# Patient Record
Sex: MALE | Race: WHITE | NOT HISPANIC OR LATINO | Employment: OTHER | URBAN - METROPOLITAN AREA
[De-identification: names, ages, dates, MRNs, and addresses within clinical notes are randomized per-mention and may not be internally consistent; named-entity substitution may affect disease eponyms.]

---

## 2018-10-23 PROBLEM — R07.9 CHEST PAIN: Status: ACTIVE | Noted: 2018-10-23

## 2020-06-18 ENCOUNTER — OFFICE VISIT (OUTPATIENT)
Dept: FAMILY MEDICINE | Facility: CLINIC | Age: 65
End: 2020-06-18
Payer: MEDICARE

## 2020-06-18 VITALS
WEIGHT: 153.38 LBS | OXYGEN SATURATION: 95 % | SYSTOLIC BLOOD PRESSURE: 152 MMHG | DIASTOLIC BLOOD PRESSURE: 90 MMHG | HEART RATE: 68 BPM | BODY MASS INDEX: 24.65 KG/M2 | HEIGHT: 66 IN | TEMPERATURE: 99 F

## 2020-06-18 DIAGNOSIS — Z12.5 SCREENING FOR PROSTATE CANCER: ICD-10-CM

## 2020-06-18 DIAGNOSIS — I10 ESSENTIAL HYPERTENSION: ICD-10-CM

## 2020-06-18 DIAGNOSIS — Z13.89 SCREENING FOR BLOOD OR PROTEIN IN URINE: ICD-10-CM

## 2020-06-18 DIAGNOSIS — Z79.899 LONG-TERM USE OF HIGH-RISK MEDICATION: ICD-10-CM

## 2020-06-18 DIAGNOSIS — Z13.29 SCREENING FOR ENDOCRINE DISORDER: ICD-10-CM

## 2020-06-18 DIAGNOSIS — K21.9 GASTROESOPHAGEAL REFLUX DISEASE WITHOUT ESOPHAGITIS: Primary | ICD-10-CM

## 2020-06-18 DIAGNOSIS — Z13.0 SCREENING FOR IRON DEFICIENCY ANEMIA: ICD-10-CM

## 2020-06-18 DIAGNOSIS — F41.1 GENERALIZED ANXIETY DISORDER: ICD-10-CM

## 2020-06-18 DIAGNOSIS — Z13.6 SCREENING FOR ISCHEMIC HEART DISEASE (IHD): ICD-10-CM

## 2020-06-18 DIAGNOSIS — E78.2 MIXED HYPERLIPIDEMIA: ICD-10-CM

## 2020-06-18 DIAGNOSIS — B00.1 FEVER BLISTER: ICD-10-CM

## 2020-06-18 PROCEDURE — 99203 OFFICE O/P NEW LOW 30 MIN: CPT | Mod: S$GLB,,, | Performed by: FAMILY MEDICINE

## 2020-06-18 PROCEDURE — 99203 PR OFFICE/OUTPT VISIT, NEW, LEVL III, 30-44 MIN: ICD-10-PCS | Mod: S$GLB,,, | Performed by: FAMILY MEDICINE

## 2020-06-18 RX ORDER — HYDROGEN PEROXIDE 3 %
20 SOLUTION, NON-ORAL MISCELLANEOUS
COMMUNITY
End: 2020-07-08 | Stop reason: SDUPTHER

## 2020-06-18 RX ORDER — ESCITALOPRAM OXALATE 20 MG/1
10 TABLET ORAL DAILY
Qty: 90 TABLET | Refills: 1 | Status: SHIPPED | OUTPATIENT
Start: 2020-06-18 | End: 2022-03-14 | Stop reason: SDUPTHER

## 2020-06-18 RX ORDER — FAMOTIDINE 20 MG/1
20 TABLET, FILM COATED ORAL 2 TIMES DAILY
COMMUNITY
End: 2020-07-17

## 2020-06-18 RX ORDER — ROSUVASTATIN CALCIUM 20 MG/1
20 TABLET, COATED ORAL DAILY
Qty: 30 TABLET | Refills: 3 | Status: SHIPPED | OUTPATIENT
Start: 2020-06-18 | End: 2020-07-17

## 2020-06-18 NOTE — PROGRESS NOTES
SUBJECTIVE:    Patient ID: Martell Corcoran is a 65 y.o. male.    Chief Complaint: Establish Care and Bottles brought    Pt here to establish care.       Pt with a PMHx of Anxiety, Pepcid, HTN, CAD (65%), HLD, GERD, Anxiety    CAD, has chest pain, with exertion, not much just walking around (feeding chickens or tending to coup) doesn't currently have a cardiologist. Wants to see Dr. French Gaffney    Has COPD, has a steady cough where he get phlegm up, clear, takes mucinex DM.     HLD, LDL is 209, not on a statin. Has trouble with statin(pravastatin, atorvastatin).  Has was to try livalo in the past but her insurance did not want to cover it.     Has bad reflux, and found that pepcid did not work as well, but otc nexium works well. (failed prilosec,protonix)    BP is not good today, takes it at home, and tends to fluctuate there too.      Has had a lot of anxiety. Thinks back, and thinks it has been all of is life. Probably didn't realize until his 30s. Has been on Xanax since 2009. Takes as needed.  Has also been on lexapro for a while.     Has occasional fever blister breakout. Has one right now.    Has been on avodart before. Urinating fine.           No visits with results within 6 Month(s) from this visit.   Latest known visit with results is:   Orders Only on 11/25/2019   Component Date Value Ref Range Status    Cholesterol 11/25/2019 284* 100 - 199 mg/dL Final    Triglycerides 11/25/2019 106  0 - 149 mg/dL Final    HDL 11/25/2019 54  >39 mg/dL Final    VLDL Cholesterol Christophe 11/25/2019 21  5 - 40 mg/dL Final    LDL Calculated 11/25/2019 209* 0 - 99 mg/dL Final       Past Medical History:   Diagnosis Date    Benign enlargement of prostate     Had a biopsy in around 2015    GERD (gastroesophageal reflux disease)     Hypertension     Started with meds in 2012.     Past Surgical History:   Procedure Laterality Date    FISTULA REPAIR      LEFT HEART CATHETERIZATION N/A 10/23/2018    Procedure:  Left heart cath;  Surgeon: Niharika Squires MD;  Location: [facility];  Service: Cardiology;  Laterality: N/A;     Family History   Problem Relation Age of Onset    Heart failure Mother     Cancer Father     Heart disease Brother     Heart attack Brother     Cancer Brother        Marital Status:   Alcohol History:  reports current alcohol use.  Tobacco History:  reports that he has been smoking cigarettes. He has been smoking about 1.00 pack per day. He has never used smokeless tobacco.  Drug History:  reports no history of drug use.    Review of patient's allergies indicates:   Allergen Reactions    Oyster extract Swelling     PT swells in his throat after eating oysters on occasion       Current Outpatient Medications:     ALPRAZolam (XANAX) 0.5 MG tablet, Take 0.5 mg by mouth 3 (three) times daily as needed for Anxiety., Disp: , Rfl:     azelastine (ASTELIN) 137 mcg (0.1 %) nasal spray, 1 spray by Nasal route 2 (two) times daily., Disp: , Rfl:     escitalopram oxalate (LEXAPRO) 20 MG tablet, Take 0.5 tablets (10 mg total) by mouth once daily., Disp: 90 tablet, Rfl: 1    esomeprazole (NEXIUM) 20 MG capsule, Take 20 mg by mouth before breakfast., Disp: , Rfl:     losartan (COZAAR) 100 MG tablet, Take 100 mg by mouth once daily., Disp: , Rfl:     valACYclovir (VALTREX) 1000 MG tablet, Take 1 g by mouth 2 (two) times daily., Disp: , Rfl:     aspirin (ECOTRIN) 81 MG EC tablet, Take 1 tablet (81 mg total) by mouth once daily., Disp: , Rfl: 0    ciprofloxacin HCl (CIPRO) 500 MG tablet, Take 1 tablet (500 mg total) by mouth 2 (two) times daily., Disp: 14 tablet, Rfl: 0    dutasteride (AVODART) 0.5 mg capsule, Take 0.5 mg by mouth once daily., Disp: , Rfl:     famotidine (PEPCID) 20 MG tablet, Take 20 mg by mouth 2 (two) times daily., Disp: , Rfl:     pantoprazole (PROTONIX) 40 MG tablet, Take 40 mg by mouth once daily., Disp: , Rfl:     rosuvastatin (CRESTOR) 20 MG tablet, Take 1 tablet (20 mg  "total) by mouth once daily., Disp: 30 tablet, Rfl: 3    varenicline (CHANTIX) 1 mg Tab, Take 1 mg by mouth 2 (two) times daily., Disp: , Rfl:     Review of Systems   Constitutional: Negative for appetite change, fatigue, fever and unexpected weight change.   Respiratory: Positive for cough. Negative for chest tightness, shortness of breath and wheezing.    Cardiovascular: Positive for chest pain. Negative for leg swelling.   Gastrointestinal: Negative for abdominal pain, constipation, nausea and vomiting.        -heartburn   Genitourinary: Negative for decreased urine volume, difficulty urinating, dysuria and frequency.   Musculoskeletal: Negative for arthralgias, back pain, myalgias and neck pain.   Skin: Positive for rash (lip).   Neurological: Negative for dizziness, weakness, numbness and headaches.   Hematological: Does not bruise/bleed easily.   Psychiatric/Behavioral: Negative for dysphoric mood, sleep disturbance and suicidal ideas. The patient is nervous/anxious.    All other systems reviewed and are negative.         Objective:       Vitals:    06/18/20 1502   BP: (!) 152/90   Pulse: 68   Temp: 98.7 °F (37.1 °C)   SpO2: 95%   Weight: 69.6 kg (153 lb 6.4 oz)   Height: 5' 6" (1.676 m)     Body mass index is 24.76 kg/m².     Physical Exam  Vitals signs reviewed.   Constitutional:       General: He is not in acute distress.     Appearance: He is well-developed.   HENT:      Head: Normocephalic and atraumatic.   Neck:      Musculoskeletal: Neck supple.      Thyroid: No thyromegaly.   Cardiovascular:      Rate and Rhythm: Normal rate and regular rhythm.      Heart sounds: Normal heart sounds. No murmur. No friction rub.   Pulmonary:      Effort: Pulmonary effort is normal.      Breath sounds: Normal breath sounds. No wheezing or rales.   Abdominal:      General: Bowel sounds are normal. There is no distension.      Palpations: Abdomen is soft.      Tenderness: There is no abdominal tenderness. "   Lymphadenopathy:      Cervical: No cervical adenopathy.   Skin:     General: Skin is warm and dry.      Findings: No rash.   Neurological:      Mental Status: He is alert and oriented to person, place, and time.   Psychiatric:         Attention and Perception: He is attentive.         Speech: Speech normal.         Behavior: Behavior normal.         Thought Content: Thought content normal.         Judgment: Judgment normal.             Assessment:       1. Gastroesophageal reflux disease without esophagitis    2. Essential hypertension    3. Generalized anxiety disorder    4. Fever blister    5. Mixed hyperlipidemia    6. Screening for prostate cancer    7. Screening for ischemic heart disease (IHD)    8. Screening for endocrine disorder    9. Screening for iron deficiency anemia    10. Screening for blood or protein in urine    11. Long-term use of high-risk medication         Plan:       Gastroesophageal reflux disease without esophagitis  Comments:  Symptomatic. To continue otc nexium for now. Will continue to monitor symptoms.    Essential hypertension  Comments:  Uncontrolled. Will continue to monitor    Generalized anxiety disorder  Comments:  Symptomatic. To continue lexapro and xanax prn. Will continue to monitor symptoms.  Orders:  -     escitalopram oxalate (LEXAPRO) 20 MG tablet; Take 0.5 tablets (10 mg total) by mouth once daily.  Dispense: 90 tablet; Refill: 1    Fever blister  Comments:  Symptomatic. To take valtrex as needed. Will continue ot monitor symptoms.    Mixed hyperlipidemia  Comments:  Hx of statin intolerance. Willing to try crestor. Will monitor response.   Orders:  -     rosuvastatin (CRESTOR) 20 MG tablet; Take 1 tablet (20 mg total) by mouth once daily.  Dispense: 30 tablet; Refill: 3    Screening for prostate cancer  -     PSA, Screening; Future; Expected date: 06/18/2020  -     Urinalysis; Future; Expected date: 06/18/2020    Screening for ischemic heart disease (IHD)  -      Comprehensive metabolic panel; Future; Expected date: 06/18/2020  -     Lipid Panel; Future; Expected date: 06/18/2020    Screening for endocrine disorder  -     TSH w/reflex to FT4; Future; Expected date: 06/18/2020    Screening for iron deficiency anemia    Screening for blood or protein in urine  -     Microalbumin/creatinine urine ratio; Future; Expected date: 06/18/2020    Long-term use of high-risk medication  -     CBC auto differential; Future; Expected date: 06/18/2020      Follow up in about 3 months (around 9/18/2020) for HTN, GERD, Anxiety, HSV.

## 2020-06-24 ENCOUNTER — PATIENT MESSAGE (OUTPATIENT)
Dept: FAMILY MEDICINE | Facility: CLINIC | Age: 65
End: 2020-06-24

## 2020-06-24 ENCOUNTER — TELEPHONE (OUTPATIENT)
Dept: FAMILY MEDICINE | Facility: CLINIC | Age: 65
End: 2020-06-24

## 2020-06-24 ENCOUNTER — OFFICE VISIT (OUTPATIENT)
Dept: FAMILY MEDICINE | Facility: CLINIC | Age: 65
End: 2020-06-24
Payer: MEDICARE

## 2020-06-24 DIAGNOSIS — R31.0 GROSS HEMATURIA: ICD-10-CM

## 2020-06-24 DIAGNOSIS — R07.2 PRECORDIAL PAIN: Primary | ICD-10-CM

## 2020-06-24 DIAGNOSIS — Z87.442 HISTORY OF KIDNEY STONES: ICD-10-CM

## 2020-06-24 DIAGNOSIS — R10.9 ACUTE FLANK PAIN: ICD-10-CM

## 2020-06-24 DIAGNOSIS — R10.30 LOWER ABDOMINAL PAIN: ICD-10-CM

## 2020-06-24 DIAGNOSIS — R31.9 HEMATURIA: Primary | ICD-10-CM

## 2020-06-24 PROCEDURE — 99214 OFFICE O/P EST MOD 30 MIN: CPT | Mod: 25,S$GLB,, | Performed by: FAMILY MEDICINE

## 2020-06-24 PROCEDURE — 93000 ELECTROCARDIOGRAM COMPLETE: CPT | Mod: S$GLB,,, | Performed by: FAMILY MEDICINE

## 2020-06-24 PROCEDURE — 99214 PR OFFICE/OUTPT VISIT, EST, LEVL IV, 30-39 MIN: ICD-10-PCS | Mod: 25,S$GLB,, | Performed by: FAMILY MEDICINE

## 2020-06-24 PROCEDURE — 93000 POCT EKG 12-LEAD: ICD-10-PCS | Mod: S$GLB,,, | Performed by: FAMILY MEDICINE

## 2020-06-24 RX ORDER — CIPROFLOXACIN 500 MG/1
500 TABLET ORAL 2 TIMES DAILY
Qty: 14 TABLET | Refills: 0 | Status: SHIPPED | OUTPATIENT
Start: 2020-06-24 | End: 2020-07-17

## 2020-06-24 NOTE — PROGRESS NOTES
SUBJECTIVE:    Patient ID: Martell Corcoran is a 65 y.o. male.    Chief Complaint: Hematuria, Chest Pain, and Back Pain    Pt neglected to mention that he was having some blood in urine at previous visit.  He has had some bright red blood in it.  Has had a kidney stone in the past, but they never did give him a problem or pain, and is unsure if he passed them.  Was in 2016.    Has been having pain in the middle of his back and both sides of flank as well that comes and goes, not constant.    Started having a tightness in his neck yesterday morning as well, that is new, and feels tight.  Usually has neck pain in the center of the neck from herniated disks, etc.  Then has been having a dull pain in the center of his chest as well, which has been going on for a while as well.  The shortness of breath is the same      Office Visit on 06/18/2020   Component Date Value Ref Range Status    WBC 06/24/2020 6.9  3.8 - 10.8 Thousand/uL Final    RBC 06/24/2020 5.34  4.20 - 5.80 Million/uL Final    Hemoglobin 06/24/2020 16.9  13.2 - 17.1 g/dL Final    Hematocrit 06/24/2020 51.0* 38.5 - 50.0 % Final    Mean Corpuscular Volume 06/24/2020 95.5  80.0 - 100.0 fL Final    Mean Corpuscular Hemoglobin 06/24/2020 31.6  27.0 - 33.0 pg Final    Mean Corpuscular Hemoglobin Conc 06/24/2020 33.1  32.0 - 36.0 g/dL Final    RDW 06/24/2020 13.4  11.0 - 15.0 % Final    Platelets 06/24/2020 262  140 - 400 Thousand/uL Final    MPV 06/24/2020 9.0  7.5 - 12.5 fL Final    Neutrophils Absolute 06/24/2020 4,616  1,500 - 7,800 cells/uL Final    Lymph # 06/24/2020 1,484  850 - 3,900 cells/uL Final    Mono # 06/24/2020 676  200 - 950 cells/uL Final    Eos # 06/24/2020 97  15 - 500 cells/uL Final    Baso # 06/24/2020 28  0 - 200 cells/uL Final    Neutrophils Relative 06/24/2020 66.9  % Final    Lymph% 06/24/2020 21.5  % Final    Mono% 06/24/2020 9.8  % Final    Eosinophil% 06/24/2020 1.4  % Final    Basophil% 06/24/2020 0.4  %  Final    TSH w/reflex to FT4 06/24/2020 0.89  0.40 - 4.50 mIU/L Final    Creatinine, Random Ur 06/24/2020 143  20 - 320 mg/dL Final    Microalb, Ur 06/24/2020 4.6  See Note: mg/dL Final    Microalb Creat Ratio 06/24/2020 32* <30 mcg/mg creat Final    Glucose 06/24/2020 106  65 - 139 mg/dL Final    BUN, Bld 06/24/2020 12  7 - 25 mg/dL Final    Creatinine 06/24/2020 0.84  0.70 - 1.25 mg/dL Final    eGFR if non African American 06/24/2020 92  > OR = 60 mL/min/1.73m2 Final    eGFR if African American 06/24/2020 106  > OR = 60 mL/min/1.73m2 Final    BUN/Creatinine Ratio 06/24/2020 NOT APPLICABLE  6 - 22 (calc) Final    Sodium 06/24/2020 141  135 - 146 mmol/L Final    Potassium 06/24/2020 4.3  3.5 - 5.3 mmol/L Final    Chloride 06/24/2020 102  98 - 110 mmol/L Final    CO2 06/24/2020 31  20 - 32 mmol/L Final    Calcium 06/24/2020 9.8  8.6 - 10.3 mg/dL Final    Total Protein 06/24/2020 6.6  6.1 - 8.1 g/dL Final    Albumin 06/24/2020 4.2  3.6 - 5.1 g/dL Final    Globulin, Total 06/24/2020 2.4  1.9 - 3.7 g/dL (calc) Final    Albumin/Globulin Ratio 06/24/2020 1.8  1.0 - 2.5 (calc) Final    Total Bilirubin 06/24/2020 0.4  0.2 - 1.2 mg/dL Final    Alkaline Phosphatase 06/24/2020 72  35 - 144 U/L Final    AST 06/24/2020 19  10 - 35 U/L Final    ALT 06/24/2020 15  9 - 46 U/L Final    Cholesterol 06/24/2020 224* <200 mg/dL Final    HDL 06/24/2020 56  > OR = 40 mg/dL Final    Triglycerides 06/24/2020 143  <150 mg/dL Final    LDL Cholesterol 06/24/2020 141* mg/dL (calc) Final    Hdl/Cholesterol Ratio 06/24/2020 4.0  <5.0 (calc) Final    Non HDL Chol. (LDL+VLDL) 06/24/2020 168* <130 mg/dL (calc) Final    PROSTATE SPECIFIC ANTIGEN, SCR - Q* 06/24/2020 2.4  < OR = 4.0 ng/mL Final    Color, UA 06/24/2020 DARK YELLOW  YELLOW Final    Appearance, UA 06/24/2020 CLOUDY* CLEAR Final    Specific [place], UA 06/24/2020 1.017  1.001 - 1.035 Final    pH, UA 06/24/2020 6.5  5.0 - 8.0 Final    Glucose, UA  06/24/2020 NEGATIVE  NEGATIVE Final    Bilirubin, UA 06/24/2020 NEGATIVE  NEGATIVE Final    Ketones, UA 06/24/2020 NEGATIVE  NEGATIVE Final    Occult Blood UA 06/24/2020 3+* NEGATIVE Final    Protein, UA 06/24/2020 1+* NEGATIVE Final    Nitrite, UA 06/24/2020 NEGATIVE  NEGATIVE Final    Leukocytes, UA 06/24/2020 2+* NEGATIVE Final    Urine Culture, Routine 06/24/2020 *  Final       Past Medical History:   Diagnosis Date    Benign enlargement of prostate     Had a biopsy in around 2015    GERD (gastroesophageal reflux disease)     Hypertension     Started with meds in 2012.     Past Surgical History:   Procedure Laterality Date    FISTULA REPAIR      LEFT HEART CATHETERIZATION N/A 10/23/2018    Procedure: Left heart cath;  Surgeon: Niharika Squires MD;  Location: [facility] C[facility];  Service: Cardiology;  Laterality: N/A;     Family History   Problem Relation Age of Onset    Heart failure Mother     Cancer Father     Heart disease Brother     Heart attack Brother     Cancer Brother        Marital Status:   Alcohol History:  reports current alcohol use.  Tobacco History:  reports that he has been smoking cigarettes. He has been smoking about 1.00 pack per day. He has never used smokeless tobacco.  Drug History:  reports no history of drug use.    Review of patient's allergies indicates:   Allergen Reactions    Oyster extract Swelling     PT swells in his throat after eating oysters on occasion       Current Outpatient Medications:     ALPRAZolam (XANAX) 0.5 MG tablet, Take 0.5 mg by mouth 3 (three) times daily as needed for Anxiety., Disp: , Rfl:     aspirin (ECOTRIN) 81 MG EC tablet, Take 1 tablet (81 mg total) by mouth once daily., Disp: , Rfl: 0    azelastine (ASTELIN) 137 mcg (0.1 %) nasal spray, 1 spray by Nasal route 2 (two) times daily., Disp: , Rfl:     ciprofloxacin HCl (CIPRO) 500 MG tablet, Take 1 tablet (500 mg total) by mouth 2 (two) times daily., Disp: 14 tablet, Rfl: 0     dutasteride (AVODART) 0.5 mg capsule, Take 0.5 mg by mouth once daily., Disp: , Rfl:     escitalopram oxalate (LEXAPRO) 20 MG tablet, Take 0.5 tablets (10 mg total) by mouth once daily., Disp: 90 tablet, Rfl: 1    esomeprazole (NEXIUM) 20 MG capsule, Take 20 mg by mouth before breakfast., Disp: , Rfl:     famotidine (PEPCID) 20 MG tablet, Take 20 mg by mouth 2 (two) times daily., Disp: , Rfl:     losartan (COZAAR) 100 MG tablet, Take 100 mg by mouth once daily., Disp: , Rfl:     methylPREDNISolone (MEDROL DOSEPACK) 4 mg tablet, use as directed, Disp: 1 Package, Rfl: 0    pantoprazole (PROTONIX) 40 MG tablet, Take 40 mg by mouth once daily., Disp: , Rfl:     rosuvastatin (CRESTOR) 20 MG tablet, Take 1 tablet (20 mg total) by mouth once daily., Disp: 30 tablet, Rfl: 3    valACYclovir (VALTREX) 1000 MG tablet, Take 1 g by mouth 2 (two) times daily., Disp: , Rfl:     varenicline (CHANTIX) 1 mg Tab, Take 1 mg by mouth 2 (two) times daily., Disp: , Rfl:     Review of Systems   Constitutional: Negative for appetite change, fatigue, fever and unexpected weight change.   Respiratory: Negative for cough, chest tightness, shortness of breath and wheezing.    Cardiovascular: Negative for chest pain and leg swelling.   Gastrointestinal: Positive for abdominal pain. Negative for constipation, nausea and vomiting.        -heartburn   Genitourinary: Positive for difficulty urinating (little bit), flank pain and hematuria. Negative for decreased urine volume, dysuria and frequency.   Musculoskeletal: Positive for back pain and neck pain. Negative for arthralgias and myalgias.   Skin: Negative for rash.   Neurological: Negative for dizziness, weakness, numbness and headaches.   Hematological: Does not bruise/bleed easily.   Psychiatric/Behavioral: Negative for dysphoric mood, sleep disturbance and suicidal ideas. The patient is not nervous/anxious.    All other systems reviewed and are negative.         Objective:     "  Vitals:    06/24/20 1542   BP: (!) 145/92   Pulse: 72   Resp: 16   Temp: 99.1 °F (37.3 °C)   SpO2: 96%   Weight: 69.9 kg (154 lb)   Height: 5' 6" (1.676 m)     Body mass index is 24.86 kg/m².     EKG 6/24/20: normal sinus rhythm, nonspecific ST and T waves changes, nonacute. Abnormal EKG.      Physical Exam  Vitals signs reviewed.   Constitutional:       General: He is not in acute distress.     Appearance: He is well-developed and normal weight.   HENT:      Head: Normocephalic and atraumatic.   Neck:      Musculoskeletal: Neck supple.      Thyroid: No thyromegaly.   Cardiovascular:      Rate and Rhythm: Normal rate and regular rhythm.      Heart sounds: Normal heart sounds. No murmur. No friction rub.   Pulmonary:      Effort: Pulmonary effort is normal.      Breath sounds: Normal breath sounds. No wheezing or rales.   Abdominal:      General: Bowel sounds are normal. There is no distension.      Palpations: Abdomen is soft. There is no hepatomegaly or splenomegaly.      Tenderness: There is generalized abdominal tenderness. There is left CVA tenderness. There is no right CVA tenderness, guarding or rebound.      Hernia: A hernia is present.   Lymphadenopathy:      Cervical: No cervical adenopathy.   Skin:     General: Skin is warm and dry.      Findings: No rash.   Neurological:      Mental Status: He is alert and oriented to person, place, and time.   Psychiatric:         Attention and Perception: He is attentive.         Speech: Speech normal.         Behavior: Behavior normal.         Thought Content: Thought content normal.         Judgment: Judgment normal.           Assessment:       1. Precordial pain    2. Gross hematuria    3. Acute flank pain    4. History of kidney stones    5. Lower abdominal pain         Plan:       Precordial pain  Comments:  Chronic. Likely some aspect of angina. Needs f/u with Cardiology. Will continue to monito symptoms  Orders:  -     POCT EKG 12-LEAD (NOT FOR RACHAELSASHLEY " USE)    Gross hematuria  Comments:  Acute. U/A adn CT ordered to r/o stone due to history and symtpoms  Orders:  -     ciprofloxacin HCl (CIPRO) 500 MG tablet; Take 1 tablet (500 mg total) by mouth 2 (two) times daily.  Dispense: 14 tablet; Refill: 0    Acute flank pain    History of kidney stones    Lower abdominal pain  Comments:  Acute. Labs and tests ordered to r/o renal pathology  Orders:  -     CT Abdomen Pelvis  Without Contrast; Future; Expected date: 06/24/2020    Labs have been ordered for monitoring of chronic conditions, just before next visit.    Follow up for As scheduled.

## 2020-06-24 NOTE — TELEPHONE ENCOUNTER
Per Dr Burrell, add urine culture. order. Dx Hematuria. I only added urine culture, not u/a or u/a with reflex to culture. Patient has several orders from 6/18, just FYI. Please send to Dr Burrell to sign order

## 2020-06-25 ENCOUNTER — TELEPHONE (OUTPATIENT)
Dept: FAMILY MEDICINE | Facility: CLINIC | Age: 65
End: 2020-06-25

## 2020-06-25 ENCOUNTER — PATIENT MESSAGE (OUTPATIENT)
Dept: FAMILY MEDICINE | Facility: CLINIC | Age: 65
End: 2020-06-25

## 2020-06-25 LAB
ALBUMIN SERPL-MCNC: 4.2 G/DL (ref 3.6–5.1)
ALBUMIN/GLOB SERPL: 1.8 (CALC) (ref 1–2.5)
ALP SERPL-CCNC: 72 U/L (ref 35–144)
ALT SERPL-CCNC: 15 U/L (ref 9–46)
AST SERPL-CCNC: 19 U/L (ref 10–35)
BASOPHILS # BLD AUTO: 28 CELLS/UL (ref 0–200)
BASOPHILS NFR BLD AUTO: 0.4 %
BILIRUB SERPL-MCNC: 0.4 MG/DL (ref 0.2–1.2)
BUN SERPL-MCNC: 12 MG/DL (ref 7–25)
BUN/CREAT SERPL: NORMAL (CALC) (ref 6–22)
CALCIUM SERPL-MCNC: 9.8 MG/DL (ref 8.6–10.3)
CHLORIDE SERPL-SCNC: 102 MMOL/L (ref 98–110)
CHOLEST SERPL-MCNC: 224 MG/DL
CHOLEST/HDLC SERPL: 4 (CALC)
CO2 SERPL-SCNC: 31 MMOL/L (ref 20–32)
CREAT SERPL-MCNC: 0.84 MG/DL (ref 0.7–1.25)
EOSINOPHIL # BLD AUTO: 97 CELLS/UL (ref 15–500)
EOSINOPHIL NFR BLD AUTO: 1.4 %
ERYTHROCYTE [DISTWIDTH] IN BLOOD BY AUTOMATED COUNT: 13.4 % (ref 11–15)
GFRSERPLBLD MDRD-ARVRAT: 92 ML/MIN/1.73M2
GLOBULIN SER CALC-MCNC: 2.4 G/DL (CALC) (ref 1.9–3.7)
GLUCOSE SERPL-MCNC: 106 MG/DL (ref 65–139)
HCT VFR BLD AUTO: 51 % (ref 38.5–50)
HDLC SERPL-MCNC: 56 MG/DL
HGB BLD-MCNC: 16.9 G/DL (ref 13.2–17.1)
LDLC SERPL CALC-MCNC: 141 MG/DL (CALC)
LYMPHOCYTES # BLD AUTO: 1484 CELLS/UL (ref 850–3900)
LYMPHOCYTES NFR BLD AUTO: 21.5 %
MCH RBC QN AUTO: 31.6 PG (ref 27–33)
MCHC RBC AUTO-ENTMCNC: 33.1 G/DL (ref 32–36)
MCV RBC AUTO: 95.5 FL (ref 80–100)
MONOCYTES # BLD AUTO: 676 CELLS/UL (ref 200–950)
MONOCYTES NFR BLD AUTO: 9.8 %
NEUTROPHILS # BLD AUTO: 4616 CELLS/UL (ref 1500–7800)
NEUTROPHILS NFR BLD AUTO: 66.9 %
NONHDLC SERPL-MCNC: 168 MG/DL (CALC)
PLATELET # BLD AUTO: 262 THOUSAND/UL (ref 140–400)
PMV BLD REES-ECKER: 9 FL (ref 7.5–12.5)
POTASSIUM SERPL-SCNC: 4.3 MMOL/L (ref 3.5–5.3)
PROT SERPL-MCNC: 6.6 G/DL (ref 6.1–8.1)
PSA SERPL-MCNC: 2.4 NG/ML
RBC # BLD AUTO: 5.34 MILLION/UL (ref 4.2–5.8)
SODIUM SERPL-SCNC: 141 MMOL/L (ref 135–146)
TRIGL SERPL-MCNC: 143 MG/DL
TSH SERPL-ACNC: 0.89 MIU/L (ref 0.4–4.5)
WBC # BLD AUTO: 6.9 THOUSAND/UL (ref 3.8–10.8)

## 2020-06-25 NOTE — TELEPHONE ENCOUNTER
----- Message from Zo Burrell MD sent at 6/25/2020  5:04 PM CDT -----  Let pt know that     1. the cholesterol panel is elevated, but not as high as it was 7 months ago..   2. The liver and kidney function are normal.    3. The glucose is good for non fasting sample.  4. The patient is not anemic.   5. The urinalysis is abnormal.  He has blood, protein and white blood cells in the urine, which could be evidence of a stone or UTI.  He is spilling some micro protein in his urine, which may or may not be significant. We will need to repeat this about 6 months from now.  He is to be sure to take the cipro (anbx I called in) and have CT done.  6. The thyroid function is normal.  7. His PSA is normal at 2.4

## 2020-06-26 ENCOUNTER — HOSPITAL ENCOUNTER (OUTPATIENT)
Dept: RADIOLOGY | Facility: HOSPITAL | Age: 65
Discharge: HOME | End: 2020-06-26
Attending: FAMILY MEDICINE
Payer: MEDICARE

## 2020-06-26 ENCOUNTER — TELEPHONE (OUTPATIENT)
Dept: FAMILY MEDICINE | Facility: CLINIC | Age: 65
End: 2020-06-26

## 2020-06-26 DIAGNOSIS — R10.9 ABDOMINAL PAIN, UNSPECIFIED ABDOMINAL LOCATION: ICD-10-CM

## 2020-06-26 LAB
ALBUMIN/CREAT UR: 32 MCG/MG CREAT
APPEARANCE UR: ABNORMAL
BACTERIA UR CULT: ABNORMAL
BILIRUB UR QL STRIP: NEGATIVE
COLOR UR: ABNORMAL
CREAT UR-MCNC: 143 MG/DL (ref 20–320)
GLUCOSE UR QL STRIP: NEGATIVE
HGB UR QL STRIP: ABNORMAL
KETONES UR QL STRIP: NEGATIVE
LEUKOCYTE ESTERASE UR QL STRIP: ABNORMAL
MICROALBUMIN UR-MCNC: 4.6 MG/DL
NITRITE UR QL STRIP: NEGATIVE
PH UR STRIP: 6.5 [PH] (ref 5–8)
PROT UR QL STRIP: ABNORMAL
SP GR UR STRIP: 1.02 (ref 1–1.03)

## 2020-06-26 PROCEDURE — 74176 CT ABD & PELVIS W/O CONTRAST: CPT | Mod: TC,PO

## 2020-06-26 NOTE — TELEPHONE ENCOUNTER
Abnormal urine culture. Looks like he was called with lab results yesterday, but I don't believe the culture was back yet.

## 2020-06-29 ENCOUNTER — TELEPHONE (OUTPATIENT)
Dept: FAMILY MEDICINE | Facility: CLINIC | Age: 65
End: 2020-06-29

## 2020-06-29 DIAGNOSIS — N32.89 BLADDER MASS: ICD-10-CM

## 2020-06-29 DIAGNOSIS — T14.8XXA MUSCLE STRAIN: Primary | ICD-10-CM

## 2020-06-29 RX ORDER — METHYLPREDNISOLONE 4 MG/1
TABLET ORAL
Qty: 1 PACKAGE | Refills: 0 | Status: SHIPPED | OUTPATIENT
Start: 2020-06-29 | End: 2020-07-17

## 2020-06-29 NOTE — TELEPHONE ENCOUNTER
Spoke with pt - Pt informed of results and recommendations per Dr. Burrell verbatim. Pt verbalized an understanding of our conversation. CHARLES

## 2020-06-29 NOTE — TELEPHONE ENCOUNTER
Spoke with pt - Pt informed of his results and that he needs to make sure he finishes his abx. Pt verbalized an understanding. CHARLES

## 2020-06-29 NOTE — TELEPHONE ENCOUNTER
----- Message from Zo Burrell MD sent at 6/28/2020 11:47 PM CDT -----  Please call the patient regarding his abnormal urine culture result.    1. Let him know he can finish antibiotic.

## 2020-06-29 NOTE — TELEPHONE ENCOUNTER
----- Message from Zo Burrell MD sent at 6/28/2020 11:45 PM CDT -----  Please call the patient regarding his abnormal result.    1. Let him know he has bladder mass suspicious for bladder cancer, needs to see  Urology.   2. Also has liver lesions resembling cysts; Can wait as Urology may order more imaging.  3. Emphysematous changes of lung at the bases.  4. Atherosclerosis of the aorta  5. Small inguinal hernias.  ##Refer to Urology, Dr. Lewis;

## 2020-06-29 NOTE — TELEPHONE ENCOUNTER
Spoke with pt - Pt states that he pulled something in his back over the weekend and that he is taking ibuprofen for the pain. Pt requesting a steroid pack to take because he has used it before when he pulled something in his back and it seemed to help. CHARLES    [facility]

## 2020-07-01 ENCOUNTER — TELEPHONE (OUTPATIENT)
Dept: UROLOGY | Facility: CLINIC | Age: 65
End: 2020-07-01

## 2020-07-01 ENCOUNTER — TELEPHONE (OUTPATIENT)
Dept: FAMILY MEDICINE | Facility: CLINIC | Age: 65
End: 2020-07-01

## 2020-07-01 VITALS
DIASTOLIC BLOOD PRESSURE: 92 MMHG | HEIGHT: 66 IN | TEMPERATURE: 99 F | SYSTOLIC BLOOD PRESSURE: 145 MMHG | RESPIRATION RATE: 16 BRPM | WEIGHT: 154 LBS | BODY MASS INDEX: 24.75 KG/M2 | OXYGEN SATURATION: 96 % | HEART RATE: 72 BPM

## 2020-07-01 LAB — EKG 12-LEAD: NORMAL

## 2020-07-01 NOTE — TELEPHONE ENCOUNTER
----- Message from Kaylan Bahena MA sent at 7/1/2020 10:04 AM CDT -----  Type:  Patient Returning Call    Who Called:  Martell Chamorro Left Message for Patient:  unknown  Does the patient know what this is regarding?:  appointment  Best Call Back Number:    Additional Information:

## 2020-07-01 NOTE — TELEPHONE ENCOUNTER
----- Message from Cuca Humphrey MA sent at 7/1/2020  4:39 PM CDT -----  Regarding: FW: needing call back    ----- Message -----  From: Mary Gomez  Sent: 7/1/2020   3:41 PM CDT  To: Zo Burrell Staff  Subject: needing call back                                Pt just finished the 1st round of Cipro and he is urinating a lot of blood . And wants to know does he need another round of Cipro   Pt [phone]

## 2020-07-01 NOTE — TELEPHONE ENCOUNTER
Patient scheduled for NP appt for bladder mass 7/20.  Records requested from Dr. Deepak Antonio.    Please advise if sooner appt needed.

## 2020-07-06 ENCOUNTER — TELEPHONE (OUTPATIENT)
Dept: PULMONOLOGY | Facility: CLINIC | Age: 65
End: 2020-07-06

## 2020-07-08 ENCOUNTER — OFFICE VISIT (OUTPATIENT)
Dept: PULMONOLOGY | Facility: CLINIC | Age: 65
End: 2020-07-08
Payer: MEDICARE

## 2020-07-08 VITALS
SYSTOLIC BLOOD PRESSURE: 136 MMHG | WEIGHT: 148.25 LBS | OXYGEN SATURATION: 95 % | HEART RATE: 73 BPM | BODY MASS INDEX: 23.93 KG/M2 | DIASTOLIC BLOOD PRESSURE: 86 MMHG

## 2020-07-08 DIAGNOSIS — J44.9 CHRONIC OBSTRUCTIVE PULMONARY DISEASE, UNSPECIFIED COPD TYPE: ICD-10-CM

## 2020-07-08 DIAGNOSIS — K21.9 GASTROESOPHAGEAL REFLUX DISEASE, ESOPHAGITIS PRESENCE NOT SPECIFIED: ICD-10-CM

## 2020-07-08 DIAGNOSIS — R06.09 DOE (DYSPNEA ON EXERTION): Primary | ICD-10-CM

## 2020-07-08 DIAGNOSIS — I10 HYPERTENSION, UNSPECIFIED TYPE: ICD-10-CM

## 2020-07-08 PROCEDURE — 99214 OFFICE O/P EST MOD 30 MIN: CPT | Mod: PBBFAC,PO | Performed by: INTERNAL MEDICINE

## 2020-07-08 PROCEDURE — 99205 PR OFFICE/OUTPT VISIT, NEW, LEVL V, 60-74 MIN: ICD-10-PCS | Mod: S$PBB,,, | Performed by: INTERNAL MEDICINE

## 2020-07-08 PROCEDURE — 99999 PR PBB SHADOW E&M-EST. PATIENT-LVL IV: ICD-10-PCS | Mod: PBBFAC,,, | Performed by: INTERNAL MEDICINE

## 2020-07-08 PROCEDURE — 99205 OFFICE O/P NEW HI 60 MIN: CPT | Mod: S$PBB,,, | Performed by: INTERNAL MEDICINE

## 2020-07-08 PROCEDURE — 99999 PR PBB SHADOW E&M-EST. PATIENT-LVL IV: CPT | Mod: PBBFAC,,, | Performed by: INTERNAL MEDICINE

## 2020-07-08 RX ORDER — ALBUTEROL SULFATE 90 UG/1
1-2 AEROSOL, METERED RESPIRATORY (INHALATION) EVERY 4 HOURS PRN
Qty: 18 G | Refills: 11 | Status: SHIPPED | OUTPATIENT
Start: 2020-07-08 | End: 2022-01-14 | Stop reason: SDUPTHER

## 2020-07-08 RX ORDER — HYDROGEN PEROXIDE 3 %
20 SOLUTION, NON-ORAL MISCELLANEOUS
Qty: 90 CAPSULE | Refills: 3 | Status: SHIPPED | OUTPATIENT
Start: 2020-07-08 | End: 2021-07-26

## 2020-07-08 NOTE — PROGRESS NOTES
2020    Martell Corcoran  New Patient Consult    Chief Complaint   Patient presents with    Referral To Pulmonary     Dr. Janette MD    Cough     clear mucus    Shortness of Breath    Results     had pelvic/abdomen CT done shows something on CT       HPI: Pt is a 66 yo male with HTN, GERD and BPH presents for new evaluation of breathing issues. He complains of SOB especially if he carries anything like taking out trash to the dumpster. This has been progressive over about 3.5 yrs. He wheezes and coughs up clear mucous, throughout the day.  Pt smokes 1 PPD x 55 yrs.  At age 5 pt had pna and a collapsed lung requiring chest tube on the left side and hospitalization. Didn't have any other problems w/ breathing growing up. He took up playing [employer] to help lung function.  Pt had abd/p scan for UTI recently which showed some emphysematous changes.    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 One brother  at 65 from leukemia.    Grew up in [place]  The chief compliant  problem is new to me    PFSH:  Past Medical History:   Diagnosis Date    Benign enlargement of prostate     Had a biopsy in around     GERD (gastroesophageal reflux disease)     Hypertension     Started with meds in .         Past Surgical History:   Procedure Laterality Date    FISTULA REPAIR      LEFT HEART CATHETERIZATION N/A 10/23/2018    Procedure: Left heart cath;  Surgeon: Niharika Squires MD;  Location: [facility] CATH;  Service: Cardiology;  Laterality: N/A;     Social History     Tobacco Use    Smoking status: Current Every Day Smoker     Packs/day: 1.00     Types: Cigarettes    Smokeless tobacco: Never Used   Substance Use Topics    Alcohol use: Yes     Comment: Previous 12 beer a day for 20 years. Now 8-10/week    Drug use: No     Family History   Problem Relation Age of Onset    Heart failure Mother     Cancer  Father     Heart disease Brother     Heart attack Brother     Cancer Brother      Review of patient's allergies indicates:   Allergen Reactions    Oyster extract Swelling     PT swells in his throat after eating oysters on occasion       Performance Status:The patient's activity level is functions out of house. Dyspneic w/ exertion such as walking to take trash out.     Review of Systems:  a review of eleven systems covering constitutional, Eye, HEENT, Psych, Respiratory, Cardiac, GI, , Musculoskeletal, Endocrine, Dermatologic was negative except for pertinent findings as listed ABOVE and below:  Chest pain/pressure   GERD - takes Nexium  Headaches  dizziness  occ diarrhea    Exam:Comprehensive exam done. /86 (BP Location: Left arm, Patient Position: Sitting)   Pulse 73   Wt 67.3 kg (148 lb 4.2 oz)   SpO2 95% Comment: on room air at rest  BMI 23.93 kg/m²   Exam included Vitals as listed, and patient's appearance and affect and alertness and mood, oral exam for yeast and hygiene and pharynx lesions and Mallapatti (M) score, neck with inspection for jvd and masses and thyroid abnormalities and lymph nodes (supraclavicular and infraclavicular nodes and axillary also examined and noted if abn), chest exam included symmetry and effort and fremitus and percussion and auscultation, cardiac exam included rhythm and gallops and murmur and rubs and jvd and edema, abdominal exam for mass and hepatosplenomegaly and tenderness and hernias and bowel sounds, Musculoskeletal exam with muscle tone and posture and mobility/gait and  strength, and skin for rashes and cyanosis and pallor and turgor, extremity for clubbing.  Findings were normal except for pertinent findings listed below:  M1  Diminished breath sounds bilaterally  No clubbing or edema    Radiographs (ct chest and cxr) reviewed: view by direct vision   CT abd/p 6/26/20- bases of lungs w/ scant emphysematous changes, some strands of atelectasis    Labs  reviewed    Results for JENAESOMARTELL DOMINGUEZ (MRN [id number]) as of 7/8/2020 13:20   Ref. Range 6/24/2020 16:04   Hemoglobin Latest Ref Range: 13.2 - 17.1 g/dL 16.9   Hematocrit Latest Ref Range: 38.5 - 50.0 % 51.0 (H)   CMP wnl     PFT will be done and results to be reviewed      Plan:  Clinical impression is resonably certain and repeated evaluation prn +/- follow up will be needed as below.    Martell was seen today for referral to pulmonary, cough, shortness of breath and results.    Diagnoses and all orders for this visit:    QUACH (dyspnea on exertion)    Chronic obstructive pulmonary disease, unspecified COPD type  -     Complete PFT with bronchodilator; Future  -     fluticasone-umeclidin-vilanter (TRELEGY ELLIPTA) 100-62.5-25 mcg DsDv; Inhale 1 puff into the lungs once daily.  -     albuterol (PROVENTIL/VENTOLIN HFA) 90 mcg/actuation inhaler; Inhale 1-2 puffs into the lungs every 4 (four) hours as needed for Shortness of Breath (coughing). Rescue  -     CT Chest Without Contrast; Future  -     Ambulatory referral/consult to Smoking Cessation Program; Future    Hypertension, unspecified type    Gastroesophageal reflux disease, esophagitis presence not specified  -     esomeprazole (NEXIUM) 20 MG capsule; Take 1 capsule (20 mg total) by mouth before breakfast.        Follow up in about 3 months (around 10/8/2020).    Discussed with patient above for education the following:      Patient Instructions   Get pulmonary function tests  Get CT chest  Start using trelegy inhaler once a day- control inhaler- rinse mouth after use  Use albuterol inhaler as needed- rescue inhaler  Need to quit smoking- go to program, consider wellbutrin instead of chantix

## 2020-07-08 NOTE — PATIENT INSTRUCTIONS
Get pulmonary function tests  Get CT chest  Start using trelegy inhaler once a day- control inhaler- rinse mouth after use  Use albuterol inhaler as needed- rescue inhaler  Need to quit smoking- go to program, consider wellbutrin instead of chantix

## 2020-07-13 ENCOUNTER — TELEPHONE (OUTPATIENT)
Dept: PULMONOLOGY | Facility: CLINIC | Age: 65
End: 2020-07-13

## 2020-07-13 NOTE — TELEPHONE ENCOUNTER
Faxed last office note to fax number provided.----- Message from Manny Aden sent at 7/13/2020  2:55 PM CDT -----  Type: Needs Medical Advice    Who Called:  Dominic (Dr. Savage)  Best Call Back Number: [phone]  Additional Information: Caller would like to request clinical notes from previous appointment from referral. Please fax to [phone]. Thanks!

## 2020-07-14 ENCOUNTER — TELEPHONE (OUTPATIENT)
Dept: PULMONOLOGY | Facility: CLINIC | Age: 65
End: 2020-07-14

## 2020-07-14 ENCOUNTER — TELEPHONE (OUTPATIENT)
Dept: UROLOGY | Facility: CLINIC | Age: 65
End: 2020-07-14

## 2020-07-14 NOTE — TELEPHONE ENCOUNTER
----- Message from Devorah Colunga sent at 7/14/2020 11:43 AM CDT -----  Regarding: sooner appointment  Type:  Patient Returning Call    Who Called:  Patient/[phone] (home)     Who Left Message for Patient:  April  Does the patient know what this is regarding?:  Sooner appointment    Additional Information:  Called office but nurse said she did not know anything about this; to send a message to April. Please call patient with sooner availability.

## 2020-07-14 NOTE — TELEPHONE ENCOUNTER
Called patient no answer. LVM. Prior auth done for Trelegy. It was denied. Dr. Marcelo aware and will be changing medication.----- Message from Ita Michelle sent at 7/14/2020 12:01 PM CDT -----  Contact: self  Type: Needs Medical Advice  Who Called:  patient     Pharmacy name and phone #:    [id number] - [place], [place] - [address] [facility] [address] [facility].  [address] [place] [address]  Phone: [phone] Fax: [phone]  Best Call Back Number: [phone]  Additional Information: states insurance will not cover fluticasone-umeclidin-vilanter (TRELEGY ELLIPTA) 100-62.5-25 mcg DsDv, may require prior authorization, please contact to advise.

## 2020-07-15 ENCOUNTER — TELEPHONE (OUTPATIENT)
Dept: PULMONOLOGY | Facility: CLINIC | Age: 65
End: 2020-07-15

## 2020-07-15 DIAGNOSIS — J44.9 CHRONIC OBSTRUCTIVE PULMONARY DISEASE, UNSPECIFIED COPD TYPE: Primary | ICD-10-CM

## 2020-07-15 RX ORDER — UMECLIDINIUM BROMIDE AND VILANTEROL TRIFENATATE 62.5; 25 UG/1; UG/1
1 POWDER RESPIRATORY (INHALATION) DAILY
Qty: 1 EACH | Refills: 11 | Status: SHIPPED | OUTPATIENT
Start: 2020-07-15 | End: 2021-11-04

## 2020-07-15 NOTE — TELEPHONE ENCOUNTER
Spoke with patient and let him know the new inhaler was sent to his pharmacy, patient verbally understood.     ----- Message from Zayra Marcelo MD sent at 7/15/2020  7:57 AM CDT -----  Please let patient know I switched his inhaler to Anoro because his insurance refused to pay for trelegy.

## 2020-07-15 NOTE — PROGRESS NOTES
Pt's insurance denied Trelegy. He may not need the inhaled glucocorticoid.  Will switch to anoro inhaler.

## 2020-07-16 ENCOUNTER — HOSPITAL ENCOUNTER (OUTPATIENT)
Dept: PULMONOLOGY | Facility: HOSPITAL | Age: 65
Discharge: HOME | End: 2020-07-16
Attending: INTERNAL MEDICINE
Payer: MEDICARE

## 2020-07-16 ENCOUNTER — HOSPITAL ENCOUNTER (OUTPATIENT)
Dept: RADIOLOGY | Facility: HOSPITAL | Age: 65
Discharge: HOME | End: 2020-07-16
Attending: INTERNAL MEDICINE
Payer: MEDICARE

## 2020-07-16 ENCOUNTER — TELEPHONE (OUTPATIENT)
Dept: UROLOGY | Facility: CLINIC | Age: 65
End: 2020-07-16

## 2020-07-16 DIAGNOSIS — J44.9 CHRONIC OBSTRUCTIVE PULMONARY DISEASE, UNSPECIFIED COPD TYPE: ICD-10-CM

## 2020-07-16 LAB
BRPFT: ABNORMAL
DLCO ADJ PRE: 14.47 ML/(MIN*MMHG) (ref 17.99–31.85)
DLCO SINGLE BREATH LLN: 17.99
DLCO SINGLE BREATH PRE REF: 58.1 %
DLCO SINGLE BREATH REF: 24.92
DLCOC SBVA LLN: 2.62
DLCOC SBVA PRE REF: 68.7 %
DLCOC SBVA REF: 3.93
DLCOC SINGLE BREATH LLN: 17.99
DLCOC SINGLE BREATH PRE REF: 58.1 %
DLCOC SINGLE BREATH REF: 24.92
DLCOVA LLN: 2.62
DLCOVA PRE REF: 68.7 %
DLCOVA PRE: 2.7 ML/(MIN*MMHG*L) (ref 2.62–5.24)
DLCOVA REF: 3.93
DLVAADJ PRE: 2.7 ML/(MIN*MMHG*L) (ref 2.62–5.24)
ERVN2 LLN: -16448.97
ERVN2 PRE REF: 76.2 %
ERVN2 PRE: 0.78 L (ref -16448.97–16451.03)
ERVN2 REF: 1.03
FEF 25 75 CHG: -19.4 %
FEF 25 75 LLN: 1.14
FEF 25 75 POST REF: 20.3 %
FEF 25 75 PRE REF: 25.2 %
FEF 25 75 REF: 2.45
FET100 CHG: 26.6 %
FEV1 CHG: -5.5 %
FEV1 FVC CHG: -0.2 %
FEV1 FVC LLN: 65
FEV1 FVC POST REF: 59.9 %
FEV1 FVC PRE REF: 60 %
FEV1 FVC REF: 77
FEV1 LLN: 2.21
FEV1 POST REF: 51.6 %
FEV1 PRE REF: 54.6 %
FEV1 REF: 3
FRCN2 LLN: 2.44
FRCN2 PRE REF: 120.2 %
FRCN2 REF: 3.43
FVC CHG: -5.3 %
FVC LLN: 2.92
FVC POST REF: 86 %
FVC PRE REF: 90.8 %
FVC REF: 3.87
IVC PRE: 2.5 L (ref 2.92–4.83)
IVC SINGLE BREATH LLN: 2.92
IVC SINGLE BREATH PRE REF: 64.5 %
IVC SINGLE BREATH REF: 3.87
MVV LLN: 97
MVV PRE REF: 47.4 %
MVV REF: 114
PEF CHG: -9.8 %
PEF LLN: 5.91
PEF POST REF: 52.3 %
PEF PRE REF: 58 %
PEF REF: 7.98
POST FEF 25 75: 0.5 L/S (ref 1.14–3.77)
POST FET 100: 11.52 SEC
POST FEV1 FVC: 46.38 % (ref 64.6–90.21)
POST FEV1: 1.54 L (ref 2.21–3.78)
POST FVC: 3.33 L (ref 2.92–4.83)
POST PEF: 4.17 L/S (ref 5.91–10.05)
PRE DLCO: 14.47 ML/(MIN*MMHG) (ref 17.99–31.85)
PRE FEF 25 75: 0.62 L/S (ref 1.14–3.77)
PRE FET 100: 9.1 SEC
PRE FEV1 FVC: 46.48 % (ref 64.6–90.21)
PRE FEV1: 1.64 L (ref 2.21–3.78)
PRE FRC N2: 4.12 L
PRE FVC: 3.52 L (ref 2.92–4.83)
PRE MVV: 54 L/MIN (ref 96.87–131.05)
PRE PEF: 4.63 L/S (ref 5.91–10.05)
RVN2 LLN: 1.73
RVN2 PRE REF: 130.6 %
RVN2 PRE: 3.14 L (ref 1.73–3.08)
RVN2 REF: 2.4
RVN2TLCN2 LLN: 30.33
RVN2TLCN2 PRE REF: 119.9 %
RVN2TLCN2 PRE: 47.13 % (ref 30.33–48.29)
RVN2TLCN2 REF: 39.31
TLCN2 LLN: 5.19
TLCN2 PRE REF: 104.9 %
TLCN2 PRE: 6.65 L (ref 5.19–7.49)
TLCN2 REF: 6.34
VA PRE: 5.37 L (ref 6.19–6.19)
VA SINGLE BREATH LLN: 6.19
VA SINGLE BREATH PRE REF: 86.6 %
VA SINGLE BREATH REF: 6.19
VCMAXN2 LLN: 2.92
VCMAXN2 PRE REF: 90.8 %
VCMAXN2 PRE: 3.52 L (ref 2.92–4.83)
VCMAXN2 REF: 3.87

## 2020-07-16 PROCEDURE — 94727 PR PULM FUNCTION TEST BY GAS: ICD-10-PCS | Mod: 26,,, | Performed by: INTERNAL MEDICINE

## 2020-07-16 PROCEDURE — 94727 GAS DIL/WSHOT DETER LNG VOL: CPT

## 2020-07-16 PROCEDURE — 94729 DIFFUSING CAPACITY: CPT

## 2020-07-16 PROCEDURE — 71250 CT CHEST WITHOUT CONTRAST: ICD-10-PCS | Mod: 26,,, | Performed by: RADIOLOGY

## 2020-07-16 PROCEDURE — 94729 PR C02/MEMBANE DIFFUSE CAPACITY: ICD-10-PCS | Mod: 26,,, | Performed by: INTERNAL MEDICINE

## 2020-07-16 PROCEDURE — 94729 DIFFUSING CAPACITY: CPT | Mod: 26,,, | Performed by: INTERNAL MEDICINE

## 2020-07-16 PROCEDURE — 71250 CT THORAX DX C-: CPT | Mod: 26,,, | Performed by: RADIOLOGY

## 2020-07-16 PROCEDURE — 94060 EVALUATION OF WHEEZING: CPT | Mod: 26,,, | Performed by: INTERNAL MEDICINE

## 2020-07-16 PROCEDURE — 94727 GAS DIL/WSHOT DETER LNG VOL: CPT | Mod: 26,,, | Performed by: INTERNAL MEDICINE

## 2020-07-16 PROCEDURE — 94060 EVALUATION OF WHEEZING: CPT

## 2020-07-16 PROCEDURE — 94060 PR EVAL OF BRONCHOSPASM: ICD-10-PCS | Mod: 26,,, | Performed by: INTERNAL MEDICINE

## 2020-07-16 PROCEDURE — 71250 CT THORAX DX C-: CPT | Mod: TC

## 2020-07-16 NOTE — TELEPHONE ENCOUNTER
Informed Maria Luisa that we don't have any records.    ----- Message from Zayra Rain sent at 7/16/2020  2:12 PM CDT -----  Type: Patient Call Back    Who called: Maria Luisa with Dr. Lewis     What is the request in detail: Maria Luisa with Dr. Lewis is requesting a call back. She states that she would like all the medical records be sent over. She states that patient has an upcoming appointment on Monday. She states that she faxed over a request on 07/01/20.   Please advise.    Can the clinic reply by [facility]? No    Best call back number: [phone] fax# [phone]    Additional Information: N/A

## 2020-07-17 ENCOUNTER — OFFICE VISIT (OUTPATIENT)
Dept: UROLOGY | Facility: CLINIC | Age: 65
End: 2020-07-17
Payer: MEDICARE

## 2020-07-17 VITALS
HEART RATE: 73 BPM | RESPIRATION RATE: 18 BRPM | TEMPERATURE: 98 F | WEIGHT: 154.31 LBS | SYSTOLIC BLOOD PRESSURE: 148 MMHG | DIASTOLIC BLOOD PRESSURE: 92 MMHG | HEIGHT: 66 IN | BODY MASS INDEX: 24.8 KG/M2

## 2020-07-17 DIAGNOSIS — R91.1 SOLITARY PULMONARY NODULE: ICD-10-CM

## 2020-07-17 DIAGNOSIS — R31.0 GROSS HEMATURIA: ICD-10-CM

## 2020-07-17 DIAGNOSIS — N32.89 BLADDER MASS: Primary | ICD-10-CM

## 2020-07-17 PROCEDURE — 99205 OFFICE O/P NEW HI 60 MIN: CPT | Mod: S$PBB,,, | Performed by: UROLOGY

## 2020-07-17 PROCEDURE — 99999 PR PBB SHADOW E&M-EST. PATIENT-LVL V: ICD-10-PCS | Mod: PBBFAC,,, | Performed by: UROLOGY

## 2020-07-17 PROCEDURE — 99205 PR OFFICE/OUTPT VISIT, NEW, LEVL V, 60-74 MIN: ICD-10-PCS | Mod: S$PBB,,, | Performed by: UROLOGY

## 2020-07-17 PROCEDURE — 99215 OFFICE O/P EST HI 40 MIN: CPT | Mod: PBBFAC,PN | Performed by: UROLOGY

## 2020-07-17 PROCEDURE — 99999 PR PBB SHADOW E&M-EST. PATIENT-LVL V: CPT | Mod: PBBFAC,,, | Performed by: UROLOGY

## 2020-07-17 PROCEDURE — 87086 URINE CULTURE/COLONY COUNT: CPT

## 2020-07-17 RX ORDER — NITROGLYCERIN 0.4 MG/1
TABLET SUBLINGUAL
COMMUNITY
Start: 2020-07-08 | End: 2020-09-22

## 2020-07-17 RX ORDER — FLUTICASONE FUROATE, UMECLIDINIUM BROMIDE AND VILANTEROL TRIFENATATE 100; 62.5; 25 UG/1; UG/1; UG/1
POWDER RESPIRATORY (INHALATION)
COMMUNITY
Start: 2020-07-16 | End: 2021-03-22

## 2020-07-17 RX ORDER — EZETIMIBE 10 MG/1
10 TABLET ORAL DAILY
COMMUNITY
Start: 2020-07-06 | End: 2023-05-22 | Stop reason: SDUPTHER

## 2020-07-17 RX ORDER — PITAVASTATIN CALCIUM 4.18 MG/1
1 TABLET, FILM COATED ORAL DAILY
COMMUNITY
Start: 2020-07-13 | End: 2023-07-06 | Stop reason: SDUPTHER

## 2020-07-17 NOTE — PROGRESS NOTES
[facility] New Patient/H&P:    Martell Corcoran is a 65 y.o. male who presents for bladder mass and gross hematuria.    Patient has had gross hematuria that started 6/2020. He subsequently underwent CT abd/pelvis on 6/26/20 which revealed 2.1 cm left UVJ bladder mass.     He was previously on Avodart and Flomax for lower urinary tract symptoms. He has since discontinued the medication, but states his LUTS have returned starting 6/2020.     Reports severe incomplete emptying, nocturia, frequency and urgency.    Of note, s/p negative prostate biopsy with Dr. Antonio for elevated PSA.     Patient has smoked 0.5 ppd for approximately 55 years.  On ASA daily. Has a history of left heart cath. No stents.       PSA  2.4  6/24/20    IPSS QoL  31 2 7/17/20     PVR  80 mL  7/17/20      Past Medical History:   Diagnosis Date    Benign enlargement of prostate     Had a biopsy in around 2015    Elevated PSA     GERD (gastroesophageal reflux disease)     Hypertension     Started with meds in 2012.    Kidney stone     Urinary tract infection        Past Surgical History:   Procedure Laterality Date    FISTULA REPAIR      LEFT HEART CATHETERIZATION N/A 10/23/2018    Procedure: Left heart cath;  Surgeon: Niharika Squires MD;  Location: [facility] CATH;  Service: Cardiology;  Laterality: N/A;       Family History   Problem Relation Age of Onset    Heart failure Mother     Cancer Father     Heart disease Brother     Heart attack Brother     Cancer Brother        Social History     Socioeconomic History    Marital status:      Spouse name: Not on file    Number of children: Not on file    Years of education: Not on file    Highest education level: Not on file   Occupational History    Not on file   Social Needs    Financial resource strain: Not on file    Food insecurity     Worry: Not on file     Inability: Not on file    Transportation needs     Medical: Not on file     Non-medical: Not  on file   Tobacco Use    Smoking status: Current Every Day Smoker     Packs/day: 1.00     Types: Cigarettes    Smokeless tobacco: Never Used   Substance and Sexual Activity    Alcohol use: Yes     Comment: Previous 12 beer a day for 20 years. Now 8-10/week    Drug use: No    Sexual activity: Not on file   Lifestyle    Physical activity     Days per week: Not on file     Minutes per session: Not on file    Stress: Not on file   Relationships    Social connections     Talks on phone: Not on file     Gets together: Not on file     Attends Scientologist service: Not on file     Active member of club or organization: Not on file     Attends meetings of clubs or organizations: Not on file     Relationship status: Not on file   Other Topics Concern    Not on file   Social History Narrative    Not on file       Review of patient's allergies indicates:   Allergen Reactions    Oyster extract Swelling     PT swells in his throat after eating oysters on occasion       Medications Reviewed: see MAR    ROS:    Constitutional: denies fevers, chills, night sweats, fatigue, malaise  Respiratory: negative for cough, shortness of breath, wheezing, dyspnea.  Cardiovascular: + for high blood pressure, negative for chest pain, varicose veins, ankle swelling, palpitations, syncope.  GI: negative for abdominal pain, heartburn, indigestion, nausea, vomiting, constipation, diarrhea, blood in stool.   Urology: as noted above in HPI  Endocrinology: negative for cold intolerance, excessive thirst, not feeling tired/sluggish, no heat intolerance.   Hematology/Lymph: negative for easy bleeding, easy bruising, swollen glands.  Musculoskeletal: negative for back pain, joint pain, joint swelling, neck pain.  Allergy-Immunology: negative for seasonal allergies, negative for unusual infections.   Skin: negative for boils, breast lumps, hives, itching, rash.   Neurology: negative for, dizziness, headache, tingling/numbness, tremors.   Psych:  "satisfied with life; negative for, anxiety, depression, suicidal thoughts.     PHYSICAL EXAM:    Vitals:    07/17/20 1120   BP: (!) 148/92   Pulse: 73   Resp: 18   Temp: 98.2 °F (36.8 °C)     Body mass index is 24.91 kg/m². Weight: 70 kg (154 lb 5.2 oz) Height: 5' 6" (167.6 cm)       General: Alert, cooperative, no distress, appears stated age  Head: Normocephalic, without obvious abnormality, atraumatic  Neck: no masses, no thyromegaly, no lymphadenopathy  Eyes: PERRL, conjunctiva/corneas clear  Lungs: Respirations unlabored, normal effort, no accessory muscle use  CV: Warm and well perfused extremities  Abdomen: Soft, non-tender, no CVA tenderness, no hepatosplenomegaly, no hernia  Extremities: Extremities normal, atraumatic, no cyanosis or edema  Skin: Normal color, texture, and turgor, no rashes or lesions  Psych: Appropriate, well oriented, normal affect, normal mood  Neuro: Non-focal    No results found for: PSA    LABS:    Recent Results (from the past 336 hour(s))   Complete PFT with bronchodilator    Collection Time: 07/16/20  4:53 PM   Result Value Ref Range    Interpretation         Moderately severe obstruction. FEV1  54.74 %  predicted.  Airflow is not clearly improved after bronchodilator. Clinical improvement following bronchodilator therapy may occur in the absence of spirometric improvement.   Mild Hyperinflation is noted on lung volume testing.  Moderate reduction in diffusion capacity - unadjusted for hemoglobin.       Post FVC 3.33 2.92 - 4.83 L    Post FEV1 1.54 (L) 2.21 - 3.78 L    Post FEV1 FVC 46.38 (L) 64.60 - 90.21 %    Post FEF 25 75 0.50 (L) 1.14 - 3.77 L/s    Post PEF 4.17 (L) 5.91 - 10.05 L/s    Post  11.52 sec    Pre DLCO 14.47 (L) 17.99 - 31.85 ml/(min*mmHg)    DLCO ADJ PRE 14.47 (L) 17.99 - 31.85 ml/(min*mmHg)    DLCOVA PRE 2.70 2.62 - 5.24 ml/(min*mmHg*L)    DLVAAdj PRE 2.70 2.62 - 5.24 ml/(min*mmHg*L)    VA PRE 5.37 (L) 6.19 - 6.19 L    IVC PRE 2.50 (L) 2.92 - 4.83 L    " TLCN2 PRE 6.65 5.19 - 7.49 L    VCMAXN2 PRE 3.52 2.92 - 4.83 L    Pre FRC N2 4.12 L    ERVN2 PRE 0.78 -44449.97 - 86731.03 L    RVN2 PRE 3.14 (H) 1.73 - 3.08 L    WCL4GPGO3 PRE 47.13 30.33 - 48.29 %    Pre FVC 3.52 2.92 - 4.83 L    Pre FEV1 1.64 (L) 2.21 - 3.78 L    Pre FEV1 FVC 46.48 (L) 64.60 - 90.21 %    Pre FEF 25 75 0.62 (L) 1.14 - 3.77 L/s    Pre PEF 4.63 (L) 5.91 - 10.05 L/s    Pre  9.10 sec    Pre MVV 54.00 (L) 96.87 - 131.05 L/min    FVC Ref 3.87     FVC LLN 2.92     FVC Pre Ref 90.8 %    FVC Post Ref 86.0 %    FVC Chg -5.3 %    FEV1 Ref 3.00     FEV1 LLN 2.21     FEV1 Pre Ref 54.6 %    FEV1 Post Ref 51.6 %    FEV1 Chg -5.5 %    FEV1 FVC Ref 77     FEV1 FVC LLN 65     FEV1 FVC Pre Ref 60.0 %    FEV1 FVC Post Ref 59.9 %    FEV1 FVC Chg -0.2 %    FEF 25 75 Ref 2.45     FEF 25 75 LLN 1.14     FEF 25 75 Pre Ref 25.2 %    FEF 25 75 Post Ref 20.3 %    FEF 25 75 Chg -19.4 %    PEF Ref 7.98     PEF LLN 5.91     PEF Pre Ref 58.0 %    PEF Post Ref 52.3 %    PEF Chg -9.8 %    YOU139 Chg 26.6 %    MVV Ref 114     MVV LLN 97     MVV Pre Ref 47.4 %    TLCN2 Ref 6.34     TLCN2 LLN 5.19     TLCN2 Pre Ref 104.9 %    VCMAXN2 Ref 3.87     VCMAXN2 LLN 2.92     VCMAXN2 Pre Ref 90.8 %    FRCN2 Ref 3.43     FRCN2 LLN 2.44     FRCN2 Pre Ref 120.2 %    ERVN2 Ref 1.03     ERVN2 LLN -24166.97     ERVN2 Pre Ref 76.2 %    RVN2 Ref 2.40     RVN2 LLN 1.73     RVN2 Pre Ref 130.6 %    CER4KDFR5 Ref 39.31     EDH9ECIY8 LLN 30.33     UEC4JLFL7 Pre Ref 119.9 %    DLCO Single Breath Ref 24.92     DLCO Single Breath LLN 17.99     DLCO Single Breath Pre Ref 58.1 %    DLCOc Single Breath Ref 24.92     DLCOc Single Breath LLN 17.99     DLCOc Single Breath Pre Ref 58.1 %    DLCOVA Ref 3.93     DLCOVA LLN 2.62     DLCOVA Pre Ref 68.7 %    DLCOc SBVA Ref 3.93     DLCOc SBVA LLN 2.62     DLCOc SBVA Pre Ref 68.7 %    VA Single Breath Ref 6.19     VA Single Breath LLN 6.19     VA Single Breath Pre Ref 86.6 %    IVC Single Breath Ref 3.87     IVC  Single Breath LLN 2.92     IVC Single Breath Pre Ref 64.5 %       IMAGING:    CT abd/pelvis wo contrast 6/26/20  Images independently reviewed by me    1. 2.1 cm intraluminal bladder mass at the left ureterovesicular  junction. Findings are highly suspicious for bladder neoplasm.  Urologic consultation and cystoscopy are recommended for further  assessment.  2. Mild circumferential bladder wall thickening, likely on the basis  of chronic outlet obstruction. Prostate gland is mildly enlarged.  3. Small low-density lesions within the liver are probably cysts, but  indeterminate on this noncontrast examination.  4. Mild emphysematous changes at the lung bases.  5. Aortic atherosclerosis.  6. Small bilateral fat-containing inguinal hernias.      Assessment/Diagnosis:    1. Bladder mass  Case Request Operating Room: TURBT (TRANSURETHRAL RESECTION OF BLADDER TUMOR)    Place in Outpatient    Vital Signs     Diet NPO    Place sequential compression device    EKG 12-lead    Diet NPO    COVID-19 Routine Screening    Urine culture   2. Gross hematuria         Plans:    - We discussed the etiology and management of the patient's gross hematuria. Explained that hematuria is multi-factorial and can be secondary to  cancer, obstructive uropathy, nephritis,  trauma,  infections, thrombosis, nephrolithiasis, bleeding disorders and medications.  - We discussed that patient has a 2.1 cm mass near his left UVJ which is concerning for malignancy. Explained that we should defer further evaluation with cystoscopy and proceed with transurethral resection of bladder tumor.   - Scheduled for transurethral resection of bladder tumor on 7/28/20 with instillation of mitomycin C and possible retrograde pyelogram. All risks, benefits and alternatives discussed at length and all questions answered.    - Urine culture today.   - Needs COVID and cardiac clearance. Dr. Porter - cardiologist.

## 2020-07-17 NOTE — LETTER
July 17, 2020      Zo Burrell MD  [address] [facility]  [address]  [facility]  [place] [address]           [place] [facility]  [address] [place] [address] [address]  [place] [address]  Phone: [phone]  Fax: [phone]          Patient: Martell Corcoran   MR Number: [id number]   YOB: 1955   Date of Visit: 7/17/2020       Dear Dr. Zo Burrell:    Thank you for referring Martell Corcoran to me for evaluation. Attached you will find relevant portions of my assessment and plan of care.    If you have questions, please do not hesitate to call me. I look forward to following Martell Corcoran along with you.    Sincerely,    Vanna Garcia Jr., MD    Enclosure  CC:  No Recipients    If you would like to receive this communication electronically, please contact [email or web address] or [phone] to request more information on [employer] access.    For providers and/or their staff who would like to refer a patient to [facility], please contact us through our one-stop-shop provider referral line, [facility], at [phone].    If you feel you have received this communication in error or would no longer like to receive these types of communications, please e-mail [email or web address]

## 2020-07-19 LAB — BACTERIA UR CULT: NORMAL

## 2020-07-20 ENCOUNTER — TELEPHONE (OUTPATIENT)
Dept: PULMONOLOGY | Facility: CLINIC | Age: 65
End: 2020-07-20

## 2020-07-20 NOTE — TELEPHONE ENCOUNTER
Spoke to pt, informed of results and CT scheduled for 1/20/2021   ----- Message from Zayra Marcelo MD sent at 7/17/2020 12:05 PM CDT -----  Mr Gonsoulin,  CT shows emphysema from smoking and tiny nodule in the left lung- recommendation is to repeat CT chest in 6 months.  Dr. Marcelo

## 2020-07-20 NOTE — TELEPHONE ENCOUNTER
Pt informed of results ----- Message from Zayra Marcelo MD sent at 7/16/2020  4:54 PM CDT -----  PFT shows moderately severe obstruction (COPD)

## 2020-07-21 ENCOUNTER — TELEPHONE (OUTPATIENT)
Dept: UROLOGY | Facility: CLINIC | Age: 65
End: 2020-07-21

## 2020-07-21 NOTE — TELEPHONE ENCOUNTER
Called and spoke to patient who stated that he left a message with his cardiologist nurse regarding clearance and is awaiting a response back from the nurse. Informed patient that clearance request has been faxed over 3x and left VM with nurse and awaiting a response back as well. Patient stated that he will give the office a call back tomorrow if he hear from her and vice versa. Patient verbalized understanding.

## 2020-07-21 NOTE — TELEPHONE ENCOUNTER
----- Message from Pa Ankit sent at 7/21/2020  3:03 PM CDT -----  Regarding: Patient Advice  Contact: Martell Corcoran  Pt called and stated he wanted to let your office know he has been in contact with his cardiologist and would like to speak to a nurse when possible     Pt can be reached at [phone]

## 2020-07-23 ENCOUNTER — TELEPHONE (OUTPATIENT)
Dept: SMOKING CESSATION | Facility: CLINIC | Age: 65
End: 2020-07-23

## 2020-07-23 NOTE — TELEPHONE ENCOUNTER
I spoke to patient today regarding the tobacco cessation program. He has accepted the invitation to participate. He has been registered and he is scheduled for his intake appointment on 8/24/2020.

## 2020-07-24 ENCOUNTER — HOSPITAL ENCOUNTER (OUTPATIENT)
Dept: PREADMISSION TESTING | Facility: HOSPITAL | Age: 65
Discharge: HOME | End: 2020-07-24

## 2020-07-24 NOTE — PRE-PROCEDURE INSTRUCTIONS
Patient was called to give information regarding medication and  pre-procedure instructions. Information left on voicemail. Pt instructed to call back to verify receiving information.  Pt reminded to have Covid test done 7/25/20      Pt called back to verify receipt of information.

## 2020-07-25 ENCOUNTER — LAB VISIT (OUTPATIENT)
Dept: PRIMARY CARE CLINIC | Facility: CLINIC | Age: 65
End: 2020-07-25
Payer: MEDICARE

## 2020-07-25 DIAGNOSIS — N32.89 BLADDER MASS: ICD-10-CM

## 2020-07-25 PROCEDURE — U0003 INFECTIOUS AGENT DETECTION BY NUCLEIC ACID (DNA OR RNA); SEVERE ACUTE RESPIRATORY SYNDROME CORONAVIRUS 2 (SARS-COV-2) (CORONAVIRUS DISEASE [COVID-19]), AMPLIFIED PROBE TECHNIQUE, MAKING USE OF HIGH THROUGHPUT TECHNOLOGIES AS DESCRIBED BY CMS-2020-01-R: HCPCS

## 2020-07-26 LAB — SARS-COV-2 RNA RESP QL NAA+PROBE: NOT DETECTED

## 2020-07-27 ENCOUNTER — TELEPHONE (OUTPATIENT)
Dept: UROLOGY | Facility: CLINIC | Age: 65
End: 2020-07-27

## 2020-07-27 NOTE — TELEPHONE ENCOUNTER
----- Message from Tierney Brock sent at 7/27/2020 12:20 PM CDT -----  Type: Needs Medical Advice  Who Called:  pt  Symptoms (please be specific):   How long has patient had these symptoms:   Pharmacy name and phone #:   Best Call Back Number: [phone] (home)     Additional Information: pt states he is having coughing, sob and feeling he has something in his throat, just wanted to make someone a ware in the physical office before his procedure and he also stated he does not know what time the procedure is schedule for . thanks

## 2020-07-27 NOTE — TELEPHONE ENCOUNTER
----- Message from Manny Aden sent at 7/27/2020  3:39 PM CDT -----  Type: Needs Medical Advice    Who Called:  Patient  Best Call Back Number: [phone]  Additional Information: Patient would like to discuss upcoming procedure. Please call to advise. Thanks!

## 2020-07-27 NOTE — TELEPHONE ENCOUNTER
Called and spoke to patient who had concerns about arrival time. Patient stated that he was told on phone pre-op to be NPO/ no smoking for 24 hours. Informed patient that pre-op is making calls to inform of arrival time. Patient also stated that he has been experiencing SOB, coughing and wheezing for 4 days. Patient stated that he has been taking mucinex and allergy medicine. Informed patient that provider has been informed and to also inform pre-op when they call for arrival time. Patient verbalized understanding.

## 2020-07-27 NOTE — TELEPHONE ENCOUNTER
Called patient regarding symptoms and upcoming procedure. No answer. LMOM to give the office a call back.

## 2020-07-28 ENCOUNTER — DOCUMENTATION ONLY (OUTPATIENT)
Dept: UROLOGY | Facility: CLINIC | Age: 65
End: 2020-07-28

## 2020-07-28 NOTE — PROGRESS NOTES
Patient scheduled for resection of bladder tumor in the OR today.     He was upset yesterday due to several reasons (e.g., COVID testing, unsure about how long to fast or stop smoking, cough, wheezing, cardiac clearance, etc.).     Called several times by pre-op and myself regarding his case today after he no-showed.     No answer from him or wife.

## 2020-07-29 ENCOUNTER — TELEPHONE (OUTPATIENT)
Dept: FAMILY MEDICINE | Facility: CLINIC | Age: 65
End: 2020-07-29

## 2020-07-29 DIAGNOSIS — R06.02 SHORTNESS OF BREATH: ICD-10-CM

## 2020-07-29 DIAGNOSIS — R51.9 HEAD ACHE: ICD-10-CM

## 2020-07-29 DIAGNOSIS — Z20.822 CLOSE EXPOSURE TO COVID-19 VIRUS: Primary | ICD-10-CM

## 2020-07-29 DIAGNOSIS — R05.9 COUGH: ICD-10-CM

## 2020-07-29 DIAGNOSIS — M79.10 MUSCLE PAIN: ICD-10-CM

## 2020-07-29 NOTE — TELEPHONE ENCOUNTER
Pt requesting appointment via portal -   Reason for visit: Respiratory distress, coughing, sneezing, wheezing, shortness of breath, tickling in throat and chest, itchy eyes and watering,stomach muscle pain and headache from coughing, fatigued      Comments:   Coughing and respiratory distress and shortness of breath

## 2020-07-29 NOTE — TELEPHONE ENCOUNTER
Let pt know I would like him to be seen at COVID clinic today if possible, or ER if they have no availability

## 2020-07-29 NOTE — TELEPHONE ENCOUNTER
Spoke with patient states he's already had a covid test on 7.25.20, was supposed to have a procedure this past Tuesday.  Is wanting to be seen in the office -DN

## 2020-07-29 NOTE — TELEPHONE ENCOUNTER
Spoke with pt - Pt informed that we have a 4pm appointment available if he would like to be scheduled.Pt inquiring as to what does Dr. Burrell believe would be best. Pt informed that because he short of breath and having difficultly breathing then the best place per Dr. Burrell would be the ER. Pt informed that we no longer do xray's in office and that if one is needed the ER  would be able the place where he medical needs could be met. Pt verbalized an understanding and states that he will be going to the ER. Dr. Burrell notified. CHARLES

## 2020-08-13 ENCOUNTER — HOSPITAL ENCOUNTER (OUTPATIENT)
Facility: HOSPITAL | Age: 65
Discharge: HOME | End: 2020-08-14
Attending: EMERGENCY MEDICINE | Admitting: HOSPITALIST
Payer: MEDICARE

## 2020-08-13 DIAGNOSIS — J44.1 COPD WITH ACUTE EXACERBATION: Primary | ICD-10-CM

## 2020-08-13 DIAGNOSIS — R07.9 CHEST PAIN: ICD-10-CM

## 2020-08-13 PROBLEM — Z72.0 TOBACCO ABUSE: Status: ACTIVE | Noted: 2020-08-13

## 2020-08-13 PROBLEM — N32.89 BLADDER MASS: Status: ACTIVE | Noted: 2020-08-13

## 2020-08-13 PROBLEM — I24.89 ISCHEMIA DUE TO INCREASED OXYGEN DEMAND: Status: ACTIVE | Noted: 2020-08-13

## 2020-08-13 LAB
ALBUMIN SERPL BCP-MCNC: 3.9 G/DL (ref 3.5–5.2)
ALP SERPL-CCNC: 76 U/L (ref 55–135)
ALT SERPL W/O P-5'-P-CCNC: 29 U/L (ref 10–44)
ANION GAP SERPL CALC-SCNC: 10 MMOL/L (ref 8–16)
ANION GAP SERPL CALC-SCNC: 9 MMOL/L (ref 8–16)
AST SERPL-CCNC: 27 U/L (ref 10–40)
BASOPHILS # BLD AUTO: 0.03 K/UL (ref 0–0.2)
BASOPHILS # BLD AUTO: 0.07 K/UL (ref 0–0.2)
BASOPHILS NFR BLD: 0.2 % (ref 0–1.9)
BASOPHILS NFR BLD: 0.5 % (ref 0–1.9)
BILIRUB SERPL-MCNC: 0.2 MG/DL (ref 0.1–1)
BNP SERPL-MCNC: 52 PG/ML (ref 0–99)
BUN SERPL-MCNC: 12 MG/DL (ref 8–23)
BUN SERPL-MCNC: 13 MG/DL (ref 8–23)
CALCIUM SERPL-MCNC: 8.7 MG/DL (ref 8.7–10.5)
CALCIUM SERPL-MCNC: 9 MG/DL (ref 8.7–10.5)
CHLORIDE SERPL-SCNC: 106 MMOL/L (ref 95–110)
CHLORIDE SERPL-SCNC: 107 MMOL/L (ref 95–110)
CO2 SERPL-SCNC: 26 MMOL/L (ref 23–29)
CO2 SERPL-SCNC: 27 MMOL/L (ref 23–29)
CREAT SERPL-MCNC: 0.9 MG/DL (ref 0.5–1.4)
CREAT SERPL-MCNC: 1.1 MG/DL (ref 0.5–1.4)
DIFFERENTIAL METHOD: ABNORMAL
DIFFERENTIAL METHOD: ABNORMAL
EOSINOPHIL # BLD AUTO: 0.3 K/UL (ref 0–0.5)
EOSINOPHIL # BLD AUTO: 1.4 K/UL (ref 0–0.5)
EOSINOPHIL NFR BLD: 10.6 % (ref 0–8)
EOSINOPHIL NFR BLD: 2 % (ref 0–8)
ERYTHROCYTE [DISTWIDTH] IN BLOOD BY AUTOMATED COUNT: 13 % (ref 11.5–14.5)
ERYTHROCYTE [DISTWIDTH] IN BLOOD BY AUTOMATED COUNT: 13.1 % (ref 11.5–14.5)
EST. GFR  (AFRICAN AMERICAN): >60 ML/MIN/1.73 M^2
EST. GFR  (AFRICAN AMERICAN): >60 ML/MIN/1.73 M^2
EST. GFR  (NON AFRICAN AMERICAN): >60 ML/MIN/1.73 M^2
EST. GFR  (NON AFRICAN AMERICAN): >60 ML/MIN/1.73 M^2
GLUCOSE SERPL-MCNC: 113 MG/DL (ref 70–110)
GLUCOSE SERPL-MCNC: 125 MG/DL (ref 70–110)
HCT VFR BLD AUTO: 45.6 % (ref 40–54)
HCT VFR BLD AUTO: 47.5 % (ref 40–54)
HGB BLD-MCNC: 14.8 G/DL (ref 14–18)
HGB BLD-MCNC: 15.4 G/DL (ref 14–18)
IMM GRANULOCYTES # BLD AUTO: 0.03 K/UL (ref 0–0.04)
IMM GRANULOCYTES # BLD AUTO: 0.06 K/UL (ref 0–0.04)
IMM GRANULOCYTES NFR BLD AUTO: 0.2 % (ref 0–0.5)
IMM GRANULOCYTES NFR BLD AUTO: 0.5 % (ref 0–0.5)
LACTATE SERPL-SCNC: 1 MMOL/L (ref 0.5–2.2)
LYMPHOCYTES # BLD AUTO: 0.7 K/UL (ref 1–4.8)
LYMPHOCYTES # BLD AUTO: 2.8 K/UL (ref 1–4.8)
LYMPHOCYTES NFR BLD: 21.6 % (ref 18–48)
LYMPHOCYTES NFR BLD: 5.9 % (ref 18–48)
MAGNESIUM SERPL-MCNC: 1.8 MG/DL (ref 1.6–2.6)
MCH RBC QN AUTO: 32.8 PG (ref 27–31)
MCH RBC QN AUTO: 32.9 PG (ref 27–31)
MCHC RBC AUTO-ENTMCNC: 32.4 G/DL (ref 32–36)
MCHC RBC AUTO-ENTMCNC: 32.5 G/DL (ref 32–36)
MCV RBC AUTO: 101 FL (ref 82–98)
MCV RBC AUTO: 101 FL (ref 82–98)
MONOCYTES # BLD AUTO: 0.2 K/UL (ref 0.3–1)
MONOCYTES # BLD AUTO: 1.3 K/UL (ref 0.3–1)
MONOCYTES NFR BLD: 1.9 % (ref 4–15)
MONOCYTES NFR BLD: 10.3 % (ref 4–15)
NEUTROPHILS # BLD AUTO: 11.1 K/UL (ref 1.8–7.7)
NEUTROPHILS # BLD AUTO: 7.3 K/UL (ref 1.8–7.7)
NEUTROPHILS NFR BLD: 56.8 % (ref 38–73)
NEUTROPHILS NFR BLD: 89.5 % (ref 38–73)
NRBC BLD-RTO: 0 /100 WBC
NRBC BLD-RTO: 0 /100 WBC
PHOSPHATE SERPL-MCNC: 1.5 MG/DL (ref 2.7–4.5)
PLATELET # BLD AUTO: 244 K/UL (ref 150–350)
PLATELET # BLD AUTO: 271 K/UL (ref 150–350)
PMV BLD AUTO: 8.4 FL (ref 9.2–12.9)
PMV BLD AUTO: 8.5 FL (ref 9.2–12.9)
POTASSIUM SERPL-SCNC: 4.1 MMOL/L (ref 3.5–5.1)
POTASSIUM SERPL-SCNC: 4.5 MMOL/L (ref 3.5–5.1)
PROCALCITONIN SERPL IA-MCNC: 0.02 NG/ML
PROT SERPL-MCNC: 7 G/DL (ref 6–8.4)
RBC # BLD AUTO: 4.5 M/UL (ref 4.6–6.2)
RBC # BLD AUTO: 4.7 M/UL (ref 4.6–6.2)
SARS-COV-2 RDRP RESP QL NAA+PROBE: NEGATIVE
SODIUM SERPL-SCNC: 142 MMOL/L (ref 136–145)
SODIUM SERPL-SCNC: 143 MMOL/L (ref 136–145)
TROPONIN I SERPL DL<=0.01 NG/ML-MCNC: 0.01 NG/ML (ref 0–0.03)
TROPONIN I SERPL DL<=0.01 NG/ML-MCNC: 0.04 NG/ML (ref 0–0.03)
TROPONIN I SERPL DL<=0.01 NG/ML-MCNC: 0.09 NG/ML (ref 0–0.03)
WBC # BLD AUTO: 12.36 K/UL (ref 3.9–12.7)
WBC # BLD AUTO: 12.94 K/UL (ref 3.9–12.7)

## 2020-08-13 PROCEDURE — 25000242 PHARM REV CODE 250 ALT 637 W/ HCPCS: Performed by: EMERGENCY MEDICINE

## 2020-08-13 PROCEDURE — 94640 AIRWAY INHALATION TREATMENT: CPT

## 2020-08-13 PROCEDURE — 36415 COLL VENOUS BLD VENIPUNCTURE: CPT

## 2020-08-13 PROCEDURE — U0002 COVID-19 LAB TEST NON-CDC: HCPCS

## 2020-08-13 PROCEDURE — G0378 HOSPITAL OBSERVATION PER HR: HCPCS | Mod: CS

## 2020-08-13 PROCEDURE — 83880 ASSAY OF NATRIURETIC PEPTIDE: CPT

## 2020-08-13 PROCEDURE — S4991 NICOTINE PATCH NONLEGEND: HCPCS | Performed by: NURSE PRACTITIONER

## 2020-08-13 PROCEDURE — 96374 THER/PROPH/DIAG INJ IV PUSH: CPT

## 2020-08-13 PROCEDURE — 27000221 HC OXYGEN, UP TO 24 HOURS

## 2020-08-13 PROCEDURE — 63600175 PHARM REV CODE 636 W HCPCS: Performed by: NURSE PRACTITIONER

## 2020-08-13 PROCEDURE — 94761 N-INVAS EAR/PLS OXIMETRY MLT: CPT

## 2020-08-13 PROCEDURE — 99215 OFFICE O/P EST HI 40 MIN: CPT | Mod: ,,, | Performed by: INTERNAL MEDICINE

## 2020-08-13 PROCEDURE — 25000242 PHARM REV CODE 250 ALT 637 W/ HCPCS: Performed by: NURSE PRACTITIONER

## 2020-08-13 PROCEDURE — G0378 HOSPITAL OBSERVATION PER HR: HCPCS

## 2020-08-13 PROCEDURE — 25000003 PHARM REV CODE 250: Performed by: NURSE PRACTITIONER

## 2020-08-13 PROCEDURE — 84100 ASSAY OF PHOSPHORUS: CPT

## 2020-08-13 PROCEDURE — 84484 ASSAY OF TROPONIN QUANT: CPT | Mod: 91

## 2020-08-13 PROCEDURE — 80053 COMPREHEN METABOLIC PANEL: CPT

## 2020-08-13 PROCEDURE — 63600175 PHARM REV CODE 636 W HCPCS: Performed by: EMERGENCY MEDICINE

## 2020-08-13 PROCEDURE — 83735 ASSAY OF MAGNESIUM: CPT

## 2020-08-13 PROCEDURE — 25000003 PHARM REV CODE 250: Performed by: EMERGENCY MEDICINE

## 2020-08-13 PROCEDURE — 80048 BASIC METABOLIC PNL TOTAL CA: CPT

## 2020-08-13 PROCEDURE — 85025 COMPLETE CBC W/AUTO DIFF WBC: CPT | Mod: 91

## 2020-08-13 PROCEDURE — 84145 PROCALCITONIN (PCT): CPT

## 2020-08-13 PROCEDURE — 99285 EMERGENCY DEPT VISIT HI MDM: CPT | Mod: 25

## 2020-08-13 PROCEDURE — 93005 ELECTROCARDIOGRAM TRACING: CPT

## 2020-08-13 PROCEDURE — 96375 TX/PRO/DX INJ NEW DRUG ADDON: CPT

## 2020-08-13 PROCEDURE — 99215 PR OFFICE/OUTPT VISIT, EST, LEVL V, 40-54 MIN: ICD-10-PCS | Mod: ,,, | Performed by: INTERNAL MEDICINE

## 2020-08-13 PROCEDURE — 83605 ASSAY OF LACTIC ACID: CPT

## 2020-08-13 PROCEDURE — 84484 ASSAY OF TROPONIN QUANT: CPT

## 2020-08-13 RX ORDER — ACETAMINOPHEN 325 MG/1
650 TABLET ORAL EVERY 6 HOURS PRN
Status: DISCONTINUED | OUTPATIENT
Start: 2020-08-13 | End: 2020-08-14 | Stop reason: HOSPADM

## 2020-08-13 RX ORDER — FLUTICASONE FUROATE AND VILANTEROL 200; 25 UG/1; UG/1
1 POWDER RESPIRATORY (INHALATION) DAILY
Status: DISCONTINUED | OUTPATIENT
Start: 2020-08-13 | End: 2020-08-14 | Stop reason: HOSPADM

## 2020-08-13 RX ORDER — ASPIRIN 325 MG
325 TABLET ORAL
Status: COMPLETED | OUTPATIENT
Start: 2020-08-13 | End: 2020-08-13

## 2020-08-13 RX ORDER — AMOXICILLIN 250 MG
1 CAPSULE ORAL DAILY
Status: DISCONTINUED | OUTPATIENT
Start: 2020-08-13 | End: 2020-08-14 | Stop reason: HOSPADM

## 2020-08-13 RX ORDER — EZETIMIBE 10 MG/1
10 TABLET ORAL DAILY
Status: DISCONTINUED | OUTPATIENT
Start: 2020-08-13 | End: 2020-08-14 | Stop reason: HOSPADM

## 2020-08-13 RX ORDER — LEVOFLOXACIN 5 MG/ML
750 INJECTION, SOLUTION INTRAVENOUS
Status: DISCONTINUED | OUTPATIENT
Start: 2020-08-13 | End: 2020-08-13

## 2020-08-13 RX ORDER — ESCITALOPRAM OXALATE 10 MG/1
10 TABLET ORAL DAILY
Status: DISCONTINUED | OUTPATIENT
Start: 2020-08-13 | End: 2020-08-14 | Stop reason: HOSPADM

## 2020-08-13 RX ORDER — ONDANSETRON 2 MG/ML
4 INJECTION INTRAMUSCULAR; INTRAVENOUS EVERY 12 HOURS PRN
Status: DISCONTINUED | OUTPATIENT
Start: 2020-08-13 | End: 2020-08-14 | Stop reason: HOSPADM

## 2020-08-13 RX ORDER — LOSARTAN POTASSIUM 25 MG/1
100 TABLET ORAL DAILY
Status: DISCONTINUED | OUTPATIENT
Start: 2020-08-13 | End: 2020-08-14 | Stop reason: HOSPADM

## 2020-08-13 RX ORDER — HYDRALAZINE HYDROCHLORIDE 20 MG/ML
10 INJECTION INTRAMUSCULAR; INTRAVENOUS EVERY 8 HOURS PRN
Status: DISCONTINUED | OUTPATIENT
Start: 2020-08-13 | End: 2020-08-14 | Stop reason: HOSPADM

## 2020-08-13 RX ORDER — LEVOFLOXACIN 5 MG/ML
750 INJECTION, SOLUTION INTRAVENOUS ONCE
Status: COMPLETED | OUTPATIENT
Start: 2020-08-13 | End: 2020-08-13

## 2020-08-13 RX ORDER — ASPIRIN 81 MG/1
81 TABLET ORAL DAILY
Status: DISCONTINUED | OUTPATIENT
Start: 2020-08-13 | End: 2020-08-14 | Stop reason: HOSPADM

## 2020-08-13 RX ORDER — NITROGLYCERIN 0.4 MG/1
0.4 TABLET SUBLINGUAL EVERY 5 MIN PRN
Status: DISCONTINUED | OUTPATIENT
Start: 2020-08-13 | End: 2020-08-14 | Stop reason: HOSPADM

## 2020-08-13 RX ORDER — METHYLPREDNISOLONE 4 MG/1
4 TABLET ORAL DAILY
COMMUNITY
End: 2020-12-22

## 2020-08-13 RX ORDER — IPRATROPIUM BROMIDE AND ALBUTEROL SULFATE 2.5; .5 MG/3ML; MG/3ML
3 SOLUTION RESPIRATORY (INHALATION)
Status: COMPLETED | OUTPATIENT
Start: 2020-08-13 | End: 2020-08-13

## 2020-08-13 RX ORDER — METHYLPREDNISOLONE SOD SUCC 125 MG
125 VIAL (EA) INJECTION
Status: COMPLETED | OUTPATIENT
Start: 2020-08-13 | End: 2020-08-13

## 2020-08-13 RX ORDER — PREDNISONE 20 MG/1
40 TABLET ORAL DAILY
Status: DISCONTINUED | OUTPATIENT
Start: 2020-08-13 | End: 2020-08-14 | Stop reason: HOSPADM

## 2020-08-13 RX ORDER — IPRATROPIUM BROMIDE AND ALBUTEROL SULFATE 2.5; .5 MG/3ML; MG/3ML
3 SOLUTION RESPIRATORY (INHALATION)
Status: DISCONTINUED | OUTPATIENT
Start: 2020-08-13 | End: 2020-08-14 | Stop reason: HOSPADM

## 2020-08-13 RX ORDER — TALC
6 POWDER (GRAM) TOPICAL NIGHTLY PRN
Status: DISCONTINUED | OUTPATIENT
Start: 2020-08-13 | End: 2020-08-14 | Stop reason: HOSPADM

## 2020-08-13 RX ORDER — SODIUM CHLORIDE 0.9 % (FLUSH) 0.9 %
3 SYRINGE (ML) INJECTION
Status: DISCONTINUED | OUTPATIENT
Start: 2020-08-13 | End: 2020-08-14 | Stop reason: HOSPADM

## 2020-08-13 RX ORDER — IBUPROFEN 200 MG
1 TABLET ORAL DAILY
Status: DISCONTINUED | OUTPATIENT
Start: 2020-08-13 | End: 2020-08-14 | Stop reason: HOSPADM

## 2020-08-13 RX ORDER — PANTOPRAZOLE SODIUM 40 MG/1
40 TABLET, DELAYED RELEASE ORAL DAILY
Status: DISCONTINUED | OUTPATIENT
Start: 2020-08-13 | End: 2020-08-14 | Stop reason: HOSPADM

## 2020-08-13 RX ADMIN — METHYLPREDNISOLONE SODIUM SUCCINATE 125 MG: 125 INJECTION, POWDER, FOR SOLUTION INTRAMUSCULAR; INTRAVENOUS at 03:08

## 2020-08-13 RX ADMIN — ASPIRIN 81 MG: 81 TABLET, COATED ORAL at 09:08

## 2020-08-13 RX ADMIN — ASPIRIN 325 MG ORAL TABLET 325 MG: 325 PILL ORAL at 03:08

## 2020-08-13 RX ADMIN — IPRATROPIUM BROMIDE AND ALBUTEROL SULFATE 3 ML: .5; 2.5 SOLUTION RESPIRATORY (INHALATION) at 07:08

## 2020-08-13 RX ADMIN — PREDNISONE 40 MG: 20 TABLET ORAL at 09:08

## 2020-08-13 RX ADMIN — IPRATROPIUM BROMIDE AND ALBUTEROL SULFATE 3 ML: .5; 2.5 SOLUTION RESPIRATORY (INHALATION) at 03:08

## 2020-08-13 RX ADMIN — LOSARTAN POTASSIUM 100 MG: 25 TABLET, FILM COATED ORAL at 09:08

## 2020-08-13 RX ADMIN — IPRATROPIUM BROMIDE AND ALBUTEROL SULFATE 3 ML: .5; 2.5 SOLUTION RESPIRATORY (INHALATION) at 11:08

## 2020-08-13 RX ADMIN — IPRATROPIUM BROMIDE AND ALBUTEROL SULFATE 3 ML: .5; 2.5 SOLUTION RESPIRATORY (INHALATION) at 04:08

## 2020-08-13 RX ADMIN — SODIUM CHLORIDE 1000 ML: 0.9 INJECTION, SOLUTION INTRAVENOUS at 03:08

## 2020-08-13 RX ADMIN — LEVOFLOXACIN 750 MG: 5 INJECTION, SOLUTION INTRAVENOUS at 06:08

## 2020-08-13 RX ADMIN — EZETIMIBE 10 MG: 10 TABLET ORAL at 09:08

## 2020-08-13 RX ADMIN — ESCITALOPRAM OXALATE 10 MG: 10 TABLET, FILM COATED ORAL at 09:08

## 2020-08-13 RX ADMIN — FLUTICASONE FUROATE AND VILANTEROL TRIFENATATE 1 PUFF: 200; 25 POWDER RESPIRATORY (INHALATION) at 08:08

## 2020-08-13 RX ADMIN — Medication 1 PATCH: at 04:08

## 2020-08-13 RX ADMIN — PANTOPRAZOLE SODIUM 40 MG: 40 TABLET, DELAYED RELEASE ORAL at 09:08

## 2020-08-13 NOTE — PLAN OF CARE
Assessment completed with Pt at bedside.  Pt currently lives alone and reports his fiance stays with him when assistance is needed.  He has not addressed advance directives, and will be provided with the [facility].  He denies any history of HH services or DME at home.  His PCP is Dr. Zo Burrell, and he has Medicare / Medicaid ([facility]  His preferred pharmacy is [facility].  Plan for discharge is home, and his fiance will provide transportation.  SW notes that he is currently on 3L O2 and will need Home O2 Assessment prior to discharge if O2 is still required.  Case Management to continue to follow.     08/13/20 1021   Discharge Assessment   Assessment Type Discharge Planning Assessment   Confirmed/corrected address and phone number on facesheet? Yes   Assessment information obtained from? Patient;Medical Record   Prior to hospitilization cognitive status: Alert/Oriented   Prior to hospitalization functional status: Independent   Current cognitive status: Alert/Oriented   Current Functional Status: Independent   Facility Arrived From: Home   Lives With alone   Able to Return to Prior Arrangements yes   Is patient able to care for self after discharge? Yes   Who are your caregiver(s) and their phone number(s)? Pt is  and has one adult daughter.  He lists Ashlyn López fiance ([phone]) and Pao Ly, daughter ([phone]) as emergency contacts.   Patient's perception of discharge disposition home or selfcare   Readmission Within the Last 30 Days no previous admission in last 30 days   Patient currently being followed by outpatient case management? No   Patient currently receives any other outside agency services? No   Equipment Currently Used at Home none   Do you have any problems affording any of your prescribed medications? No   Is the patient taking medications as prescribed? yes   Does the patient have transportation home? Yes    Transportation Anticipated family or friend will provide   Dialysis Name and Scheduled days N/A   Does the patient receive services at the Coumadin Clinic? No   Discharge Plan A Home   Discharge Plan B Home   DME Needed Upon Discharge  none   Patient/Family in Agreement with Plan yes

## 2020-08-13 NOTE — CONSULTS
08/13/2020      Admit Date: 8/13/2020  Martell Jensen Nilo  New Patient Consult    Chief Complaint   Patient presents with    Chest Pain     short of breath       History of Present Illness:  Pt is a 66 yo male with bladder mass, tobacco abuse, UTI, HTN, GERD, and COPD who presented to the ED on 8/13 with complaints of SOB, cough, and congestion x2 weeks. He is being treated for COPD exacerbation.  He is known to me from clinic and seen as a new pt on 7/8/20 at which time he was started on a control inhaler for COPD and recommended that he quit smoking.   Pt reports he started Trelegy inhaler (was able to get it paid thru medicare) and it was helping his breathing. After clinic visit he developed UTI and was getting treated with an antibiotic. Then he started having increased wheezing and coughing for the past 2 wks with congestion in his chest and clear phlegm. He tried to manage at home taking mucinex DM and allergy meds and symptoms were on and off better and then worse again. Yesterday he started having gross hematuria w/ blood clots at about 11am then developed increased cough and increased SOB with pleuritic chest pain which became worse and inhalers were not working. He felt panicky like he couldn't breathe so came to the ED.    From consult 7/8/20-  HPI: Pt is a 66 yo male with HTN, GERD and BPH presents for new evaluation of breathing issues. He complains of SOB especially if he carries anything like taking out trash to the dumpster. This has been progressive over about 3.5 yrs. He wheezes and coughs up clear mucous, throughout the day.  Pt smokes 1 PPD x 55 yrs.  At age 5 pt had pna and a collapsed lung requiring chest tube on the left side and hospitalization. Didn't have any other problems w/ breathing growing up. He took up playing [employer] to help lung function.  Pt had abd/p scan for UTI recently which showed some emphysematous changes.     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 One brother  at 65 from leukemia.         PFSH:  Past Medical History:   Diagnosis Date    Benign enlargement of prostate     Had a biopsy in around     Elevated PSA     GERD (gastroesophageal reflux disease)     Hypertension     Started with meds in .    Kidney stone     Urinary tract infection      Past Surgical History:   Procedure Laterality Date    FISTULA REPAIR      LEFT HEART CATHETERIZATION N/A 10/23/2018    Procedure: Left heart cath;  Surgeon: Niharika Squires MD;  Location: [facility];  Service: Cardiology;  Laterality: N/A;     Social History     Tobacco Use    Smoking status: Current Every Day Smoker     Packs/day: 1.00     Years: 55.00     Pack years: 55.00     Types: Cigarettes    Smokeless tobacco: Never Used   Substance Use Topics    Alcohol use: Yes     Comment: Previous 12 beer a day for 20 years. Now occ.    Drug use: No     Family History   Problem Relation Age of Onset    Heart failure Mother     Cancer Father     Heart disease Brother     Heart attack Brother     Cancer Brother      Review of patient's allergies indicates:   Allergen Reactions    Oyster extract Swelling     PT swells in his throat after eating oysters on occasion       Performance Status:Performance Status:The patient's activity level is functions out of house.    Review of Systems:  a review of eleven systems covering constitutional, Psych, Eye, HEENT, Respiratory, Cardiac, GI, , Musculoskeletal, Endocrine, Dermatologicwas negative except the above mentioned abnormalities and for any pertinent findings as listed below:  Chest discomfort- sharp, pleuritic       Exam:Comprehensive exam done. /85   Pulse 85   Temp 97.2 °F (36.2 °C) (Oral)   Resp 18   Ht 5' 6" (1.676 m)   Wt 71.7 kg (158 lb 1.1 oz)   SpO2 95%   BMI 25.51 kg/m²   Exam included Vitals as listed, and patient's appearance and affect " and alertness and mood, oral exam for yeast and hygiene and pharynx lesions and Mallapatti (M) score, neck with inspection for jvd and masses and thyroid abnormalities and lymph nodes (supraclavicular and infraclavicular nodes also examined and noted if abn), chest exam included symmetry and effort and fremitus and percussion and auscultation, cardiac exam included rhythm and gallops and murmur and rubs and jvd and edema, abdominal exam for mass and hepatosplenomegaly and tenderness and hernias and bowel sounds, Musculoskeletal exam with muscle tone and posture and mobility/gait and  strenght, and skin for rashes and cyanosis and pallor and turgor, extremity for clubbing.  Findings were normal except as listed below:  Awake, alert no acute distress  Tight breath sounds bilaterally, diminished at bases- slight rales at left base  Abdomen nontender nondistended  Urine in urinal dark brown-black  No edema    Radiographs reviewed: view by direct vision   CXR 8/13/20- hyperinflation. has some chronic interstitial markings at bilateral bases and possible new infiltrates L base  CT chest 7/16/20- upper lobe emphysematous changes, aortic calcification, hyperinflation. 6 mm nodule DANA    PFT 7/16/20- mod/severe obstruction, air trapping, reduced DLCO      Labs     Recent Labs   Lab 08/13/20  0601   WBC 12.36   HGB 14.8   HCT 45.6        Recent Labs   Lab 08/13/20  0230 08/13/20  0300 08/13/20  0601     --  143   K 4.1  --  4.5     --  107   CO2 26  --  27   BUN 12  --  13   CREATININE 1.1  --  0.9   *  --  125*   CALCIUM 9.0  --  8.7   MG  --   --  1.8   PHOS  --   --  1.5*   AST 27  --   --    ALT 29  --   --    ALKPHOS 76  --   --    BILITOT 0.2  --   --    PROT 7.0  --   --    ALBUMIN 3.9  --   --    PROCAL  --   --  0.02   LACTATE  --  1.0  --    TROPONINI 0.012  --  0.094*   BNP 52  --   --    No results for input(s): PH, PCO2, PO2, HCO3 in the last 24 hours.  Microbiology Results (last 7  days)     ** No results found for the last 168 hours. **          Impression:  Active Hospital Problems    Diagnosis  POA    *Chronic obstructive pulmonary disease with acute exacerbation [J44.1]  Yes    Bladder mass [N32.89]  Yes    Tobacco abuse [Z72.0]  Yes    HTN (hypertension) [I10]  Yes    GERD (gastroesophageal reflux disease) [K21.9]  Yes      Resolved Hospital Problems   No resolved problems to display.               Plan:   Acute hypoxemic resp failure  Acute exacerbation COPD  Tobacco dependence  Hematuria  Bladder mass  HTN    - continue inhaled bronchodilators   - supplemental O2 to keep sats >89%  - continue LABA/ICS inhaler- resume trelegy on dc  - continue prednisone 40mg daily  - repeat CXR in am- possible pneumonia on left?  - continue levaquin for now  - follow up with urology outpatient   - needs to quit smoking  - BP meds per hospitalist    Zayra Marcelo MD  Pulmonary & Critical Care Medicine

## 2020-08-13 NOTE — ASSESSMENT & PLAN NOTE
Patient having hematuria for >6 weeks. He is followed by Dr. Garcia outpatient. He did not show for resection of bladder tumor scheduled 2 weeks ago. Patient plans to reschedule and follow up with Dr. Garcia.

## 2020-08-13 NOTE — RESPIRATORY THERAPY
08/13/20 0738   Patient Assessment/Suction   Level of Consciousness (AVPU) alert   All Lung Fields Breath Sounds diminished   PRE-TX-O2   O2 Device (Oxygen Therapy) nasal cannula   $ Is the patient on Low Flow Oxygen? Yes   Flow (L/min) 3   Oxygen Concentration (%) 32   SpO2 95 %   Pulse Oximetry Type Intermittent   $ Pulse Oximetry - Multiple Charge Pulse Oximetry - Multiple   Pulse 68   Resp 18   Aerosol Therapy   $ Aerosol Therapy Charges Aerosol Treatment   Respiratory Treatment Status (SVN) given   Treatment Route (SVN) mask   Patient Position (SVN) HOB elevated   Post Treatment Assessment (SVN) breath sounds unchanged   Signs of Intolerance (SVN) none   Breath Sounds Post-Respiratory Treatment   Throughout All Fields Post-Treatment All Fields   Throughout All Fields Post-Treatment no change   Post-treatment Heart Rate (beats/min) 72   Post-treatment Resp Rate (breaths/min) 18   Ready to Wean/Extubation Screen   FIO2<=50 (chart decimal) 0.32

## 2020-08-13 NOTE — ASSESSMENT & PLAN NOTE
Patient's COPD is uncontrolled due to continued dyspnea and worsening of baseline hypoxia currently.  Patient is currently on COPD Pathway. Continue scheduled inhalers Steroids, Antibiotics and Supplemental oxygen and monitor respiratory status closely.     Lactic negative.   Procalcitonin ordered.  Patient started on levaquin in ED, will continue for now.  Pulmonology consulted.

## 2020-08-13 NOTE — SUBJECTIVE & OBJECTIVE
Interval History: still with dyspnea, increases with exertion and conversation. On 3 Liters NC.  Denies oxygen at home.   + productive cough, fatigue    Review of Systems   Constitutional: Positive for fatigue. Negative for appetite change, chills and diaphoresis.   HENT: Negative for congestion and hearing loss.    Respiratory: Positive for cough, shortness of breath and wheezing.    Cardiovascular: Negative for chest pain, palpitations and leg swelling.   Gastrointestinal: Negative for abdominal pain, blood in stool and nausea.   Musculoskeletal: Negative for arthralgias and myalgias.   Neurological: Negative for dizziness, syncope and light-headedness.   Hematological: Negative for adenopathy.   Psychiatric/Behavioral: Negative for agitation and confusion. The patient is nervous/anxious.      Objective:     Vital Signs (Most Recent):  Temp: 96.5 °F (35.8 °C) (08/13/20 1153)  Pulse: 74 (08/13/20 1153)  Resp: 18 (08/13/20 1153)  BP: 127/61 (08/13/20 1153)  SpO2: 97 % (08/13/20 1153) Vital Signs (24h Range):  Temp:  [96.4 °F (35.8 °C)-99.8 °F (37.7 °C)] 96.5 °F (35.8 °C)  Pulse:  [] 74  Resp:  [17-26] 18  SpO2:  [94 %-97 %] 97 %  BP: (127-181)/() 127/61     Weight: 71.7 kg (158 lb 1.1 oz)  Body mass index is 25.51 kg/m².    Intake/Output Summary (Last 24 hours) at 8/13/2020 1250  Last data filed at 8/13/2020 0622  Gross per 24 hour   Intake 150 ml   Output 200 ml   Net -50 ml      Physical Exam  Vitals signs and nursing note reviewed.   Constitutional:       Appearance: Normal appearance. He is well-developed. He is not ill-appearing.   HENT:      Head: Normocephalic and atraumatic.      Right Ear: External ear normal.      Left Ear: External ear normal.      Nose: Nose normal.   Eyes:      Conjunctiva/sclera: Conjunctivae normal.      Pupils: Pupils are equal, round, and reactive to light.   Neck:      Musculoskeletal: Normal range of motion and neck supple.   Cardiovascular:      Rate and Rhythm:  Normal rate and regular rhythm.      Heart sounds: Normal heart sounds.   Pulmonary:      Effort: Respiratory distress present.      Breath sounds: Wheezing present.      Comments: Tachypnea with conversation, on 3 Liters NC.  Abdominal:      General: Bowel sounds are normal.      Palpations: Abdomen is soft.   Musculoskeletal: Normal range of motion.   Skin:     General: Skin is warm and dry.   Neurological:      Mental Status: He is alert and oriented to person, place, and time.   Psychiatric:         Behavior: Behavior normal.         Significant Labs:   CBC:   Recent Labs   Lab 08/13/20  0230 08/13/20  0601   WBC 12.94* 12.36   HGB 15.4 14.8   HCT 47.5 45.6    244     CMP:   Recent Labs   Lab 08/13/20 0230 08/13/20  0601    143   K 4.1 4.5    107   CO2 26 27   * 125*   BUN 12 13   CREATININE 1.1 0.9   CALCIUM 9.0 8.7   PROT 7.0  --    ALBUMIN 3.9  --    BILITOT 0.2  --    ALKPHOS 76  --    AST 27  --    ALT 29  --    ANIONGAP 10 9   EGFRNONAA >60 >60       Significant Imaging: I have reviewed and interpreted all pertinent imaging results/findings within the past 24 hours.

## 2020-08-13 NOTE — ED PROVIDER NOTES
Encounter Date: 8/13/2020       History     Chief Complaint   Patient presents with    Chest Pain     short of breath     65-year-old male past medical history of hypertension, dyslipidemia, acid reflux, tobacco use, CAD and bladder cancer presents today with multiple complaints but most significant is his sob. He reports worsening SOB,cough, and congestion x 2 weeks. Reports SOB worse with exertion. States got acutely worse in last two days. He reports chest pain a/w coughing. Denies fevers or chills. Also reports worsening hematuria. He has known bladder mass and was supposed to get bx 12 days ago but missed his appointment. Denies Fevers, chills, N/V/D, abdominal pain, dysuria or any other complaints.      The history is provided by the patient. No  was used.     Review of patient's allergies indicates:   Allergen Reactions    Oyster extract Swelling     PT swells in his throat after eating oysters on occasion     Past Medical History:   Diagnosis Date    Benign enlargement of prostate     Had a biopsy in around 2015    Elevated PSA     GERD (gastroesophageal reflux disease)     Hypertension     Started with meds in 2012.    Kidney stone     Urinary tract infection      Past Surgical History:   Procedure Laterality Date    FISTULA REPAIR      LEFT HEART CATHETERIZATION N/A 10/23/2018    Procedure: Left heart cath;  Surgeon: Niharika Squires MD;  Location: [facility] CATH;  Service: Cardiology;  Laterality: N/A;     Family History   Problem Relation Age of Onset    Heart failure Mother     Cancer Father     Heart disease Brother     Heart attack Brother     Cancer Brother      Social History     Tobacco Use    Smoking status: Current Every Day Smoker     Packs/day: 1.00     Years: 55.00     Pack years: 55.00     Types: Cigarettes    Smokeless tobacco: Never Used   Substance Use Topics    Alcohol use: Yes     Comment: Previous 12 beer a day for 20 years. Now occ.    Drug use: No      Review of Systems   Constitutional: Positive for fatigue. Negative for activity change, diaphoresis and fever.   HENT: Negative for drooling, rhinorrhea, sore throat and trouble swallowing.    Eyes: Negative for pain and visual disturbance.   Respiratory: Positive for cough and shortness of breath. Negative for stridor.    Cardiovascular: Positive for chest pain. Negative for leg swelling.   Gastrointestinal: Negative for abdominal distention, abdominal pain, constipation and vomiting.   Genitourinary: Positive for hematuria. Negative for discharge and dysuria.   Musculoskeletal: Negative for gait problem.   Skin: Negative for rash.   Neurological: Negative for seizures, facial asymmetry and headaches.   Psychiatric/Behavioral: Negative for hallucinations and suicidal ideas.       Physical Exam     Initial Vitals [08/13/20 0222]   BP Pulse Resp Temp SpO2   (!) 181/106 110 (!) 26 99.8 °F (37.7 °C) (!) 94 %      MAP       --         Physical Exam    Nursing note and vitals reviewed.  Constitutional: He appears well-developed.   HENT:   Head: Normocephalic and atraumatic.   Nose: Nose normal.   Eyes: EOM are normal.   Neck: Neck supple. No tracheal deviation present. No JVD present.   Cardiovascular: Regular rhythm, normal heart sounds and intact distal pulses. Tachycardia present.  Exam reveals no gallop and no friction rub.    No murmur heard.  Pulmonary/Chest: Accessory muscle usage present. Tachypnea noted. He is in respiratory distress. He has wheezes. He has no rhonchi. He has no rales.   Abdominal: Soft. Bowel sounds are normal. There is no abdominal tenderness.   Musculoskeletal: Normal range of motion.   Neurological: He is alert and oriented to person, place, and time. No cranial nerve deficit.   Skin: Skin is warm and dry. Capillary refill takes less than 2 seconds. No rash noted.   Psychiatric: He has a normal mood and affect.         ED Course   Procedures  Labs Reviewed   CBC W/ AUTO DIFFERENTIAL -  Abnormal; Notable for the following components:       Result Value    WBC 12.94 (*)     Mean Corpuscular Volume 101 (*)     Mean Corpuscular Hemoglobin 32.8 (*)     MPV 8.5 (*)     Mono # 1.3 (*)     Eos # 1.4 (*)     Eosinophil% 10.6 (*)     All other components within normal limits   COMPREHENSIVE METABOLIC PANEL - Abnormal; Notable for the following components:    Glucose 113 (*)     All other components within normal limits   SARS-COV-2 RNA AMPLIFICATION, QUAL   TROPONIN I   B-TYPE NATRIURETIC PEPTIDE   LACTIC ACID, PLASMA        ECG Results          EKG 12-lead (In process)  Result time 08/13/20 09:57:26    In process by Interface, Lab In [facility] (08/13/20 09:57:26)                 Narrative:    Test Reason : R07.9,    Vent. Rate : 084 BPM     Atrial Rate : 084 BPM     P-R Int : 160 ms          QRS Dur : 092 ms      QT Int : 364 ms       P-R-T Axes : 085 -24 071 degrees     QTc Int : 430 ms    Normal sinus rhythm  Incomplete right bundle branch block  Possible Anterior infarct (cited on or before 13-AUG-2020)  Abnormal ECG  When compared with ECG of 13-AUG-2020 02:31,  No significant change was found    Referred By: [email or web address]   SELF           Confirmed By:                              EKG 12-lead (In process)  Result time 08/13/20 09:57:14    In process by Interface, Lab In [facility] (08/13/20 09:57:14)                 Narrative:    Test Reason : R07.9,    Vent. Rate : 089 BPM     Atrial Rate : 089 BPM     P-R Int : 150 ms          QRS Dur : 094 ms      QT Int : 366 ms       P-R-T Axes : 083 -35 071 degrees     QTc Int : 445 ms    Normal sinus rhythm  Left axis deviation  Incomplete right bundle branch block  Possible Anterior infarct ,age undetermined  Abnormal ECG  When compared with ECG of 06-JAN-1989 15:29,  Incomplete right bundle branch block has replaced Nonspecific  intraventricular conduction delay  Borderline criteria for Anterior infarct are now Present    Referred By: [email or web address]   SELF            Confirmed By:                             Imaging Results          X-Ray Chest AP Portable (Final result)  Result time 08/13/20 07:59:40    Final result by Vinay Spence MD (08/13/20 07:59:40)                 Impression:      1.  As above      Electronically signed by: Vinay Spence MD  Date:    08/13/2020  Time:    07:59             Narrative:    EXAMINATION:  XR CHEST AP PORTABLE    CLINICAL HISTORY:  Chest Pain;    TECHNIQUE:  A single portable AP erect view of the chest was obtained    COMPARISON:  Chest CT dated 07/16/2020    FINDINGS:  The lungs are mildly hyperexpanded but clear without focal infiltrate or mass.  There is a very gentle dextrocurvature of the thoracic spine.  Heart size and mediastinal contour are normal.  There is no pleural effusion or pneumothorax.  Pulmonary vascularity is normal.  There is no evidence of congestive failure.                                 Medical Decision Making:   ED Management:  Given steroids and nebs as well as levaquin for emperic coverage. Admit to medicine for further management of COPD exacerbation.                    ED Course as of Aug 13 1604   Thu Aug 13, 2020   0243 65-year-old male past medical history of hypertension, dyslipidemia, acid reflux, tobacco use, CAD and bladder cancer presents today with sob, worsening cough, cp and hematuria.  Afebrile.  Tachycardic, tachypneic, hypertensive but oxygenating well. Diffuse wheezing with accessory muscle usage on exam.    [BD]      ED Course User Index  [BD] Micah Gonzales MD                Clinical Impression:       ICD-10-CM ICD-9-CM   1. Chest pain  R07.9 786.50   2. COPD with acute exacerbation  J44.1 491.21             ED Disposition Condition    Observation                           Micah Gonzales MD  08/13/20 1612

## 2020-08-13 NOTE — SUBJECTIVE & OBJECTIVE
Past Medical History:   Diagnosis Date    Benign enlargement of prostate     Had a biopsy in around 2015    Elevated PSA     GERD (gastroesophageal reflux disease)     Hypertension     Started with meds in 2012.    Kidney stone     Urinary tract infection        Past Surgical History:   Procedure Laterality Date    FISTULA REPAIR      LEFT HEART CATHETERIZATION N/A 10/23/2018    Procedure: Left heart cath;  Surgeon: Niharika Squires MD;  Location: [facility];  Service: Cardiology;  Laterality: N/A;       Review of patient's allergies indicates:   Allergen Reactions    Oyster extract Swelling     PT swells in his throat after eating oysters on occasion       No current facility-administered medications on file prior to encounter.      Current Outpatient Medications on File Prior to Encounter   Medication Sig    albuterol (PROVENTIL/VENTOLIN HFA) 90 mcg/actuation inhaler Inhale 1-2 puffs into the lungs every 4 (four) hours as needed for Shortness of Breath (coughing). Rescue    ALPRAZolam (XANAX) 0.5 MG tablet Take 0.5 mg by mouth 3 (three) times daily as needed for Anxiety.    aspirin (ECOTRIN) 81 MG EC tablet Take 1 tablet (81 mg total) by mouth once daily.    azelastine (ASTELIN) 137 mcg (0.1 %) nasal spray 1 spray by Nasal route 2 (two) times daily.    escitalopram oxalate (LEXAPRO) 20 MG tablet Take 0.5 tablets (10 mg total) by mouth once daily.    esomeprazole (NEXIUM) 20 MG capsule Take 1 capsule (20 mg total) by mouth before breakfast.    ezetimibe (ZETIA) 10 mg tablet Take 10 mg by mouth once daily.    LIVALO 4 mg Tab Take 1 tablet by mouth once daily.    losartan (COZAAR) 100 MG tablet Take 100 mg by mouth once daily.    nitroGLYCERIN (NITROSTAT) 0.4 MG SL tablet DISSOLVE ONE TABLET UNDER THE TONGUE EVERY 5 MINUTES AS NEEDED FOR CHEST PAIN. DO NOT EXCEED A TOTAL OF 3 DOSES IN 15 MINUTES    TRELEGY ELLIPTA 100-62.5-25 mcg DsDv     umeclidinium-vilanteroL (ANORO ELLIPTA) 62.5-25  mcg/actuation DsDv Inhale 1 puff into the lungs once daily. Controller    valACYclovir (VALTREX) 1000 MG tablet Take 1 g by mouth 2 (two) times daily.     Family History     Problem Relation (Age of Onset)    Cancer Father, Brother    Heart attack Brother    Heart disease Brother    Heart failure Mother        Tobacco Use    Smoking status: Current Every Day Smoker     Packs/day: 1.00     Years: 55.00     Pack years: 55.00     Types: Cigarettes    Smokeless tobacco: Never Used   Substance and Sexual Activity    Alcohol use: Yes     Comment: Previous 12 beer a day for 20 years. Now occ.    Drug use: No    Sexual activity: Not on file     Review of Systems   Constitutional: Negative for activity change, appetite change, chills, fatigue and fever.   HENT: Positive for congestion. Negative for ear pain, rhinorrhea, sneezing and sore throat.    Eyes: Negative for photophobia and visual disturbance.   Respiratory: Positive for cough, chest tightness, shortness of breath and wheezing.    Cardiovascular: Negative for chest pain, palpitations and leg swelling.   Gastrointestinal: Negative for abdominal distention, abdominal pain, constipation, diarrhea, nausea and vomiting.   Genitourinary: Positive for hematuria (x6 weeks). Negative for dysuria, flank pain and frequency.   Musculoskeletal: Negative for arthralgias, gait problem, myalgias and neck pain.   Skin: Negative for color change, rash and wound.   Neurological: Negative for dizziness, speech difficulty, weakness, numbness and headaches.   Psychiatric/Behavioral: Negative for agitation, confusion and hallucinations. The patient is not nervous/anxious.      Objective:     Vital Signs (Most Recent):  Temp: 99.8 °F (37.7 °C) (08/13/20 0222)  Pulse: 100 (08/13/20 0418)  Resp: 20 (08/13/20 0418)  BP: (!) 168/96 (08/13/20 0301)  SpO2: 96 % (08/13/20 0418) Vital Signs (24h Range):  Temp:  [99.8 °F (37.7 °C)] 99.8 °F (37.7 °C)  Pulse:  [] 100  Resp:  [20-26]  20  SpO2:  [94 %-97 %] 96 %  BP: (168-181)/() 168/96     Weight: 71.7 kg (158 lb)  Body mass index is 25.5 kg/m².    Physical Exam  Vitals signs and nursing note reviewed.   Constitutional:       General: He is not in acute distress.     Appearance: He is well-developed. He is not diaphoretic.      Interventions: Nasal cannula in place.   HENT:      Head: Normocephalic and atraumatic.      Right Ear: External ear normal.      Left Ear: External ear normal.      Mouth/Throat:      Mouth: Mucous membranes are moist.   Eyes:      Pupils: Pupils are equal, round, and reactive to light.   Neck:      Musculoskeletal: Normal range of motion.      Vascular: No JVD.   Cardiovascular:      Rate and Rhythm: Normal rate and regular rhythm.      Pulses: Normal pulses.      Heart sounds: No murmur.   Pulmonary:      Effort: Pulmonary effort is normal. Tachypnea present. No respiratory distress.      Breath sounds: Decreased air movement present. Wheezing present. No rhonchi or rales.      Comments: Scattered expiratory wheezes; currently on 4L nasal cannula  Abdominal:      General: Bowel sounds are normal. There is no distension.      Palpations: Abdomen is soft.      Tenderness: There is no abdominal tenderness.   Genitourinary:     Comments: Not examined  Musculoskeletal: Normal range of motion.         General: No deformity.      Right lower leg: No edema.      Left lower leg: No edema.   Skin:     General: Skin is warm and dry.      Capillary Refill: Capillary refill takes less than 2 seconds.   Neurological:      General: No focal deficit present.      Mental Status: He is alert and oriented to person, place, and time.      Motor: No weakness or tremor.   Psychiatric:         Attention and Perception: Attention normal.         Mood and Affect: Mood normal.         Speech: Speech normal.         Behavior: Behavior normal. Behavior is cooperative.           CRANIAL NERVES     CN III, IV, VI   Pupils are equal, round,  and reactive to light.       Significant Labs:   CBC:   Recent Labs   Lab 08/13/20  0230   WBC 12.94*   HGB 15.4   HCT 47.5        CMP:   Recent Labs   Lab 08/13/20  0230      K 4.1      CO2 26   *   BUN 12   CREATININE 1.1   CALCIUM 9.0   PROT 7.0   ALBUMIN 3.9   BILITOT 0.2   ALKPHOS 76   AST 27   ALT 29   ANIONGAP 10   EGFRNONAA >60     Lactic Acid:   Recent Labs   Lab 08/13/20  0300   LACTATE 1.0     All pertinent labs within the past 24 hours have been reviewed.    Significant Imaging: I have reviewed and interpreted all pertinent imaging results/findings within the past 24 hours.

## 2020-08-13 NOTE — ASSESSMENT & PLAN NOTE
Elevation in troponin. Repeat troponin.   Underwent cardiac stress test recently >2 months- negative. Cardiac angiogram in 2017 no significant CAD, no PCI

## 2020-08-13 NOTE — H&P
[facility] Hospital Medicine  History & Physical    Patient Name: Martell Corcoran  MRN: [id number]  Admission Date: 8/13/2020  Attending Physician: Mingo Antonio MD   Primary Care Provider: Zo Burrell MD         Patient information was obtained from patient, past medical records and ER records.     Subjective:     Principal Problem:Chronic obstructive pulmonary disease with acute exacerbation    Chief Complaint:   Chief Complaint   Patient presents with    Chest Pain     short of breath        HPI: Martell Corcoran is a 65 y.o. male with a PMHx of bladder mass, tobacco abuse, UTI, HTN, GERD, and COPD who presents to the ED with complaints of SOB, cough, and congestion x2 weeks. He reports intermittent symptoms over the last 2 weeks that have progressively gotten worse over the last 2 days. He reports exertion makes the SOB worse and nothing makes it better. The patient endorses clear sputum production and chest tightness with coughing or deep breathing. He denies any fever/chills. The patient denies any CP, abdominal pain, nausea, vomiting, diarrhea, weakness, dizziness, headache, or dysuria. His ED work up is significant for tachypnea, tachycardia, and mild leukocytosis on labs. The patient received duo neb treatment, solumedrol, and levaquin while in the ED. The patient will be placed in observation for further work up and evaluation.     Past Medical History:   Diagnosis Date    Benign enlargement of prostate     Had a biopsy in around 2015    Elevated PSA     GERD (gastroesophageal reflux disease)     Hypertension     Started with meds in 2012.    Kidney stone     Urinary tract infection        Past Surgical History:   Procedure Laterality Date    FISTULA REPAIR      LEFT HEART CATHETERIZATION N/A 10/23/2018    Procedure: Left heart cath;  Surgeon: Niharika Squires MD;  Location: [facility] CATH;  Service: Cardiology;  Laterality: N/A;       Review of patient's allergies  indicates:   Allergen Reactions    Oyster extract Swelling     PT swells in his throat after eating oysters on occasion       No current facility-administered medications on file prior to encounter.      Current Outpatient Medications on File Prior to Encounter   Medication Sig    albuterol (PROVENTIL/VENTOLIN HFA) 90 mcg/actuation inhaler Inhale 1-2 puffs into the lungs every 4 (four) hours as needed for Shortness of Breath (coughing). Rescue    ALPRAZolam (XANAX) 0.5 MG tablet Take 0.5 mg by mouth 3 (three) times daily as needed for Anxiety.    aspirin (ECOTRIN) 81 MG EC tablet Take 1 tablet (81 mg total) by mouth once daily.    azelastine (ASTELIN) 137 mcg (0.1 %) nasal spray 1 spray by Nasal route 2 (two) times daily.    escitalopram oxalate (LEXAPRO) 20 MG tablet Take 0.5 tablets (10 mg total) by mouth once daily.    esomeprazole (NEXIUM) 20 MG capsule Take 1 capsule (20 mg total) by mouth before breakfast.    ezetimibe (ZETIA) 10 mg tablet Take 10 mg by mouth once daily.    LIVALO 4 mg Tab Take 1 tablet by mouth once daily.    losartan (COZAAR) 100 MG tablet Take 100 mg by mouth once daily.    nitroGLYCERIN (NITROSTAT) 0.4 MG SL tablet DISSOLVE ONE TABLET UNDER THE TONGUE EVERY 5 MINUTES AS NEEDED FOR CHEST PAIN. DO NOT EXCEED A TOTAL OF 3 DOSES IN 15 MINUTES    TRELEGY ELLIPTA 100-62.5-25 mcg DsDv     umeclidinium-vilanteroL (ANORO ELLIPTA) 62.5-25 mcg/actuation DsDv Inhale 1 puff into the lungs once daily. Controller    valACYclovir (VALTREX) 1000 MG tablet Take 1 g by mouth 2 (two) times daily.     Family History     Problem Relation (Age of Onset)    Cancer Father, Brother    Heart attack Brother    Heart disease Brother    Heart failure Mother        Tobacco Use    Smoking status: Current Every Day Smoker     Packs/day: 1.00     Years: 55.00     Pack years: 55.00     Types: Cigarettes    Smokeless tobacco: Never Used   Substance and Sexual Activity    Alcohol use: Yes     Comment:  Previous 12 beer a day for 20 years. Now occ.    Drug use: No    Sexual activity: Not on file     Review of Systems   Constitutional: Negative for activity change, appetite change, chills, fatigue and fever.   HENT: Positive for congestion. Negative for ear pain, rhinorrhea, sneezing and sore throat.    Eyes: Negative for photophobia and visual disturbance.   Respiratory: Positive for cough, chest tightness, shortness of breath and wheezing.    Cardiovascular: Negative for chest pain, palpitations and leg swelling.   Gastrointestinal: Negative for abdominal distention, abdominal pain, constipation, diarrhea, nausea and vomiting.   Genitourinary: Positive for hematuria (x6 weeks). Negative for dysuria, flank pain and frequency.   Musculoskeletal: Negative for arthralgias, gait problem, myalgias and neck pain.   Skin: Negative for color change, rash and wound.   Neurological: Negative for dizziness, speech difficulty, weakness, numbness and headaches.   Psychiatric/Behavioral: Negative for agitation, confusion and hallucinations. The patient is not nervous/anxious.      Objective:     Vital Signs (Most Recent):  Temp: 99.8 °F (37.7 °C) (08/13/20 0222)  Pulse: 100 (08/13/20 0418)  Resp: 20 (08/13/20 0418)  BP: (!) 168/96 (08/13/20 0301)  SpO2: 96 % (08/13/20 0418) Vital Signs (24h Range):  Temp:  [99.8 °F (37.7 °C)] 99.8 °F (37.7 °C)  Pulse:  [] 100  Resp:  [20-26] 20  SpO2:  [94 %-97 %] 96 %  BP: (168-181)/() 168/96     Weight: 71.7 kg (158 lb)  Body mass index is 25.5 kg/m².    Physical Exam  Vitals signs and nursing note reviewed.   Constitutional:       General: He is not in acute distress.     Appearance: He is well-developed. He is not diaphoretic.      Interventions: Nasal cannula in place.   HENT:      Head: Normocephalic and atraumatic.      Right Ear: External ear normal.      Left Ear: External ear normal.      Mouth/Throat:      Mouth: Mucous membranes are moist.   Eyes:      Pupils: Pupils  are equal, round, and reactive to light.   Neck:      Musculoskeletal: Normal range of motion.      Vascular: No JVD.   Cardiovascular:      Rate and Rhythm: Normal rate and regular rhythm.      Pulses: Normal pulses.      Heart sounds: No murmur.   Pulmonary:      Effort: Pulmonary effort is normal. Tachypnea present. No respiratory distress.      Breath sounds: Decreased air movement present. Wheezing present. No rhonchi or rales.      Comments: Scattered expiratory wheezes; currently on 4L nasal cannula  Abdominal:      General: Bowel sounds are normal. There is no distension.      Palpations: Abdomen is soft.      Tenderness: There is no abdominal tenderness.   Genitourinary:     Comments: Not examined  Musculoskeletal: Normal range of motion.         General: No deformity.      Right lower leg: No edema.      Left lower leg: No edema.   Skin:     General: Skin is warm and dry.      Capillary Refill: Capillary refill takes less than 2 seconds.   Neurological:      General: No focal deficit present.      Mental Status: He is alert and oriented to person, place, and time.      Motor: No weakness or tremor.   Psychiatric:         Attention and Perception: Attention normal.         Mood and Affect: Mood normal.         Speech: Speech normal.         Behavior: Behavior normal. Behavior is cooperative.           CRANIAL NERVES     CN III, IV, VI   Pupils are equal, round, and reactive to light.       Significant Labs:   CBC:   Recent Labs   Lab 08/13/20  0230   WBC 12.94*   HGB 15.4   HCT 47.5        CMP:   Recent Labs   Lab 08/13/20  0230      K 4.1      CO2 26   *   BUN 12   CREATININE 1.1   CALCIUM 9.0   PROT 7.0   ALBUMIN 3.9   BILITOT 0.2   ALKPHOS 76   AST 27   ALT 29   ANIONGAP 10   EGFRNONAA >60     Lactic Acid:   Recent Labs   Lab 08/13/20  0300   LACTATE 1.0     All pertinent labs within the past 24 hours have been reviewed.    Significant Imaging: I have reviewed and interpreted  all pertinent imaging results/findings within the past 24 hours.    Assessment/Plan:     * Chronic obstructive pulmonary disease with acute exacerbation  Patient's COPD is uncontrolled due to continued dyspnea and worsening of baseline hypoxia currently.  Patient is currently on COPD Pathway. Continue scheduled inhalers Steroids, Antibiotics and Supplemental oxygen and monitor respiratory status closely.     Lactic negative.   Procalcitonin ordered.  Patient started on levaquin in ED, will continue for now.  Pulmonology consulted.    Tobacco abuse  Assistance with smoking cessation was offered, including:  [x]  Medications  [x]  Counseling  []  Printed Information on Smoking Cessation  []  Referral to a Smoking Cessation Program    Patient was counseled regarding smoking for 3-10 minutes.    Bladder mass  Patient having hematuria for >6 weeks. He is followed by Dr. Garcia outpatient. He did not show for resection of bladder tumor scheduled 2 weeks ago. Patient plans to reschedule and follow up with Dr. Garcia.    GERD (gastroesophageal reflux disease)  Chronic, stable. Continue PPI.    HTN (hypertension)  Chronic, controlled.  Latest blood pressure and vitals reviewed-   Temp:  [96.4 °F (35.8 °C)-99.8 °F (37.7 °C)]   Pulse:  []   Resp:  [17-26]   BP: (166-181)/()   SpO2:  [94 %-97 %] .   Home meds for hypertension were reviewed and noted below. Hospital anti-hypertensive changes were made as shown below.  Hypertension Medications             losartan (COZAAR) 100 MG tablet Take 100 mg by mouth once daily.    nitroGLYCERIN (NITROSTAT) 0.4 MG SL tablet DISSOLVE ONE TABLET UNDER THE TONGUE EVERY 5 MINUTES AS NEEDED FOR CHEST PAIN. DO NOT EXCEED A TOTAL OF 3 DOSES IN 15 MINUTES      Hospital Medications             hydrALAZINE injection 10 mg 10 mg, Intravenous, Every 8 hours PRN, 1. Consider placing patient on continuous cardiac monitor (obtain order if needed).<BR>2. Monitor BP and HR pre-administration,  post-administration, then every 30 minutes x2, then every hour x2.<BR>3. Administer at a rate no faster than 5mg over 1 minute.<BR>    losartan tablet 100 mg 100 mg, Oral, Daily    nitroGLYCERIN SL tablet 0.4 mg 0.4 mg, Sublingual, Every 5 min PRN, DO NOT CRUSH OR CHEW; DISSOLVE UNDER TONGUE; MAXIMUM OF 3 DOSES IN 15 MINUTES        Will utilize p.r.n. blood pressure medication only if patient's blood pressure greater than  180/110 and he develops symptoms such as worsening chest pain or shortness of breath.      VTE Risk Mitigation (From admission, onward)         Ordered     IP VTE LOW RISK PATIENT  Once      08/13/20 0520     Place sequential compression device  Until discontinued      08/13/20 0520     Place KIANA hose  Until discontinued      08/13/20 0520                   Loli Hernandez NP  Department of Hospital Medicine   [facility]

## 2020-08-13 NOTE — PLAN OF CARE
Plan of care reviewed with pt at beginning during admission.  Pt verbalized understanding.  Pt voids with use of urinal- red, bloody urine with clots noted.  MD aware.  Repositions independently, safety maintained.  Patient has remained free from fall/injury, no skin breakdown noted.  Side rails up x2, bed in locked and lowest position, call light kept within reach.  Needs attended to, will continue to monitor/ update as indicated

## 2020-08-13 NOTE — PROGRESS NOTES
[facility] Hospital Medicine  Progress Note    Patient Name: Martell Corcoran  MRN: [id number]  Patient Class: OP- Observation   Admission Date: 8/13/2020  Length of Stay: 0 days  Attending Physician: Mingo Antonio MD  Primary Care Provider: Zo Burrell MD        Subjective:     Principal Problem:Chronic obstructive pulmonary disease with acute exacerbation        HPI:  Martell Corcoran is a 65 y.o. male with a PMHx of bladder mass, tobacco abuse, UTI, HTN, GERD, and COPD who presents to the ED with complaints of SOB, cough, and congestion x2 weeks. He reports intermittent symptoms over the last 2 weeks that have progressively gotten worse over the last 2 days. He reports exertion makes the SOB worse and nothing makes it better. The patient endorses clear sputum production and chest tightness with coughing or deep breathing. He denies any fever/chills. The patient denies any CP, abdominal pain, nausea, vomiting, diarrhea, weakness, dizziness, headache, or dysuria. His ED work up is significant for tachypnea, tachycardia, and mild leukocytosis on labs. The patient received duo neb treatment, solumedrol, and levaquin while in the ED. The patient will be placed in observation for further work up and evaluation.     Overview/Hospital Course:  No notes on file    Interval History: still with dyspnea, increases with exertion and conversation. On 3 Liters NC.  Denies oxygen at home.   + productive cough, fatigue    Review of Systems   Constitutional: Positive for fatigue. Negative for appetite change, chills and diaphoresis.   HENT: Negative for congestion and hearing loss.    Respiratory: Positive for cough, shortness of breath and wheezing.    Cardiovascular: Negative for chest pain, palpitations and leg swelling.   Gastrointestinal: Negative for abdominal pain, blood in stool and nausea.   Musculoskeletal: Negative for arthralgias and myalgias.   Neurological: Negative for dizziness,  syncope and light-headedness.   Hematological: Negative for adenopathy.   Psychiatric/Behavioral: Negative for agitation and confusion. The patient is nervous/anxious.      Objective:     Vital Signs (Most Recent):  Temp: 96.5 °F (35.8 °C) (08/13/20 1153)  Pulse: 74 (08/13/20 1153)  Resp: 18 (08/13/20 1153)  BP: 127/61 (08/13/20 1153)  SpO2: 97 % (08/13/20 1153) Vital Signs (24h Range):  Temp:  [96.4 °F (35.8 °C)-99.8 °F (37.7 °C)] 96.5 °F (35.8 °C)  Pulse:  [] 74  Resp:  [17-26] 18  SpO2:  [94 %-97 %] 97 %  BP: (127-181)/() 127/61     Weight: 71.7 kg (158 lb 1.1 oz)  Body mass index is 25.51 kg/m².    Intake/Output Summary (Last 24 hours) at 8/13/2020 1250  Last data filed at 8/13/2020 0622  Gross per 24 hour   Intake 150 ml   Output 200 ml   Net -50 ml      Physical Exam  Vitals signs and nursing note reviewed.   Constitutional:       Appearance: Normal appearance. He is well-developed. He is not ill-appearing.   HENT:      Head: Normocephalic and atraumatic.      Right Ear: External ear normal.      Left Ear: External ear normal.      Nose: Nose normal.   Eyes:      Conjunctiva/sclera: Conjunctivae normal.      Pupils: Pupils are equal, round, and reactive to light.   Neck:      Musculoskeletal: Normal range of motion and neck supple.   Cardiovascular:      Rate and Rhythm: Normal rate and regular rhythm.      Heart sounds: Normal heart sounds.   Pulmonary:      Effort: Respiratory distress present.      Breath sounds: Wheezing present.      Comments: Tachypnea with conversation, on 3 Liters NC.  Abdominal:      General: Bowel sounds are normal.      Palpations: Abdomen is soft.   Musculoskeletal: Normal range of motion.   Skin:     General: Skin is warm and dry.   Neurological:      Mental Status: He is alert and oriented to person, place, and time.   Psychiatric:         Behavior: Behavior normal.         Significant Labs:   CBC:   Recent Labs   Lab 08/13/20  0230 08/13/20  0601   WBC 12.94* 12.36    HGB 15.4 14.8   HCT 47.5 45.6    244     CMP:   Recent Labs   Lab 08/13/20  0230 08/13/20  0601    143   K 4.1 4.5    107   CO2 26 27   * 125*   BUN 12 13   CREATININE 1.1 0.9   CALCIUM 9.0 8.7   PROT 7.0  --    ALBUMIN 3.9  --    BILITOT 0.2  --    ALKPHOS 76  --    AST 27  --    ALT 29  --    ANIONGAP 10 9   EGFRNONAA >60 >60       Significant Imaging: I have reviewed and interpreted all pertinent imaging results/findings within the past 24 hours.      Assessment/Plan:      * Chronic obstructive pulmonary disease with acute exacerbation  Patient's COPD is uncontrolled due to continued dyspnea and worsening of baseline hypoxia currently.  Patient is currently on COPD Pathway. Continue scheduled inhalers Steroids, Antibiotics and Supplemental oxygen and monitor respiratory status closely.     Lactic negative.   Procalcitonin ordered.  Patient started on levaquin in ED, will continue for now.  Pulmonology consulted.    Ischemia due to increased oxygen demand  Elevation in troponin. Repeat troponin.   Underwent cardiac stress test recently >2 months- negative. Cardiac angiogram in 2017 no significant CAD, no PCI      Tobacco abuse  Assistance with smoking cessation was offered, including:  [x]  Medications  [x]  Counseling  []  Printed Information on Smoking Cessation  []  Referral to a Smoking Cessation Program    Patient was counseled regarding smoking for 3-10 minutes.    Bladder mass  Patient having hematuria for >6 weeks. He is followed by Dr. Garcia outpatient. He did not show for resection of bladder tumor scheduled 2 weeks ago. Patient plans to reschedule and follow up with Dr. Garcia.    GERD (gastroesophageal reflux disease)  Chronic, stable. Continue PPI.    HTN (hypertension)  Chronic, controlled.  Latest blood pressure and vitals reviewed-   Temp:  [96.4 °F (35.8 °C)-99.8 °F (37.7 °C)]   Pulse:  []   Resp:  [17-26]   BP: (166-181)/()   SpO2:  [94 %-97 %] .   Home meds  for hypertension were reviewed and noted below. Hospital anti-hypertensive changes were made as shown below.  Hypertension Medications             losartan (COZAAR) 100 MG tablet Take 100 mg by mouth once daily.    nitroGLYCERIN (NITROSTAT) 0.4 MG SL tablet DISSOLVE ONE TABLET UNDER THE TONGUE EVERY 5 MINUTES AS NEEDED FOR CHEST PAIN. DO NOT EXCEED A TOTAL OF 3 DOSES IN 15 MINUTES      Hospital Medications             hydrALAZINE injection 10 mg 10 mg, Intravenous, Every 8 hours PRN, 1. Consider placing patient on continuous cardiac monitor (obtain order if needed).2. Monitor BP and HR pre-administration, post-administration, then every 30 minutes x2, then every hour x2.3. Administer at a rate no faster than 5mg over 1 minute.    losartan tablet 100 mg 100 mg, Oral, Daily    nitroGLYCERIN SL tablet 0.4 mg 0.4 mg, Sublingual, Every 5 min PRN, DO NOT CRUSH OR CHEW; DISSOLVE UNDER TONGUE; MAXIMUM OF 3 DOSES IN 15 MINUTES        Will utilize p.r.n. blood pressure medication only if patient's blood pressure greater than  180/110 and he develops symptoms such as worsening chest pain or shortness of breath.    VTE Risk Mitigation (From admission, onward)         Ordered     IP VTE LOW RISK PATIENT  Once      08/13/20 0520     Place sequential compression device  Until discontinued      08/13/20 0520     Place KIANA hose  Until discontinued      08/13/20 0520                Discharge Planning   DIAN: 8/14/2020     Code Status: Full Code   Is the patient medically ready for discharge?: No    Reason for patient still in hospital (select all that apply): Patient trending condition, Laboratory test, Treatment and Consult recommendations  Discharge Plan A: Home                  Zayra Shaw NP  Department of Hospital Medicine   [facility]

## 2020-08-13 NOTE — HPI
Martell Corcoran is a 65 y.o. male with a PMHx of bladder mass, tobacco abuse, UTI, HTN, GERD, and COPD who presents to the ED with complaints of SOB, cough, and congestion x2 weeks. He reports intermittent symptoms over the last 2 weeks that have progressively gotten worse over the last 2 days. He reports exertion makes the SOB worse and nothing makes it better. The patient endorses clear sputum production and chest tightness with coughing or deep breathing. He denies any fever/chills. The patient denies any CP, abdominal pain, nausea, vomiting, diarrhea, weakness, dizziness, headache, or dysuria. His ED work up is significant for tachypnea, tachycardia, and mild leukocytosis on labs. The patient received duo neb treatment, solumedrol, and levaquin while in the ED. The patient will be placed in observation for further work up and evaluation.

## 2020-08-13 NOTE — NURSING
Results for GONSOULIN, ANDREW RAMEY (MRN [id number]) as of 8/13/2020 07:09   Ref. Range 8/13/2020 06:01   Troponin I Latest Ref Range: 0.000 - 0.026 ng/mL 0.094 (H)     Dr. Antonio notified via secure chat

## 2020-08-13 NOTE — ED NOTES
Martell Corcoranpresents to ED with c/o chest pain sob, and blood in urine x 1 day.   Mucous membranes are pink and moist. Skin is warm, dry and intact. Lungs wheezing noted, respirations labored. BS active x4, no tenderness with palpitation, abd is soft and not distended. Denies any appetite or activity change. S1S2, capillary refill is < 2 secs.Denies  difficulty urinating, frequency, numbness, tingling or weakness.

## 2020-08-13 NOTE — ASSESSMENT & PLAN NOTE
Chronic, controlled.  Latest blood pressure and vitals reviewed-   Temp:  [96.4 °F (35.8 °C)-99.8 °F (37.7 °C)]   Pulse:  []   Resp:  [17-26]   BP: (166-181)/()   SpO2:  [94 %-97 %] .   Home meds for hypertension were reviewed and noted below. Hospital anti-hypertensive changes were made as shown below.  Hypertension Medications             losartan (COZAAR) 100 MG tablet Take 100 mg by mouth once daily.    nitroGLYCERIN (NITROSTAT) 0.4 MG SL tablet DISSOLVE ONE TABLET UNDER THE TONGUE EVERY 5 MINUTES AS NEEDED FOR CHEST PAIN. DO NOT EXCEED A TOTAL OF 3 DOSES IN 15 MINUTES      Hospital Medications             hydrALAZINE injection 10 mg 10 mg, Intravenous, Every 8 hours PRN, 1. Consider placing patient on continuous cardiac monitor (obtain order if needed).<BR>2. Monitor BP and HR pre-administration, post-administration, then every 30 minutes x2, then every hour x2.<BR>3. Administer at a rate no faster than 5mg over 1 minute.<BR>    losartan tablet 100 mg 100 mg, Oral, Daily    nitroGLYCERIN SL tablet 0.4 mg 0.4 mg, Sublingual, Every 5 min PRN, DO NOT CRUSH OR CHEW; DISSOLVE UNDER TONGUE; MAXIMUM OF 3 DOSES IN 15 MINUTES        Will utilize p.r.n. blood pressure medication only if patient's blood pressure greater than  180/110 and he develops symptoms such as worsening chest pain or shortness of breath.

## 2020-08-14 ENCOUNTER — TELEPHONE (OUTPATIENT)
Dept: FAMILY MEDICINE | Facility: CLINIC | Age: 65
End: 2020-08-14

## 2020-08-14 VITALS
TEMPERATURE: 97 F | WEIGHT: 158.06 LBS | RESPIRATION RATE: 18 BRPM | DIASTOLIC BLOOD PRESSURE: 67 MMHG | OXYGEN SATURATION: 96 % | HEART RATE: 70 BPM | HEIGHT: 66 IN | SYSTOLIC BLOOD PRESSURE: 141 MMHG | BODY MASS INDEX: 25.4 KG/M2

## 2020-08-14 LAB
ANION GAP SERPL CALC-SCNC: 10 MMOL/L (ref 8–16)
BACTERIA #/AREA URNS HPF: ABNORMAL /HPF
BASOPHILS # BLD AUTO: 0.02 K/UL (ref 0–0.2)
BASOPHILS NFR BLD: 0.1 % (ref 0–1.9)
BILIRUB UR QL STRIP: NEGATIVE
BUN SERPL-MCNC: 12 MG/DL (ref 8–23)
CALCIUM SERPL-MCNC: 9.7 MG/DL (ref 8.7–10.5)
CHLORIDE SERPL-SCNC: 104 MMOL/L (ref 95–110)
CLARITY UR: CLEAR
CO2 SERPL-SCNC: 27 MMOL/L (ref 23–29)
COLOR UR: YELLOW
CREAT SERPL-MCNC: 0.7 MG/DL (ref 0.5–1.4)
DIFFERENTIAL METHOD: ABNORMAL
EOSINOPHIL # BLD AUTO: 0 K/UL (ref 0–0.5)
EOSINOPHIL NFR BLD: 0 % (ref 0–8)
ERYTHROCYTE [DISTWIDTH] IN BLOOD BY AUTOMATED COUNT: 13.2 % (ref 11.5–14.5)
EST. GFR  (AFRICAN AMERICAN): >60 ML/MIN/1.73 M^2
EST. GFR  (NON AFRICAN AMERICAN): >60 ML/MIN/1.73 M^2
GLUCOSE SERPL-MCNC: 121 MG/DL (ref 70–110)
GLUCOSE UR QL STRIP: NEGATIVE
HCT VFR BLD AUTO: 42.8 % (ref 40–54)
HGB BLD-MCNC: 13.9 G/DL (ref 14–18)
HGB UR QL STRIP: ABNORMAL
IMM GRANULOCYTES # BLD AUTO: 0.11 K/UL (ref 0–0.04)
IMM GRANULOCYTES NFR BLD AUTO: 0.7 % (ref 0–0.5)
KETONES UR QL STRIP: NEGATIVE
LEUKOCYTE ESTERASE UR QL STRIP: ABNORMAL
LYMPHOCYTES # BLD AUTO: 1.6 K/UL (ref 1–4.8)
LYMPHOCYTES NFR BLD: 9.9 % (ref 18–48)
MAGNESIUM SERPL-MCNC: 2.1 MG/DL (ref 1.6–2.6)
MCH RBC QN AUTO: 31.9 PG (ref 27–31)
MCHC RBC AUTO-ENTMCNC: 32.5 G/DL (ref 32–36)
MCV RBC AUTO: 98 FL (ref 82–98)
MICROSCOPIC COMMENT: ABNORMAL
MONOCYTES # BLD AUTO: 1.5 K/UL (ref 0.3–1)
MONOCYTES NFR BLD: 9.3 % (ref 4–15)
NEUTROPHILS # BLD AUTO: 12.6 K/UL (ref 1.8–7.7)
NEUTROPHILS NFR BLD: 80 % (ref 38–73)
NITRITE UR QL STRIP: NEGATIVE
NRBC BLD-RTO: 0 /100 WBC
PH UR STRIP: 7 [PH] (ref 5–8)
PHOSPHATE SERPL-MCNC: 2.8 MG/DL (ref 2.7–4.5)
PLATELET # BLD AUTO: 262 K/UL (ref 150–350)
PMV BLD AUTO: 8.6 FL (ref 9.2–12.9)
POTASSIUM SERPL-SCNC: 3.8 MMOL/L (ref 3.5–5.1)
PROT UR QL STRIP: ABNORMAL
RBC # BLD AUTO: 4.36 M/UL (ref 4.6–6.2)
RBC #/AREA URNS HPF: 60 /HPF (ref 0–4)
SODIUM SERPL-SCNC: 141 MMOL/L (ref 136–145)
SP GR UR STRIP: 1.01 (ref 1–1.03)
SQUAMOUS #/AREA URNS HPF: 1 /HPF
URN SPEC COLLECT METH UR: ABNORMAL
UROBILINOGEN UR STRIP-ACNC: NEGATIVE EU/DL
WBC # BLD AUTO: 15.77 K/UL (ref 3.9–12.7)
WBC #/AREA URNS HPF: 2 /HPF (ref 0–5)

## 2020-08-14 PROCEDURE — 63600175 PHARM REV CODE 636 W HCPCS: Performed by: NURSE PRACTITIONER

## 2020-08-14 PROCEDURE — 94640 AIRWAY INHALATION TREATMENT: CPT

## 2020-08-14 PROCEDURE — 81000 URINALYSIS NONAUTO W/SCOPE: CPT

## 2020-08-14 PROCEDURE — S4991 NICOTINE PATCH NONLEGEND: HCPCS | Performed by: NURSE PRACTITIONER

## 2020-08-14 PROCEDURE — 25000003 PHARM REV CODE 250: Performed by: NURSE PRACTITIONER

## 2020-08-14 PROCEDURE — G0378 HOSPITAL OBSERVATION PER HR: HCPCS | Mod: CS

## 2020-08-14 PROCEDURE — 99214 PR OFFICE/OUTPT VISIT, EST, LEVL IV, 30-39 MIN: ICD-10-PCS | Mod: ,,, | Performed by: INTERNAL MEDICINE

## 2020-08-14 PROCEDURE — 85025 COMPLETE CBC W/AUTO DIFF WBC: CPT

## 2020-08-14 PROCEDURE — 25000242 PHARM REV CODE 250 ALT 637 W/ HCPCS: Performed by: NURSE PRACTITIONER

## 2020-08-14 PROCEDURE — G0378 HOSPITAL OBSERVATION PER HR: HCPCS

## 2020-08-14 PROCEDURE — 84100 ASSAY OF PHOSPHORUS: CPT

## 2020-08-14 PROCEDURE — 80048 BASIC METABOLIC PNL TOTAL CA: CPT

## 2020-08-14 PROCEDURE — 96375 TX/PRO/DX INJ NEW DRUG ADDON: CPT

## 2020-08-14 PROCEDURE — 99214 OFFICE O/P EST MOD 30 MIN: CPT | Mod: ,,, | Performed by: INTERNAL MEDICINE

## 2020-08-14 PROCEDURE — 83735 ASSAY OF MAGNESIUM: CPT

## 2020-08-14 PROCEDURE — 36415 COLL VENOUS BLD VENIPUNCTURE: CPT

## 2020-08-14 RX ORDER — IPRATROPIUM BROMIDE AND ALBUTEROL SULFATE 2.5; .5 MG/3ML; MG/3ML
3 SOLUTION RESPIRATORY (INHALATION) EVERY 4 HOURS PRN
Status: DISCONTINUED | OUTPATIENT
Start: 2020-08-14 | End: 2020-08-14 | Stop reason: HOSPADM

## 2020-08-14 RX ORDER — LEVOFLOXACIN 750 MG/1
750 TABLET ORAL DAILY
Qty: 7 TABLET | Refills: 0 | Status: SHIPPED | OUTPATIENT
Start: 2020-08-15 | End: 2020-08-22

## 2020-08-14 RX ORDER — IPRATROPIUM BROMIDE AND ALBUTEROL SULFATE 2.5; .5 MG/3ML; MG/3ML
3 SOLUTION RESPIRATORY (INHALATION) EVERY 4 HOURS PRN
Qty: 1 BOX | Refills: 0 | Status: SHIPPED | OUTPATIENT
Start: 2020-08-14 | End: 2020-08-20 | Stop reason: SDUPTHER

## 2020-08-14 RX ORDER — PREDNISONE 20 MG/1
40 TABLET ORAL DAILY
Qty: 8 TABLET | Refills: 0 | Status: SHIPPED | OUTPATIENT
Start: 2020-08-15 | End: 2020-08-20 | Stop reason: SDUPTHER

## 2020-08-14 RX ORDER — KETOROLAC TROMETHAMINE 30 MG/ML
15 INJECTION, SOLUTION INTRAMUSCULAR; INTRAVENOUS ONCE
Status: COMPLETED | OUTPATIENT
Start: 2020-08-14 | End: 2020-08-14

## 2020-08-14 RX ADMIN — ASPIRIN 81 MG: 81 TABLET, COATED ORAL at 09:08

## 2020-08-14 RX ADMIN — PANTOPRAZOLE SODIUM 40 MG: 40 TABLET, DELAYED RELEASE ORAL at 09:08

## 2020-08-14 RX ADMIN — IPRATROPIUM BROMIDE AND ALBUTEROL SULFATE 3 ML: .5; 2.5 SOLUTION RESPIRATORY (INHALATION) at 07:08

## 2020-08-14 RX ADMIN — LEVOFLOXACIN 750 MG: 500 TABLET, FILM COATED ORAL at 05:08

## 2020-08-14 RX ADMIN — DOCUSATE SODIUM 50 MG AND SENNOSIDES 8.6 MG 1 TABLET: 8.6; 5 TABLET, FILM COATED ORAL at 09:08

## 2020-08-14 RX ADMIN — ESCITALOPRAM OXALATE 10 MG: 10 TABLET, FILM COATED ORAL at 09:08

## 2020-08-14 RX ADMIN — FLUTICASONE FUROATE AND VILANTEROL TRIFENATATE 1 PUFF: 200; 25 POWDER RESPIRATORY (INHALATION) at 07:08

## 2020-08-14 RX ADMIN — IPRATROPIUM BROMIDE AND ALBUTEROL SULFATE 3 ML: .5; 2.5 SOLUTION RESPIRATORY (INHALATION) at 11:08

## 2020-08-14 RX ADMIN — KETOROLAC TROMETHAMINE 15 MG: 30 INJECTION, SOLUTION INTRAMUSCULAR; INTRAVENOUS at 05:08

## 2020-08-14 RX ADMIN — Medication 1 PATCH: at 09:08

## 2020-08-14 RX ADMIN — LOSARTAN POTASSIUM 100 MG: 25 TABLET, FILM COATED ORAL at 09:08

## 2020-08-14 RX ADMIN — PREDNISONE 40 MG: 20 TABLET ORAL at 09:08

## 2020-08-14 RX ADMIN — EZETIMIBE 10 MG: 10 TABLET ORAL at 09:08

## 2020-08-14 RX ADMIN — IPRATROPIUM BROMIDE AND ALBUTEROL SULFATE 3 ML: .5; 2.5 SOLUTION RESPIRATORY (INHALATION) at 04:08

## 2020-08-14 NOTE — PLAN OF CARE
08/13/20 1942   Patient Assessment/Suction   Level of Consciousness (AVPU) alert   Respiratory Effort Normal;Unlabored   Expansion/Accessory Muscles/Retractions expansion symmetric   All Lung Fields Breath Sounds diminished   Cough Frequency infrequent   Cough Type nonproductive   PRE-TX-O2   O2 Device (Oxygen Therapy) nasal cannula   Flow (L/min) 3   SpO2 96 %   Pulse Oximetry Type Intermittent   Pulse 74   Resp 18   Aerosol Therapy   $ Aerosol Therapy Charges Aerosol Treatment   Respiratory Treatment Status (SVN) given   Treatment Route (SVN) mask   Patient Position (SVN) HOB elevated   Signs of Intolerance (SVN) none   Breath Sounds Post-Respiratory Treatment   Post-treatment Heart Rate (beats/min) 76   Post-treatment Resp Rate (breaths/min) 18

## 2020-08-14 NOTE — PLAN OF CARE
The pt is discharging home and does not qualify for home O2. The pt is clear for discharge from case management. Alondra Leo, KAILEE     08/14/20 1134   Final Note   Assessment Type Final Discharge Note   Anticipated Discharge Disposition Home

## 2020-08-14 NOTE — PROGRESS NOTES
Progress Note  PULMONARY    Admit Date: 8/13/2020 08/14/2020    From Dr Marcelo's note 8/13-History of Present Illness:  Pt is a 64 yo male with bladder mass, tobacco abuse, UTI, HTN, GERD, and COPD who presented to the ED on 8/13 with complaints of SOB, cough, and congestion x2 weeks. He is being treated for COPD exacerbation.  He is known to me from clinic and seen as a new pt on 7/8/20 at which time he was started on a control inhaler for COPD and recommended that he quit smoking.   Pt reports he started Trelegy inhaler (was able to get it paid thru medicare) and it was helping his breathing. After clinic visit he developed UTI and was getting treated with an antibiotic. Then he started having increased wheezing and coughing for the past 2 wks with congestion in his chest and clear phlegm. He tried to manage at home taking mucinex DM and allergy meds and symptoms were on and off better and then worse again. Yesterday he started having gross hematuria w/ blood clots at about 11am then developed increased cough and increased SOB with pleuritic chest pain which became worse and inhalers were not working. He felt panicky like he couldn't breathe so came to the ED.     Plan:   Acute hypoxemic resp failure  Acute exacerbation COPD  Tobacco dependence  Hematuria  Bladder mass  HTN     - continue inhaled bronchodilators   - supplemental O2 to keep sats >89%  - continue LABA/ICS inhaler- resume trelegy on dc  - continue prednisone 40mg daily  - repeat CXR in am- possible pneumonia on left?  - continue levaquin for now  - follow up with urology outpatient   - needs to quit smoking  - BP meds per hospitalist     Zayra Marcelo MD  Pulmonary & Critical Care Medicine  SUBJECTIVE:     8/14 no new c/o, needing 3lpm  Not typcially with cough/wheezes but noted with hematuria yesterday.  Hematuria better.    PFSH and Allergies reviewed.    OBJECTIVE:     Vitals (Most recent):  Vitals:    08/14/20 0745   BP:    Pulse: 72   Resp:  18   Temp:        Vitals (24 hour range):  Temp:  [96.4 °F (35.8 °C)-97.8 °F (36.6 °C)]   Pulse:  [64-98]   Resp:  [18]   BP: (106-147)/(61-79)   SpO2:  [95 %-100 %]       Intake/Output Summary (Last 24 hours) at 8/14/2020 0925  Last data filed at 8/14/2020 0000  Gross per 24 hour   Intake 600 ml   Output 2215 ml   Net -1615 ml          Physical Exam:  The patient's neuro status (alertness,orientation,cognitive function,motor skills,), pharyngeal exam (oral lesions, hygiene, abn dentition,), Neck (jvd,mass,thyroid,nodes in neck and above/below clavicle),RESPIRATORY(symmetry,effort,fremitus,percussion,auscultation),  Cor(rhythm,heart tones including gallops,perfusion,edema)ABD(distention,hepatic&splenomegaly,tenderness,masses), Skin(rash,cyanosis),Psyc(affect,judgement,).  Exam negative except for these pertinent findings:    chest is symmetric, no distress, normal percussion, normal fremitus and good decreased breath sounds      Radiographs reviewed: view by direct vision  nad  Results for orders placed during the hospital encounter of 08/13/20   X-Ray Chest 1 View    Narrative EXAMINATION:  XR CHEST 1 VIEW    CLINICAL HISTORY:  possible pneumonia;    TECHNIQUE:  Single frontal view of the chest was performed.    COMPARISON:  Radiograph 1 day prior    FINDINGS:  Pulmonary overinflation with increased lucency in the upper lung zones.  No airspace disease.  Normal size heart.  Aortic arch atherosclerosis.  No pleural effusion or pneumothorax.  No acute osseous abnormality.      Impression No acute cardiopulmonary abnormality.  COPD.      Electronically signed by: Jax Garza  Date:    08/14/2020  Time:    08:20   ]    Labs     Recent Labs   Lab 08/14/20  0503   WBC 15.77*   HGB 13.9*   HCT 42.8        Recent Labs   Lab 08/13/20  1331 08/14/20  0503   NA  --  141   K  --  3.8   CL  --  104   CO2  --  27   BUN  --  12   CREATININE  --  0.7   GLU  --  121*   CALCIUM  --  9.7   MG  --  2.1   PHOS  --  2.8    TROPONINI 0.039*  --    No results for input(s): PH, PCO2, PO2, HCO3 in the last 24 hours.  Microbiology Results (last 7 days)     ** No results found for the last 168 hours. **          Impression:  Active Hospital Problems    Diagnosis  POA    *Chronic obstructive pulmonary disease with acute exacerbation [J44.1]  Yes    Bladder mass [N32.89]  Yes    Tobacco abuse [Z72.0]  Yes    Ischemia due to increased oxygen demand [I24.8]  Yes    HTN (hypertension) [I10]  Yes    GERD (gastroesophageal reflux disease) [K21.9]  Yes      Resolved Hospital Problems   No resolved problems to display.               Plan:     8/14, cxr nad, ct July emphysema.  Had rm air sat 95% 7/8/2020 office visit.      Pt needs course prednisone/abx- not typcially with wheezes/cough but had yesterday.  Dose pack ordered as is levaquin. outpt f/u dr Marcelo needed.                                .

## 2020-08-14 NOTE — PLAN OF CARE
I called Dr. Burrell's office at [phone] and left a message with the nurse to call me regarding a one week follow up. The pt currently has an apt for 9/22/20. She is going to look at the schedule and call me back.     I messaged David Mickey with Dr. Marcelo's office asking if the pts appt on 10/8/20 can be moved up closer for a one week follow up.  David made a f/u apt for 8/20/20 at 1:40, pts AVS updated.     Pts is still pending a home O2 evaluation for discharge. Updated pts nurse, Tejal . [facility] Leo, [facility]   08/14/20 0816   Discharge Assessment   Assessment Type Discharge Planning Reassessment

## 2020-08-14 NOTE — NURSING
Pt IV removed tele removed and returned to monitor room.  No distress noted.  Pt left floor via wheelchair.

## 2020-08-14 NOTE — PLAN OF CARE
POC reviewed with patient, patient verbalized understanding, AAOX4, VSS, 2L O2 NC, no c /o pain or sob, tele and safety maintained, bed in lowest position, side rails up X2, call light and table within reach, no distress noted, will continue to monitor.

## 2020-08-14 NOTE — TELEPHONE ENCOUNTER
----- Message from Marnie Smith sent at 8/14/2020  9:29 AM CDT -----  Alondra calling from [facility] they need to schedule a one week HFU for the attached pt.   Cb # [phone]

## 2020-08-14 NOTE — CARE UPDATE
08/14/20 1025   Home Oxygen Qualification   Room Air SpO2 At Rest 95 %   Room Air SpO2 on Exertion 94 %   Heart Rate on O2 89 bpm   SpO2 on Recovery 96 %   Recovery Heart Rate 87 bpm   Home O2 Eval Comments SpO2 on exertion increased to 98 only dropped to 94

## 2020-08-14 NOTE — UM SECONDARY REVIEW
Physician Advisor External    Level of Care Issue    To EHR for md review. OUTPT DETERMINATION FOR 8/13/2020.

## 2020-08-14 NOTE — PLAN OF CARE
Approval received from Mihaela Xie with [facility] to pull the pts nebulizer. Per Tejal, the pt has already left. She is going to call the family to return. I pulled the home nebulizer and the pts wife signed the delivery ticket. Completed paperwork returned to the DME closet and AVS updated. Alondra Leo, SUSANNAW     08/14/20 1440   Post-Acute Status   Post-Acute Authorization HME   E Status Set-up Complete

## 2020-08-15 NOTE — DISCHARGE SUMMARY
[facility] Hospital Medicine  Discharge Summary      Patient Name: Martell Corcoran  MRN: [id number]  Admission Date: 8/13/2020  Hospital Length of Stay: 0 days  Discharge Date and Time: 8/14/2020  2:25 PM  Attending Physician: No att. providers found   Discharging Provider: Zayra Shaw NP  Primary Care Provider: Zo Burrell MD      HPI:   Martell Corcoran is a 65 y.o. male with a PMHx of bladder mass, tobacco abuse, UTI, HTN, GERD, and COPD who presents to the ED with complaints of SOB, cough, and congestion x2 weeks. He reports intermittent symptoms over the last 2 weeks that have progressively gotten worse over the last 2 days. He reports exertion makes the SOB worse and nothing makes it better. The patient endorses clear sputum production and chest tightness with coughing or deep breathing. He denies any fever/chills. The patient denies any CP, abdominal pain, nausea, vomiting, diarrhea, weakness, dizziness, headache, or dysuria. His ED work up is significant for tachypnea, tachycardia, and mild leukocytosis on labs. The patient received duo neb treatment, solumedrol, and levaquin while in the ED. The patient will be placed in observation for further work up and evaluation.     * No surgery found *      Hospital Course:   Patient monitor closely during hospitalization.  He was initiated on oxygen and oxygen saturation monitor closely.  He received IV Levaquin and initiated on prednisone.  Pulmonology consulted.  Sputum culture obtained.  Patient is doing well from a respiratory standpoint is stable for discharge from pulmonary.  He did report hematuria due to bladder mass.  He has follow-up with Urology.  Discussed outpatient plan of care with Dr. Adams who recommends close follow-up for cystoscope.  Patient was scheduled for cystoscope 2 weeks ago however was a no-show.  Discussed with patient and wife importance of follow up with  outpatient.  CBC and BMP trended and  remained stable.  He did not qualify for home oxygen.    PE.  Chest course with few rhonchi at the bases no wheezes noted.  Oxygen saturation 98% at rest.     Consults:   Consults (From admission, onward)        Status Ordering Provider     Inpatient consult to Pulmonology  Once     Provider:  Zayra Marcelo MD    Completed LÁZARO LO     Inpatient consult to Social Work/Case Management  Once     Provider:  (Not yet assigned)    PAM Franco          No new Assessment & Plan notes have been filed under this hospital service since the last note was generated.  Service: Hospital Medicine    Final Active Diagnoses:    Diagnosis Date Noted POA    PRINCIPAL PROBLEM:  Chronic obstructive pulmonary disease with acute exacerbation [J44.1] 07/08/2020 Yes    Bladder mass [N32.89] 08/13/2020 Yes    Tobacco abuse [Z72.0] 08/13/2020 Yes    Ischemia due to increased oxygen demand [I24.8] 08/13/2020 Yes    HTN (hypertension) [I10] 07/08/2020 Yes    GERD (gastroesophageal reflux disease) [K21.9] 07/08/2020 Yes      Problems Resolved During this Admission:       Discharged Condition: stable    Disposition: Home or Self Care    Follow Up:  Follow-up Information     Zo Burrell MD In 1 week.    Specialty: Family Medicine  Contact information:  [address]  [address]  [facility]  [place] [address]  [phone]             Zayra Marcelo MD On 8/20/2020.    Specialties: Pulmonary Disease, Critical Care Medicine  Why: 1:40 pm   Contact information:  [address]  [address]  [place] [address]  [phone]             Vanna Garcia Jr, MD In 1 week.    Specialty: Urology  Contact information:  [address] [place] [address] [address]  [address]  [place] [address]  [phone]             [facility]    Specialty: DME Provider  Why: DME- nebulizer  Contact information:  [address]  [address]  [facility] [address]  [phone]                 Patient Instructions:      NEBULIZER FOR HOME  "USE     Order Specific Question Answer Comments   Height: 5' 6" (1.676 m)    Weight: 71.7 kg (158 lb 1.1 oz)    Does patient have medical equipment at home? none    Length of need (1-99 months): 99    Vendor: [facility] Mailed out   Expected Date of Delivery: 8/14/2020      Ambulatory referral/consult to Pulmonology   Standing Status: Future   Referral Priority: Routine Referral Type: Consultation   Referral Reason: Specialty Services Required   Requested Specialty: Pulmonary Disease   Number of Visits Requested: 1     Ambulatory referral/consult to Pulmonary Rehab   Standing Status: Future   Referral Priority: Routine Referral Type: Consultation   Referral Reason: Specialty Services Required   Requested Specialty: Pulmonary Disease   Number of Visits Requested: 1     Ambulatory referral/consult to Smoking Cessation Program   Standing Status: Future   Referral Priority: Routine Referral Type: Consultation   Referral Reason: Specialty Services Required   Requested Specialty: CTTS   Number of Visits Requested: 1       Significant Diagnostic Studies: Labs:   BMP:   Recent Labs   Lab 08/14/20  0503   *      K 3.8      CO2 27   BUN 12   CREATININE 0.7   CALCIUM 9.7   MG 2.1    and CMP   Recent Labs   Lab 08/14/20  0503      K 3.8      CO2 27   *   BUN 12   CREATININE 0.7   CALCIUM 9.7   ANIONGAP 10   ESTGFRAFRICA >60   EGFRNONAA >60     Radiology: X-Ray: CXR: X-Ray Chest 1 View (CXR):   Results for orders placed or performed during the hospital encounter of 08/13/20   X-Ray Chest 1 View    Narrative    EXAMINATION:  XR CHEST 1 VIEW    CLINICAL HISTORY:  possible pneumonia;    TECHNIQUE:  Single frontal view of the chest was performed.    COMPARISON:  Radiograph 1 day prior    FINDINGS:  Pulmonary overinflation with increased lucency in the upper lung zones.  No airspace disease.  Normal size heart.  Aortic arch atherosclerosis.  No pleural effusion or pneumothorax.  No acute osseous " abnormality.      Impression    No acute cardiopulmonary abnormality.  COPD.      Electronically signed by: Jax Garza  Date:    08/14/2020  Time:    08:20       Pending Diagnostic Studies:     None         Medications:  Reconciled Home Medications:      Medication List      START taking these medications    albuterol-ipratropium 2.5 mg-0.5 mg/3 mL nebulizer solution  Commonly known as: DUO-NEB  Take 3 mLs by nebulization every 4 (four) hours as needed for Wheezing. Rescue     levoFLOXacin 750 MG tablet  Commonly known as: LEVAQUIN  Take 1 tablet (750 mg total) by mouth once daily. for 7 days     predniSONE 20 MG tablet  Commonly known as: DELTASONE  Take 2 tablets (40 mg total) by mouth once daily. for 4 days        CONTINUE taking these medications    albuterol 90 mcg/actuation inhaler  Commonly known as: PROVENTIL/VENTOLIN HFA  Inhale 1-2 puffs into the lungs every 4 (four) hours as needed for Shortness of Breath (coughing). Rescue     ALPRAZolam 0.5 MG tablet  Commonly known as: XANAX  Take 0.5 mg by mouth 3 (three) times daily as needed for Anxiety.     ANORO ELLIPTA 62.5-25 mcg/actuation Dsdv  Generic drug: umeclidinium-vilanteroL  Inhale 1 puff into the lungs once daily. Controller     aspirin 81 MG EC tablet  Commonly known as: ECOTRIN  Take 1 tablet (81 mg total) by mouth once daily.     azelastine 137 mcg (0.1 %) nasal spray  Commonly known as: ASTELIN  1 spray by Nasal route 2 (two) times daily.     escitalopram oxalate 20 MG tablet  Commonly known as: LEXAPRO  Take 0.5 tablets (10 mg total) by mouth once daily.     esomeprazole 20 MG capsule  Commonly known as: NEXIUM  Take 1 capsule (20 mg total) by mouth before breakfast.     ezetimibe 10 mg tablet  Commonly known as: ZETIA  Take 10 mg by mouth once daily.     LIVALO 4 mg Tab  Generic drug: pitavastatin calcium  Take 1 tablet by mouth once daily.     losartan 100 MG tablet  Commonly known as: COZAAR  Take 100 mg by mouth once daily.     MEDROL  (JOLYNN) 4 mg tablet  Generic drug: methylPREDNISolone  Take 4 mg by mouth once daily. use as directed     nitroGLYCERIN 0.4 MG SL tablet  Commonly known as: NITROSTAT  DISSOLVE ONE TABLET UNDER THE TONGUE EVERY 5 MINUTES AS NEEDED FOR CHEST PAIN. DO NOT EXCEED A TOTAL OF 3 DOSES IN 15 MINUTES     TRELEGY ELLIPTA 100-62.5-25 mcg Dsdv  Generic drug: fluticasone-umeclidin-vilanter     valACYclovir 1000 MG tablet  Commonly known as: VALTREX  Take 1 g by mouth 2 (two) times daily.            Indwelling Lines/Drains at time of discharge:   Lines/Drains/Airways     None                 Time spent on the discharge of patient: 60 minutes  Patient was seen and examined on the date of discharge and determined to be suitable for discharge.         Zayra Shaw NP  Department of Hospital Medicine  [facility]

## 2020-08-15 NOTE — HOSPITAL COURSE
Patient monitor closely during hospitalization.  He was initiated on oxygen and oxygen saturation monitor closely.  He received IV Levaquin and initiated on prednisone.  Pulmonology consulted.  Sputum culture obtained.  Patient is doing well from a respiratory standpoint is stable for discharge from pulmonary.  He did report hematuria due to bladder mass.  He has follow-up with Urology.  Discussed outpatient plan of care with Dr. Adams who recommends close follow-up for cystoscope.  Patient was scheduled for cystoscope 2 weeks ago however was a no-show.  Discussed with patient and wife importance of follow up with  outpatient.  CBC and BMP trended and remained stable.  He did not qualify for home oxygen.    PE.  Chest course with few rhonchi at the bases no wheezes noted.  Oxygen saturation 98% at rest.

## 2020-08-17 ENCOUNTER — TELEPHONE (OUTPATIENT)
Dept: MEDSURG UNIT | Facility: HOSPITAL | Age: 65
End: 2020-08-17

## 2020-08-20 ENCOUNTER — OFFICE VISIT (OUTPATIENT)
Dept: PULMONOLOGY | Facility: CLINIC | Age: 65
End: 2020-08-20
Payer: MEDICARE

## 2020-08-20 VITALS
BODY MASS INDEX: 26.65 KG/M2 | OXYGEN SATURATION: 96 % | WEIGHT: 165.13 LBS | DIASTOLIC BLOOD PRESSURE: 98 MMHG | HEART RATE: 79 BPM | SYSTOLIC BLOOD PRESSURE: 156 MMHG

## 2020-08-20 DIAGNOSIS — J44.1 COPD WITH ACUTE EXACERBATION: Primary | ICD-10-CM

## 2020-08-20 DIAGNOSIS — F17.200 TOBACCO DEPENDENCE: ICD-10-CM

## 2020-08-20 DIAGNOSIS — N32.89 BLADDER MASS: ICD-10-CM

## 2020-08-20 PROCEDURE — 99214 PR OFFICE/OUTPT VISIT, EST, LEVL IV, 30-39 MIN: ICD-10-PCS | Mod: S$PBB,,, | Performed by: INTERNAL MEDICINE

## 2020-08-20 PROCEDURE — 99214 OFFICE O/P EST MOD 30 MIN: CPT | Mod: S$PBB,,, | Performed by: INTERNAL MEDICINE

## 2020-08-20 PROCEDURE — 99999 PR PBB SHADOW E&M-EST. PATIENT-LVL IV: CPT | Mod: PBBFAC,,, | Performed by: INTERNAL MEDICINE

## 2020-08-20 PROCEDURE — 99214 OFFICE O/P EST MOD 30 MIN: CPT | Mod: PBBFAC,PO | Performed by: INTERNAL MEDICINE

## 2020-08-20 PROCEDURE — 99999 PR PBB SHADOW E&M-EST. PATIENT-LVL IV: ICD-10-PCS | Mod: PBBFAC,,, | Performed by: INTERNAL MEDICINE

## 2020-08-20 RX ORDER — PREDNISONE 5 MG/1
5 TABLET ORAL DAILY
Qty: 30 TABLET | Refills: 0 | Status: SHIPPED | OUTPATIENT
Start: 2020-08-20 | End: 2020-09-22

## 2020-08-20 RX ORDER — PREDNISONE 20 MG/1
TABLET ORAL
Qty: 30 TABLET | Refills: 0 | Status: SHIPPED | OUTPATIENT
Start: 2020-08-20 | End: 2020-09-22

## 2020-08-20 RX ORDER — IPRATROPIUM BROMIDE AND ALBUTEROL SULFATE 2.5; .5 MG/3ML; MG/3ML
3 SOLUTION RESPIRATORY (INHALATION) EVERY 4 HOURS PRN
Qty: 180 ML | Refills: 11 | Status: SHIPPED | OUTPATIENT
Start: 2020-08-20 | End: 2021-11-04

## 2020-08-20 NOTE — PROGRESS NOTES
2020    Martell Jensen [place]  Follow up    Chief Complaint   Patient presents with    Follow-up     still coughing       HPI:   2020- pt was recently hospitalized for COPD exacerbation, seen by me while admitted 20 and also having worsening hematuria at that time. He was sent home with a course of levaquin and steroid taper. He did not qualify for home O2. He is being worked up for a bladder mass per urology and pending transurethral resection.  Pt c/o pain around L kidney after doing yard work and urine turned darker. He was coughing last night and woke up with face feeling puffy. Switched urologists to a Dr. Pham at [place] and has appt at end of September.  He finished prednisone taper. Still taking levaquin. Still feels some congestion in chest but it is getting better. Also has sinus problems w/ nose blocked. He denies frequent exacerbations but this one has been very bad. Feels like worsening since stopped prednisone. Still smokes but trying to cut back. Has smoking cessation appt later this month.  Inhalers: nebs 4-5x/day, trelegy daily, albuterol rescue 1-2x/day    7/15/20- Pt is a 64 yo male with HTN, GERD and BPH presents for new evaluation of breathing issues. He complains of SOB especially if he carries anything like taking out trash to the dumpster. This has been progressive over about 3.5 yrs. He wheezes and coughs up clear mucous, throughout the day.  Pt smokes 1 PPD x 55 yrs.  At age 5 pt had pna and a collapsed lung requiring chest tube on the left side and hospitalization. Didn't have any other problems w/ breathing growing up. He took up playing [employer] to help lung function.  Pt had abd/p scan for UTI recently which showed some emphysematous changes.    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 One brother  at 65 from leukemia.    Grew up in [place]  The chief  compliant  problem is new to me    PFSH:  Past Medical History:   Diagnosis Date    Benign enlargement of prostate     Had a biopsy in around 2015    Elevated PSA     GERD (gastroesophageal reflux disease)     Hypertension     Started with meds in 2012.    Kidney stone     Urinary tract infection          Past Surgical History:   Procedure Laterality Date    FISTULA REPAIR      LEFT HEART CATHETERIZATION N/A 10/23/2018    Procedure: Left heart cath;  Surgeon: Niharika Squires MD;  Location: [facility] CATH;  Service: Cardiology;  Laterality: N/A;     Social History     Tobacco Use    Smoking status: Current Every Day Smoker     Packs/day: 1.00     Years: 55.00     Pack years: 55.00     Types: Cigarettes    Smokeless tobacco: Never Used   Substance Use Topics    Alcohol use: Yes     Comment: Previous 12 beer a day for 20 years. Now occ.    Drug use: No     Family History   Problem Relation Age of Onset    Heart failure Mother     Cancer Father     Heart disease Brother     Heart attack Brother     Cancer Brother      Review of patient's allergies indicates:   Allergen Reactions    Oyster extract Swelling     PT swells in his throat after eating oysters on occasion       Performance Status:The patient's activity level is functions out of house. Dyspneic w/ exertion such as walking to take trash out.     Review of Systems:  a review of eleven systems covering constitutional, Eye, HEENT, Psych, Respiratory, Cardiac, GI, , Musculoskeletal, Endocrine, Dermatologic was negative except for pertinent findings as listed ABOVE and below:  Chest pain/pressure   GERD - takes Nexium  Headaches  dizziness  occ diarrhea    Exam:Comprehensive exam done. BP (!) 156/98 (BP Location: Left arm, Patient Position: Sitting)   Pulse 79   Wt 74.9 kg (165 lb 2 oz)   SpO2 96% Comment: on room air at rest  BMI 26.65 kg/m²   Exam included Vitals as listed, and patient's appearance and affect and alertness and mood, oral exam for  yeast and hygiene and pharynx lesions and Mallapatti (M) score, neck with inspection for jvd and masses and thyroid abnormalities and lymph nodes (supraclavicular and infraclavicular nodes and axillary also examined and noted if abn), chest exam included symmetry and effort and fremitus and percussion and auscultation, cardiac exam included rhythm and gallops and murmur and rubs and jvd and edema, abdominal exam for mass and hepatosplenomegaly and tenderness and hernias and bowel sounds, Musculoskeletal exam with muscle tone and posture and mobility/gait and  strength, and skin for rashes and cyanosis and pallor and turgor, extremity for clubbing.  Findings were normal except for pertinent findings listed below:  M1  Diminished breath sounds bilaterally, rales that clear w/ cough RLL  L flank tenderness   No clubbing or edema    Radiographs (ct chest and cxr) reviewed: view by direct vision   CXR 8/14/20- possible vague infiltrate/opacifications bilateral bases  CXR 8/14/20-   CT abd/p 6/26/20- bases of lungs w/ scant emphysematous changes, some strands of atelectasis    Labs reviewed    Results for GONSOALBERTO, MARTELL RAMEY (MRN [id number]) as of 8/20/2020 12:48   Ref. Range 8/14/2020 05:03   WBC Latest Ref Range: 3.90 - 12.70 K/uL 15.77 (H)   RBC Latest Ref Range: 4.60 - 6.20 M/uL 4.36 (L)   Hemoglobin Latest Ref Range: 14.0 - 18.0 g/dL 13.9 (L)   Hematocrit Latest Ref Range: 40.0 - 54.0 % 42.8   MCV Latest Ref Range: 82 - 98 fL 98   MCH Latest Ref Range: 27.0 - 31.0 pg 31.9 (H)   MCHC Latest Ref Range: 32.0 - 36.0 g/dL 32.5   RDW Latest Ref Range: 11.5 - 14.5 % 13.2   Platelets Latest Ref Range: 150 - 350 K/uL 262     PFT reviewed  7/16/20- moderately severe obstruction, reduced DLCO, air trapping        Plan:  Clinical impression is resonably certain and repeated evaluation prn +/- follow up will be needed as below.    Martell was seen today for follow-up.    Diagnoses and all orders for this visit:    COPD with  acute exacerbation  -     predniSONE (DELTASONE) 20 MG tablet; Take 1 tablet daily for 7 days then 1/2 tablet daily 7 days, then start taking 5mg tablet for 7 days  -     predniSONE (DELTASONE) 5 MG tablet; Take 1 tablet (5 mg total) by mouth once daily. Start taking after you finish the 20mg then 10mg taper  -     albuterol-ipratropium (DUO-NEB) 2.5 mg-0.5 mg/3 mL nebulizer solution; Take 3 mLs by nebulization every 4 (four) hours as needed for Wheezing. Rescue    Tobacco dependence    Bladder mass        Follow up in about 6 weeks (around 10/1/2020).    Discussed with patient above for education the following:      Patient Instructions   Start taking prednisone again- long taper over 3 weeks then try to stop. If lungs flaring while decreasing prednisone go up to the last dose that helped you and call our office.  Continue trelegy  Refilled duo nebs to use as needed  Use albuterol as needed  Quit smoking- go to program  Follow up with urologist  Follow up with PCP for kidney testing and possible urinary infection

## 2020-08-20 NOTE — PATIENT INSTRUCTIONS
Start taking prednisone again- long taper over 3 weeks then try to stop. If lungs flaring while decreasing prednisone go up to the last dose that helped you and call our office.  Continue trelegy  Refilled duo nebs to use as needed  Use albuterol as needed  Quit smoking- go to program  Follow up with urologist  Follow up with PCP for kidney testing and possible urinary infection

## 2020-08-24 ENCOUNTER — TELEPHONE (OUTPATIENT)
Dept: SMOKING CESSATION | Facility: CLINIC | Age: 65
End: 2020-08-24

## 2020-08-24 NOTE — TELEPHONE ENCOUNTER
Patient cancelled his tobacco cessation intake appointment for today. I called to offer him an opportunity to reschedule or to complete by phone today. I was unable to reach him at this time. I did leave my name and number for a return call.

## 2020-09-22 ENCOUNTER — OFFICE VISIT (OUTPATIENT)
Dept: FAMILY MEDICINE | Facility: CLINIC | Age: 65
End: 2020-09-22
Payer: MEDICARE

## 2020-09-22 VITALS
OXYGEN SATURATION: 96 % | BODY MASS INDEX: 26.01 KG/M2 | SYSTOLIC BLOOD PRESSURE: 140 MMHG | TEMPERATURE: 98 F | DIASTOLIC BLOOD PRESSURE: 82 MMHG | WEIGHT: 161.81 LBS | HEART RATE: 67 BPM | HEIGHT: 66 IN

## 2020-09-22 DIAGNOSIS — J44.1 CHRONIC OBSTRUCTIVE PULMONARY DISEASE WITH ACUTE EXACERBATION: ICD-10-CM

## 2020-09-22 DIAGNOSIS — I10 ESSENTIAL HYPERTENSION: Primary | ICD-10-CM

## 2020-09-22 DIAGNOSIS — K21.9 GASTROESOPHAGEAL REFLUX DISEASE WITHOUT ESOPHAGITIS: ICD-10-CM

## 2020-09-22 DIAGNOSIS — F41.1 GENERALIZED ANXIETY DISORDER: ICD-10-CM

## 2020-09-22 DIAGNOSIS — J30.9 ALLERGIC RHINITIS, UNSPECIFIED SEASONALITY, UNSPECIFIED TRIGGER: ICD-10-CM

## 2020-09-22 DIAGNOSIS — Z28.21 INFLUENZA VACCINE REFUSED: ICD-10-CM

## 2020-09-22 DIAGNOSIS — N32.89 BLADDER MASS: ICD-10-CM

## 2020-09-22 PROCEDURE — 99214 PR OFFICE/OUTPT VISIT, EST, LEVL IV, 30-39 MIN: ICD-10-PCS | Mod: S$GLB,,, | Performed by: FAMILY MEDICINE

## 2020-09-22 PROCEDURE — 99214 OFFICE O/P EST MOD 30 MIN: CPT | Mod: S$GLB,,, | Performed by: FAMILY MEDICINE

## 2020-09-22 RX ORDER — AZELASTINE 1 MG/ML
1 SPRAY, METERED NASAL 2 TIMES DAILY
Qty: 30 ML | Refills: 2 | Status: SHIPPED | OUTPATIENT
Start: 2020-09-22 | End: 2022-09-14 | Stop reason: SDUPTHER

## 2020-09-22 NOTE — PROGRESS NOTES
SUBJECTIVE:    Patient ID: Martell Corcoran is a 65 y.o. male.    Chief Complaint: Hypertension (follow up) and Bottles not brought    Pt here to follow up on chronic issues.      Pt with a PMHx of Anxiety, Pepcid, HTN, CAD (65%), HLD, GERD, Anxiety    CAD, has not had any more chest pain since he has been taking neixum. Did get a chance to see  Cardiologist, Dr. French Gaffney.  Had a nuclear stress test, that came out fine. Is now on livalo and zetia.    Heartburn is doing ok on nexium. (failed prilosec,protonix)    BP is not good today, takes it at home, and tends to fluctuate there too.      Anxiety is doing ok. Continues to take some of his Xanax when he gets up and then another piece if he has to later on. Was taking 10mg for a while of lexapro but back to taking 20mg due to every thing going on.    Has had a fall from a chair after waking from a vivid dream. Fell forward into another chair and hit his head.     Since last visit, pt went to the hospital 8/13/20 for SOB. Was urinating blood clots for 1.5 days again. Was dx with COPD exacerbation.  Has since follow with Pulm, Dr. Marcelo, and remains on prednisone. Also on Trelegy, albuterol and neb about 4 times a day.    Had been following Dr. Pham ([place])  for gross hematuria and dx with bladder mass.  Has a follow up appt on 9/28/20.  States he was supposed to have the mass removed last month but didn't have it done due to miscommunication.  Has not had blood in the urine since last hospitalization in August.    Allergies are acting up, using astelin which helps.            Admission on 08/13/2020, Discharged on 08/14/2020   Component Date Value Ref Range Status    SARS-CoV-2 RNA, Amplification, Qual 08/13/2020 Negative  Negative Final    WBC 08/13/2020 12.94* 3.90 - 12.70 K/uL Final    RBC 08/13/2020 4.70  4.60 - 6.20 M/uL Final    Hemoglobin 08/13/2020 15.4  14.0 - 18.0 g/dL Final    Hematocrit 08/13/2020 47.5  40.0 - 54.0 % Final     Mean Corpuscular Volume 08/13/2020 101* 82 - 98 fL Final    Mean Corpuscular Hemoglobin 08/13/2020 32.8* 27.0 - 31.0 pg Final    Mean Corpuscular Hemoglobin Conc 08/13/2020 32.4  32.0 - 36.0 g/dL Final    RDW 08/13/2020 13.1  11.5 - 14.5 % Final    Platelets 08/13/2020 271  150 - 350 K/uL Final    MPV 08/13/2020 8.5* 9.2 - 12.9 fL Final    Immature Granulocytes 08/13/2020 0.2  0.0 - 0.5 % Final    Gran # (ANC) 08/13/2020 7.3  1.8 - 7.7 K/uL Final    Immature Grans (Abs) 08/13/2020 0.03  0.00 - 0.04 K/uL Final    Lymph # 08/13/2020 2.8  1.0 - 4.8 K/uL Final    Mono # 08/13/2020 1.3* 0.3 - 1.0 K/uL Final    Eos # 08/13/2020 1.4* 0.0 - 0.5 K/uL Final    Baso # 08/13/2020 0.07  0.00 - 0.20 K/uL Final    nRBC 08/13/2020 0  0 /100 WBC Final    Gran% 08/13/2020 56.8  38.0 - 73.0 % Final    Lymph% 08/13/2020 21.6  18.0 - 48.0 % Final    Mono% 08/13/2020 10.3  4.0 - 15.0 % Final    Eosinophil% 08/13/2020 10.6* 0.0 - 8.0 % Final    Basophil% 08/13/2020 0.5  0.0 - 1.9 % Final    Differential Method 08/13/2020 Automated   Final    Sodium 08/13/2020 142  136 - 145 mmol/L Final    Potassium 08/13/2020 4.1  3.5 - 5.1 mmol/L Final    Chloride 08/13/2020 106  95 - 110 mmol/L Final    CO2 08/13/2020 26  23 - 29 mmol/L Final    Glucose 08/13/2020 113* 70 - 110 mg/dL Final    BUN, Bld 08/13/2020 12  8 - 23 mg/dL Final    Creatinine 08/13/2020 1.1  0.5 - 1.4 mg/dL Final    Calcium 08/13/2020 9.0  8.7 - 10.5 mg/dL Final    Total Protein 08/13/2020 7.0  6.0 - 8.4 g/dL Final    Albumin 08/13/2020 3.9  3.5 - 5.2 g/dL Final    Total Bilirubin 08/13/2020 0.2  0.1 - 1.0 mg/dL Final    Alkaline Phosphatase 08/13/2020 76  55 - 135 U/L Final    AST 08/13/2020 27  10 - 40 U/L Final    ALT 08/13/2020 29  10 - 44 U/L Final    Anion Gap 08/13/2020 10  8 - 16 mmol/L Final    eGFR if African American 08/13/2020 >60  >60 mL/min/1.73 m^2 Final    eGFR if non African American 08/13/2020 >60  >60 mL/min/1.73 m^2 Final     Troponin I 08/13/2020 0.012  0.000 - 0.026 ng/mL Final    Troponin I 08/13/2020 0.094* 0.000 - 0.026 ng/mL Final    BNP 08/13/2020 52  0 - 99 pg/mL Final    Lactate (Lactic Acid) 08/13/2020 1.0  0.5 - 2.2 mmol/L Final    Procalcitonin 08/13/2020 0.02  <0.25 ng/mL Final    Sodium 08/13/2020 143  136 - 145 mmol/L Final    Potassium 08/13/2020 4.5  3.5 - 5.1 mmol/L Final    Chloride 08/13/2020 107  95 - 110 mmol/L Final    CO2 08/13/2020 27  23 - 29 mmol/L Final    Glucose 08/13/2020 125* 70 - 110 mg/dL Final    BUN, Bld 08/13/2020 13  8 - 23 mg/dL Final    Creatinine 08/13/2020 0.9  0.5 - 1.4 mg/dL Final    Calcium 08/13/2020 8.7  8.7 - 10.5 mg/dL Final    Anion Gap 08/13/2020 9  8 - 16 mmol/L Final    eGFR if African American 08/13/2020 >60  >60 mL/min/1.73 m^2 Final    eGFR if non African American 08/13/2020 >60  >60 mL/min/1.73 m^2 Final    Magnesium 08/13/2020 1.8  1.6 - 2.6 mg/dL Final    Phosphorus 08/13/2020 1.5* 2.7 - 4.5 mg/dL Final    WBC 08/13/2020 12.36  3.90 - 12.70 K/uL Final    RBC 08/13/2020 4.50* 4.60 - 6.20 M/uL Final    Hemoglobin 08/13/2020 14.8  14.0 - 18.0 g/dL Final    Hematocrit 08/13/2020 45.6  40.0 - 54.0 % Final    Mean Corpuscular Volume 08/13/2020 101* 82 - 98 fL Final    Mean Corpuscular Hemoglobin 08/13/2020 32.9* 27.0 - 31.0 pg Final    Mean Corpuscular Hemoglobin Conc 08/13/2020 32.5  32.0 - 36.0 g/dL Final    RDW 08/13/2020 13.0  11.5 - 14.5 % Final    Platelets 08/13/2020 244  150 - 350 K/uL Final    MPV 08/13/2020 8.4* 9.2 - 12.9 fL Final    Immature Granulocytes 08/13/2020 0.5  0.0 - 0.5 % Final    Gran # (ANC) 08/13/2020 11.1* 1.8 - 7.7 K/uL Final    Immature Grans (Abs) 08/13/2020 0.06* 0.00 - 0.04 K/uL Final    Lymph # 08/13/2020 0.7* 1.0 - 4.8 K/uL Final    Mono # 08/13/2020 0.2* 0.3 - 1.0 K/uL Final    Eos # 08/13/2020 0.3  0.0 - 0.5 K/uL Final    Baso # 08/13/2020 0.03  0.00 - 0.20 K/uL Final    nRBC 08/13/2020 0  0 /100 WBC Final     Gran% 08/13/2020 89.5* 38.0 - 73.0 % Final    Lymph% 08/13/2020 5.9* 18.0 - 48.0 % Final    Mono% 08/13/2020 1.9* 4.0 - 15.0 % Final    Eosinophil% 08/13/2020 2.0  0.0 - 8.0 % Final    Basophil% 08/13/2020 0.2  0.0 - 1.9 % Final    Differential Method 08/13/2020 Automated   Final    Troponin I 08/13/2020 0.039* 0.000 - 0.026 ng/mL Final    Sodium 08/14/2020 141  136 - 145 mmol/L Final    Potassium 08/14/2020 3.8  3.5 - 5.1 mmol/L Final    Chloride 08/14/2020 104  95 - 110 mmol/L Final    CO2 08/14/2020 27  23 - 29 mmol/L Final    Glucose 08/14/2020 121* 70 - 110 mg/dL Final    BUN, Bld 08/14/2020 12  8 - 23 mg/dL Final    Creatinine 08/14/2020 0.7  0.5 - 1.4 mg/dL Final    Calcium 08/14/2020 9.7  8.7 - 10.5 mg/dL Final    Anion Gap 08/14/2020 10  8 - 16 mmol/L Final    eGFR if African American 08/14/2020 >60  >60 mL/min/1.73 m^2 Final    eGFR if non African American 08/14/2020 >60  >60 mL/min/1.73 m^2 Final    Magnesium 08/14/2020 2.1  1.6 - 2.6 mg/dL Final    Phosphorus 08/14/2020 2.8  2.7 - 4.5 mg/dL Final    WBC 08/14/2020 15.77* 3.90 - 12.70 K/uL Final    RBC 08/14/2020 4.36* 4.60 - 6.20 M/uL Final    Hemoglobin 08/14/2020 13.9* 14.0 - 18.0 g/dL Final    Hematocrit 08/14/2020 42.8  40.0 - 54.0 % Final    Mean Corpuscular Volume 08/14/2020 98  82 - 98 fL Final    Mean Corpuscular Hemoglobin 08/14/2020 31.9* 27.0 - 31.0 pg Final    Mean Corpuscular Hemoglobin Conc 08/14/2020 32.5  32.0 - 36.0 g/dL Final    RDW 08/14/2020 13.2  11.5 - 14.5 % Final    Platelets 08/14/2020 262  150 - 350 K/uL Final    MPV 08/14/2020 8.6* 9.2 - 12.9 fL Final    Immature Granulocytes 08/14/2020 0.7* 0.0 - 0.5 % Final    Gran # (ANC) 08/14/2020 12.6* 1.8 - 7.7 K/uL Final    Immature Grans (Abs) 08/14/2020 0.11* 0.00 - 0.04 K/uL Final    Lymph # 08/14/2020 1.6  1.0 - 4.8 K/uL Final    Mono # 08/14/2020 1.5* 0.3 - 1.0 K/uL Final    Eos # 08/14/2020 0.0  0.0 - 0.5 K/uL Final    Baso # 08/14/2020 0.02   0.00 - 0.20 K/uL Final    nRBC 08/14/2020 0  0 /100 WBC Final    Gran% 08/14/2020 80.0* 38.0 - 73.0 % Final    Lymph% 08/14/2020 9.9* 18.0 - 48.0 % Final    Mono% 08/14/2020 9.3  4.0 - 15.0 % Final    Eosinophil% 08/14/2020 0.0  0.0 - 8.0 % Final    Basophil% 08/14/2020 0.1  0.0 - 1.9 % Final    Differential Method 08/14/2020 Automated   Final    Specimen UA 08/14/2020 Urine, Clean Catch   Final    Color, UA 08/14/2020 Yellow  Yellow, Straw, Noami Final    Appearance, UA 08/14/2020 Clear  Clear Final    pH, UA 08/14/2020 7.0  5.0 - 8.0 Final    Specific Gravity, UA 08/14/2020 1.010  1.005 - 1.030 Final    Protein, UA 08/14/2020 Trace* Negative Final    Glucose, UA 08/14/2020 Negative  Negative Final    Ketones, UA 08/14/2020 Negative  Negative Final    Bilirubin (UA) 08/14/2020 Negative  Negative Final    Occult Blood UA 08/14/2020 3+* Negative Final    Nitrite, UA 08/14/2020 Negative  Negative Final    Urobilinogen, UA 08/14/2020 Negative  <2.0 EU/dL Final    Leukocytes, UA 08/14/2020 Trace* Negative Final    RBC, UA 08/14/2020 60* 0 - 4 /hpf Final    WBC, UA 08/14/2020 2  0 - 5 /hpf Final    Bacteria 08/14/2020 Occasional  None-Occ /hpf Final    Squam Epithel, UA 08/14/2020 1  /hpf Final    Microscopic Comment 08/14/2020 SEE COMMENT   Final   Lab Visit on 07/25/2020   Component Date Value Ref Range Status    SARS-CoV2 (COVID-19) Qualitative P* 07/25/2020 Not Detected  Not Detected Final   Office Visit on 07/17/2020   Component Date Value Ref Range Status    Urine Culture, Routine 07/17/2020 No significant growth   Final   Hospital Outpatient Visit on 07/16/2020   Component Date Value Ref Range Status    Interpretation 07/16/2020    Final                    Value:  Moderately severe obstruction. FEV1  54.74 %  predicted.  Airflow is not clearly improved after bronchodilator. Clinical improvement following bronchodilator therapy may occur in the absence of spirometric improvement.   Mild  Hyperinflation is noted on lung volume testing.  Moderate reduction in diffusion capacity - unadjusted for hemoglobin.       Post FVC 07/16/2020 3.33  2.92 - 4.83 L Final    Post FEV1 07/16/2020 1.54* 2.21 - 3.78 L Final    Post FEV1 FVC 07/16/2020 46.38* 64.60 - 90.21 % Final    Post FEF 25 75 07/16/2020 0.50* 1.14 - 3.77 L/s Final    Post PEF 07/16/2020 4.17* 5.91 - 10.05 L/s Final    Post  07/16/2020 11.52  sec Final    Pre DLCO 07/16/2020 14.47* 17.99 - 31.85 ml/(min*mmHg) Final    DLCO ADJ PRE 07/16/2020 14.47* 17.99 - 31.85 ml/(min*mmHg) Final    DLCOVA PRE 07/16/2020 2.70  2.62 - 5.24 ml/(min*mmHg*L) Final    DLVAAdj PRE 07/16/2020 2.70  2.62 - 5.24 ml/(min*mmHg*L) Final    VA PRE 07/16/2020 5.37* 6.19 - 6.19 L Final    IVC PRE 07/16/2020 2.50* 2.92 - 4.83 L Final    TLCN2 PRE 07/16/2020 6.65  5.19 - 7.49 L Final    VCMAXN2 PRE 07/16/2020 3.52  2.92 - 4.83 L Final    Pre FRC N2 07/16/2020 4.12  L Final    ERVN2 PRE 07/16/2020 0.78  -24750.97 - 81530.03 L Final    RVN2 PRE 07/16/2020 3.14* 1.73 - 3.08 L Final    [id number] PRE 07/16/2020 47.13  30.33 - 48.29 % Final    Pre FVC 07/16/2020 3.52  2.92 - 4.83 L Final    Pre FEV1 07/16/2020 1.64* 2.21 - 3.78 L Final    Pre FEV1 FVC 07/16/2020 46.48* 64.60 - 90.21 % Final    Pre FEF 25 75 07/16/2020 0.62* 1.14 - 3.77 L/s Final    Pre PEF 07/16/2020 4.63* 5.91 - 10.05 L/s Final    Pre  07/16/2020 9.10  sec Final    Pre MVV 07/16/2020 54.00* 96.87 - 131.05 L/min Final    FVC Ref 07/16/2020 3.87   Final    FVC LLN 07/16/2020 2.92   Final    FVC Pre Ref 07/16/2020 90.8  % Final    FVC Post Ref 07/16/2020 86.0  % Final    FVC Chg 07/16/2020 -5.3  % Final    FEV1 Ref 07/16/2020 3.00   Final    FEV1 LLN 07/16/2020 2.21   Final    FEV1 Pre Ref 07/16/2020 54.6  % Final    FEV1 Post Ref 07/16/2020 51.6  % Final    FEV1 Chg 07/16/2020 -5.5  % Final    FEV1 FVC Ref 07/16/2020 77   Final    FEV1 FVC LLN 07/16/2020 65   Final     FEV1 FVC Pre Ref 07/16/2020 60.0  % Final    FEV1 FVC Post Ref 07/16/2020 59.9  % Final    FEV1 FVC Chg 07/16/2020 -0.2  % Final    FEF 25 75 Ref 07/16/2020 2.45   Final    FEF 25 75 LLN 07/16/2020 1.14   Final    FEF 25 75 Pre Ref 07/16/2020 25.2  % Final    FEF 25 75 Post Ref 07/16/2020 20.3  % Final    FEF 25 75 Chg 07/16/2020 -19.4  % Final    PEF Ref 07/16/2020 7.98   Final    PEF LLN 07/16/2020 5.91   Final    PEF Pre Ref 07/16/2020 58.0  % Final    PEF Post Ref 07/16/2020 52.3  % Final    PEF Chg 07/16/2020 -9.8  % Final    FPQ108 Chg 07/16/2020 26.6  % Final    MVV Ref 07/16/2020 114   Final    MVV LLN 07/16/2020 97   Final    MVV Pre Ref 07/16/2020 47.4  % Final    TLCN2 Ref 07/16/2020 6.34   Final    TLCN2 LLN 07/16/2020 5.19   Final    TLCN2 Pre Ref 07/16/2020 104.9  % Final    VCMAXN2 Ref 07/16/2020 3.87   Final    VCMAXN2 LLN 07/16/2020 2.92   Final    VCMAXN2 Pre Ref 07/16/2020 90.8  % Final    FRCN2 Ref 07/16/2020 3.43   Final    FRCN2 LLN 07/16/2020 2.44   Final    FRCN2 Pre Ref 07/16/2020 120.2  % Final    ERVN2 Ref 07/16/2020 1.03   Final    ERVN2 LLN 07/16/2020 -66394.97   Final    ERVN2 Pre Ref 07/16/2020 76.2  % Final    RVN2 Ref 07/16/2020 2.40   Final    RVN2 LLN 07/16/2020 1.73   Final    RVN2 Pre Ref 07/16/2020 130.6  % Final    [id number] Ref 07/16/2020 39.31   Final    ZLE9OEOT2 LLN 07/16/2020 30.33   Final    [id number] Pre Ref 07/16/2020 119.9  % Final    DLCO Single Breath Ref 07/16/2020 24.92   Final    DLCO Single Breath LLN 07/16/2020 17.99   Final    DLCO Single Breath Pre Ref 07/16/2020 58.1  % Final    DLCOc Single Breath Ref 07/16/2020 24.92   Final    DLCOc Single Breath LLN 07/16/2020 17.99   Final    DLCOc Single Breath Pre Ref 07/16/2020 58.1  % Final    DLCOVA Ref 07/16/2020 3.93   Final    DLCOVA LLN 07/16/2020 2.62   Final    DLCOVA Pre Ref 07/16/2020 68.7  % Final    DLCOc SBVA Ref 07/16/2020 3.93   Final    DLCOc SBVA LLN  07/16/2020 2.62   Final    DLCOc SBVA Pre Ref 07/16/2020 68.7  % Final    VA Single Breath Ref 07/16/2020 6.19   Final    VA Single Breath LLN 07/16/2020 6.19   Final    VA Single Breath Pre Ref 07/16/2020 86.6  % Final    IVC Single Breath Ref 07/16/2020 3.87   Final    IVC Single Breath LLN 07/16/2020 2.92   Final    IVC Single Breath Pre Ref 07/16/2020 64.5  % Final   Office Visit on 06/18/2020   Component Date Value Ref Range Status    WBC 06/24/2020 6.9  3.8 - 10.8 Thousand/uL Final    RBC 06/24/2020 5.34  4.20 - 5.80 Million/uL Final    Hemoglobin 06/24/2020 16.9  13.2 - 17.1 g/dL Final    Hematocrit 06/24/2020 51.0* 38.5 - 50.0 % Final    Mean Corpuscular Volume 06/24/2020 95.5  80.0 - 100.0 fL Final    Mean Corpuscular Hemoglobin 06/24/2020 31.6  27.0 - 33.0 pg Final    Mean Corpuscular Hemoglobin Conc 06/24/2020 33.1  32.0 - 36.0 g/dL Final    RDW 06/24/2020 13.4  11.0 - 15.0 % Final    Platelets 06/24/2020 262  140 - 400 Thousand/uL Final    MPV 06/24/2020 9.0  7.5 - 12.5 fL Final    Neutrophils Absolute 06/24/2020 4,616  1,500 - 7,800 cells/uL Final    Lymph # 06/24/2020 1,484  850 - 3,900 cells/uL Final    Mono # 06/24/2020 676  200 - 950 cells/uL Final    Eos # 06/24/2020 97  15 - 500 cells/uL Final    Baso # 06/24/2020 28  0 - 200 cells/uL Final    Neutrophils Relative 06/24/2020 66.9  % Final    Lymph% 06/24/2020 21.5  % Final    Mono% 06/24/2020 9.8  % Final    Eosinophil% 06/24/2020 1.4  % Final    Basophil% 06/24/2020 0.4  % Final    TSH w/reflex to FT4 06/24/2020 0.89  0.40 - 4.50 mIU/L Final    Creatinine, Random Ur 06/24/2020 143  20 - 320 mg/dL Final    Microalb, Ur 06/24/2020 4.6  See Note: mg/dL Final    Microalb Creat Ratio 06/24/2020 32* <30 mcg/mg creat Final    Glucose 06/24/2020 106  65 - 139 mg/dL Final    BUN, Bld 06/24/2020 12  7 - 25 mg/dL Final    Creatinine 06/24/2020 0.84  0.70 - 1.25 mg/dL Final    eGFR if non African American 06/24/2020 92  >  OR = 60 mL/min/1.73m2 Final    eGFR if African American 06/24/2020 106  > OR = 60 mL/min/1.73m2 Final    BUN/Creatinine Ratio 06/24/2020 NOT APPLICABLE  6 - 22 (calc) Final    Sodium 06/24/2020 141  135 - 146 mmol/L Final    Potassium 06/24/2020 4.3  3.5 - 5.3 mmol/L Final    Chloride 06/24/2020 102  98 - 110 mmol/L Final    CO2 06/24/2020 31  20 - 32 mmol/L Final    Calcium 06/24/2020 9.8  8.6 - 10.3 mg/dL Final    Total Protein 06/24/2020 6.6  6.1 - 8.1 g/dL Final    Albumin 06/24/2020 4.2  3.6 - 5.1 g/dL Final    Globulin, Total 06/24/2020 2.4  1.9 - 3.7 g/dL (calc) Final    Albumin/Globulin Ratio 06/24/2020 1.8  1.0 - 2.5 (calc) Final    Total Bilirubin 06/24/2020 0.4  0.2 - 1.2 mg/dL Final    Alkaline Phosphatase 06/24/2020 72  35 - 144 U/L Final    AST 06/24/2020 19  10 - 35 U/L Final    ALT 06/24/2020 15  9 - 46 U/L Final    Cholesterol 06/24/2020 224* <200 mg/dL Final    HDL 06/24/2020 56  > OR = 40 mg/dL Final    Triglycerides 06/24/2020 143  <150 mg/dL Final    LDL Cholesterol 06/24/2020 141* mg/dL (calc) Final    Hdl/Cholesterol Ratio 06/24/2020 4.0  <5.0 (calc) Final    Non HDL Chol. (LDL+VLDL) 06/24/2020 168* <130 mg/dL (calc) Final    PROSTATE SPECIFIC ANTIGEN, SCR - Q* 06/24/2020 2.4  < OR = 4.0 ng/mL Final    Color, UA 06/24/2020 DARK YELLOW  YELLOW Final    Appearance, UA 06/24/2020 CLOUDY* CLEAR Final    Specific Elmer, UA 06/24/2020 1.017  1.001 - 1.035 Final    pH, UA 06/24/2020 6.5  5.0 - 8.0 Final    Glucose, UA 06/24/2020 NEGATIVE  NEGATIVE Final    Bilirubin, UA 06/24/2020 NEGATIVE  NEGATIVE Final    Ketones, UA 06/24/2020 NEGATIVE  NEGATIVE Final    Occult Blood UA 06/24/2020 3+* NEGATIVE Final    Protein, UA 06/24/2020 1+* NEGATIVE Final    Nitrite, UA 06/24/2020 NEGATIVE  NEGATIVE Final    Leukocytes, UA 06/24/2020 2+* NEGATIVE Final    Urine Culture, Routine 06/24/2020 *  Final       Past Medical History:   Diagnosis Date    Benign enlargement of  prostate     Had a biopsy in around 2015    Elevated PSA     GERD (gastroesophageal reflux disease)     Hypertension     Started with meds in 2012.    Kidney stone     Urinary tract infection      Social History     Socioeconomic History    Marital status: Significant Other     Spouse name: Not on file    Number of children: Not on file    Years of education: Not on file    Highest education level: Not on file   Occupational History    Not on file   Social Needs    Financial resource strain: Very hard    Food insecurity     Worry: Sometimes true     Inability: Sometimes true    Transportation needs     Medical: No     Non-medical: No   Tobacco Use    Smoking status: Current Every Day Smoker     Packs/day: 1.00     Years: 55.00     Pack years: 55.00     Types: Cigarettes    Smokeless tobacco: Never Used   Substance and Sexual Activity    Alcohol use: Yes     Comment: Previous 12 beer a day for 20 years. Now occ.    Drug use: No    Sexual activity: Not on file   Lifestyle    Physical activity     Days per week: 2 days     Minutes per session: 150+ min    Stress: To some extent   Relationships    Social connections     Talks on phone: More than three times a week     Gets together: Once a week     Attends Gnosticism service: Not on file     Active member of club or organization: No     Attends meetings of clubs or organizations: Never     Relationship status:    Other Topics Concern    Not on file   Social History Narrative    Not on file     Past Surgical History:   Procedure Laterality Date    FISTULA REPAIR      LEFT HEART CATHETERIZATION N/A 10/23/2018    Procedure: Left heart cath;  Surgeon: Niharika Squires MD;  Location[facility];  Service: Cardiology;  Laterality: N/A;     Family History   Problem Relation Age of Onset    Heart failure Mother     Cancer Father     Heart disease Brother     Heart attack Brother     Cancer Brother        Review of patient's allergies  indicates:   Allergen Reactions    Oyster extract Swelling     PT swells in his throat after eating oysters on occasion       Current Outpatient Medications:     albuterol (PROVENTIL/VENTOLIN HFA) 90 mcg/actuation inhaler, Inhale 1-2 puffs into the lungs every 4 (four) hours as needed for Shortness of Breath (coughing). Rescue, Disp: 18 g, Rfl: 11    albuterol-ipratropium (DUO-NEB) 2.5 mg-0.5 mg/3 mL nebulizer solution, Take 3 mLs by nebulization every 4 (four) hours as needed for Wheezing. Rescue, Disp: 180 mL, Rfl: 11    ALPRAZolam (XANAX) 0.5 MG tablet, Take 0.5 mg by mouth 3 (three) times daily as needed for Anxiety., Disp: , Rfl:     aspirin (ECOTRIN) 81 MG EC tablet, Take 1 tablet (81 mg total) by mouth once daily., Disp: , Rfl: 0    azelastine (ASTELIN) 137 mcg (0.1 %) nasal spray, 1 spray (137 mcg total) by Nasal route 2 (two) times daily., Disp: 30 mL, Rfl: 2    escitalopram oxalate (LEXAPRO) 20 MG tablet, Take 0.5 tablets (10 mg total) by mouth once daily., Disp: 90 tablet, Rfl: 1    esomeprazole (NEXIUM) 20 MG capsule, Take 1 capsule (20 mg total) by mouth before breakfast., Disp: 90 capsule, Rfl: 3    ezetimibe (ZETIA) 10 mg tablet, Take 10 mg by mouth once daily., Disp: , Rfl:     LIVALO 4 mg Tab, Take 1 tablet by mouth once daily., Disp: , Rfl:     losartan (COZAAR) 100 MG tablet, Take 100 mg by mouth once daily., Disp: , Rfl:     methylPREDNISolone (MEDROL, JOLYNN,) 4 mg tablet, Take 4 mg by mouth once daily. use as directed, Disp: , Rfl:     TRELEGY ELLIPTA 100-62.5-25 mcg DsDv, , Disp: , Rfl:     umeclidinium-vilanteroL (ANORO ELLIPTA) 62.5-25 mcg/actuation DsDv, Inhale 1 puff into the lungs once daily. Controller, Disp: 1 each, Rfl: 11    valACYclovir (VALTREX) 1000 MG tablet, Take 1 g by mouth 2 (two) times daily., Disp: , Rfl:     Review of Systems   Constitutional: Negative for appetite change, fatigue, fever and unexpected weight change.   HENT: Positive for congestion and  "rhinorrhea.    Respiratory: Negative for cough, chest tightness, shortness of breath and wheezing.    Cardiovascular: Negative for chest pain and leg swelling.   Gastrointestinal: Negative for abdominal pain, constipation, nausea and vomiting.        -heartburn   Genitourinary: Negative for decreased urine volume, difficulty urinating, dysuria and frequency.   Musculoskeletal: Negative for arthralgias, back pain, myalgias and neck pain.   Skin: Negative for rash.   Neurological: Negative for dizziness, weakness, numbness and headaches.   Hematological: Does not bruise/bleed easily.   Psychiatric/Behavioral: Negative for dysphoric mood, sleep disturbance and suicidal ideas. The patient is not nervous/anxious.    All other systems reviewed and are negative.         Objective:      Vitals:    09/22/20 1444 09/22/20 1532   BP: (!) 154/78 (!) 140/82   Pulse: 67    Temp: 97.8 °F (36.6 °C)    SpO2: 96%    Weight: 73.4 kg (161 lb 12.8 oz)    Height: 5' 6" (1.676 m)      Physical Exam  Vitals signs reviewed.   Constitutional:       General: He is not in acute distress.     Appearance: Normal appearance. He is well-developed and overweight.   HENT:      Head: Normocephalic and atraumatic.   Neck:      Musculoskeletal: Neck supple.      Thyroid: No thyromegaly.   Cardiovascular:      Rate and Rhythm: Normal rate and regular rhythm.      Heart sounds: Normal heart sounds. No murmur. No friction rub.   Pulmonary:      Effort: Pulmonary effort is normal.      Breath sounds: Normal breath sounds. No wheezing or rales.   Abdominal:      General: Bowel sounds are normal. There is no distension.      Palpations: Abdomen is soft.      Tenderness: There is no abdominal tenderness.   Lymphadenopathy:      Cervical: No cervical adenopathy.   Skin:     General: Skin is warm and dry.      Findings: No rash.   Neurological:      Mental Status: He is alert and oriented to person, place, and time.   Psychiatric:         Attention and " Perception: He is attentive.         Speech: Speech normal.         Behavior: Behavior normal.         Thought Content: Thought content normal.         Judgment: Judgment normal.           Assessment:       1. Essential hypertension    2. Generalized anxiety disorder    3. Gastroesophageal reflux disease without esophagitis    4. Bladder mass    5. Chronic obstructive pulmonary disease with acute exacerbation    6. Allergic rhinitis, unspecified seasonality, unspecified trigger    7. Influenza vaccine refused         Plan:       Essential hypertension  Comments:  Controlled. Will continue to monitor on current BP regimen    Generalized anxiety disorder  Comments:  Controlled. Will continue to monitor symptoms on xanax/lexapro    Gastroesophageal reflux disease without esophagitis  Comments:  Controlled. Will continue to monitor symptoms on nexium    Bladder mass  Comments:  Active. Encouraged pt to keep f/u with urology to complete evaluation to r/o cancer.    Chronic obstructive pulmonary disease with acute exacerbation  Comments:  Stable. To continue prednisone, Trelegy, albuterol nebs until f/u with Pulmonlogy.    Allergic rhinitis, unspecified seasonality, unspecified trigger  Comments:  Symptomatic. Astelin refilled.  Orders:  -     azelastine (ASTELIN) 137 mcg (0.1 %) nasal spray; 1 spray (137 mcg total) by Nasal route 2 (two) times daily.  Dispense: 30 mL; Refill: 2    Influenza vaccine refused    Other  Lab results discussed and reviewed with patient.    Follow up in about 3 months (around 12/22/2020) for HTN, Anxiety, GERD, Allergies, COPD, Cancer.        9/23/2020 Zo Burrell

## 2020-10-09 ENCOUNTER — LAB VISIT (OUTPATIENT)
Dept: PRIMARY CARE CLINIC | Facility: CLINIC | Age: 65
End: 2020-10-09
Payer: MEDICARE

## 2020-10-09 DIAGNOSIS — Z01.818 PREOP TESTING: ICD-10-CM

## 2020-10-09 LAB — SARS-COV-2 RNA RESP QL NAA+PROBE: NOT DETECTED

## 2020-10-09 PROCEDURE — U0003 INFECTIOUS AGENT DETECTION BY NUCLEIC ACID (DNA OR RNA); SEVERE ACUTE RESPIRATORY SYNDROME CORONAVIRUS 2 (SARS-COV-2) (CORONAVIRUS DISEASE [COVID-19]), AMPLIFIED PROBE TECHNIQUE, MAKING USE OF HIGH THROUGHPUT TECHNOLOGIES AS DESCRIBED BY CMS-2020-01-R: HCPCS

## 2020-10-17 ENCOUNTER — PATIENT MESSAGE (OUTPATIENT)
Dept: PULMONOLOGY | Facility: CLINIC | Age: 65
End: 2020-10-17

## 2020-10-17 ENCOUNTER — PATIENT MESSAGE (OUTPATIENT)
Dept: FAMILY MEDICINE | Facility: CLINIC | Age: 65
End: 2020-10-17

## 2020-10-19 ENCOUNTER — PATIENT MESSAGE (OUTPATIENT)
Dept: FAMILY MEDICINE | Facility: CLINIC | Age: 65
End: 2020-10-19

## 2020-10-19 RX ORDER — LOSARTAN POTASSIUM 100 MG/1
100 TABLET ORAL DAILY
Qty: 90 TABLET | Refills: 1 | Status: SHIPPED | OUTPATIENT
Start: 2020-10-19 | End: 2022-03-14

## 2020-10-19 NOTE — TELEPHONE ENCOUNTER
The patient's prescription has been approved and sent to   [id number] - [place], [place] - [address]  [address]  [place] [address]  Phone: [phone] Fax: [phone]

## 2020-11-16 DIAGNOSIS — C67.2 MALIGNANT NEOPLASM OF LATERAL WALL OF URINARY BLADDER: Primary | ICD-10-CM

## 2020-11-20 ENCOUNTER — HOSPITAL ENCOUNTER (OUTPATIENT)
Dept: RADIOLOGY | Facility: HOSPITAL | Age: 65
Discharge: HOME | End: 2020-11-20
Attending: UROLOGY
Payer: MEDICARE

## 2020-11-20 DIAGNOSIS — C67.2 MALIGNANT NEOPLASM OF LATERAL WALL OF URINARY BLADDER: ICD-10-CM

## 2020-11-20 LAB
CREAT SERPL-MCNC: 1 MG/DL (ref 0.5–1.4)
SAMPLE: NORMAL

## 2020-11-20 PROCEDURE — 82565 ASSAY OF CREATININE: CPT | Mod: PO

## 2020-11-20 PROCEDURE — 25500020 PHARM REV CODE 255: Mod: PO

## 2020-11-20 RX ADMIN — IOHEXOL 100 ML: 350 INJECTION, SOLUTION INTRAVENOUS at 11:11

## 2020-11-24 ENCOUNTER — OFFICE VISIT (OUTPATIENT)
Dept: PULMONOLOGY | Facility: CLINIC | Age: 65
End: 2020-11-24
Payer: MEDICARE

## 2020-11-24 VITALS
SYSTOLIC BLOOD PRESSURE: 133 MMHG | HEART RATE: 90 BPM | WEIGHT: 171.5 LBS | BODY MASS INDEX: 27.68 KG/M2 | DIASTOLIC BLOOD PRESSURE: 76 MMHG | OXYGEN SATURATION: 95 %

## 2020-11-24 DIAGNOSIS — R91.1 LUNG NODULE: ICD-10-CM

## 2020-11-24 DIAGNOSIS — F17.200 TOBACCO DEPENDENCE: ICD-10-CM

## 2020-11-24 DIAGNOSIS — J30.2 SEASONAL ALLERGIES: ICD-10-CM

## 2020-11-24 DIAGNOSIS — C67.9 MALIGNANT NEOPLASM OF URINARY BLADDER, UNSPECIFIED SITE: ICD-10-CM

## 2020-11-24 DIAGNOSIS — J44.1 CHRONIC OBSTRUCTIVE PULMONARY DISEASE WITH ACUTE EXACERBATION: Primary | ICD-10-CM

## 2020-11-24 DIAGNOSIS — J43.9 PULMONARY EMPHYSEMA, UNSPECIFIED EMPHYSEMA TYPE: ICD-10-CM

## 2020-11-24 PROCEDURE — 99214 OFFICE O/P EST MOD 30 MIN: CPT | Mod: S$PBB,,, | Performed by: INTERNAL MEDICINE

## 2020-11-24 PROCEDURE — 99999 PR PBB SHADOW E&M-EST. PATIENT-LVL IV: CPT | Mod: PBBFAC,,, | Performed by: INTERNAL MEDICINE

## 2020-11-24 PROCEDURE — 99214 OFFICE O/P EST MOD 30 MIN: CPT | Mod: PBBFAC,PO | Performed by: INTERNAL MEDICINE

## 2020-11-24 PROCEDURE — 99214 PR OFFICE/OUTPT VISIT, EST, LEVL IV, 30-39 MIN: ICD-10-PCS | Mod: S$PBB,,, | Performed by: INTERNAL MEDICINE

## 2020-11-24 PROCEDURE — 99999 PR PBB SHADOW E&M-EST. PATIENT-LVL IV: ICD-10-PCS | Mod: PBBFAC,,, | Performed by: INTERNAL MEDICINE

## 2020-11-24 RX ORDER — BUDESONIDE, GLYCOPYRROLATE, AND FORMOTEROL FUMARATE 160; 9; 4.8 UG/1; UG/1; UG/1
2 AEROSOL, METERED RESPIRATORY (INHALATION) 2 TIMES DAILY
Qty: 10.7 G | Refills: 11 | Status: SHIPPED | OUTPATIENT
Start: 2020-11-24 | End: 2021-03-22

## 2020-11-24 RX ORDER — ALFUZOSIN HYDROCHLORIDE 10 MG/1
TABLET, EXTENDED RELEASE ORAL
COMMUNITY
Start: 2020-11-04

## 2020-11-24 RX ORDER — FINASTERIDE 5 MG/1
5 TABLET, FILM COATED ORAL DAILY
COMMUNITY
Start: 2020-11-04

## 2020-11-24 RX ORDER — HYDROCODONE BITARTRATE AND ACETAMINOPHEN 7.5; 325 MG/1; MG/1
TABLET ORAL
COMMUNITY
Start: 2020-11-04 | End: 2021-03-22

## 2020-11-24 NOTE — PATIENT INSTRUCTIONS
Need to quit smoking  Continue anoro inhaler for lungs- in future if not doing well can document and try to get trelegy approved by insurance  If covered can try to switch to breztri inhaler- 2 puffs twice a day.  To follow up tiny lung nodule would get another CT in 6 months- need to cancel next scan and push it back to may  Follow up with urologist

## 2020-11-24 NOTE — PROGRESS NOTES
2020    Martell Jensen Gonsoulin  Follow up    Chief Complaint   Patient presents with    Follow-up     feeling better    Recurrent Sinusitis       HPI:   2020- he had bladder biopsy at [facility] with Dr. Pham on 11/3- per outside records path with High grade papillary urothelial cell carcinoma. He has cough w/ postnasal drip and allergies but breathing is much better. He has been dealing with a lot for the family- 2 brothers just . Hematuria is much better. Not getting bladder infections any more. He continues to smoke 1/2 ppd. Wants to quit but too much stress recently- not ready.    2020-   Start taking prednisone again- long taper over 3 weeks then try to stop. If lungs flaring while decreasing prednisone go up to the last dose that helped you and call our office.  Continue trelegy  Refilled duo nebs to use as needed  Use albuterol as needed  Quit smoking- go to program  Follow up with urologist  Follow up with PCP for kidney testing and possible urinary infection  pt was recently hospitalized for COPD exacerbation, seen by me while admitted 20 and also having worsening hematuria at that time. He was sent home with a course of levaquin and steroid taper. He did not qualify for home O2. He is being worked up for a bladder mass per urology and pending transurethral resection.  Pt c/o pain around L kidney after doing yard work and urine turned darker. He was coughing last night and woke up with face feeling puffy. Switched urologists to a Dr. Pham at [facility] and has appt at end of September.  He finished prednisone taper. Still taking levaquin. Still feels some congestion in chest but it is getting better. Also has sinus problems w/ nose blocked. He denies frequent exacerbations but this one has been very bad. Feels like worsening since stopped prednisone. Still smokes but trying to cut back. Has smoking cessation appt later this month.  Inhalers: nebs 4-5x/day, trelegy daily,  albuterol rescue 1-2x/day    7/15/20- Pt is a 66 yo male with HTN, GERD and BPH presents for new evaluation of breathing issues. He complains of SOB especially if he carries anything like taking out trash to the dumpster. This has been progressive over about 3.5 yrs. He wheezes and coughs up clear mucous, throughout the day.  Pt smokes 1 PPD x 55 yrs.  At age 5 pt had pna and a collapsed lung requiring chest tube on the left side and hospitalization. Didn't have any other problems w/ breathing growing up. He took up playing Simple-Fill to help lung function.  Pt had abd/p scan for UTI recently which showed some emphysematous changes.    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 One brother  at 65 from leukemia.    Grew up in [place]  The chief compliant  problem is stable    PFSH:  Past Medical History:   Diagnosis Date    Benign enlargement of prostate     Had a biopsy in around     Elevated PSA     GERD (gastroesophageal reflux disease)     Hypertension     Started with meds in .    Kidney stone     Urinary tract infection          Past Surgical History:   Procedure Laterality Date    FISTULA REPAIR      LEFT HEART CATHETERIZATION N/A 10/23/2018    Procedure: Left heart cath;  Surgeon: Niharika Squires MD;  Location: [facility] CATH;  Service: Cardiology;  Laterality: N/A;     Social History     Tobacco Use    Smoking status: Current Every Day Smoker     Packs/day: 1.00     Years: 55.00     Pack years: 55.00     Types: Cigarettes    Smokeless tobacco: Never Used   Substance Use Topics    Alcohol use: Yes     Comment: Previous 12 beer a day for 20 years. Now occ.    Drug use: No     Family History   Problem Relation Age of Onset    Heart failure Mother     Cancer Father     Heart disease Brother     Heart attack Brother     Cancer Brother      Review of patient's allergies indicates:   Allergen  Reactions    Oyster extract Swelling     PT swells in his throat after eating oysters on occasion       Performance Status:The patient's activity level is functions out of house. QUACH improved and does not limit him. Back pain limits activity.    Review of Systems:  a review of eleven systems covering constitutional, Eye, HEENT, Psych, Respiratory, Cardiac, GI, , Musculoskeletal, Endocrine, Dermatologic was negative except for pertinent findings as listed ABOVE and below:  Back pain  Wt gain  Sinus/allergy problems    Exam:Comprehensive exam done. /76 (BP Location: Right arm, Patient Position: Sitting)   Pulse 90   Wt 77.8 kg (171 lb 8.3 oz)   SpO2 95% Comment: on room air at rest  BMI 27.68 kg/m²   Exam included Vitals as listed, and patient's appearance and affect and alertness and mood, oral exam for yeast and hygiene and pharynx lesions and Mallapatti (M) score, neck with inspection for jvd and masses and thyroid abnormalities and lymph nodes (supraclavicular and infraclavicular nodes and axillary also examined and noted if abn), chest exam included symmetry and effort and fremitus and percussion and auscultation, cardiac exam included rhythm and gallops and murmur and rubs and jvd and edema, abdominal exam for mass and hepatosplenomegaly and tenderness and hernias and bowel sounds, Musculoskeletal exam with muscle tone and posture and mobility/gait and  strength, and skin for rashes and cyanosis and pallor and turgor, extremity for clubbing.  Findings were normal except for pertinent findings listed below:  M1  Bilateral clear lungs w/ faint rhonchi bilateral lower lung zones  No clubbing or edema    Radiographs (ct chest and cxr) reviewed: view by direct vision   CT chest/abd/p 11/20/20- mild emphysema, lungs clear aside from small 6mm nodule DANA which is unchanged from prior exam  1. No metastatic disease.  2. Nodular thickening of the left posterior aspect of the bladder.  3. Enlarged  prostate.  3. Mild pulmonary emphysema.   CXR 8/14/20- possible vague infiltrate/opacifications bilateral bases  CT chest 7/16/20- DANA 6mm nodule  CT abd/p 6/26/20- bases of lungs w/ scant emphysematous changes, some strands of atelectasis    Labs reviewed    Results for GONSOULIN, MARTELL RAMEY (MRN [id number]) as of 8/20/2020 12:48   Ref. Range 8/14/2020 05:03   WBC Latest Ref Range: 3.90 - 12.70 K/uL 15.77 (H)   RBC Latest Ref Range: 4.60 - 6.20 M/uL 4.36 (L)   Hemoglobin Latest Ref Range: 14.0 - 18.0 g/dL 13.9 (L)   Hematocrit Latest Ref Range: 40.0 - 54.0 % 42.8   MCV Latest Ref Range: 82 - 98 fL 98   MCH Latest Ref Range: 27.0 - 31.0 pg 31.9 (H)   MCHC Latest Ref Range: 32.0 - 36.0 g/dL 32.5   RDW Latest Ref Range: 11.5 - 14.5 % 13.2   Platelets Latest Ref Range: 150 - 350 K/uL 262     PFT reviewed  7/16/20- moderately severe obstruction, reduced DLCO, air trapping        Plan:  Clinical impression is resonably certain and repeated evaluation prn +/- follow up will be needed as below. Mod-severe COPD, continues to smoke. Recent dx bladder ca without evidence of mets. Has small lung nodule- stable. Continue to follow given high risk.    Martell was seen today for follow-up and recurrent sinusitis.    Diagnoses and all orders for this visit:    Chronic obstructive pulmonary disease with acute exacerbation  -     budesonide-glycopyr-formoterol (BREZTRI AEROSPHERE) 160-9-4.8 mcg/actuation HFAA; Inhale 2 puffs into the lungs 2 (two) times a day.    Lung nodule  -     CT Chest Without Contrast; Future    Tobacco dependence    Malignant neoplasm of urinary bladder, unspecified site    Pulmonary emphysema, unspecified emphysema type    Seasonal allergies        Follow up in about 6 months (around 5/24/2021).    Discussed with patient above for education the following:      Patient Instructions   Need to quit smoking  Continue anoro inhaler for lungs- in future if not doing well can document and try to get trelegy approved by  insurance  If covered can try to switch to breztri inhaler- 2 puffs twice a day.  To follow up tiny lung nodule would get another CT in 6 months- need to cancel next scan and push it back to may  Follow up with urologist

## 2020-12-11 ENCOUNTER — LAB VISIT (OUTPATIENT)
Dept: PRIMARY CARE CLINIC | Facility: CLINIC | Age: 65
End: 2020-12-11
Payer: MEDICARE

## 2020-12-11 DIAGNOSIS — Z01.812 ENCOUNTER FOR PREOPERATIVE SCREENING LABORATORY TESTING FOR COVID-19 VIRUS: ICD-10-CM

## 2020-12-11 DIAGNOSIS — Z11.52 ENCOUNTER FOR PREOPERATIVE SCREENING LABORATORY TESTING FOR COVID-19 VIRUS: ICD-10-CM

## 2020-12-11 PROCEDURE — U0003 INFECTIOUS AGENT DETECTION BY NUCLEIC ACID (DNA OR RNA); SEVERE ACUTE RESPIRATORY SYNDROME CORONAVIRUS 2 (SARS-COV-2) (CORONAVIRUS DISEASE [COVID-19]), AMPLIFIED PROBE TECHNIQUE, MAKING USE OF HIGH THROUGHPUT TECHNOLOGIES AS DESCRIBED BY CMS-2020-01-R: HCPCS

## 2020-12-12 LAB — SARS-COV-2 RNA RESP QL NAA+PROBE: NOT DETECTED

## 2020-12-16 ENCOUNTER — HOSPITAL ENCOUNTER (EMERGENCY)
Facility: HOSPITAL | Age: 65
Discharge: HOME | End: 2020-12-16
Attending: EMERGENCY MEDICINE
Payer: MEDICARE

## 2020-12-16 VITALS
WEIGHT: 172 LBS | HEART RATE: 75 BPM | RESPIRATION RATE: 16 BRPM | DIASTOLIC BLOOD PRESSURE: 57 MMHG | OXYGEN SATURATION: 93 % | SYSTOLIC BLOOD PRESSURE: 104 MMHG | TEMPERATURE: 100 F | BODY MASS INDEX: 27.76 KG/M2

## 2020-12-16 DIAGNOSIS — J44.1 COPD WITH ACUTE EXACERBATION: Primary | ICD-10-CM

## 2020-12-16 DIAGNOSIS — R06.02 SHORTNESS OF BREATH: ICD-10-CM

## 2020-12-16 LAB
ANION GAP SERPL CALC-SCNC: 16 MMOL/L (ref 8–16)
BASOPHILS # BLD AUTO: 0.06 K/UL (ref 0–0.2)
BASOPHILS NFR BLD: 0.4 % (ref 0–1.9)
BUN SERPL-MCNC: 12 MG/DL (ref 8–23)
CALCIUM SERPL-MCNC: 8.5 MG/DL (ref 8.7–10.5)
CHLORIDE SERPL-SCNC: 104 MMOL/L (ref 95–110)
CO2 SERPL-SCNC: 25 MMOL/L (ref 23–29)
CREAT SERPL-MCNC: 0.8 MG/DL (ref 0.5–1.4)
DIFFERENTIAL METHOD: ABNORMAL
EOSINOPHIL # BLD AUTO: 0.5 K/UL (ref 0–0.5)
EOSINOPHIL NFR BLD: 3.3 % (ref 0–8)
ERYTHROCYTE [DISTWIDTH] IN BLOOD BY AUTOMATED COUNT: 13.1 % (ref 11.5–14.5)
EST. GFR  (AFRICAN AMERICAN): >60 ML/MIN/1.73 M^2
EST. GFR  (NON AFRICAN AMERICAN): >60 ML/MIN/1.73 M^2
GLUCOSE SERPL-MCNC: 125 MG/DL (ref 70–110)
HCT VFR BLD AUTO: 43.7 % (ref 40–54)
HGB BLD-MCNC: 14 G/DL (ref 14–18)
IMM GRANULOCYTES # BLD AUTO: 0.04 K/UL (ref 0–0.04)
IMM GRANULOCYTES NFR BLD AUTO: 0.3 % (ref 0–0.5)
LYMPHOCYTES # BLD AUTO: 1.7 K/UL (ref 1–4.8)
LYMPHOCYTES NFR BLD: 12.2 % (ref 18–48)
MCH RBC QN AUTO: 32.3 PG (ref 27–31)
MCHC RBC AUTO-ENTMCNC: 32 G/DL (ref 32–36)
MCV RBC AUTO: 101 FL (ref 82–98)
MONOCYTES # BLD AUTO: 1.3 K/UL (ref 0.3–1)
MONOCYTES NFR BLD: 9.4 % (ref 4–15)
NEUTROPHILS # BLD AUTO: 10.5 K/UL (ref 1.8–7.7)
NEUTROPHILS NFR BLD: 74.4 % (ref 38–73)
NRBC BLD-RTO: 0 /100 WBC
PLATELET # BLD AUTO: 252 K/UL (ref 150–350)
PMV BLD AUTO: 8.5 FL (ref 9.2–12.9)
POTASSIUM SERPL-SCNC: 4 MMOL/L (ref 3.5–5.1)
RBC # BLD AUTO: 4.34 M/UL (ref 4.6–6.2)
SARS-COV-2 RDRP RESP QL NAA+PROBE: NEGATIVE
SODIUM SERPL-SCNC: 145 MMOL/L (ref 136–145)
TROPONIN I SERPL DL<=0.01 NG/ML-MCNC: <0.006 NG/ML (ref 0–0.03)
WBC # BLD AUTO: 14.15 K/UL (ref 3.9–12.7)

## 2020-12-16 PROCEDURE — 94640 AIRWAY INHALATION TREATMENT: CPT

## 2020-12-16 PROCEDURE — 93005 ELECTROCARDIOGRAM TRACING: CPT

## 2020-12-16 PROCEDURE — 84484 ASSAY OF TROPONIN QUANT: CPT

## 2020-12-16 PROCEDURE — 93010 ELECTROCARDIOGRAM REPORT: CPT | Mod: ,,, | Performed by: SPECIALIST

## 2020-12-16 PROCEDURE — 99285 EMERGENCY DEPT VISIT HI MDM: CPT | Mod: 25

## 2020-12-16 PROCEDURE — U0002 COVID-19 LAB TEST NON-CDC: HCPCS

## 2020-12-16 PROCEDURE — 93010 EKG 12-LEAD: ICD-10-PCS | Mod: ,,, | Performed by: SPECIALIST

## 2020-12-16 PROCEDURE — 36415 COLL VENOUS BLD VENIPUNCTURE: CPT

## 2020-12-16 PROCEDURE — 25000242 PHARM REV CODE 250 ALT 637 W/ HCPCS: Performed by: EMERGENCY MEDICINE

## 2020-12-16 PROCEDURE — 85025 COMPLETE CBC W/AUTO DIFF WBC: CPT

## 2020-12-16 PROCEDURE — 80048 BASIC METABOLIC PNL TOTAL CA: CPT

## 2020-12-16 RX ORDER — ALBUTEROL SULFATE 2.5 MG/.5ML
5 SOLUTION RESPIRATORY (INHALATION)
Status: COMPLETED | OUTPATIENT
Start: 2020-12-16 | End: 2020-12-16

## 2020-12-16 RX ORDER — PREDNISONE 50 MG/1
50 TABLET ORAL DAILY
Qty: 5 TABLET | Refills: 0 | Status: SHIPPED | OUTPATIENT
Start: 2020-12-16 | End: 2020-12-22

## 2020-12-16 RX ADMIN — ALBUTEROL SULFATE 5 MG: 2.5 SOLUTION RESPIRATORY (INHALATION) at 09:12

## 2020-12-16 NOTE — ED PROVIDER NOTES
Encounter Date: 12/16/2020    SCRIBE #1 NOTE: I, Natalie Almonte, am scribing for, and in the presence of, Jax Trujillo MD.       History     Chief Complaint   Patient presents with    Shortness of Breath       Time seen by provider: 9:05 AM on 12/16/2020    Martell Corcoran is a 65 y.o. male with PMHx of COPD and HTN who presents to the ED via EMS with an onset of shortness of breath beginning last night. He and the EMS personnel report he was previous unable to stand or sit comfortably, was wheezing, was fighting for breath, and was coughing without any blood. He states he has mild chest pain that has improved since arriving to the ED. He denies fever, bloody stool, leg pain, change in urination or any other symptoms at this time. It is reported that his last COPD episode was 4 months ago and he had a biopsy yesterday. He has a PSHx of left heart catheterization.       The history is provided by the patient and the EMS personnel.     Review of patient's allergies indicates:   Allergen Reactions    Oyster extract Swelling     PT swells in his throat after eating oysters on occasion     Past Medical History:   Diagnosis Date    Benign enlargement of prostate     Had a biopsy in around 2015    Elevated PSA     GERD (gastroesophageal reflux disease)     Hypertension     Started with meds in 2012.    Kidney stone     Urinary tract infection      Past Surgical History:   Procedure Laterality Date    FISTULA REPAIR      LEFT HEART CATHETERIZATION N/A 10/23/2018    Procedure: Left heart cath;  Surgeon: Niharika Squires MD;  [facility];  Service: Cardiology;  Laterality: N/A;     Family History   Problem Relation Age of Onset    Heart failure Mother     Cancer Father     Heart disease Brother     Heart attack Brother     Cancer Brother      Social History     Tobacco Use    Smoking status: Current Every Day Smoker     Packs/day: 1.00     Years: 55.00     Pack years: 55.00     Types:  Cigarettes    Smokeless tobacco: Never Used   Substance Use Topics    Alcohol use: Yes     Comment: Previous 12 beer a day for 20 years. Now occ.    Drug use: No     Review of Systems   Constitutional: Negative for fever.   HENT: Negative for sore throat.    Respiratory: Positive for cough, shortness of breath and wheezing.    Cardiovascular: Positive for chest pain.   Gastrointestinal: Negative for blood in stool and nausea.   Genitourinary: Negative for difficulty urinating, dysuria and enuresis.   Musculoskeletal: Negative for back pain and myalgias.   Skin: Negative for rash.   Neurological: Negative for weakness.   Hematological: Does not bruise/bleed easily.       Physical Exam     Initial Vitals [12/16/20 0920]   BP Pulse Resp Temp SpO2   (!) 145/87 88 (!) 28 100 °F (37.8 °C) 98 %      MAP       --         Physical Exam    Nursing note and vitals reviewed.  Constitutional: He appears well-developed and well-nourished. He is not diaphoretic. No distress.   HENT:   Head: Normocephalic and atraumatic.   Mouth/Throat: Oropharynx is clear and moist.   No lip or tongue swelling.    Eyes: Conjunctivae are normal.   Neck: Neck supple.   Cardiovascular: Regular rhythm, normal heart sounds and intact distal pulses. Tachycardia present.  Exam reveals no gallop and no friction rub.    No murmur heard.  Tachycardic with normal rhythm.    Pulmonary/Chest: Tachypnea noted. He has wheezes. He has no rhonchi. He has no rales.   Tachypneic, diffuse bilateral expiratory wheezes, no stridor, no rhonchi.    Abdominal: Soft. He exhibits no distension. There is no abdominal tenderness.   Musculoskeletal: Normal range of motion. No tenderness or edema.      Comments: No edema, extremities non-tender and symmetric.    Neurological: He is alert and oriented to person, place, and time.   Skin: No rash noted. No erythema.         ED Course   Procedures  Labs Reviewed   CBC W/ AUTO DIFFERENTIAL - Abnormal; Notable for the following  components:       Result Value    WBC 14.15 (*)     RBC 4.34 (*)      (*)     MCH 32.3 (*)     MPV 8.5 (*)     Gran # (ANC) 10.5 (*)     Mono # 1.3 (*)     Gran % 74.4 (*)     Lymph % 12.2 (*)     All other components within normal limits   BASIC METABOLIC PANEL - Abnormal; Notable for the following components:    Glucose 125 (*)     Calcium 8.5 (*)     All other components within normal limits   TROPONIN I   SARS-COV-2 RNA AMPLIFICATION, QUAL        ECG Results          EKG 12-lead (In process)  Result time 12/16/20 09:50:11    In process by Interface, Lab In [facility] (12/16/20 09:50:11)                 Narrative:    Test Reason : R06.02,    Vent. Rate : 085 BPM     Atrial Rate : 085 BPM     P-R Int : 158 ms          QRS Dur : 098 ms      QT Int : 384 ms       P-R-T Axes : 080 -24 068 degrees     QTc Int : 456 ms    Normal sinus rhythm  Septal infarct (cited on or before 13-AUG-2020)  Abnormal ECG  When compared with ECG of 13-AUG-2020 03:15,  Incomplete right bundle branch block is no longer Present  Questionable change in initial forces of Anterior-septal leads    Referred By: [email or web address]   SELF           Confirmed By:                             Imaging Results          X-Ray Chest 1 View (Final result)  Result time 12/16/20 09:34:11    Final result by Gavino Lees Jr., MD (12/16/20 09:34:11)                 Impression:      No acute abnormality.      Electronically signed by: Gavino Lees MD  Date:    12/16/2020  Time:    09:34             Narrative:    EXAMINATION:  XR CHEST 1 VIEW    CLINICAL HISTORY:  SOB;    TECHNIQUE:  Single frontal view of the chest was performed.    COMPARISON:  Chest x-ray of August 14, 2020    FINDINGS:  The lungs are clear, with normal appearance of pulmonary vasculature and no pleural effusion or pneumothorax.    The cardiac silhouette is normal in size. The hilar and mediastinal contours are unremarkable.    Bones are intact.                            (radiology  reading, visualized by me)       Medical Decision Making:   History:   Old Medical Records: I decided to obtain old medical records.  Clinical Tests:   Lab Tests: Ordered and Reviewed  Radiological Study: Ordered and Reviewed  Medical Tests: Ordered and Reviewed            Scribe Attestation:   Scribe #1: I performed the above scribed service and the documentation accurately describes the services I performed. I attest to the accuracy of the note.    I, Dr. Jax Trujillo, personally performed the services described in this documentation. All medical record entries made by the scribe were at my direction and in my presence.  I have reviewed the chart and agree that the record reflects my personal performance and is accurate and complete. Jax Trujillo MD.  2:05 PM 12/16/2020    Martell Corcoran is a 65 y.o. male presenting with shortness of breath consistent with COPD exacerbation.  Patient had marked response to pre-hospital and continued ED therapy with beta agonist as well as anticholinergic therapy and systemic steroids.  I have low suspicion for other alternative processes such as ACS, pneumonia, PE.  No sign of ischemia on EKG or cardiac biomarker evaluation.  I do not he requires admission for serial monitoring or troponin evaluation.  He is asymptomatic on ultimate reassessment with markedly decreased.  He is on with his follow-up, which I feel is reasonable.  He has no persistent hypoxia or oxygen requirement.  I have review detailed return precautions if condition worsens and encouraged albuterol as necessary for any recurrent shortness of breath as well as steroid course.          ED Course as of Dec 16 1408   Wed Dec 16, 2020   0931 EKG:  NSR, rate of 85, normal intervals.  There are no acute ST or T wave changes suggestive of acute ischemia or infarction.      [MR]   1005 Patient appears much improved and is resting comfortably without distress or increased work of breathing.    [MR]    1056 Patient reassessed once again.  No subjective dyspnea.  Lying supine in bed.  No hypoxia 94% on room air.  Mild persistent wheezing on exam without accessory muscle use or prolonged expiratory phase.    [MR]      ED Course User Index  [MR] Jax Trujillo MD            Clinical Impression:       ICD-10-CM ICD-9-CM   1. COPD with acute exacerbation  J44.1 491.21   2. Shortness of breath  R06.02 786.05                      Disposition:   Disposition: Discharged  Condition: Stable     ED Disposition Condition    Discharge Stable        ED Prescriptions     Medication Sig Dispense Start Date End Date Auth. Provider    predniSONE (DELTASONE) 50 MG Tab Take 1 tablet (50 mg total) by mouth once daily. for 5 days 5 tablet 12/16/2020 12/21/2020 Jax Trujillo MD        Follow-up Information     Follow up With Specialties Details Why Contact Info    Zo Burrell MD Family Medicine  or your pulmonologist, 3-5 days [address]  [address]  [facility] [place] [address]  [phone]                                         Jax Trujillo MD  12/16/20 [id number]

## 2020-12-16 NOTE — RESPIRATORY THERAPY
12/16/20 0946   Patient Assessment/Suction   Level of Consciousness (AVPU) alert   Respiratory Effort Mild;Labored   All Lung Fields Breath Sounds diminished;wheezes, expiratory   PRE-TX-O2   O2 Device (Oxygen Therapy) room air   SpO2 98 %   Pulse 80   Resp 16   Aerosol Therapy   $ Aerosol Therapy Charges Aerosol Treatment   Respiratory Treatment Status (SVN) given   Treatment Route (SVN) mask   Patient Position (SVN) HOB elevated   Signs of Intolerance (SVN) none   Breath Sounds Post-Respiratory Treatment   Throughout All Fields Post-Treatment All Fields   Throughout All Fields Post-Treatment aeration increased;wheezes, expiratory   Post-treatment Heart Rate (beats/min) 80   Post-treatment Resp Rate (breaths/min) 16

## 2020-12-22 ENCOUNTER — OFFICE VISIT (OUTPATIENT)
Dept: FAMILY MEDICINE | Facility: CLINIC | Age: 65
End: 2020-12-22
Payer: MEDICARE

## 2020-12-22 VITALS
DIASTOLIC BLOOD PRESSURE: 78 MMHG | WEIGHT: 171 LBS | HEART RATE: 76 BPM | HEIGHT: 66 IN | SYSTOLIC BLOOD PRESSURE: 120 MMHG | BODY MASS INDEX: 27.48 KG/M2

## 2020-12-22 DIAGNOSIS — Z13.6 SCREENING FOR ISCHEMIC HEART DISEASE (IHD): ICD-10-CM

## 2020-12-22 DIAGNOSIS — F41.1 GENERALIZED ANXIETY DISORDER: ICD-10-CM

## 2020-12-22 DIAGNOSIS — Z13.89 SCREENING FOR BLOOD OR PROTEIN IN URINE: ICD-10-CM

## 2020-12-22 DIAGNOSIS — Z79.899 LONG-TERM USE OF HIGH-RISK MEDICATION: ICD-10-CM

## 2020-12-22 DIAGNOSIS — Z12.5 SCREENING FOR PROSTATE CANCER: ICD-10-CM

## 2020-12-22 DIAGNOSIS — Z13.29 SCREENING FOR ENDOCRINE DISORDER: ICD-10-CM

## 2020-12-22 DIAGNOSIS — C67.2 MALIGNANT NEOPLASM OF LATERAL WALL OF URINARY BLADDER: ICD-10-CM

## 2020-12-22 DIAGNOSIS — I10 ESSENTIAL HYPERTENSION: Primary | ICD-10-CM

## 2020-12-22 DIAGNOSIS — J44.1 COPD EXACERBATION: ICD-10-CM

## 2020-12-22 DIAGNOSIS — F17.200 TOBACCO DEPENDENCE: ICD-10-CM

## 2020-12-22 DIAGNOSIS — K21.9 GASTROESOPHAGEAL REFLUX DISEASE WITHOUT ESOPHAGITIS: ICD-10-CM

## 2020-12-22 PROCEDURE — 99214 OFFICE O/P EST MOD 30 MIN: CPT | Mod: S$GLB,,, | Performed by: FAMILY MEDICINE

## 2020-12-22 PROCEDURE — 99214 PR OFFICE/OUTPT VISIT, EST, LEVL IV, 30-39 MIN: ICD-10-PCS | Mod: S$GLB,,, | Performed by: FAMILY MEDICINE

## 2020-12-22 RX ORDER — PREDNISONE 20 MG/1
TABLET ORAL
Qty: 15 TABLET | Refills: 0 | Status: SHIPPED | OUTPATIENT
Start: 2020-12-22 | End: 2021-01-01

## 2020-12-22 RX ORDER — AZITHROMYCIN 250 MG/1
TABLET, FILM COATED ORAL
Qty: 6 TABLET | Refills: 0 | Status: SHIPPED | OUTPATIENT
Start: 2020-12-22 | End: 2020-12-27

## 2020-12-22 NOTE — PROGRESS NOTES
SUBJECTIVE:    Patient ID: Martell Corcoran is a 65 y.o. male.    Chief Complaint: Follow-up (did not bring bottles )    Pt here to follow up on chronic issues (Anxiety, Pepcid, HTN, CAD (65%), HLD, GERD)    Heartburn is doing ok on nexium.  (failed prilosec,protonix)    BP is doing ok today.     Anxiety is doing ok.     Since last visit, pt has had a operation on bladder mass. Has no more bladder tumor, high grade urothelial cell cancer.  Wants him to have some more infusions weekly for 6 weeks of tuberculin bladder wash. (Dr. Pham ([place]) )     Allergies are acting up, using astelin which helps.    Pt aware he needs to stop smoking, plans to stop smoking with his fiance at the first of the year. Planning to call with smoking cessation.    Has been having a cough since he had urologic procedure. Coughing up a lot of thick and yellow mucus. Having a hard time coming up.             Admission on 12/16/2020, Discharged on 12/16/2020   Component Date Value Ref Range Status    WBC 12/16/2020 14.15* 3.90 - 12.70 K/uL Final    RBC 12/16/2020 4.34* 4.60 - 6.20 M/uL Final    Hemoglobin 12/16/2020 14.0  14.0 - 18.0 g/dL Final    Hematocrit 12/16/2020 43.7  40.0 - 54.0 % Final    MCV 12/16/2020 101* 82 - 98 fL Final    MCH 12/16/2020 32.3* 27.0 - 31.0 pg Final    MCHC 12/16/2020 32.0  32.0 - 36.0 g/dL Final    RDW 12/16/2020 13.1  11.5 - 14.5 % Final    Platelets 12/16/2020 252  150 - 350 K/uL Final    MPV 12/16/2020 8.5* 9.2 - 12.9 fL Final    Immature Granulocytes 12/16/2020 0.3  0.0 - 0.5 % Final    Gran # (ANC) 12/16/2020 10.5* 1.8 - 7.7 K/uL Final    Immature Grans (Abs) 12/16/2020 0.04  0.00 - 0.04 K/uL Final    Lymph # 12/16/2020 1.7  1.0 - 4.8 K/uL Final    Mono # 12/16/2020 1.3* 0.3 - 1.0 K/uL Final    Eos # 12/16/2020 0.5  0.0 - 0.5 K/uL Final    Baso # 12/16/2020 0.06  0.00 - 0.20 K/uL Final    nRBC 12/16/2020 0  0 /100 WBC Final    Gran % 12/16/2020 74.4* 38.0 - 73.0 % Final     Lymph % 12/16/2020 12.2* 18.0 - 48.0 % Final    Mono % 12/16/2020 9.4  4.0 - 15.0 % Final    Eosinophil % 12/16/2020 3.3  0.0 - 8.0 % Final    Basophil % 12/16/2020 0.4  0.0 - 1.9 % Final    Differential Method 12/16/2020 Automated   Final    Sodium 12/16/2020 145  136 - 145 mmol/L Final    Potassium 12/16/2020 4.0  3.5 - 5.1 mmol/L Final    Chloride 12/16/2020 104  95 - 110 mmol/L Final    CO2 12/16/2020 25  23 - 29 mmol/L Final    Glucose 12/16/2020 125* 70 - 110 mg/dL Final    BUN 12/16/2020 12  8 - 23 mg/dL Final    Creatinine 12/16/2020 0.8  0.5 - 1.4 mg/dL Final    Calcium 12/16/2020 8.5* 8.7 - 10.5 mg/dL Final    Anion Gap 12/16/2020 16  8 - 16 mmol/L Final    eGFR if African American 12/16/2020 >60  >60 mL/min/1.73 m^2 Final    eGFR if non African American 12/16/2020 >60  >60 mL/min/1.73 m^2 Final    Troponin I 12/16/2020 <0.006  0.000 - 0.026 ng/mL Final    SARS-CoV-2 RNA, Amplification, Qual 12/16/2020 Negative  Negative Final   Lab Visit on 12/11/2020   Component Date Value Ref Range Status    SARS-CoV2 (COVID-19) Qualitative P* 12/11/2020 Not Detected  Not Detected Final   Hospital Outpatient Visit on 11/20/2020   Component Date Value Ref Range Status    POC Creatinine 11/20/2020 1.0  0.5 - 1.4 mg/dL Final    Sample 11/20/2020 VENOUS   Final   Lab Visit on 10/09/2020   Component Date Value Ref Range Status    SARS-CoV2 (COVID-19) Qualitative P* 10/09/2020 Not Detected  Not Detected Final   Admission on 08/13/2020, Discharged on 08/14/2020   Component Date Value Ref Range Status    SARS-CoV-2 RNA, Amplification, Qual 08/13/2020 Negative  Negative Final    WBC 08/13/2020 12.94* 3.90 - 12.70 K/uL Final    RBC 08/13/2020 4.70  4.60 - 6.20 M/uL Final    Hemoglobin 08/13/2020 15.4  14.0 - 18.0 g/dL Final    Hematocrit 08/13/2020 47.5  40.0 - 54.0 % Final    MCV 08/13/2020 101* 82 - 98 fL Final    MCH 08/13/2020 32.8* 27.0 - 31.0 pg Final    MCHC 08/13/2020 32.4  32.0 - 36.0 g/dL  Final    RDW 08/13/2020 13.1  11.5 - 14.5 % Final    Platelets 08/13/2020 271  150 - 350 K/uL Final    MPV 08/13/2020 8.5* 9.2 - 12.9 fL Final    Immature Granulocytes 08/13/2020 0.2  0.0 - 0.5 % Final    Gran # (ANC) 08/13/2020 7.3  1.8 - 7.7 K/uL Final    Immature Grans (Abs) 08/13/2020 0.03  0.00 - 0.04 K/uL Final    Lymph # 08/13/2020 2.8  1.0 - 4.8 K/uL Final    Mono # 08/13/2020 1.3* 0.3 - 1.0 K/uL Final    Eos # 08/13/2020 1.4* 0.0 - 0.5 K/uL Final    Baso # 08/13/2020 0.07  0.00 - 0.20 K/uL Final    nRBC 08/13/2020 0  0 /100 WBC Final    Gran % 08/13/2020 56.8  38.0 - 73.0 % Final    Lymph % 08/13/2020 21.6  18.0 - 48.0 % Final    Mono % 08/13/2020 10.3  4.0 - 15.0 % Final    Eosinophil % 08/13/2020 10.6* 0.0 - 8.0 % Final    Basophil % 08/13/2020 0.5  0.0 - 1.9 % Final    Differential Method 08/13/2020 Automated   Final    Sodium 08/13/2020 142  136 - 145 mmol/L Final    Potassium 08/13/2020 4.1  3.5 - 5.1 mmol/L Final    Chloride 08/13/2020 106  95 - 110 mmol/L Final    CO2 08/13/2020 26  23 - 29 mmol/L Final    Glucose 08/13/2020 113* 70 - 110 mg/dL Final    BUN 08/13/2020 12  8 - 23 mg/dL Final    Creatinine 08/13/2020 1.1  0.5 - 1.4 mg/dL Final    Calcium 08/13/2020 9.0  8.7 - 10.5 mg/dL Final    Total Protein 08/13/2020 7.0  6.0 - 8.4 g/dL Final    Albumin 08/13/2020 3.9  3.5 - 5.2 g/dL Final    Total Bilirubin 08/13/2020 0.2  0.1 - 1.0 mg/dL Final    Alkaline Phosphatase 08/13/2020 76  55 - 135 U/L Final    AST 08/13/2020 27  10 - 40 U/L Final    ALT 08/13/2020 29  10 - 44 U/L Final    Anion Gap 08/13/2020 10  8 - 16 mmol/L Final    eGFR if African American 08/13/2020 >60  >60 mL/min/1.73 m^2 Final    eGFR if non African American 08/13/2020 >60  >60 mL/min/1.73 m^2 Final    Troponin I 08/13/2020 0.012  0.000 - 0.026 ng/mL Final    Troponin I 08/13/2020 0.094* 0.000 - 0.026 ng/mL Final    BNP 08/13/2020 52  0 - 99 pg/mL Final    Lactate (Lactic Acid) 08/13/2020 1.0   0.5 - 2.2 mmol/L Final    Procalcitonin 08/13/2020 0.02  <0.25 ng/mL Final    Sodium 08/13/2020 143  136 - 145 mmol/L Final    Potassium 08/13/2020 4.5  3.5 - 5.1 mmol/L Final    Chloride 08/13/2020 107  95 - 110 mmol/L Final    CO2 08/13/2020 27  23 - 29 mmol/L Final    Glucose 08/13/2020 125* 70 - 110 mg/dL Final    BUN 08/13/2020 13  8 - 23 mg/dL Final    Creatinine 08/13/2020 0.9  0.5 - 1.4 mg/dL Final    Calcium 08/13/2020 8.7  8.7 - 10.5 mg/dL Final    Anion Gap 08/13/2020 9  8 - 16 mmol/L Final    eGFR if African American 08/13/2020 >60  >60 mL/min/1.73 m^2 Final    eGFR if non African American 08/13/2020 >60  >60 mL/min/1.73 m^2 Final    Magnesium 08/13/2020 1.8  1.6 - 2.6 mg/dL Final    Phosphorus 08/13/2020 1.5* 2.7 - 4.5 mg/dL Final    WBC 08/13/2020 12.36  3.90 - 12.70 K/uL Final    RBC 08/13/2020 4.50* 4.60 - 6.20 M/uL Final    Hemoglobin 08/13/2020 14.8  14.0 - 18.0 g/dL Final    Hematocrit 08/13/2020 45.6  40.0 - 54.0 % Final    MCV 08/13/2020 101* 82 - 98 fL Final    MCH 08/13/2020 32.9* 27.0 - 31.0 pg Final    MCHC 08/13/2020 32.5  32.0 - 36.0 g/dL Final    RDW 08/13/2020 13.0  11.5 - 14.5 % Final    Platelets 08/13/2020 244  150 - 350 K/uL Final    MPV 08/13/2020 8.4* 9.2 - 12.9 fL Final    Immature Granulocytes 08/13/2020 0.5  0.0 - 0.5 % Final    Gran # (ANC) 08/13/2020 11.1* 1.8 - 7.7 K/uL Final    Immature Grans (Abs) 08/13/2020 0.06* 0.00 - 0.04 K/uL Final    Lymph # 08/13/2020 0.7* 1.0 - 4.8 K/uL Final    Mono # 08/13/2020 0.2* 0.3 - 1.0 K/uL Final    Eos # 08/13/2020 0.3  0.0 - 0.5 K/uL Final    Baso # 08/13/2020 0.03  0.00 - 0.20 K/uL Final    nRBC 08/13/2020 0  0 /100 WBC Final    Gran % 08/13/2020 89.5* 38.0 - 73.0 % Final    Lymph % 08/13/2020 5.9* 18.0 - 48.0 % Final    Mono % 08/13/2020 1.9* 4.0 - 15.0 % Final    Eosinophil % 08/13/2020 2.0  0.0 - 8.0 % Final    Basophil % 08/13/2020 0.2  0.0 - 1.9 % Final    Differential Method 08/13/2020  Automated   Final    Troponin I 08/13/2020 0.039* 0.000 - 0.026 ng/mL Final    Sodium 08/14/2020 141  136 - 145 mmol/L Final    Potassium 08/14/2020 3.8  3.5 - 5.1 mmol/L Final    Chloride 08/14/2020 104  95 - 110 mmol/L Final    CO2 08/14/2020 27  23 - 29 mmol/L Final    Glucose 08/14/2020 121* 70 - 110 mg/dL Final    BUN 08/14/2020 12  8 - 23 mg/dL Final    Creatinine 08/14/2020 0.7  0.5 - 1.4 mg/dL Final    Calcium 08/14/2020 9.7  8.7 - 10.5 mg/dL Final    Anion Gap 08/14/2020 10  8 - 16 mmol/L Final    eGFR if African American 08/14/2020 >60  >60 mL/min/1.73 m^2 Final    eGFR if non African American 08/14/2020 >60  >60 mL/min/1.73 m^2 Final    Magnesium 08/14/2020 2.1  1.6 - 2.6 mg/dL Final    Phosphorus 08/14/2020 2.8  2.7 - 4.5 mg/dL Final    WBC 08/14/2020 15.77* 3.90 - 12.70 K/uL Final    RBC 08/14/2020 4.36* 4.60 - 6.20 M/uL Final    Hemoglobin 08/14/2020 13.9* 14.0 - 18.0 g/dL Final    Hematocrit 08/14/2020 42.8  40.0 - 54.0 % Final    MCV 08/14/2020 98  82 - 98 fL Final    MCH 08/14/2020 31.9* 27.0 - 31.0 pg Final    MCHC 08/14/2020 32.5  32.0 - 36.0 g/dL Final    RDW 08/14/2020 13.2  11.5 - 14.5 % Final    Platelets 08/14/2020 262  150 - 350 K/uL Final    MPV 08/14/2020 8.6* 9.2 - 12.9 fL Final    Immature Granulocytes 08/14/2020 0.7* 0.0 - 0.5 % Final    Gran # (ANC) 08/14/2020 12.6* 1.8 - 7.7 K/uL Final    Immature Grans (Abs) 08/14/2020 0.11* 0.00 - 0.04 K/uL Final    Lymph # 08/14/2020 1.6  1.0 - 4.8 K/uL Final    Mono # 08/14/2020 1.5* 0.3 - 1.0 K/uL Final    Eos # 08/14/2020 0.0  0.0 - 0.5 K/uL Final    Baso # 08/14/2020 0.02  0.00 - 0.20 K/uL Final    nRBC 08/14/2020 0  0 /100 WBC Final    Gran % 08/14/2020 80.0* 38.0 - 73.0 % Final    Lymph % 08/14/2020 9.9* 18.0 - 48.0 % Final    Mono % 08/14/2020 9.3  4.0 - 15.0 % Final    Eosinophil % 08/14/2020 0.0  0.0 - 8.0 % Final    Basophil % 08/14/2020 0.1  0.0 - 1.9 % Final    Differential Method 08/14/2020  Automated   Final    Specimen UA 08/14/2020 Urine, Clean Catch   Final    Color, UA 08/14/2020 Yellow  Yellow, Straw, Naomi Final    Appearance, UA 08/14/2020 Clear  Clear Final    pH, UA 08/14/2020 7.0  5.0 - 8.0 Final    Specific Gravity, UA 08/14/2020 1.010  1.005 - 1.030 Final    Protein, UA 08/14/2020 Trace* Negative Final    Glucose, UA 08/14/2020 Negative  Negative Final    Ketones, UA 08/14/2020 Negative  Negative Final    Bilirubin (UA) 08/14/2020 Negative  Negative Final    Occult Blood UA 08/14/2020 3+* Negative Final    Nitrite, UA 08/14/2020 Negative  Negative Final    Urobilinogen, UA 08/14/2020 Negative  <2.0 EU/dL Final    Leukocytes, UA 08/14/2020 Trace* Negative Final    RBC, UA 08/14/2020 60* 0 - 4 /hpf Final    WBC, UA 08/14/2020 2  0 - 5 /hpf Final    Bacteria 08/14/2020 Occasional  None-Occ /hpf Final    Squam Epithel, UA 08/14/2020 1  /hpf Final    Microscopic Comment 08/14/2020 SEE COMMENT   Final   Lab Visit on 07/25/2020   Component Date Value Ref Range Status    SARS-CoV2 (COVID-19) Qualitative P* 07/25/2020 Not Detected  Not Detected Final   Office Visit on 07/17/2020   Component Date Value Ref Range Status    Urine Culture, Routine 07/17/2020 No significant growth   Final   Hospital Outpatient Visit on 07/16/2020   Component Date Value Ref Range Status    Interpretation 07/16/2020    Final                    Value:  Moderately severe obstruction. FEV1  54.74 %  predicted.  Airflow is not clearly improved after bronchodilator. Clinical improvement following bronchodilator therapy may occur in the absence of spirometric improvement.   Mild Hyperinflation is noted on lung volume testing.  Moderate reduction in diffusion capacity - unadjusted for hemoglobin.       Post FVC 07/16/2020 3.33  2.92 - 4.83 L Final    Post FEV1 07/16/2020 1.54* 2.21 - 3.78 L Final    Post FEV1 FVC 07/16/2020 46.38* 64.60 - 90.21 % Final    Post FEF 25 75 07/16/2020 0.50* 1.14 - 3.77 L/s  Final    Post PEF 07/16/2020 4.17* 5.91 - 10.05 L/s Final    Post  07/16/2020 11.52  sec Final    Pre DLCO 07/16/2020 14.47* 17.99 - 31.85 ml/(min*mmHg) Final    DLCO ADJ PRE 07/16/2020 14.47* 17.99 - 31.85 ml/(min*mmHg) Final    DLCOVA PRE 07/16/2020 2.70  2.62 - 5.24 ml/(min*mmHg*L) Final    DLVAAdj PRE 07/16/2020 2.70  2.62 - 5.24 ml/(min*mmHg*L) Final    VA PRE 07/16/2020 5.37* 6.19 - 6.19 L Final    IVC PRE 07/16/2020 2.50* 2.92 - 4.83 L Final    TLCN2 PRE 07/16/2020 6.65  5.19 - 7.49 L Final    VCMAXN2 PRE 07/16/2020 3.52  2.92 - 4.83 L Final    Pre FRC N2 07/16/2020 4.12  L Final    ERVN2 PRE 07/16/2020 0.78  -47965.97 - 18824.03 L Final    RVN2 PRE 07/16/2020 3.14* 1.73 - 3.08 L Final    [id number] PRE 07/16/2020 47.13  30.33 - 48.29 % Final    Pre FVC 07/16/2020 3.52  2.92 - 4.83 L Final    Pre FEV1 07/16/2020 1.64* 2.21 - 3.78 L Final    Pre FEV1 FVC 07/16/2020 46.48* 64.60 - 90.21 % Final    Pre FEF 25 75 07/16/2020 0.62* 1.14 - 3.77 L/s Final    Pre PEF 07/16/2020 4.63* 5.91 - 10.05 L/s Final    Pre  07/16/2020 9.10  sec Final    Pre MVV 07/16/2020 54.00* 96.87 - 131.05 L/min Final    FVC Ref 07/16/2020 3.87   Final    FVC LLN 07/16/2020 2.92   Final    FVC Pre Ref 07/16/2020 90.8  % Final    FVC Post Ref 07/16/2020 86.0  % Final    FVC Chg 07/16/2020 -5.3  % Final    FEV1 Ref 07/16/2020 3.00   Final    FEV1 LLN 07/16/2020 2.21   Final    FEV1 Pre Ref 07/16/2020 54.6  % Final    FEV1 Post Ref 07/16/2020 51.6  % Final    FEV1 Chg 07/16/2020 -5.5  % Final    FEV1 FVC Ref 07/16/2020 77   Final    FEV1 FVC N 07/16/2020 65   Final    FEV1 FVC Pre Ref 07/16/2020 60.0  % Final    FEV1 FVC Post Ref 07/16/2020 59.9  % Final    FEV1 FVC Chg 07/16/2020 -0.2  % Final    FEF 25 75 Ref 07/16/2020 2.45   Final    FEF 25 75 N 07/16/2020 1.14   Final    FEF 25 75 Pre Ref 07/16/2020 25.2  % Final    FEF 25 75 Post Ref 07/16/2020 20.3  % Final    FEF 25 75 Chg  07/16/2020 -19.4  % Final    PEF Ref 07/16/2020 7.98   Final    PEF LLN 07/16/2020 5.91   Final    PEF Pre Ref 07/16/2020 58.0  % Final    PEF Post Ref 07/16/2020 52.3  % Final    PEF Chg 07/16/2020 -9.8  % Final    [id number] Chg 07/16/2020 26.6  % Final    MVV Ref 07/16/2020 114   Final    MVV LLN 07/16/2020 97   Final    MVV Pre Ref 07/16/2020 47.4  % Final    TLCN2 Ref 07/16/2020 6.34   Final    TLCN2 LLN 07/16/2020 5.19   Final    TLCN2 Pre Ref 07/16/2020 104.9  % Final    VCMAXN2 Ref 07/16/2020 3.87   Final    VCMAXN2 LLN 07/16/2020 2.92   Final    VCMAXN2 Pre Ref 07/16/2020 90.8  % Final    FRCN2 Ref 07/16/2020 3.43   Final    FRCN2 LLN 07/16/2020 2.44   Final    FRCN2 Pre Ref 07/16/2020 120.2  % Final    ERVN2 Ref 07/16/2020 1.03   Final    ERVN2 LLN 07/16/2020 -38454.97   Final    ERVN2 Pre Ref 07/16/2020 76.2  % Final    RVN2 Ref 07/16/2020 2.40   Final    RVN2 LLN 07/16/2020 1.73   Final    RVN2 Pre Ref 07/16/2020 130.6  % Final    [id number] Ref 07/16/2020 39.31   Final    [id number] LLN 07/16/2020 30.33   Final    [id number] Pre Ref 07/16/2020 119.9  % Final    DLCO Single Breath Ref 07/16/2020 24.92   Final    DLCO Single Breath LLN 07/16/2020 17.99   Final    DLCO Single Breath Pre Ref 07/16/2020 58.1  % Final    DLCOc Single Breath Ref 07/16/2020 24.92   Final    DLCOc Single Breath LLN 07/16/2020 17.99   Final    DLCOc Single Breath Pre Ref 07/16/2020 58.1  % Final    DLCOVA Ref 07/16/2020 3.93   Final    DLCOVA LLN 07/16/2020 2.62   Final    DLCOVA Pre Ref 07/16/2020 68.7  % Final    DLCOc SBVA Ref 07/16/2020 3.93   Final    DLCOc SBVA LLN 07/16/2020 2.62   Final    DLCOc SBVA Pre Ref 07/16/2020 68.7  % Final    VA Single Breath Ref 07/16/2020 6.19   Final    VA Single Breath LLN 07/16/2020 6.19   Final    VA Single Breath Pre Ref 07/16/2020 86.6  % Final    IVC Single Breath Ref 07/16/2020 3.87   Final    IVC Single Breath LLN 07/16/2020 2.92   Final    IVC  Single Breath Pre Ref 07/16/2020 64.5  % Final       Past Medical History:   Diagnosis Date    Benign enlargement of prostate     Had a biopsy in around 2015    Elevated PSA     GERD (gastroesophageal reflux disease)     Hypertension     Started with meds in 2012.    Kidney stone     Urinary tract infection      Social History     Socioeconomic History    Marital status: Significant Other     Spouse name: Not on file    Number of children: Not on file    Years of education: Not on file    Highest education level: Not on file   Occupational History    Not on file   Social Needs    Financial resource strain: Very hard    Food insecurity     Worry: Sometimes true     Inability: Sometimes true    Transportation needs     Medical: No     Non-medical: No   Tobacco Use    Smoking status: Current Every Day Smoker     Packs/day: 1.00     Years: 55.00     Pack years: 55.00     Types: Cigarettes    Smokeless tobacco: Never Used   Substance and Sexual Activity    Alcohol use: Yes     Comment: Previous 12 beer a day for 20 years. Now occ.    Drug use: No    Sexual activity: Not on file   Lifestyle    Physical activity     Days per week: 2 days     Minutes per session: 150+ min    Stress: To some extent   Relationships    Social connections     Talks on phone: More than three times a week     Gets together: Once a week     Attends Restorationism service: Not on file     Active member of club or organization: No     Attends meetings of clubs or organizations: Never     Relationship status:    Other Topics Concern    Not on file   Social History Narrative    Not on file     Past Surgical History:   Procedure Laterality Date    FISTULA REPAIR      LEFT HEART CATHETERIZATION N/A 10/23/2018    Procedure: Left heart cath;  Surgeon: Niharika Squires MD;  Location: [facility] CATH;  Service: Cardiology;  Laterality: N/A;     Family History   Problem Relation Age of Onset    Heart failure Mother     Cancer Father      Heart disease Brother     Heart attack Brother     Cancer Brother        Review of patient's allergies indicates:   Allergen Reactions    Oyster extract Swelling     PT swells in his throat after eating oysters on occasion       Current Outpatient Medications:     albuterol (PROVENTIL/VENTOLIN HFA) 90 mcg/actuation inhaler, Inhale 1-2 puffs into the lungs every 4 (four) hours as needed for Shortness of Breath (coughing). Rescue, Disp: 18 g, Rfl: 11    albuterol-ipratropium (DUO-NEB) 2.5 mg-0.5 mg/3 mL nebulizer solution, Take 3 mLs by nebulization every 4 (four) hours as needed for Wheezing. Rescue, Disp: 180 mL, Rfl: 11    alfuzosin (UROXATRAL) 10 mg Tb24, TAKE 1 TABLET BY MOUTH ONCE DAILY AFTER BREAKFAST, Disp: , Rfl:     ALPRAZolam (XANAX) 0.5 MG tablet, Take 0.5 mg by mouth 3 (three) times daily as needed for Anxiety., Disp: , Rfl:     aspirin (ECOTRIN) 81 MG EC tablet, Take 1 tablet (81 mg total) by mouth once daily., Disp: , Rfl: 0    azelastine (ASTELIN) 137 mcg (0.1 %) nasal spray, 1 spray (137 mcg total) by Nasal route 2 (two) times daily., Disp: 30 mL, Rfl: 2    azithromycin (Z-JOLYNN) 250 MG tablet, Take 2 tablets by mouth on day 1; Take 1 tablet by mouth on days 2-5, Disp: 6 tablet, Rfl: 0    budesonide-glycopyr-formoterol (BREZTRI AEROSPHERE) 160-9-4.8 mcg/actuation HFAA, Inhale 2 puffs into the lungs 2 (two) times a day., Disp: 10.7 g, Rfl: 11    escitalopram oxalate (LEXAPRO) 20 MG tablet, Take 0.5 tablets (10 mg total) by mouth once daily., Disp: 90 tablet, Rfl: 1    esomeprazole (NEXIUM) 20 MG capsule, Take 1 capsule (20 mg total) by mouth before breakfast., Disp: 90 capsule, Rfl: 3    ezetimibe (ZETIA) 10 mg tablet, Take 10 mg by mouth once daily., Disp: , Rfl:     finasteride (PROSCAR) 5 mg tablet, Take 5 mg by mouth once daily., Disp: , Rfl:     HYDROcodone-acetaminophen (NORCO) 7.5-325 mg per tablet, TAKE 1 TABLET BY MOUTH EVERY 6 HOURS AS NEEDED FOR SEVERE PAIN, Disp: , Rfl:  "    LIVALO 4 mg Tab, Take 1 tablet by mouth once daily., Disp: , Rfl:     losartan (COZAAR) 100 MG tablet, Take 1 tablet (100 mg total) by mouth once daily., Disp: 90 tablet, Rfl: 1    predniSONE (DELTASONE) 20 MG tablet, Take 2 tablets (40 mg total) by mouth once daily for 5 days, THEN 1 tablet (20 mg total) once daily for 5 days., Disp: 15 tablet, Rfl: 0    TRELEGY ELLIPTA 100-62.5-25 mcg DsDv, , Disp: , Rfl:     umeclidinium-vilanteroL (ANORO ELLIPTA) 62.5-25 mcg/actuation DsDv, Inhale 1 puff into the lungs once daily. Controller, Disp: 1 each, Rfl: 11    valACYclovir (VALTREX) 1000 MG tablet, Take 1 g by mouth 2 (two) times daily., Disp: , Rfl:     Review of Systems   Constitutional: Negative for appetite change, fatigue, fever and unexpected weight change.   Respiratory: Positive for cough. Negative for chest tightness, shortness of breath and wheezing.    Cardiovascular: Negative for chest pain and leg swelling.   Gastrointestinal: Negative for abdominal pain, constipation, nausea and vomiting.        -heartburn   Genitourinary: Negative for decreased urine volume, difficulty urinating, dysuria and frequency.   Musculoskeletal: Negative for arthralgias, back pain, myalgias and neck pain.   Skin: Negative for rash.   Neurological: Negative for dizziness, weakness, numbness and headaches.   Hematological: Does not bruise/bleed easily.   Psychiatric/Behavioral: Negative for dysphoric mood, sleep disturbance and suicidal ideas. The patient is not nervous/anxious.    All other systems reviewed and are negative.         Objective:      Vitals:    12/22/20 1449   BP: 120/78   Pulse: 76   Weight: 77.6 kg (171 lb)   Height: 5' 6" (1.676 m)     Physical Exam  Vitals signs reviewed.   Constitutional:       General: He is not in acute distress.     Appearance: Normal appearance. He is well-developed and overweight.   HENT:      Head: Normocephalic and atraumatic.   Neck:      Musculoskeletal: Neck supple.      " Thyroid: No thyromegaly.   Cardiovascular:      Rate and Rhythm: Normal rate and regular rhythm.      Heart sounds: Normal heart sounds. No murmur. No friction rub.   Pulmonary:      Effort: Pulmonary effort is normal.      Breath sounds: Normal breath sounds. No wheezing or rales.   Abdominal:      General: Bowel sounds are normal. There is no distension.      Palpations: Abdomen is soft.      Tenderness: There is no abdominal tenderness.   Lymphadenopathy:      Cervical: No cervical adenopathy.   Skin:     General: Skin is warm and dry.      Findings: No rash.   Neurological:      Mental Status: He is alert and oriented to person, place, and time.   Psychiatric:         Attention and Perception: He is attentive.         Speech: Speech normal.         Behavior: Behavior normal.         Thought Content: Thought content normal.         Judgment: Judgment normal.           Assessment:       1. Essential hypertension    2. Generalized anxiety disorder    3. Gastroesophageal reflux disease without esophagitis    4. COPD exacerbation    5. Malignant neoplasm of lateral wall of urinary bladder    6. Tobacco dependence    7. Long-term use of high-risk medication    8. Screening for blood or protein in urine    9. Screening for ischemic heart disease (IHD)    10. Screening for prostate cancer    11. Screening for endocrine disorder         Plan:       Essential hypertension  Comments:  Controlled. Will continue to monitor BP on current medication regimen  Orders:  -     Comprehensive Metabolic Panel; Future; Expected date: 12/22/2020  -     Lipid Panel; Future; Expected date: 12/22/2020  -     Microalbumin/Creatinine Ratio, Urine; Future; Expected date: 12/22/2020  -     Urinalysis; Future; Expected date: 12/22/2020  -     CBC Auto Differential; Future; Expected date: 12/22/2020  -     TSH w/reflex to FT4; Future; Expected date: 12/22/2020  -     PSA, Screening; Future; Expected date: 12/22/2020    Generalized anxiety  disorder  Comments:  Controlled. Will continue to monitor symptoms    Gastroesophageal reflux disease without esophagitis  Comments:  Controlled. Will continue to monitor symptoms on nexium    COPD exacerbation  Comments:  Acute. Will give longer steroid taper with zpack. To continue QID nebs at home  Orders:  -     predniSONE (DELTASONE) 20 MG tablet; Take 2 tablets (40 mg total) by mouth once daily for 5 days, THEN 1 tablet (20 mg total) once daily for 5 days.  Dispense: 15 tablet; Refill: 0  -     azithromycin (Z-JOLYNN) 250 MG tablet; Take 2 tablets by mouth on day 1; Take 1 tablet by mouth on days 2-5  Dispense: 6 tablet; Refill: 0    Malignant neoplasm of lateral wall of urinary bladder  Comments:  Active. To continue to follow treatment plan with Oncology    Tobacco dependence  Comments:  Pt strongly encouraged to stop smoking ASAP. Has plans to stop with Smoking cessation program    Long-term use of high-risk medication  -     Comprehensive Metabolic Panel; Future; Expected date: 12/22/2020  -     Lipid Panel; Future; Expected date: 12/22/2020  -     Microalbumin/Creatinine Ratio, Urine; Future; Expected date: 12/22/2020  -     Urinalysis; Future; Expected date: 12/22/2020  -     CBC Auto Differential; Future; Expected date: 12/22/2020  -     TSH w/reflex to FT4; Future; Expected date: 12/22/2020  -     PSA, Screening; Future; Expected date: 12/22/2020    Screening for blood or protein in urine  -     Microalbumin/Creatinine Ratio, Urine; Future; Expected date: 12/22/2020  -     Urinalysis; Future; Expected date: 12/22/2020    Screening for ischemic heart disease (IHD)  -     Comprehensive Metabolic Panel; Future; Expected date: 12/22/2020  -     Lipid Panel; Future; Expected date: 12/22/2020    Screening for prostate cancer  -     PSA, Screening; Future; Expected date: 12/22/2020    Screening for endocrine disorder  -     TSH w/reflex to FT4; Future; Expected date: 12/22/2020    Labs have been ordered for  monitoring of chronic conditions, just before next visit.    Follow up in about 3 months (around 3/22/2021) for HTN, Anxiety, GERD, COPD, LABS.        12/22/2020 Zo Burrell

## 2021-01-27 ENCOUNTER — CLINICAL SUPPORT (OUTPATIENT)
Dept: SMOKING CESSATION | Facility: CLINIC | Age: 66
End: 2021-01-27
Payer: COMMERCIAL

## 2021-01-27 DIAGNOSIS — F17.200 NICOTINE DEPENDENCE: Primary | ICD-10-CM

## 2021-01-27 PROCEDURE — 99404 PREV MED CNSL INDIV APPRX 60: CPT | Mod: S$GLB,,,

## 2021-01-27 PROCEDURE — 99404 PR PREVENT COUNSEL,INDIV,60 MIN: ICD-10-PCS | Mod: S$GLB,,,

## 2021-01-27 RX ORDER — DM/P-EPHED/ACETAMINOPH/DOXYLAM 30-7.5/3
2 LIQUID (ML) ORAL
Qty: 144 LOZENGE | Refills: 0 | Status: SHIPPED | OUTPATIENT
Start: 2021-01-27 | End: 2021-09-22

## 2021-01-27 RX ORDER — IBUPROFEN 200 MG
1 TABLET ORAL DAILY
Qty: 14 PATCH | Refills: 0 | Status: SHIPPED | OUTPATIENT
Start: 2021-01-27 | End: 2021-02-10 | Stop reason: SDUPTHER

## 2021-02-10 ENCOUNTER — CLINICAL SUPPORT (OUTPATIENT)
Dept: SMOKING CESSATION | Facility: CLINIC | Age: 66
End: 2021-02-10
Payer: COMMERCIAL

## 2021-02-10 DIAGNOSIS — F17.200 NICOTINE DEPENDENCE: Primary | ICD-10-CM

## 2021-02-10 PROCEDURE — 99403 PREV MED CNSL INDIV APPRX 45: CPT | Mod: S$GLB,,,

## 2021-02-10 PROCEDURE — 99403 PR PREVENT COUNSEL,INDIV,45 MIN: ICD-10-PCS | Mod: S$GLB,,,

## 2021-02-10 RX ORDER — IBUPROFEN 200 MG
1 TABLET ORAL DAILY
Qty: 28 PATCH | Refills: 0 | Status: SHIPPED | OUTPATIENT
Start: 2021-02-10 | End: 2021-02-24 | Stop reason: SDUPTHER

## 2021-02-24 ENCOUNTER — CLINICAL SUPPORT (OUTPATIENT)
Dept: SMOKING CESSATION | Facility: CLINIC | Age: 66
End: 2021-02-24
Payer: COMMERCIAL

## 2021-02-24 DIAGNOSIS — F17.200 NICOTINE DEPENDENCE: Primary | ICD-10-CM

## 2021-02-24 PROCEDURE — 99402 PR PREVENT COUNSEL,INDIV,30 MIN: ICD-10-PCS | Mod: S$GLB,,,

## 2021-02-24 PROCEDURE — 99402 PREV MED CNSL INDIV APPRX 30: CPT | Mod: S$GLB,,,

## 2021-02-24 RX ORDER — IBUPROFEN 200 MG
1 TABLET ORAL DAILY
Qty: 28 PATCH | Refills: 0 | Status: SHIPPED | OUTPATIENT
Start: 2021-02-24 | End: 2021-03-24 | Stop reason: SDUPTHER

## 2021-03-09 ENCOUNTER — TELEPHONE (OUTPATIENT)
Dept: FAMILY MEDICINE | Facility: CLINIC | Age: 66
End: 2021-03-09

## 2021-03-09 ENCOUNTER — PATIENT MESSAGE (OUTPATIENT)
Dept: FAMILY MEDICINE | Facility: CLINIC | Age: 66
End: 2021-03-09

## 2021-03-10 ENCOUNTER — CLINICAL SUPPORT (OUTPATIENT)
Dept: SMOKING CESSATION | Facility: CLINIC | Age: 66
End: 2021-03-10
Payer: COMMERCIAL

## 2021-03-10 DIAGNOSIS — F17.200 NICOTINE DEPENDENCE: Primary | ICD-10-CM

## 2021-03-10 PROCEDURE — 99402 PREV MED CNSL INDIV APPRX 30: CPT | Mod: S$GLB,,,

## 2021-03-10 PROCEDURE — 99402 PR PREVENT COUNSEL,INDIV,30 MIN: ICD-10-PCS | Mod: S$GLB,,,

## 2021-03-22 ENCOUNTER — OFFICE VISIT (OUTPATIENT)
Dept: FAMILY MEDICINE | Facility: CLINIC | Age: 66
End: 2021-03-22
Payer: MEDICARE

## 2021-03-22 VITALS
SYSTOLIC BLOOD PRESSURE: 132 MMHG | WEIGHT: 176 LBS | HEIGHT: 66 IN | HEART RATE: 72 BPM | DIASTOLIC BLOOD PRESSURE: 76 MMHG | BODY MASS INDEX: 28.28 KG/M2

## 2021-03-22 DIAGNOSIS — Z71.6 ENCOUNTER FOR SMOKING CESSATION COUNSELING: ICD-10-CM

## 2021-03-22 DIAGNOSIS — K21.9 GASTROESOPHAGEAL REFLUX DISEASE WITHOUT ESOPHAGITIS: ICD-10-CM

## 2021-03-22 DIAGNOSIS — C67.2 MALIGNANT NEOPLASM OF LATERAL WALL OF URINARY BLADDER: ICD-10-CM

## 2021-03-22 DIAGNOSIS — M54.50 CHRONIC LOW BACK PAIN, UNSPECIFIED BACK PAIN LATERALITY, UNSPECIFIED WHETHER SCIATICA PRESENT: ICD-10-CM

## 2021-03-22 DIAGNOSIS — F41.1 GENERALIZED ANXIETY DISORDER: ICD-10-CM

## 2021-03-22 DIAGNOSIS — I10 ESSENTIAL HYPERTENSION: Primary | ICD-10-CM

## 2021-03-22 DIAGNOSIS — Z11.59 NEED FOR HEPATITIS C SCREENING TEST: ICD-10-CM

## 2021-03-22 DIAGNOSIS — R10.11 RIGHT UPPER QUADRANT ABDOMINAL PAIN: ICD-10-CM

## 2021-03-22 DIAGNOSIS — G89.29 CHRONIC LOW BACK PAIN, UNSPECIFIED BACK PAIN LATERALITY, UNSPECIFIED WHETHER SCIATICA PRESENT: ICD-10-CM

## 2021-03-22 PROCEDURE — 99214 PR OFFICE/OUTPT VISIT, EST, LEVL IV, 30-39 MIN: ICD-10-PCS | Mod: S$GLB,,, | Performed by: FAMILY MEDICINE

## 2021-03-22 PROCEDURE — 99214 OFFICE O/P EST MOD 30 MIN: CPT | Mod: S$GLB,,, | Performed by: FAMILY MEDICINE

## 2021-03-22 RX ORDER — METHYLPREDNISOLONE 4 MG/1
TABLET ORAL
Qty: 1 PACKAGE | Refills: 0 | Status: SHIPPED | OUTPATIENT
Start: 2021-03-22 | End: 2021-04-12

## 2021-03-23 LAB
HCV AB S/CO SERPL IA: 0.01
HCV AB SERPL QL IA: NORMAL

## 2021-03-24 ENCOUNTER — CLINICAL SUPPORT (OUTPATIENT)
Dept: SMOKING CESSATION | Facility: CLINIC | Age: 66
End: 2021-03-24
Payer: COMMERCIAL

## 2021-03-24 DIAGNOSIS — F17.200 NICOTINE DEPENDENCE: Primary | ICD-10-CM

## 2021-03-24 LAB
ALBUMIN SERPL-MCNC: 4.3 G/DL (ref 3.6–5.1)
ALBUMIN/CREAT UR: 4 MCG/MG CREAT
ALBUMIN/GLOB SERPL: 1.8 (CALC) (ref 1–2.5)
ALP SERPL-CCNC: 96 U/L (ref 35–144)
ALT SERPL-CCNC: 18 U/L (ref 9–46)
APPEARANCE UR: CLEAR
AST SERPL-CCNC: 21 U/L (ref 10–35)
BASOPHILS # BLD AUTO: 31 CELLS/UL (ref 0–200)
BASOPHILS NFR BLD AUTO: 0.5 %
BILIRUB SERPL-MCNC: 0.7 MG/DL (ref 0.2–1.2)
BILIRUB UR QL STRIP: NEGATIVE
BUN SERPL-MCNC: 18 MG/DL (ref 7–25)
BUN/CREAT SERPL: NORMAL (CALC) (ref 6–22)
CALCIUM SERPL-MCNC: 9.5 MG/DL (ref 8.6–10.3)
CHLORIDE SERPL-SCNC: 101 MMOL/L (ref 98–110)
CHOLEST SERPL-MCNC: 170 MG/DL
CHOLEST/HDLC SERPL: 3.1 (CALC)
CO2 SERPL-SCNC: 30 MMOL/L (ref 20–32)
COLOR UR: YELLOW
CREAT SERPL-MCNC: 0.86 MG/DL (ref 0.7–1.25)
CREAT UR-MCNC: 108 MG/DL (ref 20–320)
EOSINOPHIL # BLD AUTO: 122 CELLS/UL (ref 15–500)
EOSINOPHIL NFR BLD AUTO: 2 %
ERYTHROCYTE [DISTWIDTH] IN BLOOD BY AUTOMATED COUNT: 13.2 % (ref 11–15)
GFRSERPLBLD MDRD-ARVRAT: 91 ML/MIN/1.73M2
GLOBULIN SER CALC-MCNC: 2.4 G/DL (CALC) (ref 1.9–3.7)
GLUCOSE SERPL-MCNC: 98 MG/DL (ref 65–99)
GLUCOSE UR QL STRIP: NEGATIVE
HCT VFR BLD AUTO: 46.6 % (ref 38.5–50)
HDLC SERPL-MCNC: 54 MG/DL
HGB BLD-MCNC: 15.6 G/DL (ref 13.2–17.1)
HGB UR QL STRIP: NEGATIVE
KETONES UR QL STRIP: ABNORMAL
LDLC SERPL CALC-MCNC: 101 MG/DL (CALC)
LEUKOCYTE ESTERASE UR QL STRIP: ABNORMAL
LYMPHOCYTES # BLD AUTO: 1604 CELLS/UL (ref 850–3900)
LYMPHOCYTES NFR BLD AUTO: 26.3 %
MCH RBC QN AUTO: 30.9 PG (ref 27–33)
MCHC RBC AUTO-ENTMCNC: 33.5 G/DL (ref 32–36)
MCV RBC AUTO: 92.3 FL (ref 80–100)
MICROALBUMIN UR-MCNC: 0.4 MG/DL
MONOCYTES # BLD AUTO: 665 CELLS/UL (ref 200–950)
MONOCYTES NFR BLD AUTO: 10.9 %
NEUTROPHILS # BLD AUTO: 3678 CELLS/UL (ref 1500–7800)
NEUTROPHILS NFR BLD AUTO: 60.3 %
NITRITE UR QL STRIP: NEGATIVE
NONHDLC SERPL-MCNC: 116 MG/DL (CALC)
PH UR STRIP: 6 [PH] (ref 5–8)
PLATELET # BLD AUTO: 281 THOUSAND/UL (ref 140–400)
PMV BLD REES-ECKER: 8.7 FL (ref 7.5–12.5)
POTASSIUM SERPL-SCNC: 4.4 MMOL/L (ref 3.5–5.3)
PROT SERPL-MCNC: 6.7 G/DL (ref 6.1–8.1)
PROT UR QL STRIP: NEGATIVE
PSA SERPL-MCNC: 3.4 NG/ML
RBC # BLD AUTO: 5.05 MILLION/UL (ref 4.2–5.8)
SODIUM SERPL-SCNC: 139 MMOL/L (ref 135–146)
SP GR UR STRIP: 1.01 (ref 1–1.03)
T4 FREE SERPL-MCNC: 1.1 NG/DL (ref 0.8–1.8)
TRIGL SERPL-MCNC: 61 MG/DL
TSH SERPL-ACNC: 0.37 MIU/L (ref 0.4–4.5)
WBC # BLD AUTO: 6.1 THOUSAND/UL (ref 3.8–10.8)

## 2021-03-24 PROCEDURE — 99403 PR PREVENT COUNSEL,INDIV,45 MIN: ICD-10-PCS | Mod: S$GLB,,,

## 2021-03-24 PROCEDURE — 99403 PREV MED CNSL INDIV APPRX 45: CPT | Mod: S$GLB,,,

## 2021-03-24 RX ORDER — IBUPROFEN 200 MG
1 TABLET ORAL DAILY
Qty: 28 PATCH | Refills: 0 | Status: SHIPPED | OUTPATIENT
Start: 2021-03-24 | End: 2021-05-12 | Stop reason: SDUPTHER

## 2021-03-31 DIAGNOSIS — R10.30 PAIN IN THE GROIN: Primary | ICD-10-CM

## 2021-03-31 DIAGNOSIS — K62.89 CHRONIC IDIOPATHIC ANAL PAIN: ICD-10-CM

## 2021-03-31 DIAGNOSIS — G89.29 CHRONIC IDIOPATHIC ANAL PAIN: ICD-10-CM

## 2021-03-31 DIAGNOSIS — Z85.51 PERSONAL HISTORY OF MALIGNANT NEOPLASM OF BLADDER: ICD-10-CM

## 2021-04-07 ENCOUNTER — CLINICAL SUPPORT (OUTPATIENT)
Dept: SMOKING CESSATION | Facility: CLINIC | Age: 66
End: 2021-04-07
Payer: COMMERCIAL

## 2021-04-07 DIAGNOSIS — F17.200 NICOTINE DEPENDENCE: Primary | ICD-10-CM

## 2021-04-07 PROCEDURE — 99402 PREV MED CNSL INDIV APPRX 30: CPT | Mod: S$GLB,,,

## 2021-04-07 PROCEDURE — 99402 PR PREVENT COUNSEL,INDIV,30 MIN: ICD-10-PCS | Mod: S$GLB,,,

## 2021-04-15 ENCOUNTER — HOSPITAL ENCOUNTER (OUTPATIENT)
Dept: RADIOLOGY | Facility: HOSPITAL | Age: 66
Discharge: HOME | End: 2021-04-15
Attending: FAMILY MEDICINE
Payer: MEDICARE

## 2021-04-15 ENCOUNTER — HOSPITAL ENCOUNTER (OUTPATIENT)
Dept: RADIOLOGY | Facility: HOSPITAL | Age: 66
Discharge: HOME | End: 2021-04-15
Attending: UROLOGY
Payer: MEDICARE

## 2021-04-15 DIAGNOSIS — G89.29 CHRONIC IDIOPATHIC ANAL PAIN: ICD-10-CM

## 2021-04-15 DIAGNOSIS — Z85.51 PERSONAL HISTORY OF MALIGNANT NEOPLASM OF BLADDER: ICD-10-CM

## 2021-04-15 DIAGNOSIS — R10.30 PAIN IN THE GROIN: ICD-10-CM

## 2021-04-15 DIAGNOSIS — K62.89 CHRONIC IDIOPATHIC ANAL PAIN: ICD-10-CM

## 2021-04-15 DIAGNOSIS — R10.11 RIGHT UPPER QUADRANT ABDOMINAL PAIN: ICD-10-CM

## 2021-04-15 PROCEDURE — 76700 US EXAM ABDOM COMPLETE: CPT | Mod: TC,PO

## 2021-04-20 ENCOUNTER — TELEPHONE (OUTPATIENT)
Dept: SMOKING CESSATION | Facility: CLINIC | Age: 66
End: 2021-04-20

## 2021-05-06 ENCOUNTER — PATIENT MESSAGE (OUTPATIENT)
Dept: RESEARCH | Facility: HOSPITAL | Age: 66
End: 2021-05-06

## 2021-05-12 ENCOUNTER — CLINICAL SUPPORT (OUTPATIENT)
Dept: SMOKING CESSATION | Facility: CLINIC | Age: 66
End: 2021-05-12
Payer: COMMERCIAL

## 2021-05-12 ENCOUNTER — TELEPHONE (OUTPATIENT)
Dept: SMOKING CESSATION | Facility: CLINIC | Age: 66
End: 2021-05-12

## 2021-05-12 DIAGNOSIS — F17.200 NICOTINE DEPENDENCE: Primary | ICD-10-CM

## 2021-05-12 PROCEDURE — 99403 PREV MED CNSL INDIV APPRX 45: CPT | Mod: S$GLB,,,

## 2021-05-12 PROCEDURE — 99403 PR PREVENT COUNSEL,INDIV,45 MIN: ICD-10-PCS | Mod: S$GLB,,,

## 2021-05-12 RX ORDER — IBUPROFEN 200 MG
1 TABLET ORAL DAILY
Qty: 28 PATCH | Refills: 0 | Status: SHIPPED | OUTPATIENT
Start: 2021-05-12 | End: 2021-06-09 | Stop reason: SDUPTHER

## 2021-05-20 ENCOUNTER — HOSPITAL ENCOUNTER (OUTPATIENT)
Dept: RADIOLOGY | Facility: HOSPITAL | Age: 66
Discharge: HOME | End: 2021-05-20
Attending: INTERNAL MEDICINE
Payer: MEDICARE

## 2021-05-20 DIAGNOSIS — R91.1 SOLITARY PULMONARY NODULE: ICD-10-CM

## 2021-05-20 PROCEDURE — 71250 CT THORAX DX C-: CPT | Mod: 26,,, | Performed by: RADIOLOGY

## 2021-05-20 PROCEDURE — 71250 CT THORAX DX C-: CPT | Mod: TC

## 2021-05-20 PROCEDURE — 71250 CT CHEST WITHOUT CONTRAST: ICD-10-PCS | Mod: 26,,, | Performed by: RADIOLOGY

## 2021-05-24 ENCOUNTER — OFFICE VISIT (OUTPATIENT)
Dept: PULMONOLOGY | Facility: CLINIC | Age: 66
End: 2021-05-24
Payer: MEDICARE

## 2021-05-24 VITALS
HEART RATE: 73 BPM | BODY MASS INDEX: 28.77 KG/M2 | OXYGEN SATURATION: 95 % | SYSTOLIC BLOOD PRESSURE: 140 MMHG | DIASTOLIC BLOOD PRESSURE: 78 MMHG | WEIGHT: 179 LBS | HEIGHT: 66 IN

## 2021-05-24 DIAGNOSIS — J44.9 CHRONIC OBSTRUCTIVE PULMONARY DISEASE, UNSPECIFIED COPD TYPE: Primary | ICD-10-CM

## 2021-05-24 DIAGNOSIS — J43.9 PULMONARY EMPHYSEMA, UNSPECIFIED EMPHYSEMA TYPE: ICD-10-CM

## 2021-05-24 DIAGNOSIS — R91.1 LUNG NODULE: ICD-10-CM

## 2021-05-24 PROCEDURE — 99214 PR OFFICE/OUTPT VISIT, EST, LEVL IV, 30-39 MIN: ICD-10-PCS | Mod: S$PBB,,, | Performed by: NURSE PRACTITIONER

## 2021-05-24 PROCEDURE — 99999 PR PBB SHADOW E&M-EST. PATIENT-LVL IV: CPT | Mod: PBBFAC,,, | Performed by: NURSE PRACTITIONER

## 2021-05-24 PROCEDURE — 99999 PR PBB SHADOW E&M-EST. PATIENT-LVL IV: ICD-10-PCS | Mod: PBBFAC,,, | Performed by: NURSE PRACTITIONER

## 2021-05-24 PROCEDURE — 99214 OFFICE O/P EST MOD 30 MIN: CPT | Mod: S$PBB,,, | Performed by: NURSE PRACTITIONER

## 2021-05-24 PROCEDURE — 99214 OFFICE O/P EST MOD 30 MIN: CPT | Mod: PBBFAC,PO | Performed by: NURSE PRACTITIONER

## 2021-05-25 ENCOUNTER — TELEPHONE (OUTPATIENT)
Dept: PULMONOLOGY | Facility: CLINIC | Age: 66
End: 2021-05-25

## 2021-05-26 ENCOUNTER — CLINICAL SUPPORT (OUTPATIENT)
Dept: SMOKING CESSATION | Facility: CLINIC | Age: 66
End: 2021-05-26
Payer: COMMERCIAL

## 2021-05-26 DIAGNOSIS — F17.200 NICOTINE DEPENDENCE: Primary | ICD-10-CM

## 2021-05-26 PROCEDURE — 99403 PR PREVENT COUNSEL,INDIV,45 MIN: ICD-10-PCS | Mod: S$GLB,,,

## 2021-05-26 PROCEDURE — 99403 PREV MED CNSL INDIV APPRX 45: CPT | Mod: S$GLB,,,

## 2021-06-07 ENCOUNTER — TELEPHONE (OUTPATIENT)
Dept: PULMONOLOGY | Facility: CLINIC | Age: 66
End: 2021-06-07

## 2021-06-07 DIAGNOSIS — C67.2 MALIGNANT NEOPLASM OF LATERAL WALL OF URINARY BLADDER: Primary | ICD-10-CM

## 2021-06-09 ENCOUNTER — CLINICAL SUPPORT (OUTPATIENT)
Dept: SMOKING CESSATION | Facility: CLINIC | Age: 66
End: 2021-06-09
Payer: COMMERCIAL

## 2021-06-09 DIAGNOSIS — F17.200 NICOTINE DEPENDENCE: Primary | ICD-10-CM

## 2021-06-09 PROCEDURE — 99403 PR PREVENT COUNSEL,INDIV,45 MIN: ICD-10-PCS | Mod: S$GLB,,,

## 2021-06-09 PROCEDURE — 99403 PREV MED CNSL INDIV APPRX 45: CPT | Mod: S$GLB,,,

## 2021-06-09 RX ORDER — IBUPROFEN 200 MG
1 TABLET ORAL DAILY
Qty: 28 PATCH | Refills: 0 | Status: SHIPPED | OUTPATIENT
Start: 2021-06-09 | End: 2022-09-14

## 2021-07-13 DIAGNOSIS — J44.9 CHRONIC OBSTRUCTIVE PULMONARY DISEASE, UNSPECIFIED COPD TYPE: ICD-10-CM

## 2021-07-13 DIAGNOSIS — J43.9 PULMONARY EMPHYSEMA, UNSPECIFIED EMPHYSEMA TYPE: ICD-10-CM

## 2021-08-19 ENCOUNTER — TELEPHONE (OUTPATIENT)
Dept: SMOKING CESSATION | Facility: CLINIC | Age: 66
End: 2021-08-19

## 2021-09-01 ENCOUNTER — TELEPHONE (OUTPATIENT)
Dept: SMOKING CESSATION | Facility: CLINIC | Age: 66
End: 2021-09-01

## 2021-09-13 RX ORDER — METHYLPREDNISOLONE 4 MG/1
TABLET ORAL
Qty: 1 PACKAGE | Refills: 0 | Status: SHIPPED | OUTPATIENT
Start: 2021-09-13 | End: 2021-09-22

## 2021-09-15 ENCOUNTER — TELEPHONE (OUTPATIENT)
Dept: SMOKING CESSATION | Facility: CLINIC | Age: 66
End: 2021-09-15

## 2021-09-22 ENCOUNTER — OFFICE VISIT (OUTPATIENT)
Dept: FAMILY MEDICINE | Facility: CLINIC | Age: 66
End: 2021-09-22
Payer: MEDICARE

## 2021-09-22 VITALS
WEIGHT: 168 LBS | SYSTOLIC BLOOD PRESSURE: 138 MMHG | OXYGEN SATURATION: 95 % | HEART RATE: 80 BPM | HEIGHT: 66 IN | BODY MASS INDEX: 27 KG/M2 | DIASTOLIC BLOOD PRESSURE: 80 MMHG

## 2021-09-22 DIAGNOSIS — Z79.899 LONG-TERM USE OF HIGH-RISK MEDICATION: ICD-10-CM

## 2021-09-22 DIAGNOSIS — J44.1 CHRONIC OBSTRUCTIVE PULMONARY DISEASE WITH ACUTE EXACERBATION: ICD-10-CM

## 2021-09-22 DIAGNOSIS — Z13.29 SCREENING FOR ENDOCRINE DISORDER: ICD-10-CM

## 2021-09-22 DIAGNOSIS — K21.9 GASTROESOPHAGEAL REFLUX DISEASE WITHOUT ESOPHAGITIS: ICD-10-CM

## 2021-09-22 DIAGNOSIS — Z13.6 SCREENING FOR ISCHEMIC HEART DISEASE (IHD): ICD-10-CM

## 2021-09-22 DIAGNOSIS — Z12.11 SCREENING FOR COLON CANCER: ICD-10-CM

## 2021-09-22 DIAGNOSIS — F41.1 GENERALIZED ANXIETY DISORDER: ICD-10-CM

## 2021-09-22 DIAGNOSIS — F17.200 TOBACCO DEPENDENCE: ICD-10-CM

## 2021-09-22 DIAGNOSIS — I10 ESSENTIAL HYPERTENSION: Primary | ICD-10-CM

## 2021-09-22 DIAGNOSIS — Z13.89 SCREENING FOR BLOOD OR PROTEIN IN URINE: ICD-10-CM

## 2021-09-22 PROCEDURE — 99214 OFFICE O/P EST MOD 30 MIN: CPT | Mod: S$GLB,,, | Performed by: FAMILY MEDICINE

## 2021-09-22 PROCEDURE — 99214 PR OFFICE/OUTPT VISIT, EST, LEVL IV, 30-39 MIN: ICD-10-PCS | Mod: S$GLB,,, | Performed by: FAMILY MEDICINE

## 2021-09-22 RX ORDER — HYDROGEN PEROXIDE 3 %
20 SOLUTION, NON-ORAL MISCELLANEOUS
Qty: 90 CAPSULE | Refills: 1 | Status: SHIPPED | OUTPATIENT
Start: 2021-09-22 | End: 2022-03-14 | Stop reason: DRUGHIGH

## 2021-10-04 ENCOUNTER — OFFICE VISIT (OUTPATIENT)
Dept: PULMONOLOGY | Facility: CLINIC | Age: 66
End: 2021-10-04
Payer: MEDICARE

## 2021-10-04 ENCOUNTER — LAB VISIT (OUTPATIENT)
Dept: LAB | Facility: HOSPITAL | Age: 66
End: 2021-10-04
Attending: NURSE PRACTITIONER
Payer: MEDICARE

## 2021-10-04 VITALS
DIASTOLIC BLOOD PRESSURE: 101 MMHG | SYSTOLIC BLOOD PRESSURE: 164 MMHG | HEIGHT: 66 IN | WEIGHT: 170.19 LBS | OXYGEN SATURATION: 95 % | TEMPERATURE: 98 F | BODY MASS INDEX: 27.35 KG/M2 | HEART RATE: 79 BPM

## 2021-10-04 DIAGNOSIS — R48.1 LOSS OF PERCEPTION FOR TASTE: ICD-10-CM

## 2021-10-04 DIAGNOSIS — R48.1 LOSS OF PERCEPTION FOR TASTE: Primary | ICD-10-CM

## 2021-10-04 DIAGNOSIS — J43.9 PULMONARY EMPHYSEMA, UNSPECIFIED EMPHYSEMA TYPE: ICD-10-CM

## 2021-10-04 DIAGNOSIS — G47.30 SLEEP APNEA, UNSPECIFIED TYPE: ICD-10-CM

## 2021-10-04 PROCEDURE — 86769 SARS-COV-2 COVID-19 ANTIBODY: CPT | Performed by: NURSE PRACTITIONER

## 2021-10-04 PROCEDURE — 99999 PR PBB SHADOW E&M-EST. PATIENT-LVL IV: CPT | Mod: PBBFAC,,, | Performed by: NURSE PRACTITIONER

## 2021-10-04 PROCEDURE — 99214 OFFICE O/P EST MOD 30 MIN: CPT | Mod: PBBFAC,PO | Performed by: NURSE PRACTITIONER

## 2021-10-04 PROCEDURE — 99213 PR OFFICE/OUTPT VISIT, EST, LEVL III, 20-29 MIN: ICD-10-PCS | Mod: S$PBB,,, | Performed by: NURSE PRACTITIONER

## 2021-10-04 PROCEDURE — 99999 PR PBB SHADOW E&M-EST. PATIENT-LVL IV: ICD-10-PCS | Mod: PBBFAC,,, | Performed by: NURSE PRACTITIONER

## 2021-10-04 PROCEDURE — 36415 COLL VENOUS BLD VENIPUNCTURE: CPT | Performed by: NURSE PRACTITIONER

## 2021-10-04 PROCEDURE — 99213 OFFICE O/P EST LOW 20 MIN: CPT | Mod: S$PBB,,, | Performed by: NURSE PRACTITIONER

## 2021-10-04 RX ORDER — ESOMEPRAZOLE MAGNESIUM 40 MG/1
40 CAPSULE, DELAYED RELEASE ORAL DAILY
COMMUNITY
Start: 2021-10-01

## 2021-10-05 ENCOUNTER — PATIENT MESSAGE (OUTPATIENT)
Dept: PULMONOLOGY | Facility: CLINIC | Age: 66
End: 2021-10-05

## 2021-10-05 LAB
SARS-COV-2 IGG SERPL IA-ACNC: ABNORMAL AU/ML
SARS-COV-2 IGG SERPL QL IA: POSITIVE

## 2021-10-07 ENCOUNTER — PROCEDURE VISIT (OUTPATIENT)
Dept: SLEEP MEDICINE | Facility: HOSPITAL | Age: 66
End: 2021-10-07
Attending: NURSE PRACTITIONER
Payer: MEDICARE

## 2021-10-07 DIAGNOSIS — G47.30 SLEEP APNEA, UNSPECIFIED TYPE: ICD-10-CM

## 2021-10-07 PROCEDURE — 95806 SLEEP STUDY UNATT&RESP EFFT: CPT

## 2021-10-14 ENCOUNTER — PATIENT MESSAGE (OUTPATIENT)
Dept: PULMONOLOGY | Facility: CLINIC | Age: 66
End: 2021-10-14
Payer: MEDICARE

## 2021-10-14 DIAGNOSIS — G47.33 OBSTRUCTIVE SLEEP APNEA SYNDROME: Primary | ICD-10-CM

## 2021-10-25 ENCOUNTER — TELEPHONE (OUTPATIENT)
Dept: PULMONOLOGY | Facility: CLINIC | Age: 66
End: 2021-10-25
Payer: MEDICARE

## 2021-11-09 ENCOUNTER — TELEPHONE (OUTPATIENT)
Dept: PULMONOLOGY | Facility: CLINIC | Age: 66
End: 2021-11-09
Payer: MEDICARE

## 2021-11-09 ENCOUNTER — PATIENT MESSAGE (OUTPATIENT)
Dept: FAMILY MEDICINE | Facility: CLINIC | Age: 66
End: 2021-11-09
Payer: MEDICARE

## 2021-11-09 DIAGNOSIS — M79.672 LEFT FOOT PAIN: Primary | ICD-10-CM

## 2021-11-15 ENCOUNTER — HOSPITAL ENCOUNTER (OUTPATIENT)
Dept: RADIOLOGY | Facility: CLINIC | Age: 66
Discharge: HOME | End: 2021-11-15
Attending: PODIATRIST
Payer: MEDICARE

## 2021-11-15 ENCOUNTER — OFFICE VISIT (OUTPATIENT)
Dept: PODIATRY | Facility: CLINIC | Age: 66
End: 2021-11-15
Payer: MEDICARE

## 2021-11-15 VITALS
OXYGEN SATURATION: 97 % | RESPIRATION RATE: 18 BRPM | HEIGHT: 66 IN | WEIGHT: 173.5 LBS | HEART RATE: 80 BPM | BODY MASS INDEX: 27.88 KG/M2

## 2021-11-15 DIAGNOSIS — M79.672 LEFT FOOT PAIN: ICD-10-CM

## 2021-11-15 DIAGNOSIS — M20.42 HAMMER TOE OF LEFT FOOT: ICD-10-CM

## 2021-11-15 DIAGNOSIS — D36.10 NEUROMA: Primary | ICD-10-CM

## 2021-11-15 PROCEDURE — 99203 OFFICE O/P NEW LOW 30 MIN: CPT | Mod: S$GLB,,, | Performed by: PODIATRIST

## 2021-11-15 PROCEDURE — 99203 PR OFFICE/OUTPT VISIT, NEW, LEVL III, 30-44 MIN: ICD-10-PCS | Mod: S$GLB,,, | Performed by: PODIATRIST

## 2021-11-15 PROCEDURE — 73630 XR FOOT COMPLETE 3 VIEW LEFT: ICD-10-PCS | Mod: LT,S$GLB,, | Performed by: RADIOLOGY

## 2021-11-15 PROCEDURE — 73630 X-RAY EXAM OF FOOT: CPT | Mod: LT,S$GLB,, | Performed by: RADIOLOGY

## 2021-11-15 RX ORDER — METHYLPREDNISOLONE 4 MG/1
TABLET ORAL
Qty: 1 EACH | Refills: 1 | Status: SHIPPED | OUTPATIENT
Start: 2021-11-15 | End: 2022-03-14

## 2021-11-18 ENCOUNTER — TELEPHONE (OUTPATIENT)
Dept: PULMONOLOGY | Facility: CLINIC | Age: 66
End: 2021-11-18
Payer: MEDICARE

## 2021-12-03 ENCOUNTER — TELEPHONE (OUTPATIENT)
Dept: PULMONOLOGY | Facility: CLINIC | Age: 66
End: 2021-12-03
Payer: MEDICARE

## 2022-01-14 ENCOUNTER — OFFICE VISIT (OUTPATIENT)
Dept: PULMONOLOGY | Facility: CLINIC | Age: 67
End: 2022-01-14
Payer: MEDICARE

## 2022-01-14 VITALS
OXYGEN SATURATION: 98 % | BODY MASS INDEX: 28.37 KG/M2 | DIASTOLIC BLOOD PRESSURE: 84 MMHG | WEIGHT: 176.5 LBS | SYSTOLIC BLOOD PRESSURE: 142 MMHG | HEIGHT: 66 IN | HEART RATE: 54 BPM

## 2022-01-14 DIAGNOSIS — R91.1 LUNG NODULE: ICD-10-CM

## 2022-01-14 DIAGNOSIS — G47.33 OBSTRUCTIVE SLEEP APNEA SYNDROME: Primary | ICD-10-CM

## 2022-01-14 DIAGNOSIS — J44.9 CHRONIC OBSTRUCTIVE PULMONARY DISEASE, UNSPECIFIED COPD TYPE: ICD-10-CM

## 2022-01-14 DIAGNOSIS — J43.9 PULMONARY EMPHYSEMA, UNSPECIFIED EMPHYSEMA TYPE: ICD-10-CM

## 2022-01-14 PROCEDURE — 99999 PR PBB SHADOW E&M-EST. PATIENT-LVL IV: ICD-10-PCS | Mod: PBBFAC,,, | Performed by: NURSE PRACTITIONER

## 2022-01-14 PROCEDURE — 99999 PR PBB SHADOW E&M-EST. PATIENT-LVL IV: CPT | Mod: PBBFAC,,, | Performed by: NURSE PRACTITIONER

## 2022-01-14 PROCEDURE — 99213 PR OFFICE/OUTPT VISIT, EST, LEVL III, 20-29 MIN: ICD-10-PCS | Mod: S$PBB,,, | Performed by: NURSE PRACTITIONER

## 2022-01-14 PROCEDURE — 99214 OFFICE O/P EST MOD 30 MIN: CPT | Mod: PBBFAC,PO | Performed by: NURSE PRACTITIONER

## 2022-01-14 PROCEDURE — 99213 OFFICE O/P EST LOW 20 MIN: CPT | Mod: S$PBB,,, | Performed by: NURSE PRACTITIONER

## 2022-01-14 RX ORDER — ALBUTEROL SULFATE 90 UG/1
1-2 AEROSOL, METERED RESPIRATORY (INHALATION) EVERY 4 HOURS PRN
Qty: 18 G | Refills: 11 | Status: SHIPPED | OUTPATIENT
Start: 2022-01-14 | End: 2023-05-23 | Stop reason: SDUPTHER

## 2022-01-14 NOTE — PATIENT INSTRUCTIONS
You can purchase a used CPAP machine, I will be able to order you new supplies     Continue current COPD medication regiment,     Will continue to monitor 4 mm lung nodule with CT of chest in May 2022    Continue smoking cessation

## 2022-01-14 NOTE — PROGRESS NOTES
1/14/2022    Martell Corcoran  Follow up    Chief Complaint   Patient presents with    3m f/u       HPI:   1/14/2022- not able to get CPAP due to high co-pay.   States breathing improved after decreasing smoking to 3-6 cigarettes daily.   Shortness of breath- daily complaint, slightly improved, worse with exertion, improves with rest. Daytime fatigue unchanged.   Complaint of cough- varies in severity, currently mild. Productive small amount clear mucous, did notice worsening after first starting to cut back on smoking. Has returned to normal   Followed by Urologist Dr. Pham for bladder cancer. Undergoing infusion therapy.     10/4/2021- concerned he had COVID 19 late July early August, lost sense of taste and smell. Complaint of fatigue, body aches, shortness of breath when walking,   No fever. Gradually improved over 4 weeks. Taste and smell has not returned. Experiencing short term memory loss and metal grogginess. Has daytime drowsiness onset years worsened in past two months  Persistent cough- productive clear mucous, associated with wheeze. Improves with albuterol inhaler.   Currently on Anoro, QVAR, using albuterol when needed 1-3 x weekly.     5/24/2021- Admitted To hospital in [place] while on vacation April 27, 2021 for COPD exacerbation. Seen at [facility] ER December 16, 2020 for COPD exacerbation.   Complaint of SOB- daily, worse with exertion, improves with rest. Treated with antibiotics and steroid therapy April.   Cough- recurrent complaint,  improved since hospital discharge, worsened in last 2 weeks after spending time with grandson who had bronchitis that has improved with nebulizer treatments every 4 hours. Associated with chest tightness and wheeze.       11/24/2020- he had bladder biopsy at [place] with Dr. Pham on 11/3- per outside records path with High grade papillary urothelial cell carcinoma. He has cough w/ postnasal drip and allergies but breathing is much better. He has been  dealing with a lot for the family- 2 brothers just . Hematuria is much better. Not getting bladder infections any more. He continues to smoke 1/2 ppd. Wants to quit but too much stress recently- not ready.    2020-   Start taking prednisone again- long taper over 3 weeks then try to stop. If lungs flaring while decreasing prednisone go up to the last dose that helped you and call our office.  Continue trelegy  Refilled duo nebs to use as needed  Use albuterol as needed  Quit smoking- go to program  Follow up with urologist  Follow up with PCP for kidney testing and possible urinary infection  pt was recently hospitalized for COPD exacerbation, seen by me while admitted 20 and also having worsening hematuria at that time. He was sent home with a course of levaquin and steroid taper. He did not qualify for home O2. He is being worked up for a bladder mass per urology and pending transurethral resection.  Pt c/o pain around L kidney after doing yard work and urine turned darker. He was coughing last night and woke up with face feeling puffy. Switched urologists to a Dr. Pham at [place] and has appt at end of September.  He finished prednisone taper. Still taking levaquin. Still feels some congestion in chest but it is getting better. Also has sinus problems w/ nose blocked. He denies frequent exacerbations but this one has been very bad. Feels like worsening since stopped prednisone. Still smokes but trying to cut back. Has smoking cessation appt later this month.  Inhalers: nebs 4-5x/day, trelegy daily, albuterol rescue 1-2x/day    7/15/20- Pt is a 64 yo male with HTN, GERD and BPH presents for new evaluation of breathing issues. He complains of SOB especially if he carries anything like taking out trash to the dumpster. This has been progressive over about 3.5 yrs. He wheezes and coughs up clear mucous, throughout the day.  Pt smokes 1 PPD x 55 yrs.  At age 5 pt had pna and a collapsed lung  requiring chest tube on the left side and hospitalization. Didn't have any other problems w/ breathing growing up. He took up playing [employer] to help lung function.  Pt had abd/p scan for UTI recently which showed some emphysematous changes.    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 One brother  at 65 from leukemia.    Grew up in [place]    The chief compliant  problem varies with instablilty at time      PFSH:  Past Medical History:   Diagnosis Date    Benign enlargement of prostate     Had a biopsy in around     Elevated PSA     GERD (gastroesophageal reflux disease)     Hypertension     Started with meds in .    Kidney stone     Urinary tract infection          Past Surgical History:   Procedure Laterality Date    FISTULA REPAIR      LEFT HEART CATHETERIZATION N/A 10/23/2018    Procedure: Left heart cath;  Surgeon: Niharika Squires MD;  Location: [facility] CATH;  Service: Cardiology;  Laterality: N/A;     Social History     Tobacco Use    Smoking status: Current Every Day Smoker     Packs/day: 1.00     Years: 55.00     Pack years: 55.00     Types: Cigarettes    Smokeless tobacco: Never Used   Substance Use Topics    Alcohol use: Yes     Comment: Previous 12 beer a day for 20 years. Now occ.    Drug use: No     Family History   Problem Relation Age of Onset    Heart failure Mother     Cancer Father     Heart disease Brother     Heart attack Brother     Cancer Brother      Review of patient's allergies indicates:   Allergen Reactions    Oyster extract Swelling     PT swells in his throat after eating oysters on occasion       Performance Status:The patient's activity level is functions out of house. QUACH improved and does not limit him. Back pain limits activity.    Review of Systems:  a review of eleven systems covering constitutional, Eye, HEENT, Psych, Respiratory, Cardiac, GI, ,  "Musculoskeletal, Endocrine, Dermatologic was negative except for pertinent findings as listed ABOVE and below:   cough, wheeze, chest tightness, fatigue      Exam:Comprehensive exam done. BP (!) 142/84 (BP Location: Right arm, Patient Position: Sitting, BP Method: Medium (Automatic))   Pulse (!) 54   Ht 5' 6" (1.676 m)   Wt 80 kg (176 lb 7.7 oz)   SpO2 98% Comment: on room air at rest  BMI 28.48 kg/m²   Exam included Vitals as listed, and patient's appearance and affect and alertness and mood, oral exam for yeast and hygiene and pharynx lesions and Mallapatti (M) score, neck with inspection for jvd and masses and thyroid abnormalities and lymph nodes (supraclavicular and infraclavicular nodes and axillary also examined and noted if abn), chest exam included symmetry and effort and fremitus and percussion and auscultation, cardiac exam included rhythm and gallops and murmur and rubs and jvd and edema, abdominal exam for mass and hepatosplenomegaly and tenderness and hernias and bowel sounds, Musculoskeletal exam with muscle tone and posture and mobility/gait and  strength, and skin for rashes and cyanosis and pallor and turgor, extremity for clubbing.  Findings were normal except for pertinent findings listed below:  M1  Bilateral clear lungs w/ faint rhonchi bilateral lower lung zones  No clubbing or edema    Radiographs (ct chest and cxr) reviewed: view by direct vision   CT Chest Without Contrast  05/20/2021   In the left upper lobe is a confluence of vessels and a possible 4 mm nodule decreased in size and conspicuousness since the prior study of July 16, 2020.  No new or enlarging pulmonary nodules are identified.  Emphysema.     CT chest/abd/p 11/20/20- mild emphysema, lungs clear aside from small 6mm nodule DANA which is unchanged from prior exam  1. No metastatic disease.  2. Nodular thickening of the left posterior aspect of the bladder.  3. Enlarged prostate.  3. Mild pulmonary emphysema.   CXR " 8/14/20- possible vague infiltrate/opacifications bilateral bases  CT chest 7/16/20- DANA 6mm nodule  CT abd/p 6/26/20- bases of lungs w/ scant emphysematous changes, some strands of atelectasis    Labs reviewed    Lab Results   Component Value Date    WBC 6.1 03/22/2021    RBC 5.05 03/22/2021    HGB 15.6 03/22/2021    HCT 46.6 03/22/2021    MCV 92.3 03/22/2021    MCH 30.9 03/22/2021    MCHC 33.5 03/22/2021    RDW 13.2 03/22/2021     03/22/2021    MPV 8.7 03/22/2021    GRAN 10.5 (H) 12/16/2020    GRAN 74.4 (H) 12/16/2020    LYMPH 1,604 03/22/2021    LYMPH 26.3 03/22/2021    MONO 665 03/22/2021    MONO 10.9 03/22/2021     03/22/2021    BASO 31 03/22/2021    EOSINOPHIL 2.0 03/22/2021    BASOPHIL 0.5 03/22/2021     Results for GONLUCERO, MARTELL RAMEY (MRN [id number]) as of 5/24/2021 15:33   Ref. Range 8/13/2020 02:30 8/13/2020 06:01 8/14/2020 05:03 12/16/2020 09:20 3/22/2021 15:14   Eos # Latest Ref Range: 15 - 500 cells/uL 1.4 (H) 0.3 0.0 0.5 122       PFT reviewed  7/16/20- moderately severe obstruction, reduced DLCO, air trapping    EPWORTH SLEEPINESS SCALE SCORE 13 on 10/4/2021  Sleep study 10/7/2021 AHI Significant obstructive sleep apnea with a TRUPTI of 8.1/hr    Plan:  Clinical impression is apparently straight forward and impression with management as below.    Martell was seen today for 3m f/u.    Diagnoses and all orders for this visit:    Obstructive sleep apnea syndrome   - CPAP nightly     Pulmonary emphysema, unspecified emphysema type  -     albuterol (PROVENTIL/VENTOLIN HFA) 90 mcg/actuation inhaler; Inhale 1-2 puffs into the lungs every 4 (four) hours as needed for Shortness of Breath (coughing). Rescue  -     beclomethasone (QVAR) 80 mcg/actuation Aero; Inhale 2 puffs into the lungs 2 (two) times a day. Controller    Lung nodule   - continue to monitor    Chronic obstructive pulmonary disease, unspecified COPD type  -     albuterol (PROVENTIL/VENTOLIN HFA) 90 mcg/actuation inhaler; Inhale 1-2  puffs into the lungs every 4 (four) hours as needed for Shortness of Breath (coughing). Rescue  -     beclomethasone (QVAR) 80 mcg/actuation Aero; Inhale 2 puffs into the lungs 2 (two) times a day. Controller     - continue current COPD medication regiment    Follow up in about 3 months (around 4/14/2022), or if symptoms worsen or fail to improve.    Discussed with patient above for education the following:      Patient Instructions   You can purchase a used CPAP machine, I will be able to order you new supplies     Continue current COPD medication regiment,     Will continue to monitor 4 mm lung nodule with CT of chest in May 2022    Continue smoking cessation

## 2022-02-01 ENCOUNTER — PATIENT MESSAGE (OUTPATIENT)
Dept: PULMONOLOGY | Facility: CLINIC | Age: 67
End: 2022-02-01
Payer: MEDICARE

## 2022-02-04 ENCOUNTER — OFFICE VISIT (OUTPATIENT)
Dept: PULMONOLOGY | Facility: CLINIC | Age: 67
End: 2022-02-04
Payer: MEDICARE

## 2022-02-04 VITALS
DIASTOLIC BLOOD PRESSURE: 99 MMHG | WEIGHT: 179 LBS | OXYGEN SATURATION: 97 % | HEART RATE: 69 BPM | SYSTOLIC BLOOD PRESSURE: 152 MMHG | BODY MASS INDEX: 28.77 KG/M2 | HEIGHT: 66 IN

## 2022-02-04 DIAGNOSIS — J44.9 CHRONIC OBSTRUCTIVE PULMONARY DISEASE, UNSPECIFIED COPD TYPE: ICD-10-CM

## 2022-02-04 DIAGNOSIS — G47.33 OBSTRUCTIVE SLEEP APNEA SYNDROME: Primary | ICD-10-CM

## 2022-02-04 PROCEDURE — 99213 OFFICE O/P EST LOW 20 MIN: CPT | Mod: S$PBB,,, | Performed by: NURSE PRACTITIONER

## 2022-02-04 PROCEDURE — 99999 PR PBB SHADOW E&M-EST. PATIENT-LVL IV: ICD-10-PCS | Mod: PBBFAC,,, | Performed by: NURSE PRACTITIONER

## 2022-02-04 PROCEDURE — 99214 OFFICE O/P EST MOD 30 MIN: CPT | Mod: PBBFAC,PO | Performed by: NURSE PRACTITIONER

## 2022-02-04 PROCEDURE — 99213 PR OFFICE/OUTPT VISIT, EST, LEVL III, 20-29 MIN: ICD-10-PCS | Mod: S$PBB,,, | Performed by: NURSE PRACTITIONER

## 2022-02-04 PROCEDURE — 99999 PR PBB SHADOW E&M-EST. PATIENT-LVL IV: CPT | Mod: PBBFAC,,, | Performed by: NURSE PRACTITIONER

## 2022-02-04 RX ORDER — APIXABAN 5 MG/1
5 TABLET, FILM COATED ORAL 2 TIMES DAILY
COMMUNITY
Start: 2022-01-23 | End: 2023-05-22 | Stop reason: SDUPTHER

## 2022-02-04 RX ORDER — METOPROLOL TARTRATE 25 MG/1
12.5 TABLET, FILM COATED ORAL 2 TIMES DAILY
COMMUNITY
Start: 2022-01-23 | End: 2022-09-13

## 2022-02-04 NOTE — PROGRESS NOTES
2/4/2022    Martell Viverossonancy  Follow up    Chief Complaint   Patient presents with    Follow-up    Fatigue    Shortness of Breath       HPI:   2/4/2022-  States breathing is labored. States usually worse after infusion for bladder cancer. States does have benefit with qvar and Anoro, using albuterol as needed 2x daily.     Daytime fatigue persistent.  purchased CPAP from third party. Has not started to use.     Scheduled for pacemaker to treat A-fib. Followed by Dr. Miranda.     Cough- decreased, 2x daily, productive clear mucous, cut back on smoking to 6 cigarettes daily    1/14/2022- not able to get CPAP due to high co-pay.   States breathing improved after decreasing smoking to 3-6 cigarettes daily.   Shortness of breath- daily complaint, slightly improved, worse with exertion, improves with rest. Daytime fatigue unchanged.   Complaint of cough- varies in severity, currently mild. Productive small amount clear mucous, did notice worsening after first starting to cut back on smoking. Has returned to normal   Followed by Urologist Dr. Pham for bladder cancer. Undergoing infusion therapy.     10/4/2021- concerned he had COVID 19 late July early August, lost sense of taste and smell. Complaint of fatigue, body aches, shortness of breath when walking,   No fever. Gradually improved over 4 weeks. Taste and smell has not returned. Experiencing short term memory loss and metal grogginess. Has daytime drowsiness onset years worsened in past two months  Persistent cough- productive clear mucous, associated with wheeze. Improves with albuterol inhaler.   Currently on Anoro, QVAR, using albuterol when needed 1-3 x weekly.     5/24/2021- Admitted To hospital in [place] while on vacation April 27, 2021 for COPD exacerbation. Seen at [facility] December 16, 2020 for COPD exacerbation.   Complaint of SOB- daily, worse with exertion, improves with rest. Treated with antibiotics and steroid therapy April.   Cough-  recurrent complaint,  improved since hospital discharge, worsened in last 2 weeks after spending time with grandson who had bronchitis that has improved with nebulizer treatments every 4 hours. Associated with chest tightness and wheeze.       2020- he had bladder biopsy at [place] with Dr. Pham on 11/3- per outside records path with High grade papillary urothelial cell carcinoma. He has cough w/ postnasal drip and allergies but breathing is much better. He has been dealing with a lot for the family- 2 brothers just . Hematuria is much better. Not getting bladder infections any more. He continues to smoke 1/2 ppd. Wants to quit but too much stress recently- not ready.    2020-   Start taking prednisone again- long taper over 3 weeks then try to stop. If lungs flaring while decreasing prednisone go up to the last dose that helped you and call our office.  Continue trelegy  Refilled duo nebs to use as needed  Use albuterol as needed  Quit smoking- go to program  Follow up with urologist  Follow up with PCP for kidney testing and possible urinary infection  pt was recently hospitalized for COPD exacerbation, seen by me while admitted 20 and also having worsening hematuria at that time. He was sent home with a course of levaquin and steroid taper. He did not qualify for home O2. He is being worked up for a bladder mass per urology and pending transurethral resection.  Pt c/o pain around L kidney after doing yard work and urine turned darker. He was coughing last night and woke up with face feeling puffy. Switched urologists to a Dr. Pham at [place] and has appt at end of September.  He finished prednisone taper. Still taking levaquin. Still feels some congestion in chest but it is getting better. Also has sinus problems w/ nose blocked. He denies frequent exacerbations but this one has been very bad. Feels like worsening since stopped prednisone. Still smokes but trying to cut back. Has  smoking cessation appt later this month.  Inhalers: nebs 4-5x/day, trelegy daily, albuterol rescue 1-2x/day    7/15/20- Pt is a 64 yo male with HTN, GERD and BPH presents for new evaluation of breathing issues. He complains of SOB especially if he carries anything like taking out trash to the dumpster. This has been progressive over about 3.5 yrs. He wheezes and coughs up clear mucous, throughout the day.  Pt smokes 1 PPD x 55 yrs.  At age 5 pt had pna and a collapsed lung requiring chest tube on the left side and hospitalization. Didn't have any other problems w/ breathing growing up. He took up playing [employer] to help lung function.  Pt had abd/p scan for UTI recently which showed some emphysematous changes.    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 One brother  at 65 from leukemia.    Grew up in [place]    The chief compliant  problem varies with instablilty at time      PFSH:  Past Medical History:   Diagnosis Date    Benign enlargement of prostate     Had a biopsy in around     Elevated PSA     GERD (gastroesophageal reflux disease)     Hypertension     Started with meds in .    Kidney stone     Urinary tract infection          Past Surgical History:   Procedure Laterality Date    FISTULA REPAIR      LEFT HEART CATHETERIZATION N/A 10/23/2018    Procedure: Left heart cath;  Surgeon: Niharika Squires MD;  Location: [facility] CATH;  Service: Cardiology;  Laterality: N/A;     Social History     Tobacco Use    Smoking status: Current Every Day Smoker     Packs/day: 1.00     Years: 55.00     Pack years: 55.00     Types: Cigarettes    Smokeless tobacco: Never Used   Substance Use Topics    Alcohol use: Yes     Comment: Previous 12 beer a day for 20 years. Now occ.    Drug use: No     Family History   Problem Relation Age of Onset    Heart failure Mother     Cancer Father     Heart disease  "Brother     Heart attack Brother     Cancer Brother      Review of patient's allergies indicates:   Allergen Reactions    Oyster extract Swelling     PT swells in his throat after eating oysters on occasion       Performance Status:The patient's activity level is functions out of house. QUACH improved and does not limit him. Back pain limits activity.    Review of Systems:  a review of eleven systems covering constitutional, Eye, HEENT, Psych, Respiratory, Cardiac, GI, , Musculoskeletal, Endocrine, Dermatologic was negative except for pertinent findings as listed ABOVE and below:   wheeze, shortness of breath, fatigue      Exam:Comprehensive exam done. BP (!) 152/99 (BP Location: Left arm, Patient Position: Sitting, BP Method: Medium (Automatic))   Pulse 69   Ht 5' 6" (1.676 m)   Wt 81.2 kg (179 lb 0.2 oz)   SpO2 97% Comment: on room air at rest  BMI 28.89 kg/m²   Exam included Vitals as listed, and patient's appearance and affect and alertness and mood, oral exam for yeast and hygiene and pharynx lesions and Mallapatti (M) score, neck with inspection for jvd and masses and thyroid abnormalities and lymph nodes (supraclavicular and infraclavicular nodes and axillary also examined and noted if abn), chest exam included symmetry and effort and fremitus and percussion and auscultation, cardiac exam included rhythm and gallops and murmur and rubs and jvd and edema, abdominal exam for mass and hepatosplenomegaly and tenderness and hernias and bowel sounds, Musculoskeletal exam with muscle tone and posture and mobility/gait and  strength, and skin for rashes and cyanosis and pallor and turgor, extremity for clubbing.  Findings were normal except for pertinent findings listed below:  M1  Bilateral clear lungs w/ faint rhonchi bilateral lower lung zones  No clubbing or edema    Radiographs (ct chest and cxr) reviewed: view by direct vision   CT Chest Without Contrast  05/20/2021   In the left upper lobe is a " confluence of vessels and a possible 4 mm nodule decreased in size and conspicuousness since the prior study of July 16, 2020.  No new or enlarging pulmonary nodules are identified.  Emphysema.     CT chest/abd/p 11/20/20- mild emphysema, lungs clear aside from small 6mm nodule DANA which is unchanged from prior exam  1. No metastatic disease.  2. Nodular thickening of the left posterior aspect of the bladder.  3. Enlarged prostate.  3. Mild pulmonary emphysema.   CXR 8/14/20- possible vague infiltrate/opacifications bilateral bases  CT chest 7/16/20- DANA 6mm nodule  CT abd/p 6/26/20- bases of lungs w/ scant emphysematous changes, some strands of atelectasis    Labs reviewed    Lab Results   Component Value Date    WBC 6.1 03/22/2021    RBC 5.05 03/22/2021    HGB 15.6 03/22/2021    HCT 46.6 03/22/2021    MCV 92.3 03/22/2021    MCH 30.9 03/22/2021    MCHC 33.5 03/22/2021    RDW 13.2 03/22/2021     03/22/2021    MPV 8.7 03/22/2021    GRAN 10.5 (H) 12/16/2020    GRAN 74.4 (H) 12/16/2020    LYMPH 1,604 03/22/2021    LYMPH 26.3 03/22/2021    MONO 665 03/22/2021    MONO 10.9 03/22/2021     03/22/2021    BASO 31 03/22/2021    EOSINOPHIL 2.0 03/22/2021    BASOPHIL 0.5 03/22/2021     Results for MARTELL ALBRECHT (MRN [id number]) as of 5/24/2021 15:33   Ref. Range 8/13/2020 02:30 8/13/2020 06:01 8/14/2020 05:03 12/16/2020 09:20 3/22/2021 15:14   Eos # Latest Ref Range: 15 - 500 cells/uL 1.4 (H) 0.3 0.0 0.5 122       PFT reviewed  7/16/20- moderately severe obstruction, reduced DLCO, air trapping    EPWORTH SLEEPINESS SCALE SCORE 13 on 10/4/2021  Sleep study 10/7/2021 AHI Significant obstructive sleep apnea with a TRUPTI of 8.1/hr      Plan:  Clinical impression is apparently straight forward and impression with management as below.    Martell was seen today for follow-up, fatigue and shortness of breath.    Diagnoses and all orders for this visit:    Obstructive sleep apnea syndrome  -     CPAP/BIPAP SUPPLIES  -      CPAP titration (Must have diagnosis of GLENNA from previous sleep study.); Future    Chronic obstructive pulmonary disease, unspecified COPD type     - continue COPD medication regiment    Follow up in about 3 months (around 5/4/2022), or if symptoms worsen or fail to improve.    Discussed with patient above for education the following:      Patient Instructions   Ordering Titration study to evaluate for correct pressure setting needed for CPAP therapy    Will continue to monitor lung nodules    Continue Current COPD medication regiment

## 2022-02-04 NOTE — PATIENT INSTRUCTIONS
Ordering Titration study to evaluate for correct pressure setting needed for CPAP therapy    Will continue to monitor lung nodules    Continue Current COPD medication regiment

## 2022-02-23 ENCOUNTER — PROCEDURE VISIT (OUTPATIENT)
Dept: SLEEP MEDICINE | Facility: HOSPITAL | Age: 67
End: 2022-02-23
Attending: NURSE PRACTITIONER
Payer: MEDICARE

## 2022-02-23 DIAGNOSIS — G47.33 OBSTRUCTIVE SLEEP APNEA SYNDROME: ICD-10-CM

## 2022-02-23 PROCEDURE — 95811 POLYSOM 6/>YRS CPAP 4/> PARM: CPT

## 2022-02-24 DIAGNOSIS — J44.9 CHRONIC OBSTRUCTIVE PULMONARY DISEASE, UNSPECIFIED COPD TYPE: ICD-10-CM

## 2022-02-24 NOTE — PATIENT INSTRUCTIONS
A CPAP titration was performed, and the patient was instructed to make a follow-up with Diana Salgado NP.

## 2022-02-27 RX ORDER — UMECLIDINIUM BROMIDE AND VILANTEROL TRIFENATATE 62.5; 25 UG/1; UG/1
POWDER RESPIRATORY (INHALATION)
Qty: 180 EACH | Refills: 3 | Status: SHIPPED | OUTPATIENT
Start: 2022-02-27 | End: 2022-12-14

## 2022-03-03 ENCOUNTER — TELEPHONE (OUTPATIENT)
Dept: FAMILY MEDICINE | Facility: CLINIC | Age: 67
End: 2022-03-03
Payer: MEDICARE

## 2022-03-03 ENCOUNTER — PATIENT MESSAGE (OUTPATIENT)
Dept: FAMILY MEDICINE | Facility: CLINIC | Age: 67
End: 2022-03-03
Payer: MEDICARE

## 2022-03-04 ENCOUNTER — PATIENT MESSAGE (OUTPATIENT)
Dept: FAMILY MEDICINE | Facility: CLINIC | Age: 67
End: 2022-03-04
Payer: MEDICARE

## 2022-03-08 ENCOUNTER — PATIENT MESSAGE (OUTPATIENT)
Dept: PULMONOLOGY | Facility: CLINIC | Age: 67
End: 2022-03-08
Payer: MEDICARE

## 2022-03-08 LAB
ALBUMIN SERPL-MCNC: 4.4 G/DL (ref 3.6–5.1)
ALBUMIN/GLOB SERPL: 1.7 (CALC) (ref 1–2.5)
ALP SERPL-CCNC: 65 U/L (ref 35–144)
ALT SERPL-CCNC: 19 U/L (ref 9–46)
APPEARANCE UR: CLEAR
AST SERPL-CCNC: 20 U/L (ref 10–35)
BACTERIA #/AREA URNS HPF: ABNORMAL /HPF
BACTERIA UR CULT: ABNORMAL
BACTERIA UR CULT: ABNORMAL
BASOPHILS # BLD AUTO: 29 CELLS/UL (ref 0–200)
BASOPHILS NFR BLD AUTO: 0.4 %
BILIRUB SERPL-MCNC: 0.7 MG/DL (ref 0.2–1.2)
BILIRUB UR QL STRIP: NEGATIVE
BUN SERPL-MCNC: 16 MG/DL (ref 7–25)
BUN/CREAT SERPL: NORMAL (CALC) (ref 6–22)
CALCIUM SERPL-MCNC: 9.5 MG/DL (ref 8.6–10.3)
CHLORIDE SERPL-SCNC: 101 MMOL/L (ref 98–110)
CHOLEST SERPL-MCNC: 152 MG/DL
CHOLEST/HDLC SERPL: 3 (CALC)
CO2 SERPL-SCNC: 29 MMOL/L (ref 20–32)
COLOR UR: YELLOW
CREAT SERPL-MCNC: 0.95 MG/DL (ref 0.7–1.25)
EOSINOPHIL # BLD AUTO: 161 CELLS/UL (ref 15–500)
EOSINOPHIL NFR BLD AUTO: 2.2 %
ERYTHROCYTE [DISTWIDTH] IN BLOOD BY AUTOMATED COUNT: 13 % (ref 11–15)
GLOBULIN SER CALC-MCNC: 2.6 G/DL (CALC) (ref 1.9–3.7)
GLUCOSE SERPL-MCNC: 88 MG/DL (ref 65–99)
GLUCOSE UR QL STRIP: NEGATIVE
HCT VFR BLD AUTO: 49.2 % (ref 38.5–50)
HDLC SERPL-MCNC: 51 MG/DL
HGB BLD-MCNC: 16.3 G/DL (ref 13.2–17.1)
HGB UR QL STRIP: NEGATIVE
HYALINE CASTS #/AREA URNS LPF: ABNORMAL /LPF
KETONES UR QL STRIP: ABNORMAL
LDLC SERPL CALC-MCNC: 84 MG/DL (CALC)
LEUKOCYTE ESTERASE UR QL STRIP: ABNORMAL
LYMPHOCYTES # BLD AUTO: 1570 CELLS/UL (ref 850–3900)
LYMPHOCYTES NFR BLD AUTO: 21.5 %
MCH RBC QN AUTO: 31.2 PG (ref 27–33)
MCHC RBC AUTO-ENTMCNC: 33.1 G/DL (ref 32–36)
MCV RBC AUTO: 94.1 FL (ref 80–100)
MONOCYTES # BLD AUTO: 832 CELLS/UL (ref 200–950)
MONOCYTES NFR BLD AUTO: 11.4 %
NEUTROPHILS # BLD AUTO: 4709 CELLS/UL (ref 1500–7800)
NEUTROPHILS NFR BLD AUTO: 64.5 %
NITRITE UR QL STRIP: NEGATIVE
NONHDLC SERPL-MCNC: 101 MG/DL (CALC)
PH UR STRIP: 6 [PH] (ref 5–8)
PLATELET # BLD AUTO: 236 THOUSAND/UL (ref 140–400)
PMV BLD REES-ECKER: 8.9 FL (ref 7.5–12.5)
POTASSIUM SERPL-SCNC: 4.5 MMOL/L (ref 3.5–5.3)
PROT SERPL-MCNC: 7 G/DL (ref 6.1–8.1)
PROT UR QL STRIP: NEGATIVE
RBC # BLD AUTO: 5.23 MILLION/UL (ref 4.2–5.8)
RBC #/AREA URNS HPF: ABNORMAL /HPF
SODIUM SERPL-SCNC: 140 MMOL/L (ref 135–146)
SP GR UR STRIP: 1.02 (ref 1–1.03)
SQUAMOUS #/AREA URNS HPF: ABNORMAL /HPF
TRIGL SERPL-MCNC: 77 MG/DL
TSH SERPL-ACNC: 1.1 MIU/L (ref 0.4–4.5)
WBC # BLD AUTO: 7.3 THOUSAND/UL (ref 3.8–10.8)
WBC #/AREA URNS HPF: ABNORMAL /HPF

## 2022-03-09 ENCOUNTER — TELEPHONE (OUTPATIENT)
Dept: FAMILY MEDICINE | Facility: CLINIC | Age: 67
End: 2022-03-09
Payer: MEDICARE

## 2022-03-10 ENCOUNTER — OFFICE VISIT (OUTPATIENT)
Dept: FAMILY MEDICINE | Facility: CLINIC | Age: 67
End: 2022-03-10
Payer: MEDICARE

## 2022-03-10 VITALS
WEIGHT: 185.19 LBS | HEART RATE: 54 BPM | DIASTOLIC BLOOD PRESSURE: 88 MMHG | OXYGEN SATURATION: 94 % | SYSTOLIC BLOOD PRESSURE: 134 MMHG | HEIGHT: 66 IN | TEMPERATURE: 98 F | BODY MASS INDEX: 29.76 KG/M2

## 2022-03-10 DIAGNOSIS — M79.661 BILATERAL CALF PAIN: ICD-10-CM

## 2022-03-10 DIAGNOSIS — M79.662 BILATERAL CALF PAIN: ICD-10-CM

## 2022-03-10 DIAGNOSIS — R26.9 ABNORMALITY OF GAIT AND MOBILITY: ICD-10-CM

## 2022-03-10 DIAGNOSIS — I25.118 ATHEROSCLEROTIC HEART DISEASE OF NATIVE CORONARY ARTERY WITH OTHER FORMS OF ANGINA PECTORIS: ICD-10-CM

## 2022-03-10 DIAGNOSIS — G47.33 OSA ON CPAP: ICD-10-CM

## 2022-03-10 DIAGNOSIS — F32.4 MAJOR DEPRESSIVE DISORDER IN PARTIAL REMISSION, UNSPECIFIED WHETHER RECURRENT: ICD-10-CM

## 2022-03-10 DIAGNOSIS — Z00.00 ENCOUNTER FOR PREVENTIVE HEALTH EXAMINATION: Primary | ICD-10-CM

## 2022-03-10 DIAGNOSIS — C67.2 MALIGNANT NEOPLASM OF LATERAL WALL OF URINARY BLADDER: ICD-10-CM

## 2022-03-10 DIAGNOSIS — I48.91 ATRIAL FIBRILLATION, UNSPECIFIED TYPE: ICD-10-CM

## 2022-03-10 DIAGNOSIS — I70.0 ATHEROSCLEROSIS OF AORTA: ICD-10-CM

## 2022-03-10 DIAGNOSIS — J44.9 CHRONIC OBSTRUCTIVE PULMONARY DISEASE, UNSPECIFIED COPD TYPE: ICD-10-CM

## 2022-03-10 PROCEDURE — 99215 OFFICE O/P EST HI 40 MIN: CPT | Mod: PBBFAC,PO | Performed by: NURSE PRACTITIONER

## 2022-03-10 PROCEDURE — G0439 PPPS, SUBSEQ VISIT: HCPCS | Mod: ,,, | Performed by: NURSE PRACTITIONER

## 2022-03-10 PROCEDURE — G0439 PR MEDICARE ANNUAL WELLNESS SUBSEQUENT VISIT: ICD-10-PCS | Mod: ,,, | Performed by: NURSE PRACTITIONER

## 2022-03-10 PROCEDURE — 99999 PR PBB SHADOW E&M-EST. PATIENT-LVL V: CPT | Mod: PBBFAC,,, | Performed by: NURSE PRACTITIONER

## 2022-03-10 PROCEDURE — 99999 PR PBB SHADOW E&M-EST. PATIENT-LVL V: ICD-10-PCS | Mod: PBBFAC,,, | Performed by: NURSE PRACTITIONER

## 2022-03-10 RX ORDER — NITROGLYCERIN 0.4 MG/1
TABLET SUBLINGUAL
COMMUNITY
Start: 2021-12-03

## 2022-03-10 RX ORDER — ASPIRIN 81 MG/1
TABLET ORAL
COMMUNITY
Start: 2021-12-18

## 2022-03-10 NOTE — PATIENT INSTRUCTIONS
Counseling and Referral of Other Preventative  (Italic type indicates deductible and co-insurance are waived)    Patient Name: Martell Corcoran  Today's Date: 3/10/2022    Health Maintenance       Date Due Completion Date    COVID-19 Vaccine (1) Never done ---    Pneumococcal Vaccines (Age 65+) (1 of 4 - PCV13) Never done ---    Colorectal Cancer Screening Never done ---    Shingles Vaccine (1 of 2) Never done ---    Influenza Vaccine (1) 09/01/2021 10/4/2020    LDCT Lung Screen 05/20/2022 5/20/2021    High Dose Statin 03/10/2023 3/10/2022    Lipid Panel 03/04/2027 3/4/2022    TETANUS VACCINE 12/04/2027 12/4/2017        Orders Placed This Encounter   Procedures    US Lower Extremity Arteries Bilateral     The following information is provided to all patients.  This information is to help you find resources for any of the problems found today that may be affecting your health:                Living healthy guide: www.[facility].[place].[facility]      Understanding Diabetes: www.diabetes.org      Eating healthy: www.cdc.gov/healthyweight      CDC home safety checklist: www.cdc.gov/steadi/patient.html      Agency on Aging: www.goea.[place].gov      Alcoholics anonymous (AA): www.aa.org      Physical Activity: www.yoselyn.nih.gov/to2nfoe      Tobacco use: www.quitwithusla.org

## 2022-03-10 NOTE — PROGRESS NOTES
"  Martell Corcoran presented for a  Medicare AWV and comprehensive Health Risk Assessment today. The following components were reviewed and updated:    · Medical history  · Family History  · Social history  · Allergies and Current Medications  · Health Risk Assessment  · Health Maintenance  · Care Team         ** See Completed Assessments for Annual Wellness Visit within the encounter summary.**         The following assessments were completed:  · Living Situation  · CAGE  · Depression Screening  · Timed Get Up and Go  · Whisper Test  · Cognitive Function Screening  · Nutrition Screening  · ADL Screening  · PAQ Screening            Vitals:    03/10/22 1430   BP: 134/88   Pulse: (!) 54   Temp: 98.1 °F (36.7 °C)   SpO2: (!) 94%   Weight: 84 kg (185 lb 3 oz)   Height: 5' 6" (1.676 m)     Body mass index is 29.89 kg/m².  Physical Exam  Constitutional:       Appearance: He is well-developed.   HENT:      Head: Normocephalic and atraumatic.      Right Ear: Hearing normal.      Left Ear: Hearing normal.      Nose: Nose normal.   Eyes:      General: Lids are normal.      Conjunctiva/sclera: Conjunctivae normal.      Pupils: Pupils are equal, round, and reactive to light.   Cardiovascular:      Rate and Rhythm: Normal rate.   Pulmonary:      Effort: Pulmonary effort is normal.   Abdominal:      Palpations: Abdomen is soft.   Musculoskeletal:         General: Normal range of motion.      Cervical back: Normal range of motion and neck supple.   Skin:     General: Skin is warm and dry.   Neurological:      Mental Status: He is alert and oriented to person, place, and time.               Diagnoses and health risks identified today and associated recommendations/orders:    1. Encounter for preventive health examination  Discussed health maintenance guidelines appropriate for age.    Declines all vaccines    2. Chronic obstructive pulmonary disease, unspecified COPD type  Stable, continue current medication regimen  Followed by " pulmonology    3. Atherosclerosis of aorta  Stable, continue to monitor  Follow-up by PCP  4. Bilateral calf pain  - US Lower Extremity Arteries Bilateral; Future    5. Abnormality of gait and mobility  Stable continue to monitor    6. Malignant neoplasm of lateral wall of urinary bladder  Stable, receiving active infusion treatments with urology.   Continued care and monitoring per Urology at [place]    7. Atherosclerotic heart disease of native coronary artery with other forms of angina pectoris  Stable, continue current medications  Followed by Cardiology    8. GLENNA on CPAP  -patient concerned for facial swelling in the morning following CPAP use  He is using a secondhand Moses CPAP machine  Patient to discuss this with pulmonology  9. Atrial fibrillation  -stable continue current medications  Followed by Cardiology Dr. Miranda at [facility]  Will be receiving pacemaker    10. Major depressive disorder in partial remission  Stable, continue current medication regimen  Followed by PCP  Provided Martell with a 5-10 year written screening schedule and personal prevention plan. Recommendations were developed using the USPSTF age appropriate recommendations. Education, counseling, and referrals were provided as needed. After Visit Summary printed and given to patient which includes a list of additional screenings\tests needed.    Follow up for One year for Annual Wellness Visit.    Philly Epperson, NP    I offered to discuss advanced care planning, including how to pick a person who would make decisions for you if you were unable to make them for yourself, called a health care power of , and what kind of decisions you might make such as use of life sustaining treatments such as ventilators and tube feeding when faced with a life limiting illness recorded on a living will that they will need to know. (How you want to be cared for as you near the end of your natural life)     X  Patient is unwilling to  engage in a discussion regarding advance directives at this time.

## 2022-03-11 ENCOUNTER — TELEPHONE (OUTPATIENT)
Dept: PULMONOLOGY | Facility: CLINIC | Age: 67
End: 2022-03-11
Payer: MEDICARE

## 2022-03-11 NOTE — TELEPHONE ENCOUNTER
BEN    ----- Message from Renny Nicole sent at 3/11/2022  2:51 PM CST -----  Regarding: return call  Contact: self  Type:  Patient Returning Call    Who Called:  self  Who Left Message for Patient:    Does the patient know what this is regarding?:    Best Call Back Number:  [phone]  Additional Information:

## 2022-03-14 ENCOUNTER — OFFICE VISIT (OUTPATIENT)
Dept: FAMILY MEDICINE | Facility: CLINIC | Age: 67
End: 2022-03-14
Payer: MEDICARE

## 2022-03-14 ENCOUNTER — HOSPITAL ENCOUNTER (OUTPATIENT)
Dept: RADIOLOGY | Facility: HOSPITAL | Age: 67
Discharge: HOME | End: 2022-03-14
Attending: NURSE PRACTITIONER
Payer: MEDICARE

## 2022-03-14 VITALS
SYSTOLIC BLOOD PRESSURE: 132 MMHG | HEIGHT: 66 IN | BODY MASS INDEX: 29.57 KG/M2 | OXYGEN SATURATION: 97 % | WEIGHT: 184 LBS | HEART RATE: 56 BPM | DIASTOLIC BLOOD PRESSURE: 84 MMHG

## 2022-03-14 DIAGNOSIS — K21.9 GASTROESOPHAGEAL REFLUX DISEASE WITHOUT ESOPHAGITIS: ICD-10-CM

## 2022-03-14 DIAGNOSIS — F17.200 TOBACCO DEPENDENCE: ICD-10-CM

## 2022-03-14 DIAGNOSIS — F41.1 GENERALIZED ANXIETY DISORDER: ICD-10-CM

## 2022-03-14 DIAGNOSIS — C67.2 MALIGNANT NEOPLASM OF LATERAL WALL OF URINARY BLADDER: ICD-10-CM

## 2022-03-14 DIAGNOSIS — G47.33 OSA ON CPAP: ICD-10-CM

## 2022-03-14 DIAGNOSIS — I10 ESSENTIAL HYPERTENSION: Primary | ICD-10-CM

## 2022-03-14 DIAGNOSIS — I48.91 ATRIAL FIBRILLATION, UNSPECIFIED TYPE: ICD-10-CM

## 2022-03-14 PROCEDURE — 99214 OFFICE O/P EST MOD 30 MIN: CPT | Mod: S$GLB,,, | Performed by: FAMILY MEDICINE

## 2022-03-14 PROCEDURE — 99214 PR OFFICE/OUTPT VISIT, EST, LEVL IV, 30-39 MIN: ICD-10-PCS | Mod: S$GLB,,, | Performed by: FAMILY MEDICINE

## 2022-03-14 PROCEDURE — 71250 CT THORAX DX C-: CPT | Mod: TC,PO

## 2022-03-14 RX ORDER — ESCITALOPRAM OXALATE 20 MG/1
10 TABLET ORAL DAILY
Qty: 90 TABLET | Refills: 1 | Status: SHIPPED | OUTPATIENT
Start: 2022-03-14 | End: 2022-07-01

## 2022-03-15 ENCOUNTER — PATIENT MESSAGE (OUTPATIENT)
Dept: FAMILY MEDICINE | Facility: CLINIC | Age: 67
End: 2022-03-15
Payer: MEDICARE

## 2022-03-16 ENCOUNTER — TELEPHONE (OUTPATIENT)
Dept: FAMILY MEDICINE | Facility: CLINIC | Age: 67
End: 2022-03-16
Payer: MEDICARE

## 2022-03-18 ENCOUNTER — TELEPHONE (OUTPATIENT)
Dept: FAMILY MEDICINE | Facility: CLINIC | Age: 67
End: 2022-03-18
Payer: MEDICARE

## 2022-03-21 ENCOUNTER — TELEPHONE (OUTPATIENT)
Dept: UROLOGY | Facility: CLINIC | Age: 67
End: 2022-03-21
Payer: MEDICARE

## 2022-03-21 ENCOUNTER — HOSPITAL ENCOUNTER (OUTPATIENT)
Dept: RADIOLOGY | Facility: HOSPITAL | Age: 67
Discharge: HOME | End: 2022-03-21
Attending: NURSE PRACTITIONER
Payer: MEDICARE

## 2022-03-21 DIAGNOSIS — M79.661 BILATERAL CALF PAIN: ICD-10-CM

## 2022-03-21 DIAGNOSIS — M79.662 BILATERAL CALF PAIN: ICD-10-CM

## 2022-03-21 PROCEDURE — 93922 US ARTERIAL LOWER EXTREMITY BILAT WITH ABI (XPD): ICD-10-PCS | Mod: 26,,, | Performed by: RADIOLOGY

## 2022-03-21 PROCEDURE — 93922 UPR/L XTREMITY ART 2 LEVELS: CPT | Mod: 26,,, | Performed by: RADIOLOGY

## 2022-03-21 PROCEDURE — 93925 LOWER EXTREMITY STUDY: CPT | Mod: 26,,, | Performed by: RADIOLOGY

## 2022-03-21 PROCEDURE — 93925 LOWER EXTREMITY STUDY: CPT | Mod: TC

## 2022-03-21 PROCEDURE — 93925 US ARTERIAL LOWER EXTREMITY BILAT WITH ABI (XPD): ICD-10-PCS | Mod: 26,,, | Performed by: RADIOLOGY

## 2022-03-21 NOTE — TELEPHONE ENCOUNTER
Called and spoke to patient regarding appt scheduled on 4/8. Established patient of Dr. Garcia. Informed patient that providers do not share patient and would have to f/u with Dr. Garcia. Patient states that he would not like to f/u with est MD. Advised patient that he will have to seek second opinion outside to dept. Appt scheduled 4/8 canceled. Patient voiced understanding.

## 2022-03-23 ENCOUNTER — TELEPHONE (OUTPATIENT)
Dept: CARDIOLOGY | Facility: CLINIC | Age: 67
End: 2022-03-23
Payer: MEDICARE

## 2022-03-23 NOTE — TELEPHONE ENCOUNTER
----- Message from Cher Shaw MA sent at 3/23/2022 12:11 PM CDT -----  Contact: ANDREW ALBRECHT [[id number]]  Type: Needs Medical Advice    Who Called: ANDREW ALBRECHT [[id number]]  Best Call Back Number: [phone]  Inquiry/Question: Would you kindly call ANDREW ALBRECHT [[id number]] regarding a referral for dr alonso need sooner appt    Thank you~

## 2022-03-24 ENCOUNTER — TELEPHONE (OUTPATIENT)
Dept: UROLOGY | Facility: CLINIC | Age: 67
End: 2022-03-24
Payer: MEDICARE

## 2022-03-24 ENCOUNTER — PATIENT MESSAGE (OUTPATIENT)
Dept: UROLOGY | Facility: CLINIC | Age: 67
End: 2022-03-24
Payer: MEDICARE

## 2022-03-24 NOTE — TELEPHONE ENCOUNTER
Spoke w pt was  advised based on md review would not transfer care within our practice, [facility] after being noncompliant with policies (ie covid testing), treatment plans, and then no-showing   Pt voiced ok and understanding.

## 2022-03-24 NOTE — TELEPHONE ENCOUNTER
----- Message from Ebony Del Cid sent at 3/24/2022  1:39 PM CDT -----  Type:  Sooner Apoointment Request    Caller is requesting a sooner appointment.  Caller declined first available appointment listed below.  Caller will not accept being placed on the waitlist and is requesting a message be sent to doctor.    Name of Caller:  Patient  When is the first available appointment?  7/15  Symptoms:  cancer  Best Call Back Number:    Additional Information:  Calling to schedule sooner appointment if possible. Patient's April appointment was cancelled.

## 2022-03-28 ENCOUNTER — PATIENT MESSAGE (OUTPATIENT)
Dept: FAMILY MEDICINE | Facility: CLINIC | Age: 67
End: 2022-03-28
Payer: MEDICARE

## 2022-03-28 ENCOUNTER — TELEPHONE (OUTPATIENT)
Dept: CARDIOLOGY | Facility: CLINIC | Age: 67
End: 2022-03-28
Payer: MEDICARE

## 2022-03-28 ENCOUNTER — PATIENT MESSAGE (OUTPATIENT)
Dept: CARDIOLOGY | Facility: CLINIC | Age: 67
End: 2022-03-28
Payer: MEDICARE

## 2022-03-28 ENCOUNTER — TELEPHONE (OUTPATIENT)
Dept: FAMILY MEDICINE | Facility: CLINIC | Age: 67
End: 2022-03-28
Payer: MEDICARE

## 2022-03-28 DIAGNOSIS — I49.9 CARDIAC ARRHYTHMIA, UNSPECIFIED CARDIAC ARRHYTHMIA TYPE: Primary | ICD-10-CM

## 2022-03-28 NOTE — TELEPHONE ENCOUNTER
----- Message from Jarrod Mcpherson sent at 3/28/2022 12:58 PM CDT -----  Contact: pt  Type: Needs Medical Advice    Who Called:pt   Best Call Back Number:[phone]    Additional Information: Missed call to get an appt scheduled with baker pt is refed from Dr Burrell please call back      Please Advise-Thank you

## 2022-03-31 ENCOUNTER — TELEPHONE (OUTPATIENT)
Dept: CARDIOLOGY | Facility: CLINIC | Age: 67
End: 2022-03-31
Payer: MEDICARE

## 2022-03-31 NOTE — TELEPHONE ENCOUNTER
----- Message from Lennie Dubose sent at 3/31/2022  7:46 AM CDT -----  Regarding: appointment  Good morning,     We recently saw this patient for an Enhanced Annual Wellness Visit and the provider,   Philly Epperson NP, put in a referral for cardiologist for cardiac arrhythmia. I am having trouble finding an available appt for him.  Can you please reach out to him to schedule him an appt?    Thank you and have a great day,    Lennie

## 2022-04-04 ENCOUNTER — PATIENT MESSAGE (OUTPATIENT)
Dept: FAMILY MEDICINE | Facility: CLINIC | Age: 67
End: 2022-04-04
Payer: MEDICARE

## 2022-04-04 DIAGNOSIS — I49.9 CARDIAC ARRHYTHMIA, UNSPECIFIED CARDIAC ARRHYTHMIA TYPE: Primary | ICD-10-CM

## 2022-04-14 ENCOUNTER — PATIENT MESSAGE (OUTPATIENT)
Dept: PULMONOLOGY | Facility: CLINIC | Age: 67
End: 2022-04-14
Payer: MEDICARE

## 2022-04-20 ENCOUNTER — OFFICE VISIT (OUTPATIENT)
Dept: CARDIOLOGY | Facility: CLINIC | Age: 67
End: 2022-04-20
Payer: MEDICARE

## 2022-04-20 VITALS
DIASTOLIC BLOOD PRESSURE: 72 MMHG | OXYGEN SATURATION: 98 % | BODY MASS INDEX: 28.93 KG/M2 | WEIGHT: 180 LBS | HEIGHT: 66 IN | SYSTOLIC BLOOD PRESSURE: 118 MMHG | HEART RATE: 78 BPM

## 2022-04-20 DIAGNOSIS — I70.0 ATHEROSCLEROSIS OF AORTA: ICD-10-CM

## 2022-04-20 DIAGNOSIS — E78.2 MIXED HYPERLIPIDEMIA: ICD-10-CM

## 2022-04-20 DIAGNOSIS — C67.9 MALIGNANT NEOPLASM OF URINARY BLADDER, UNSPECIFIED SITE: ICD-10-CM

## 2022-04-20 DIAGNOSIS — I25.118 ATHEROSCLEROTIC HEART DISEASE OF NATIVE CORONARY ARTERY WITH OTHER FORMS OF ANGINA PECTORIS: ICD-10-CM

## 2022-04-20 DIAGNOSIS — I49.9 CARDIAC ARRHYTHMIA, UNSPECIFIED CARDIAC ARRHYTHMIA TYPE: ICD-10-CM

## 2022-04-20 DIAGNOSIS — I49.5 TACHY-BRADY SYNDROME: ICD-10-CM

## 2022-04-20 DIAGNOSIS — J43.1 PANLOBULAR EMPHYSEMA: ICD-10-CM

## 2022-04-20 DIAGNOSIS — I24.89 ISCHEMIA DUE TO INCREASED OXYGEN DEMAND: ICD-10-CM

## 2022-04-20 DIAGNOSIS — I15.1 HYPERTENSION SECONDARY TO OTHER RENAL DISORDERS: ICD-10-CM

## 2022-04-20 DIAGNOSIS — Z01.818 PRE-OPERATIVE CLEARANCE: ICD-10-CM

## 2022-04-20 DIAGNOSIS — R07.9 CHEST PAIN, UNSPECIFIED TYPE: Primary | ICD-10-CM

## 2022-04-20 DIAGNOSIS — R97.20 ELEVATED PSA, BETWEEN 10 AND LESS THAN 20 NG/ML: ICD-10-CM

## 2022-04-20 DIAGNOSIS — R00.1 BRADYCARDIA: ICD-10-CM

## 2022-04-20 DIAGNOSIS — F17.200 TOBACCO DEPENDENCE: ICD-10-CM

## 2022-04-20 DIAGNOSIS — R06.09 DOE (DYSPNEA ON EXERTION): ICD-10-CM

## 2022-04-20 DIAGNOSIS — Z79.01 CHRONIC ANTICOAGULATION: ICD-10-CM

## 2022-04-20 DIAGNOSIS — G47.33 OSA ON CPAP: ICD-10-CM

## 2022-04-20 DIAGNOSIS — I48.0 PAROXYSMAL ATRIAL FIBRILLATION: ICD-10-CM

## 2022-04-20 DIAGNOSIS — Z78.9 STATIN INTOLERANCE: ICD-10-CM

## 2022-04-20 PROCEDURE — 93000 EKG 12-LEAD: ICD-10-PCS | Mod: S$GLB,,, | Performed by: GENERAL PRACTICE

## 2022-04-20 PROCEDURE — 99417 PROLNG OP E/M EACH 15 MIN: CPT | Mod: S$GLB,,, | Performed by: GENERAL PRACTICE

## 2022-04-20 PROCEDURE — 99205 OFFICE O/P NEW HI 60 MIN: CPT | Mod: S$GLB,,, | Performed by: GENERAL PRACTICE

## 2022-04-20 PROCEDURE — 99205 PR OFFICE/OUTPT VISIT, NEW, LEVL V, 60-74 MIN: ICD-10-PCS | Mod: S$GLB,,, | Performed by: GENERAL PRACTICE

## 2022-04-20 PROCEDURE — 93000 ELECTROCARDIOGRAM COMPLETE: CPT | Mod: S$GLB,,, | Performed by: GENERAL PRACTICE

## 2022-04-20 PROCEDURE — 99417 PR PROLONGED SVC, OUTPT, W/WO DIRECT PT CONTACT,  EA ADDTL 15 MIN: ICD-10-PCS | Mod: S$GLB,,, | Performed by: GENERAL PRACTICE

## 2022-04-20 NOTE — PROGRESS NOTES
Subjective:    Patient ID:  Martell Corcoran is a 66 y.o. male who presents for evaluation of         Chief Complaint   Patient presents with    Establish Care    Atrial Fibrillation       HPI:  Dr Gaming   Pt here to follow up on acute and chronic conditions (Anxiety, Pepcid, HTN, CAD (65%), HLD, GERD)     Heartburn is doing ok on nexium.  (failed prilosec,protonix)     BP is doing ok today. Weight is stable. Since last visit, pt was dx with Afib. Was put on Eliquis and toprol. Was being seen by Dr. Miranda. No longer wishes to go to [place] and wants a doctor in [place] and in the network. Had an angiogram and told has a 50% blockage in one of his arteries     Has dx of GLENNA and has been using CPAP. Had PSG upstairs. Has been sleeping ok, but concerned because his face is swollen when he wakes up. He is using nasal pillow right now.       Has not been able to remember much since he had COVID in 2020.  Still doesn't have any sense of taste or smell.      Anxiety is doing ok.      Continues to be part of the smoking cessation program. Still smoking. Was down to 3 cigarettes a day. used to be a 1.5 ppd.  Sometimes up to 1/2 ppd. No using nicotine patches a day.      Has a chronic cough.  His allergies are bothering him a lot. Takes otc allergy med.      Has an appt with Dr. Pham ([place]). Has had tuberculin (urothelial cell CA)bladder infusions, needs to have another cystoscopy, that he was supposed to do in Feb. Had to postpone due to a UTI, followed by a yeast infection.      Had labs done: fBS 88, LDL 84, TSH 1.10.      Dr. Navarro changed his nexium from 20 to 40mg.   -----------------------------------------------  csope 2011 ([place])   2/20/22    THE PATIENT IS HERE TO ESTABLISH CARE.  HE PREVIOUSLY SAW DR. OPAL BOYCE BUT NEEDS A LOCAL CARDIOLOGIST.  He has a history of heart catheterization with a 30% lesion he had a 2nd catheterization done by Dr. Miranda which showed a 50% lesion in March 2021. He  developed atrial fibrillation and had a slow response and therefore cardioversion was not performed.  Holter monitor with a Zio revealed a 2nd pause.  Is not clear if this occurred while he was sleeping but he did obtain a sleep study and is now using CPAP for the last 2 months.  Holter revealed ventricular rate between 35 and 144 and he was recommended for permanent pacemaker but did not have it done because they want to treat the sleep apnea 1st.  The patient complains of midsternal chest tightness which is not necessarily related to activity was getting increasing frequency and severely.  The patient has shortness of breath sometimes with exertion.  He smokes half pack a day, and is trying to quit..  He is taking his statin Livalo.  He has a history of depression but is self weaning of his antidepressant.  He has COPD and uses inhalers.  He sees a pulmonary is.  The patient has been on Eliquis and aspirin.  He is currently being treated with BCG for bladder cancer.  He is scheduled for a cystoscopy early May needs clearance to stop the Eliquis and aspirin.  He has a rising PSA which he thinks is secondary to stopping finasteride is previously was 3.4 then went up to 6.0 is currently 12 and in these to have a prostate biopsy his medications were reviewed.    The patient is interested in having cardioversion performed was told he would need a pacemaker or ablation.  He does not feel his normal of endurant her physical status.  He has had no syncope or near syncope does get occasionally dizzy.      Review of patient's allergies indicates:   Allergen Reactions    Oyster extract Swelling     PT swells in his throat after eating oysters on occasion       Past Medical History:   Diagnosis Date    Benign enlargement of prostate     Had a biopsy in around 2015    Elevated PSA     GERD (gastroesophageal reflux disease)     Hypertension     Started with meds in 2012.    Kidney stone     Urinary tract infection       Past Surgical History:   Procedure Laterality Date    FISTULA REPAIR      LEFT HEART CATHETERIZATION N/A 10/23/2018    Procedure: Left heart cath;  Surgeon: Niharika Squires MD;  Location[facility] C[facility];  Service: Cardiology;  Laterality: N/A;     Social History     Tobacco Use    Smoking status: Current Every Day Smoker     Packs/day: 1.00     Years: 55.00     Pack years: 55.00     Types: Cigarettes    Smokeless tobacco: Never Used   Substance Use Topics    Alcohol use: Yes     Comment: Previous 12 beer a day for 20 years. Now occ.    Drug use: No     Family History   Problem Relation Age of Onset    Heart failure Mother     Cancer Father     Heart disease Brother     Heart attack Brother     Cancer Brother     Heart disease Brother     Drug abuse Brother         Review of Systems:   Constitution: Negative for diaphoresis and fever.   HEENT: Negative for nosebleeds.    Cardiovascular: Negative for chest pain       No dyspnea on exertion       No leg swelling        No palpitations  Respiratory: Negative for shortness of breath and wheezing.    Hematologic/Lymphatic: Negative for bleeding problem. Does not bruise/bleed easily.   Skin: Negative for color change and rash.   Musculoskeletal: Negative for falls and myalgias.   Gastrointestinal: Negative for hematemesis and hematochezia.   Genitourinary: Negative for hematuria.   Neurological: Negative for dizziness and light-headedness.   Psychiatric/Behavioral: Negative for altered mental status and memory loss.          Objective:        Vitals:    04/20/22 1353   BP: 118/72   Pulse: 78       Lab Results   Component Value Date    WBC 7.3 03/04/2022    HGB 16.3 03/04/2022    HCT 49.2 03/04/2022     03/04/2022    CHOL 152 03/04/2022    TRIG 77 03/04/2022    HDL 51 03/04/2022    ALT 19 03/04/2022    AST 20 03/04/2022     03/04/2022    K 4.5 03/04/2022     03/04/2022    CREATININE 0.95 03/04/2022    BUN 16 03/04/2022    CO2 29 03/04/2022     INR 0.9 10/17/2018    MICROALBUR 0.4 03/22/2021 03/04/22 0000 HDL 51 -- Final result   03/04/22 0000 CHOL 152 -- Final result   03/04/22 0000 TRIG 77 -- Final result   03/04/22 0000 LDLCALC 84 -- Final result   03/04/22 0000 CHOLHDL 3.0 -- Final result   03/04/22 0000 NONHDLCHOL 101 -- Final result       ECHOCARDIOGRAM RESULTS  No results found for this or any previous visit.        CURRENT/PREVIOUS VISIT EKG  Results for orders placed or performed during the hospital encounter of 12/16/20   EKG 12-lead    Collection Time: 12/16/20  9:29 AM    Narrative    Test Reason : R06.02,    Vent. Rate : 085 BPM     Atrial Rate : 085 BPM     P-R Int : 158 ms          QRS Dur : 098 ms      QT Int : 384 ms       P-R-T Axes : 080 -24 068 degrees     QTc Int : 456 ms    Normal sinus rhythm  Septal infarct (cited on or before 13-AUG-2020)  Abnormal ECG  When compared with ECG of 13-AUG-2020 03:15,  Incomplete right bundle branch block is no longer Present  Questionable change in initial forces of Anterior-septal leads  Confirmed by Esteban VELASQUEZ, Mikey DAVID ([id number]) on 12/18/2020 6:02:42 PM    Referred By: [email or web address]   SELF           Confirmed By:Mikey Orellana MD     No valid procedures specified.   No results found for this or any previous visit.      Physical Exam:  CONSTITUTIONAL: No fever, no chills  HEENT: Normocephalic, atraumatic,pupils reactive to light                 NECK:  No JVD no carotid bruit  CVS: S1S2+, RRR, no murmurs,   LUNGS: Clear  ABDOMEN: Soft, NT, BS+  EXTREMITIES: No cyanosis, edema  : No serra catheter  NEURO: AAO X 3  PSY: Normal affect      Medication List with Changes/Refills   Current Medications    ALBUTEROL (PROVENTIL/VENTOLIN HFA) 90 MCG/ACTUATION INHALER    Inhale 1-2 puffs into the lungs every 4 (four) hours as needed for Shortness of Breath (coughing). Rescue    ALBUTEROL-IPRATROPIUM (DUO-NEB) 2.5 MG-0.5 MG/3 ML NEBULIZER SOLUTION    USE 1 AMPULE IN NEBULIZER EVERY 4 HOURS AS NEEDED FOR WHEEZING     ALFUZOSIN (UROXATRAL) 10 MG TB24    TAKE 1 TABLET BY MOUTH ONCE DAILY AFTER BREAKFAST    ALPRAZOLAM (XANAX) 0.5 MG TABLET    Take 0.5 mg by mouth 3 (three) times daily as needed for Anxiety.    ASPIRIN (ECOTRIN) 81 MG EC TABLET        AZELASTINE (ASTELIN) 137 MCG (0.1 %) NASAL SPRAY    1 spray (137 mcg total) by Nasal route 2 (two) times daily.    BECLOMETHASONE (QVAR) 80 MCG/ACTUATION AERO    Inhale 2 puffs into the lungs 2 (two) times a day. Controller    ELIQUIS 5 MG TAB    Take 5 mg by mouth 2 (two) times daily.    ESCITALOPRAM OXALATE (LEXAPRO) 20 MG TABLET    Take 0.5 tablets (10 mg total) by mouth once daily.    ESOMEPRAZOLE (NEXIUM) 40 MG CAPSULE    Take 40 mg by mouth once daily.    EZETIMIBE (ZETIA) 10 MG TABLET    Take 10 mg by mouth once daily.    FINASTERIDE (PROSCAR) 5 MG TABLET    Take 5 mg by mouth once daily.    LIVALO 4 MG TAB    Take 1 tablet by mouth once daily.    METOPROLOL TARTRATE (LOPRESSOR) 25 MG TABLET    Take 12.5 mg by mouth 2 (two) times daily.    NICOTINE (NICODERM CQ) 21 MG/24 HR    Place 1 patch onto the skin once daily.    NITROGLYCERIN (NITROSTAT) 0.4 MG SL TABLET    Place under the tongue.    UMECLIDINIUM-VILANTEROL (ANORO ELLIPTA) 62.5-25 MCG/ACTUATION DSDV    INHALE 1 PUFF BY MOUTH ONCE DAILY (CONTROLLER)    VALACYCLOVIR (VALTREX) 1000 MG TABLET    Take 1 g by mouth 2 (two) times daily.             Assessment:       1. Chest pain, unspecified type    2. Cardiac arrhythmia, unspecified cardiac arrhythmia type    3. Atherosclerotic heart disease of native coronary artery with other forms of angina pectoris    4. QUACH (dyspnea on exertion)    5. Paroxysmal atrial fibrillation    6. Atherosclerosis of aorta    7. Ischemia due to increased oxygen demand    8. Hypertension secondary to other renal disorders    9. GLENNA on CPAP    10. Malignant neoplasm of urinary bladder, unspecified site    11. Pre-operative clearance    12. Elevated PSA, between 10 and less than 20 ng/ml    13.  Tobacco dependence    14. Mixed hyperlipidemia    15. Panlobular emphysema    16. Chronic anticoagulation    17. Statin intolerance    18. Bradycardia         Plan:     Problem List Items Addressed This Visit        Pulmonary    Chronic obstructive pulmonary disease    Relevant Orders    IN OFFICE EKG 12-LEAD (to [place])    Echo       Cardiac/Vascular    Chest pain - Primary    Relevant Orders    IN OFFICE EKG 12-LEAD (to Muse)    Echo    QUACH (dyspnea on exertion)    HTN (hypertension)    Relevant Orders    IN OFFICE EKG 12-LEAD (to Muse)    Echo    Ischemia due to increased oxygen demand    Relevant Orders    IN OFFICE EKG 12-LEAD (to [place])    Echo    Atherosclerosis of aorta    Relevant Orders    IN OFFICE EKG 12-LEAD (to Muse)    Echo    Atherosclerotic heart disease of native coronary artery with other forms of angina pectoris    Relevant Orders    IN OFFICE EKG 12-LEAD (to Muse)    Echo    Atrial fibrillation    Relevant Orders    IN OFFICE EKG 12-LEAD (to [place])    Echo       Oncology    Bladder cancer    Relevant Orders    IN OFFICE EKG 12-LEAD (to [place])    Echo       Other    Tobacco dependence    Relevant Orders    IN OFFICE EKG 12-LEAD (to [place])    Echo    GLENNA on CPAP    Relevant Orders    IN OFFICE EKG 12-LEAD (to [place])    Echo      Other Visit Diagnoses     Cardiac arrhythmia, unspecified cardiac arrhythmia type        Relevant Orders    IN OFFICE EKG 12-LEAD (to [place])    Echo    Pre-operative clearance        Relevant Orders    IN OFFICE EKG 12-LEAD (to [place])    Echo    Elevated PSA, between 10 and less than 20 ng/ml        Relevant Orders    IN OFFICE EKG 12-LEAD (to [place])    Echo    Mixed hyperlipidemia        Relevant Orders    IN OFFICE EKG 12-LEAD (to [place])    Echo    Chronic anticoagulation        Statin intolerance        Bradycardia              The patient has atrial fibrillation which started in November.  There was concern about the pacemaker and 2nd pause before starting sleep apnea treatment.   The patient had tachy-bharath syndrome with rate .  He is currently sinus rhythm in the mid 60s with shortness of breath and chest discomfort.  He may benefit from cardioversion and has been compliant with his Eliquis but needs the biopsy in the near future.  He is recommended to continue with the biopsy come back after the biopsy with a echo stress test and patch Holter monitor.  Will likely still be able to start him on an antiarrhythmic and cardiovert him to see if his symptoms improved.  It is okay to stop the Eliquis for 24 hours.  Continue tobacco cessation therapy is planned to quit on his own.  Continue Livalo and check the lipid profiles.  OK FOR CYSTO, PROSTATE BX  HOLD ELIQUIS 48 HOURS      No follow-ups on file.    The patients questions were answered, they verbalized understanding, and agreed with the treatment plan.     BILLIE RUELAS MD  [facility]

## 2022-05-02 ENCOUNTER — TELEPHONE (OUTPATIENT)
Dept: CARDIOLOGY | Facility: CLINIC | Age: 67
End: 2022-05-02
Payer: MEDICARE

## 2022-05-02 NOTE — LETTER
May 2, 2022    Martell Corcoran  [address]  [place] [address]     [facility] - Cardiology  [address]  [place] [address]  Phone: [phone]  Fax: [phone] Patient: Martell Corcoran  : 1955  Referring Doctor:Ronny   Procedure:  cysto with possible bladder bx  Date of procedure: 2022  Type of anesthesia:   Date of Last Stent:  Date of Last Office Visit:2022    Current Outpatient Medications   Medication Sig    albuterol (PROVENTIL/VENTOLIN HFA) 90 mcg/actuation inhaler Inhale 1-2 puffs into the lungs every 4 (four) hours as needed for Shortness of Breath (coughing). Rescue    albuterol-ipratropium (DUO-NEB) 2.5 mg-0.5 mg/3 mL nebulizer solution USE 1 AMPULE IN NEBULIZER EVERY 4 HOURS AS NEEDED FOR WHEEZING    alfuzosin (UROXATRAL) 10 mg Tb24 TAKE 1 TABLET BY MOUTH ONCE DAILY AFTER BREAKFAST    ALPRAZolam (XANAX) 0.5 MG tablet Take 0.5 mg by mouth 3 (three) times daily as needed for Anxiety.    aspirin (ECOTRIN) 81 MG EC tablet     azelastine (ASTELIN) 137 mcg (0.1 %) nasal spray 1 spray (137 mcg total) by Nasal route 2 (two) times daily.    beclomethasone (QVAR) 80 mcg/actuation Aero Inhale 2 puffs into the lungs 2 (two) times a day. Controller    ELIQUIS 5 mg Tab Take 5 mg by mouth 2 (two) times daily.    EScitalopram oxalate (LEXAPRO) 20 MG tablet Take 0.5 tablets (10 mg total) by mouth once daily.    esomeprazole (NEXIUM) 40 MG capsule Take 40 mg by mouth once daily.    ezetimibe (ZETIA) 10 mg tablet Take 10 mg by mouth once daily.    finasteride (PROSCAR) 5 mg tablet Take 5 mg by mouth once daily.    LIVALO 4 mg Tab Take 1 tablet by mouth once daily.    metoprolol tartrate (LOPRESSOR) 25 MG tablet Take 12.5 mg by mouth 2 (two) times daily.    nicotine (NICODERM CQ) 21 mg/24 hr Place 1 patch onto the skin once daily.    nitroGLYCERIN (NITROSTAT) 0.4 MG SL tablet Place under the tongue.    umeclidinium-vilanteroL (ANORO ELLIPTA) 62.5-25  mcg/actuation DsDv INHALE 1 PUFF BY MOUTH ONCE DAILY (CONTROLLER)    valACYclovir (VALTREX) 1000 MG tablet Take 1 g by mouth 2 (two) times daily.     No current facility-administered medications for this visit.       This patient has been assessed for risk factors for clearance of surgery with the following stipulations:    __x_ No contraindications    X    Recommendations for antiplatelet/anticoagulant medications: Patient may hold  Eliquis 2 days and ASA for 7 days days prior to surgery.     __x_ Cleared for surgery with the following contraindications/precautions:    ___ Not cleared for surgery due to the following reasons:      If you have any questions regarding the above, please contact my office at [phone].    Sincerely,    Gina Rollins PA-C   [facility] - Department of Cardiology  Office: [phone]

## 2022-05-02 NOTE — TELEPHONE ENCOUNTER
Patient scheduled for cysto with possible bladder bx with Dr. Jefferson 5/6/22.  Request scanned in media. Patient will need to hold Eliquis and ASA.    Please sign and send clearance letter.

## 2022-05-03 ENCOUNTER — TELEPHONE (OUTPATIENT)
Dept: CARDIOLOGY | Facility: CLINIC | Age: 67
End: 2022-05-03
Payer: MEDICARE

## 2022-05-03 ENCOUNTER — PATIENT MESSAGE (OUTPATIENT)
Dept: CARDIOLOGY | Facility: CLINIC | Age: 67
End: 2022-05-03
Payer: MEDICARE

## 2022-05-03 NOTE — TELEPHONE ENCOUNTER
----- Message from Jesus Contreras sent at 5/3/2022  3:27 PM CDT -----  Contact: Marisela  Type: Needs Medical Advice  Who Called: Marisela with [facility]   Symptoms (please be specific):   How long has patient had these symptoms:    Pharmacy name and phone #:    Best Call Back Number: [phone] hours 9-12 and 2-4 fax# [phone]  Additional Information: Marisela requesting a call back to verify if a clearance was sent on pt to hold his Rx aspirin (ECOTRIN) 81 MG EC tablet, for pt to have a procedure in the office.

## 2022-05-23 ENCOUNTER — HOSPITAL ENCOUNTER (OUTPATIENT)
Dept: RADIOLOGY | Facility: CLINIC | Age: 67
Discharge: HOME | End: 2022-05-23
Attending: GENERAL PRACTICE
Payer: MEDICARE

## 2022-05-23 ENCOUNTER — HOSPITAL ENCOUNTER (OUTPATIENT)
Dept: CARDIOLOGY | Facility: CLINIC | Age: 67
Discharge: HOME | End: 2022-05-23
Attending: GENERAL PRACTICE
Payer: MEDICARE

## 2022-05-23 DIAGNOSIS — I24.89 ISCHEMIA DUE TO INCREASED OXYGEN DEMAND: ICD-10-CM

## 2022-05-23 DIAGNOSIS — I70.0 ATHEROSCLEROSIS OF AORTA: ICD-10-CM

## 2022-05-23 DIAGNOSIS — F17.200 TOBACCO DEPENDENCE: ICD-10-CM

## 2022-05-23 DIAGNOSIS — I25.118 ATHEROSCLEROTIC HEART DISEASE OF NATIVE CORONARY ARTERY WITH OTHER FORMS OF ANGINA PECTORIS: ICD-10-CM

## 2022-05-23 DIAGNOSIS — I49.9 CARDIAC ARRHYTHMIA, UNSPECIFIED CARDIAC ARRHYTHMIA TYPE: ICD-10-CM

## 2022-05-23 DIAGNOSIS — R07.9 CHEST PAIN, UNSPECIFIED TYPE: ICD-10-CM

## 2022-05-23 DIAGNOSIS — I48.0 PAROXYSMAL ATRIAL FIBRILLATION: ICD-10-CM

## 2022-05-23 DIAGNOSIS — R97.20 ELEVATED PSA, BETWEEN 10 AND LESS THAN 20 NG/ML: ICD-10-CM

## 2022-05-23 DIAGNOSIS — R06.09 DOE (DYSPNEA ON EXERTION): ICD-10-CM

## 2022-05-23 PROCEDURE — 78452 STRESS TEST WITH MYOCARDIAL PERFUSION (CUPID ONLY): ICD-10-PCS | Mod: S$GLB,,, | Performed by: GENERAL PRACTICE

## 2022-05-23 PROCEDURE — 93015 CV STRESS TEST SUPVJ I&R: CPT | Mod: S$GLB,,, | Performed by: GENERAL PRACTICE

## 2022-05-23 PROCEDURE — A9502 TC99M TETROFOSMIN: HCPCS | Mod: S$GLB,,, | Performed by: GENERAL PRACTICE

## 2022-05-23 PROCEDURE — 78452 HT MUSCLE IMAGE SPECT MULT: CPT | Mod: S$GLB,,, | Performed by: GENERAL PRACTICE

## 2022-05-23 PROCEDURE — A9502 STRESS TEST WITH MYOCARDIAL PERFUSION (CUPID ONLY): ICD-10-PCS | Mod: S$GLB,,, | Performed by: GENERAL PRACTICE

## 2022-05-23 PROCEDURE — 93015 STRESS TEST WITH MYOCARDIAL PERFUSION (CUPID ONLY): ICD-10-PCS | Mod: S$GLB,,, | Performed by: GENERAL PRACTICE

## 2022-05-23 RX ORDER — REGADENOSON 0.08 MG/ML
0.4 INJECTION, SOLUTION INTRAVENOUS ONCE
Status: COMPLETED | OUTPATIENT
Start: 2022-05-23 | End: 2022-05-23

## 2022-05-23 RX ADMIN — REGADENOSON 0.4 MG: 0.08 INJECTION, SOLUTION INTRAVENOUS at 08:05

## 2022-05-25 LAB
CV PHARM DOSE: 0.4 MG
CV STRESS BASE HR: 59 BPM
DIASTOLIC BLOOD PRESSURE: 84 MMHG
EJECTION FRACTION- HIGH: 65 %
END DIASTOLIC INDEX-HIGH: 158 ML/M2
END DIASTOLIC INDEX-LOW: 94 ML/M2
END SYSTOLIC INDEX-HIGH: 71 ML/M2
END SYSTOLIC INDEX-LOW: 33 ML/M2
NUC STRESS DIASTOLIC VOLUME INDEX: 71
NUC STRESS EJECTION FRACTION: 65 %
NUC STRESS SYSTOLIC VOLUME INDEX: 25
OHS CV CPX 1 MINUTE RECOVERY HEART RATE: 54 BPM
OHS CV CPX 85 PERCENT MAX PREDICTED HEART RATE MALE: 131
OHS CV CPX MAX PREDICTED HEART RATE: 154
OHS CV CPX PATIENT IS FEMALE: 0
OHS CV CPX PATIENT IS MALE: 1
OHS CV CPX PEAK DIASTOLIC BLOOD PRESSURE: 70 MMHG
OHS CV CPX PEAK HEAR RATE: 68 BPM
OHS CV CPX PEAK RATE PRESSURE PRODUCT: 8840
OHS CV CPX PEAK SYSTOLIC BLOOD PRESSURE: 130 MMHG
OHS CV CPX PERCENT MAX PREDICTED HEART RATE ACHIEVED: 44
OHS CV CPX RATE PRESSURE PRODUCT PRESENTING: 8378
RETIRED EF AND QEF - SEE NOTES: 53 %
SYSTOLIC BLOOD PRESSURE: 142 MMHG

## 2022-06-01 ENCOUNTER — OFFICE VISIT (OUTPATIENT)
Dept: PULMONOLOGY | Facility: CLINIC | Age: 67
End: 2022-06-01
Payer: MEDICARE

## 2022-06-01 VITALS
SYSTOLIC BLOOD PRESSURE: 112 MMHG | HEART RATE: 81 BPM | WEIGHT: 180.56 LBS | OXYGEN SATURATION: 93 % | DIASTOLIC BLOOD PRESSURE: 69 MMHG | BODY MASS INDEX: 29.02 KG/M2 | HEIGHT: 66 IN

## 2022-06-01 DIAGNOSIS — J43.2 CENTRILOBULAR EMPHYSEMA: Primary | ICD-10-CM

## 2022-06-01 DIAGNOSIS — R91.1 LUNG NODULE: ICD-10-CM

## 2022-06-01 PROCEDURE — 99999 PR PBB SHADOW E&M-EST. PATIENT-LVL IV: CPT | Mod: PBBFAC,,, | Performed by: NURSE PRACTITIONER

## 2022-06-01 PROCEDURE — 99214 OFFICE O/P EST MOD 30 MIN: CPT | Mod: PBBFAC,PO | Performed by: NURSE PRACTITIONER

## 2022-06-01 PROCEDURE — 99213 OFFICE O/P EST LOW 20 MIN: CPT | Mod: S$PBB,,, | Performed by: NURSE PRACTITIONER

## 2022-06-01 PROCEDURE — 99213 PR OFFICE/OUTPT VISIT, EST, LEVL III, 20-29 MIN: ICD-10-PCS | Mod: S$PBB,,, | Performed by: NURSE PRACTITIONER

## 2022-06-01 PROCEDURE — 99999 PR PBB SHADOW E&M-EST. PATIENT-LVL IV: ICD-10-PCS | Mod: PBBFAC,,, | Performed by: NURSE PRACTITIONER

## 2022-06-01 NOTE — PROGRESS NOTES
6/1/2022    Martell Corcoran  Follow up    Chief Complaint   Patient presents with    3m f/u    Chest Pain    Shortness of Breath       HPI:     6/1/2022- SOB persistent complaint, pain with deep inspiration on left chest. Followed by cardiology for A fib and vascular blockages pacemaker procedure postponed.    Wearing CPAP with nose mask, moves across face when sleeping. Does not feel better after wearing.   Cough- improved, less often and severe, most days, worse when weather changes, productive clear mucous  Currently smoking 3-4 cigarettes daily, trying to cut down.       2/4/2022-  States breathing is labored. States usually worse after infusion for bladder cancer. States does have benefit with qvar and Anoro, using albuterol as needed 2x daily.     Daytime fatigue persistent.  purchased CPAP from third party. Has not started to use.     Scheduled for pacemaker to treat A-fib. Followed by Dr. Miranda.     Cough- decreased, 2x daily, productive clear mucous, cut back on smoking to 6 cigarettes daily    1/14/2022- not able to get CPAP due to high co-pay.   States breathing improved after decreasing smoking to 3-6 cigarettes daily.   Shortness of breath- daily complaint, slightly improved, worse with exertion, improves with rest. Daytime fatigue unchanged.   Complaint of cough- varies in severity, currently mild. Productive small amount clear mucous, did notice worsening after first starting to cut back on smoking. Has returned to normal   Followed by Urologist Dr. Pham for bladder cancer. Undergoing infusion therapy.     10/4/2021- concerned he had COVID 19 late July early August, lost sense of taste and smell. Complaint of fatigue, body aches, shortness of breath when walking,   No fever. Gradually improved over 4 weeks. Taste and smell has not returned. Experiencing short term memory loss and metal grogginess. Has daytime drowsiness onset years worsened in past two months  Persistent cough- productive  clear mucous, associated with wheeze. Improves with albuterol inhaler.   Currently on Anoro, QVAR, using albuterol when needed 1-3 x weekly.     2021- Admitted To hospital in [place] while on vacation 2021 for COPD exacerbation. Seen at [facility] 2020 for COPD exacerbation.   Complaint of SOB- daily, worse with exertion, improves with rest. Treated with antibiotics and steroid therapy April.   Cough- recurrent complaint,  improved since hospital discharge, worsened in last 2 weeks after spending time with grandson who had bronchitis that has improved with nebulizer treatments every 4 hours. Associated with chest tightness and wheeze.       2020- he had bladder biopsy at [facility] with Dr. Pham on 11/3- per outside records path with High grade papillary urothelial cell carcinoma. He has cough w/ postnasal drip and allergies but breathing is much better. He has been dealing with a lot for the family- 2 brothers just . Hematuria is much better. Not getting bladder infections any more. He continues to smoke 1/2 ppd. Wants to quit but too much stress recently- not ready.    2020-   Start taking prednisone again- long taper over 3 weeks then try to stop. If lungs flaring while decreasing prednisone go up to the last dose that helped you and call our office.  Continue trelegy  Refilled duo nebs to use as needed  Use albuterol as needed  Quit smoking- go to program  Follow up with urologist  Follow up with PCP for kidney testing and possible urinary infection  pt was recently hospitalized for COPD exacerbation, seen by me while admitted 20 and also having worsening hematuria at that time. He was sent home with a course of levaquin and steroid taper. He did not qualify for home O2. He is being worked up for a bladder mass per urology and pending transurethral resection.  Pt c/o pain around L kidney after doing yard work and urine turned darker. He was coughing last night  and woke up with face feeling puffy. Switched urologists to a Dr. Pham at [place] and has appt at end of September.  He finished prednisone taper. Still taking levaquin. Still feels some congestion in chest but it is getting better. Also has sinus problems w/ nose blocked. He denies frequent exacerbations but this one has been very bad. Feels like worsening since stopped prednisone. Still smokes but trying to cut back. Has smoking cessation appt later this month.  Inhalers: nebs 4-5x/day, trelegy daily, albuterol rescue 1-2x/day    7/15/20- Pt is a 66 yo male with HTN, GERD and BPH presents for new evaluation of breathing issues. He complains of SOB especially if he carries anything like taking out trash to the dumpster. This has been progressive over about 3.5 yrs. He wheezes and coughs up clear mucous, throughout the day.  Pt smokes 1 PPD x 55 yrs.  At age 5 pt had pna and a collapsed lung requiring chest tube on the left side and hospitalization. Didn't have any other problems w/ breathing growing up. He took up playing [employer] to help lung function.  Pt had abd/p scan for UTI recently which showed some emphysematous changes.    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 One brother  at 65 from leukemia.    Grew up in [place]    The chief compliant  problem varies with instablilty at time      PFSH:  Past Medical History:   Diagnosis Date    Benign enlargement of prostate     Had a biopsy in around     Elevated PSA     GERD (gastroesophageal reflux disease)     Hypertension     Started with meds in .    Kidney stone     Urinary tract infection          Past Surgical History:   Procedure Laterality Date    FISTULA REPAIR      LEFT HEART CATHETERIZATION N/A 10/23/2018    Procedure: Left heart cath;  Surgeon: Niharika Squires MD;  Location: [facility] CATH;  Service: Cardiology;  Laterality: N/A;  "    Social History     Tobacco Use    Smoking status: Current Every Day Smoker     Packs/day: 1.00     Years: 55.00     Pack years: 55.00     Types: Cigarettes    Smokeless tobacco: Never Used   Substance Use Topics    Alcohol use: Yes     Comment: Previous 12 beer a day for 20 years. Now occ.    Drug use: No     Family History   Problem Relation Age of Onset    Heart failure Mother     Cancer Father     Heart disease Brother     Heart attack Brother     Cancer Brother     Heart disease Brother     Drug abuse Brother      Review of patient's allergies indicates:   Allergen Reactions    Msg [glutamic acid] Nausea Only, Palpitations, Shortness Of Breath and Swelling    Oyster extract Swelling     PT swells in his throat after eating oysters on occasion       Performance Status:The patient's activity level is functions out of house. QUACH improved and does not limit him. Back pain limits activity.    Review of Systems:  a review of eleven systems covering constitutional, Eye, HEENT, Psych, Respiratory, Cardiac, GI, , Musculoskeletal, Endocrine, Dermatologic was negative except for pertinent findings as listed ABOVE and below:   wheeze, shortness of breath, fatigue      Exam:Comprehensive exam done. /69 (BP Location: Left arm, Patient Position: Sitting, BP Method: Medium (Automatic))   Pulse 81   Ht 5' 6" (1.676 m)   Wt 81.9 kg (180 lb 8.9 oz)   SpO2 (!) 93% Comment: on room air at rest  BMI 29.14 kg/m²   Exam included Vitals as listed, and patient's appearance and affect and alertness and mood, oral exam for yeast and hygiene and pharynx lesions and Mallapatti (M) score, neck with inspection for jvd and masses and thyroid abnormalities and lymph nodes (supraclavicular and infraclavicular nodes and axillary also examined and noted if abn), chest exam included symmetry and effort and fremitus and percussion and auscultation, cardiac exam included rhythm and gallops and murmur and rubs and jvd and " edema, abdominal exam for mass and hepatosplenomegaly and tenderness and hernias and bowel sounds, Musculoskeletal exam with muscle tone and posture and mobility/gait and  strength, and skin for rashes and cyanosis and pallor and turgor, extremity for clubbing.  Findings were normal except for pertinent findings listed below:  M1  Bilateral clear lungs w/ faint rhonchi bilateral lower lung zones  No clubbing or edema    Radiographs (ct chest and cxr) reviewed: view by direct vision   CT Chest Without Contrast 03/14/22 Emphysema and unchanged lung nodules  Indeterminate smoothly marginated structure along the anterior superior mediastinum, measuring just above simple fluid attenuation suggestive of a minimally complex/complicated cyst, possibly a lymphovascular malformation, synovial cyst (extending from the sternoclavicular joint), foregut duplication cyst, thymic epithelial lesion/cyst, and much less likely malignancy, however this has slightly increased in size from the previous exam and may warrant interval attention on follow-up imaging.       CT Chest Without Contrast  05/20/2021   In the left upper lobe is a confluence of vessels and a possible 4 mm nodule decreased in size and conspicuousness since the prior study of July 16, 2020.  No new or enlarging pulmonary nodules are identified.  Emphysema.     CT chest/abd/p 11/20/20- mild emphysema, lungs clear aside from small 6mm nodule DANA which is unchanged from prior exam  1. No metastatic disease.  2. Nodular thickening of the left posterior aspect of the bladder.  3. Enlarged prostate.  3. Mild pulmonary emphysema.   CXR 8/14/20- possible vague infiltrate/opacifications bilateral bases  CT chest 7/16/20- DANA 6mm nodule  CT abd/p 6/26/20- bases of lungs w/ scant emphysematous changes, some strands of atelectasis    Labs reviewed    Lab Results   Component Value Date    WBC 7.3 03/04/2022    RBC 5.23 03/04/2022    HGB 16.3 03/04/2022    HCT 49.2 03/04/2022     MCV 94.1 03/04/2022    MCH 31.2 03/04/2022    MCHC 33.1 03/04/2022    RDW 13.0 03/04/2022     03/04/2022    MPV 8.9 03/04/2022    GRAN 10.5 (H) 12/16/2020    GRAN 74.4 (H) 12/16/2020    LYMPH 1,570 03/04/2022    LYMPH 21.5 03/04/2022    MONO 832 03/04/2022    MONO 11.4 03/04/2022     03/04/2022    BASO 29 03/04/2022    EOSINOPHIL 2.2 03/04/2022    BASOPHIL 0.4 03/04/2022     Results for MARTELL ALBRECHT (MRN [id number]) as of 5/24/2021 15:33   Ref. Range 8/13/2020 02:30 8/13/2020 06:01 8/14/2020 05:03 12/16/2020 09:20 3/22/2021 15:14   Eos # Latest Ref Range: 15 - 500 cells/uL 1.4 (H) 0.3 0.0 0.5 122       PFT reviewed  7/16/20- moderately severe obstruction, reduced DLCO, air trapping    EPWORTH SLEEPINESS SCALE SCORE 13 on 10/4/2021  Sleep study 10/7/2021 AHI Significant obstructive sleep apnea with a TRUPTI of 8.1/hr      Plan:  Clinical impression is apparently straight forward and impression with management as below.    Martell was seen today for 3m f/u, chest pain and shortness of breath.    Diagnoses and all orders for this visit:    Centrilobular emphysema    Lung nodule     - continue COPD medication regiment   - lung nodules unchanged.        Follow up in about 6 months (around 12/1/2022), or if symptoms worsen or fail to improve.    Discussed with patient above for education the following:      Patient Instructions   CT of chest shows clear lungs, no change to previously seen lung nodules.     A cyst is seen in muscle could be cause of chest pain    Recommend smoking cessation    Continue COPD medication regiment    Recommend trying CPAP therapy again.

## 2022-06-01 NOTE — PATIENT INSTRUCTIONS
CT of chest shows clear lungs, no change to previously seen lung nodules.     A cyst is seen in muscle could be cause of chest pain    Recommend smoking cessation    Continue COPD medication regiment    Recommend trying CPAP therapy again.

## 2022-06-15 ENCOUNTER — PATIENT MESSAGE (OUTPATIENT)
Dept: PULMONOLOGY | Facility: CLINIC | Age: 67
End: 2022-06-15
Payer: MEDICARE

## 2022-06-15 DIAGNOSIS — R93.89 ABNORMAL CT OF THE CHEST: Primary | ICD-10-CM

## 2022-06-23 ENCOUNTER — PATIENT MESSAGE (OUTPATIENT)
Dept: CARDIOLOGY | Facility: CLINIC | Age: 67
End: 2022-06-23
Payer: MEDICARE

## 2022-06-23 ENCOUNTER — TELEPHONE (OUTPATIENT)
Dept: CARDIOLOGY | Facility: CLINIC | Age: 67
End: 2022-06-23
Payer: MEDICARE

## 2022-06-30 NOTE — PROGRESS NOTES
Subjective:    Patient ID:  Martell Corcoran is a 67 y.o. male who presents for follow-up of   Chief Complaint   Patient presents with    Results     Stress echo event monitor results       HPI:  Dr Gaming   Pt here to follow up on acute and chronic conditions (Anxiety, Pepcid, HTN, CAD (65%), HLD, GERD)     Heartburn is doing ok on nexium.  (failed prilosec,protonix)     BP is doing ok today. Weight is stable. Since last visit, pt was dx with Afib. Was put on Eliquis and toprol. Was being seen by Dr. Miranda. No longer wishes to go to [place] and wants a doctor in [place] and in the network. Had an angiogram and told has a 50% blockage in one of his arteries     Has dx of GLENNA and has been using CPAP. Had PSG upstairs. Has been sleeping ok, but concerned because his face is swollen when he wakes up. He is using nasal pillow right now.       Has not been able to remember much since he had COVID in 2020.  Still doesn't have any sense of taste or smell.      Anxiety is doing ok.      Continues to be part of the smoking cessation program. Still smoking. Was down to 3 cigarettes a day. used to be a 1.5 ppd.  Sometimes up to 1/2 ppd. No using nicotine patches a day.      Has a chronic cough.  His allergies are bothering him a lot. Takes otc allergy med.      Has an appt with Dr. Pham ([place]). Has had tuberculin (urothelial cell CA)bladder infusions, needs to have another cystoscopy, that he was supposed to do in Feb. Had to postpone due to a UTI, followed by a yeast infection.      Had labs done: fBS 88, LDL 84, TSH 1.10.      Dr. Navarro changed his nexium from 20 to 40mg.   -----------------------------------------------  cscaleb 2011 ([place])   2/20/22     THE PATIENT IS HERE TO ESTABLISH CARE.  HE PREVIOUSLY SAW DR. OPAL BOYCE BUT NEEDS A LOCAL CARDIOLOGIST.  He has a history of heart catheterization with a 30% lesion he had a 2nd catheterization done by Dr. Miranda which showed a 50% lesion in March 2021. He  developed atrial fibrillation and had a slow response and therefore cardioversion was not performed.  Holter monitor with a Zio revealed a 2nd pause.  Is not clear if this occurred while he was sleeping but he did obtain a sleep study and is now using CPAP for the last 2 months.  Holter revealed ventricular rate between 35 and 144 and he was recommended for permanent pacemaker but did not have it done because they want to treat the sleep apnea 1st.  The patient complains of midsternal chest tightness which is not necessarily related to activity was getting increasing frequency and severely.  The patient has shortness of breath sometimes with exertion.  He smokes half pack a day, and is trying to quit..  He is taking his statin Livalo.  He has a history of depression but is self weaning of his antidepressant.  He has COPD and uses inhalers.  He sees a pulmonary is.  The patient has been on Eliquis and aspirin.  He is currently being treated with BCG for bladder cancer.  He is scheduled for a cystoscopy early May needs clearance to stop the Eliquis and aspirin.  He has a rising PSA which he thinks is secondary to stopping finasteride is previously was 3.4 then went up to 6.0 is currently 12 and in these to have a prostate biopsy his medications were reviewed.     The patient is interested in having cardioversion performed was told he would need a pacemaker or ablation.  He does not feel his normal of endurant her physical status.  He has had no syncope or near syncope does get occasionally dizzy.      7/1/22      Equivocal myocardial perfusion scan.    There is a small to moderate sized, reversible perfusion abnormality that is consistent with ischemia in the inferior wall(s).    There are no other significant perfusion abnormalities.    The gated perfusion images showed an ejection fraction of 65% post stress. Normal ejection fraction is greater than 53%.    The EKG portion of this study is negative for  ischemia.    The patient reported chest pain during the stress test.    Summary    · The left ventricle is normal in size with concentric remodeling and  · Mild right ventricular enlargement with normal right ventricular systolic function.  · Normal central venous pressure (3 mmHg).  · The estimated ejection fraction is 70%.  · A diastolic pattern consistent with atrial fibrillation observed.       He is here today for follow-up.  He still gets chest pains at rest.  He gets out of breath with little exertion.    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  The patient has bradycardia while sleeping.  The longest was was 3 point 0043 minutes seconds at 7 5859 in the morning.  His heart rate went up to 135 in the afternoon on the day he took go up on the 26th.  He did not record whether he was having chest pain or any kind of activity at that time.  The heart rate went up as high as 170 briefly with the average to 129 to 140 the duration was not recorded.          Review of patient's allergies indicates:   Allergen Reactions    Msg [glutamic acid] Nausea Only, Palpitations, Shortness Of Breath and Swelling    Oyster extract Swelling     PT swells in his throat after eating oysters on occasion       Past Medical History:   Diagnosis Date    Benign enlargement of prostate     Had a biopsy in around 2015    Elevated PSA     GERD (gastroesophageal reflux disease)     Hypertension     Started with meds in 2012.    Kidney stone     Urinary tract infection      Past Surgical History:   Procedure Laterality Date    FISTULA REPAIR      LEFT HEART CATHETERIZATION N/A 10/23/2018    Procedure: Left heart cath;  Surgeon: Niharika Squires MD;  [facility] CATH;  Service: Cardiology;  Laterality: N/A;     Social History     Tobacco Use    Smoking status: Current Every Day Smoker     Packs/day: 1.00     Years: 55.00     Pack years: 55.00     Types: Cigarettes    Smokeless tobacco: Never Used   Substance  Use Topics    Alcohol use: Yes     Comment: Previous 12 beer a day for 20 years. Now occ.    Drug use: No     Family History   Problem Relation Age of Onset    Heart failure Mother     Cancer Father     Heart disease Brother     Heart attack Brother     Cancer Brother     Heart disease Brother     Drug abuse Brother         Review of Systems:   Constitution: Negative for diaphoresis and fever.   HEENT: Negative for nosebleeds.    Cardiovascular: Negative for chest pain       No dyspnea on exertion       No leg swelling        No palpitations  Respiratory: Negative for shortness of breath and wheezing.    Hematologic/Lymphatic: Negative for bleeding problem. Does not bruise/bleed easily.   Skin: Negative for color change and rash.   Musculoskeletal: Negative for falls and myalgias.   Gastrointestinal: Negative for hematemesis and hematochezia.   Genitourinary: Negative for hematuria.   Neurological: Negative for dizziness and light-headedness.   Psychiatric/Behavioral: Negative for altered mental status and memory loss.          Objective:        Vitals:    07/01/22 1033   BP: 138/84   Pulse: 77   Resp: 16       Lab Results   Component Value Date    WBC 7.3 03/04/2022    HGB 16.3 03/04/2022    HCT 49.2 03/04/2022     03/04/2022    CHOL 152 03/04/2022    TRIG 77 03/04/2022    HDL 51 03/04/2022    ALT 19 03/04/2022    AST 20 03/04/2022     03/04/2022    K 4.5 03/04/2022     03/04/2022    CREATININE 0.95 03/04/2022    BUN 16 03/04/2022    CO2 29 03/04/2022    INR 0.9 10/17/2018    MICROALBUR 0.4 03/22/2021        ECHOCARDIOGRAM RESULTS  Results for orders placed during the hospital encounter of 05/23/22    Echo    Interpretation Summary  · The left ventricle is normal in size with concentric remodeling and  · Mild right ventricular enlargement with normal right ventricular systolic function.  · Normal central venous pressure (3 mmHg).  · The estimated ejection fraction is 70%.  · A diastolic  pattern consistent with atrial fibrillation observed.        CURRENT/PREVIOUS VISIT EKG  Results for orders placed or performed in visit on 04/20/22   IN OFFICE EKG 12-LEAD (to Benjamin)    Collection Time: 04/20/22  1:55 PM    Narrative    Test Reason : I49.9,R07.9,I25.118,I48.0,I70.0,I24.8,I15.1,N28.89,G47.33,Z99.8~    Vent. Rate : 066 BPM     Atrial Rate : 288 BPM     P-R Int : 000 ms          QRS Dur : 100 ms      QT Int : 400 ms       P-R-T Axes : 000 -74 077 degrees     QTc Int : 419 ms    Atrial fibrillation  Left axis deviation  Incomplete right bundle branch block  Abnormal ECG  When compared with ECG of 16-DEC-2020 09:29,  Atrial fibrillation has replaced Sinus rhythm  Incomplete right bundle branch block is now Present  Criteria for Septal infarct are no longer Present  Confirmed by Ion VELASQUEZ, Osman HINES ([id number]) on 5/4/2022 8:09:32 PM    Referred By: CLARKE ORTIZ           Confirmed By:Osman Lemon MD     No valid procedures specified.   Results for orders placed during the hospital encounter of 05/23/22    Nuclear Stress - Cardiology Interpreted    Interpretation Summary    Equivocal myocardial perfusion scan.    There is a small to moderate sized, reversible perfusion abnormality that is consistent with ischemia in the inferior wall(s).    There are no other significant perfusion abnormalities.    The gated perfusion images showed an ejection fraction of 65% post stress. Normal ejection fraction is greater than 53%.    The EKG portion of this study is negative for ischemia.    The patient reported chest pain during the stress test.      Physical Exam:  CONSTITUTIONAL: No fever, no chills  HEENT: Normocephalic, atraumatic,pupils reactive to light                 NECK:  No JVD no carotid bruit  CVS: S1S2+, RRR, no murmurs,   LUNGS: Clear  ABDOMEN: Soft, NT, BS+  EXTREMITIES: No cyanosis, edema  : No serra catheter  NEURO: AAO X 3  PSY: Normal affect      Medication List with Changes/Refills    Current Medications    ALBUTEROL (PROVENTIL/VENTOLIN HFA) 90 MCG/ACTUATION INHALER    Inhale 1-2 puffs into the lungs every 4 (four) hours as needed for Shortness of Breath (coughing). Rescue    ALBUTEROL-IPRATROPIUM (DUO-NEB) 2.5 MG-0.5 MG/3 ML NEBULIZER SOLUTION    USE 1 AMPULE IN NEBULIZER EVERY 4 HOURS AS NEEDED FOR WHEEZING    ALFUZOSIN (UROXATRAL) 10 MG TB24    TAKE 1 TABLET BY MOUTH ONCE DAILY AFTER BREAKFAST    ALPRAZOLAM (XANAX) 0.5 MG TABLET    Take 0.5 mg by mouth 3 (three) times daily as needed for Anxiety.    ASPIRIN (ECOTRIN) 81 MG EC TABLET        AZELASTINE (ASTELIN) 137 MCG (0.1 %) NASAL SPRAY    1 spray (137 mcg total) by Nasal route 2 (two) times daily.    BECLOMETHASONE (QVAR) 80 MCG/ACTUATION AERO    Inhale 2 puffs into the lungs 2 (two) times a day. Controller    ELIQUIS 5 MG TAB    Take 5 mg by mouth 2 (two) times daily.    ESCITALOPRAM OXALATE (LEXAPRO) 20 MG TABLET    Take 0.5 tablets (10 mg total) by mouth once daily.    ESOMEPRAZOLE (NEXIUM) 40 MG CAPSULE    Take 40 mg by mouth once daily.    EZETIMIBE (ZETIA) 10 MG TABLET    Take 10 mg by mouth once daily.    FINASTERIDE (PROSCAR) 5 MG TABLET    Take 5 mg by mouth once daily.    LIVALO 4 MG TAB    Take 1 tablet by mouth once daily.    METOPROLOL TARTRATE (LOPRESSOR) 25 MG TABLET    Take 12.5 mg by mouth 2 (two) times daily.    NICOTINE (NICODERM CQ) 21 MG/24 HR    Place 1 patch onto the skin once daily.    NITROGLYCERIN (NITROSTAT) 0.4 MG SL TABLET    Place under the tongue.    UMECLIDINIUM-VILANTEROL (ANORO ELLIPTA) 62.5-25 MCG/ACTUATION DSDV    INHALE 1 PUFF BY MOUTH ONCE DAILY (CONTROLLER)    VALACYCLOVIR (VALTREX) 1000 MG TABLET    Take 1 g by mouth 2 (two) times daily.             Assessment:       1. Atherosclerotic heart disease of native coronary artery with other forms of angina pectoris    2. Chest pain due to myocardial ischemia, unspecified ischemic chest pain type    3. QUACH (dyspnea on exertion)    4. Chronic atrial  fibrillation    5. Gastroesophageal reflux disease without esophagitis         Plan:     Problem List Items Addressed This Visit        Cardiac/Vascular    Chest pain    Relevant Orders    CTA Cardiac    QUACH (dyspnea on exertion)    Atherosclerotic heart disease of native coronary artery with other forms of angina pectoris - Primary    Atrial fibrillation       GI    GERD (gastroesophageal reflux disease)          The patient is had a heart catheterization with Dr. Nicky crews about a year ago was told he had a 50% blockage.  He was told his chest pains from the AFib.  He gets out of breath and has COPD.  His chest pain occurs mostly at rest.  Have some limitation in his activity.  He is supposed to have a CT scan to look at a spot on his lungs.  He has started a chronic more diet with no carbs.    His Holter monitor similar to what Dr. Miranda found with a mild tachy-bharath syndrome and bradycardia is mostly occur while sleeping even though he is wearing CPAP.  He could qualify for pacemaker however he is symptoms are not correlating that well..  We could decrease the metoprolol 12.5 b.i.d. at the risk of having more tachycardias with exercise.  He likely does not need a heart catheterization although there are some subtle findings of possible inferior wall ischemia on the stress test..    Tyrone pritchard commended continue medical management for current time.  He is hopeful his diet will help things if he loses weight..    Recommend come back in 2 months with a dobutamine stress echo prior to follow-up to rule out ischemic heart disease because of these chest pain a half or more the days of the month.  I suspect the tachy-bharath syndrome would respond to pacemaker.  Did offer placing a pacemaker versus repeating the Holter monitor with a good record of his activities when he is having chest pain, when he short of breath,  He be seen back in 2 months  No follow-ups on file.    The patients questions were answered, they  verbalized understanding, and agreed with the treatment plan.     BILLIE RUELAS MD  [facility]

## 2022-07-01 ENCOUNTER — OFFICE VISIT (OUTPATIENT)
Dept: CARDIOLOGY | Facility: CLINIC | Age: 67
End: 2022-07-01
Payer: MEDICARE

## 2022-07-01 VITALS
SYSTOLIC BLOOD PRESSURE: 138 MMHG | WEIGHT: 177.25 LBS | HEIGHT: 66 IN | RESPIRATION RATE: 16 BRPM | DIASTOLIC BLOOD PRESSURE: 84 MMHG | OXYGEN SATURATION: 98 % | BODY MASS INDEX: 28.49 KG/M2 | HEART RATE: 77 BPM

## 2022-07-01 DIAGNOSIS — I25.118 ATHEROSCLEROTIC HEART DISEASE OF NATIVE CORONARY ARTERY WITH OTHER FORMS OF ANGINA PECTORIS: Primary | ICD-10-CM

## 2022-07-01 DIAGNOSIS — K21.9 GASTROESOPHAGEAL REFLUX DISEASE WITHOUT ESOPHAGITIS: ICD-10-CM

## 2022-07-01 DIAGNOSIS — I48.20 CHRONIC ATRIAL FIBRILLATION: ICD-10-CM

## 2022-07-01 DIAGNOSIS — I25.9 CHEST PAIN DUE TO MYOCARDIAL ISCHEMIA, UNSPECIFIED ISCHEMIC CHEST PAIN TYPE: ICD-10-CM

## 2022-07-01 DIAGNOSIS — R06.09 DOE (DYSPNEA ON EXERTION): ICD-10-CM

## 2022-07-01 PROCEDURE — 99214 PR OFFICE/OUTPT VISIT, EST, LEVL IV, 30-39 MIN: ICD-10-PCS | Mod: S$GLB,,, | Performed by: GENERAL PRACTICE

## 2022-07-01 PROCEDURE — 99214 OFFICE O/P EST MOD 30 MIN: CPT | Mod: S$GLB,,, | Performed by: GENERAL PRACTICE

## 2022-07-06 ENCOUNTER — PATIENT MESSAGE (OUTPATIENT)
Dept: CARDIOLOGY | Facility: CLINIC | Age: 67
End: 2022-07-06
Payer: MEDICARE

## 2022-07-20 ENCOUNTER — PATIENT MESSAGE (OUTPATIENT)
Dept: CARDIOLOGY | Facility: CLINIC | Age: 67
End: 2022-07-20
Payer: MEDICARE

## 2022-07-22 ENCOUNTER — CLINICAL SUPPORT (OUTPATIENT)
Dept: CARDIOLOGY | Facility: HOSPITAL | Age: 67
End: 2022-07-22
Attending: GENERAL PRACTICE
Payer: MEDICARE

## 2022-07-22 DIAGNOSIS — I48.20 CHRONIC ATRIAL FIBRILLATION: ICD-10-CM

## 2022-07-22 DIAGNOSIS — I25.118 ATHEROSCLEROTIC HEART DISEASE OF NATIVE CORONARY ARTERY WITH OTHER FORMS OF ANGINA PECTORIS: ICD-10-CM

## 2022-07-22 DIAGNOSIS — R06.09 DOE (DYSPNEA ON EXERTION): ICD-10-CM

## 2022-07-22 LAB
CV STRESS BASE HR: 59 BPM
DIASTOLIC BLOOD PRESSURE: 88 MMHG
EJECTION FRACTION: 64 %
OHS CV CPX 1 MINUTE RECOVERY HEART RATE: 131 BPM
OHS CV CPX 85 PERCENT MAX PREDICTED HEART RATE MALE: 130
OHS CV CPX MAX PREDICTED HEART RATE: 153
OHS CV CPX PATIENT IS FEMALE: 0
OHS CV CPX PATIENT IS MALE: 1
OHS CV CPX PEAK DIASTOLIC BLOOD PRESSURE: 66 MMHG
OHS CV CPX PEAK HEAR RATE: 143 BPM
OHS CV CPX PEAK RATE PRESSURE PRODUCT: NORMAL
OHS CV CPX PEAK SYSTOLIC BLOOD PRESSURE: 157 MMHG
OHS CV CPX PERCENT MAX PREDICTED HEART RATE ACHIEVED: 93
OHS CV CPX RATE PRESSURE PRODUCT PRESENTING: 7847
STRESS ECHO POST EXERCISE DUR MIN: 13 MINUTES
STRESS ECHO POST EXERCISE DUR SEC: 0 SECONDS
SYSTOLIC BLOOD PRESSURE: 133 MMHG

## 2022-07-22 PROCEDURE — 63600150 PHARM REV CODE 636: Performed by: GENERAL PRACTICE

## 2022-07-22 PROCEDURE — 93351 STRESS TTE COMPLETE: CPT | Mod: 26,,, | Performed by: SPECIALIST

## 2022-07-22 PROCEDURE — 93351 STRESS ECHO (CUPID ONLY): ICD-10-PCS | Mod: 26,,, | Performed by: SPECIALIST

## 2022-07-22 PROCEDURE — 93351 STRESS TTE COMPLETE: CPT

## 2022-07-22 RX ORDER — DOBUTAMINE HYDROCHLORIDE 100 MG/100ML
10 INJECTION INTRAVENOUS CONTINUOUS
Status: DISCONTINUED | OUTPATIENT
Start: 2022-07-22 | End: 2022-07-25 | Stop reason: HOSPADM

## 2022-07-22 RX ADMIN — DOBUTAMINE HYDROCHLORIDE 10 MCG/KG/MIN: 100 INJECTION INTRAVENOUS at 10:07

## 2022-07-30 ENCOUNTER — PATIENT MESSAGE (OUTPATIENT)
Dept: CARDIOLOGY | Facility: CLINIC | Age: 67
End: 2022-07-30
Payer: MEDICARE

## 2022-08-04 ENCOUNTER — PATIENT MESSAGE (OUTPATIENT)
Dept: ADMINISTRATIVE | Facility: OTHER | Age: 67
End: 2022-08-04
Payer: MEDICARE

## 2022-08-25 DIAGNOSIS — Z79.01 LONG TERM (CURRENT) USE OF ANTICOAGULANTS: ICD-10-CM

## 2022-08-25 DIAGNOSIS — Z12.11 SCREENING FOR MALIGNANT NEOPLASM OF COLON: ICD-10-CM

## 2022-08-25 DIAGNOSIS — R10.32 LLQ ABDOMINAL PAIN: Primary | ICD-10-CM

## 2022-08-25 DIAGNOSIS — C67.9 BLADDER CANCER: ICD-10-CM

## 2022-08-31 NOTE — PROGRESS NOTES
Subjective:    Patient ID:  Martell Corcoran is a 67 y.o. male who presents for follow-up of No chief complaint on file.      HPI:  Dr Gaming   Pt here to follow up on acute and chronic conditions (Anxiety, Pepcid, HTN, CAD (65%), HLD, GERD)     Heartburn is doing ok on nexium.  (failed prilosec,protonix)     BP is doing ok today. Weight is stable. Since last visit, pt was dx with Afib. Was put on Eliquis and toprol. Was being seen by Dr. Miranda. No longer wishes to go to [place] and wants a doctor in [place] and in the network. Had an angiogram and told has a 50% blockage in one of his arteries     Has dx of GLENNA and has been using CPAP. Had PSG upstairs. Has been sleeping ok, but concerned because his face is swollen when he wakes up. He is using nasal pillow right now.       Has not been able to remember much since he had COVID in 2020.  Still doesn't have any sense of taste or smell.      Anxiety is doing ok.      Continues to be part of the smoking cessation program. Still smoking. Was down to 3 cigarettes a day. used to be a 1.5 ppd.  Sometimes up to 1/2 ppd. No using nicotine patches a day.      Has a chronic cough.  His allergies are bothering him a lot. Takes otc allergy med.      Has an appt with Dr. Pham ([place]). Has had tuberculin (urothelial cell CA)bladder infusions, needs to have another cystoscopy, that he was supposed to do in Feb. Had to postpone due to a UTI, followed by a yeast infection.      Had labs done: fBS 88, LDL 84, TSH 1.10.      Dr. Navarro changed his nexium from 20 to 40mg.   -----------------------------------------------  csope 2011 ([place])   2/20/22     THE PATIENT IS HERE TO ESTABLISH CARE.  HE PREVIOUSLY SAW DR. OPAL BOYCE BUT NEEDS A LOCAL CARDIOLOGIST.  He has a history of heart catheterization with a 30% lesion he had a 2nd catheterization done by Dr. Miranda which showed a 50% lesion in March 2021. He developed atrial fibrillation and had a slow response and  therefore cardioversion was not performed.  Holter monitor with a Zio revealed a 2nd pause.  Is not clear if this occurred while he was sleeping but he did obtain a sleep study and is now using CPAP for the last 2 months.  Holter revealed ventricular rate between 35 and 144 and he was recommended for permanent pacemaker but did not have it done because they want to treat the sleep apnea 1st.  The patient complains of midsternal chest tightness which is not necessarily related to activity was getting increasing frequency and severely.  The patient has shortness of breath sometimes with exertion.  He smokes half pack a day, and is trying to quit..  He is taking his statin Livalo.  He has a history of depression but is self weaning of his antidepressant.  He has COPD and uses inhalers.  He sees a pulmonary is.  The patient has been on Eliquis and aspirin.  He is currently being treated with BCG for bladder cancer.  He is scheduled for a cystoscopy early May needs clearance to stop the Eliquis and aspirin.  He has a rising PSA which he thinks is secondary to stopping finasteride is previously was 3.4 then went up to 6.0 is currently 12 and in these to have a prostate biopsy his medications were reviewed.     The patient is interested in having cardioversion performed was told he would need a pacemaker or ablation.  He does not feel his normal of endurant her physical status.  He has had no syncope or near syncope does get occasionally dizzy.      7/1/22       Equivocal myocardial perfusion scan.    There is a small to moderate sized, reversible perfusion abnormality that is consistent with ischemia in the inferior wall(s).    There are no other significant perfusion abnormalities.    The gated perfusion images showed an ejection fraction of 65% post stress. Normal ejection fraction is greater than 53%.    The EKG portion of this study is negative for ischemia.    The patient reported chest pain during the stress test.      Summary     The left ventricle is normal in size with concentric remodeling and  Mild right ventricular enlargement with normal right ventricular systolic function.  Normal central venous pressure (3 mmHg).  The estimated ejection fraction is 70%.  A diastolic pattern consistent with atrial fibrillation observed.        He is here today for follow-up.  He still gets chest pains at rest.  He gets out of breath with little exertion.     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  The patient has bradycardia while sleeping.  The longest was was 3 point 0043 minutes seconds at 7 5859 in the morning.  His heart rate went up to 135 in the afternoon on the day he took go up on the 26th.  He did not record whether he was having chest pain or any kind of activity at that time.  The heart rate went up as high as 170 briefly with the average to 129 to 140 the duration was not recorded.        The patient is had a heart catheterization with Dr. Nicky crews about a year ago was told he had a 50% blockage.  He was told his chest pains from the AFib.  He gets out of breath and has COPD.  His chest pain occurs mostly at rest.  Have some limitation in his activity.  He is supposed to have a CT scan to look at a spot on his lungs.  He has started a chronic more diet with no carbs.     His Holter monitor similar to what Dr. Miranda found with a mild tachy-bharath syndrome and bradycardia is mostly occur while sleeping even though he is wearing CPAP.  He could qualify for pacemaker however he is symptoms are not correlating that well..  We could decrease the metoprolol 12.5 b.i.d. at the risk of having more tachycardias with exercise.  He likely does not need a heart catheterization although there are some subtle findings of possible inferior wall ischemia on the stress test..     Tyrone pritchard commended continue medical management for current time.  He is hopeful his diet will help things if he loses weight..     Recommend  come back in 2 months with a dobutamine stress echo prior to follow-up to rule out ischemic heart disease because of these chest pain a half or more the days of the month.  I suspect the tachy-bharath syndrome would respond to pacemaker.  Did offer placing a pacemaker versus repeating the Holter monitor with a good record of his activities when he is having chest pain, when he short of breath,  He be seen back in 2 months     9/1/22   The left ventricle is normal in size with normal systolic function.  The estimated ejection fraction is 64%.  The stress echo portion of this study is negative for myocardial ischemia.  There were no arrhythmias during stress.  The ECG portion of this study is negative for myocardial ischemia.  Baseline EKG atrial fibrillation low voltage left axis deviation     14 day holter   3.168 sec pause sleeping.   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  Carnivore diet.No carbs. Family problems. Daughter diabetes.   6 beat VT.      Review of patient's allergies indicates:   Allergen Reactions    Msg [glutamic acid] Nausea Only, Palpitations, Shortness Of Breath and Swelling    Oyster extract Swelling     PT swells in his throat after eating oysters on occasion       Past Medical History:   Diagnosis Date    Benign enlargement of prostate     Had a biopsy in around 2015    Elevated PSA     GERD (gastroesophageal reflux disease)     Hypertension     Started with meds in 2012.    Kidney stone     Urinary tract infection      Past Surgical History:   Procedure Laterality Date    FISTULA REPAIR      LEFT HEART CATHETERIZATION N/A 10/23/2018    Procedure: Left heart cath;  Surgeon: Niharika Squires MD;  Location[facility] CATH;  Service: Cardiology;  Laterality: N/A;     Social History     Tobacco Use    Smoking status: Every Day     Packs/day: 1.00     Years: 55.00     Pack years: 55.00     Types: Cigarettes    Smokeless tobacco: Never   Substance Use Topics    Alcohol  use: Yes     Comment: Previous 12 beer a day for 20 years. Now occ.    Drug use: No     Family History   Problem Relation Age of Onset    Heart failure Mother     Cancer Father     Heart disease Brother     Heart attack Brother     Cancer Brother     Heart disease Brother     Drug abuse Brother         Review of Systems:   Constitution: Negative for diaphoresis and fever.   HEENT: Negative for nosebleeds.    Cardiovascular: Negative for chest pain       No dyspnea on exertion       No leg swelling        No palpitations  Respiratory: Negative for shortness of breath and wheezing.    Hematologic/Lymphatic: Negative for bleeding problem. Does not bruise/bleed easily.   Skin: Negative for color change and rash.   Musculoskeletal: Negative for falls and myalgias.   Gastrointestinal: Negative for hematemesis and hematochezia.   Genitourinary: Negative for hematuria.   Neurological: Negative for dizziness and light-headedness.   Psychiatric/Behavioral: Negative for altered mental status and memory loss.          Objective:        There were no vitals filed for this visit.    Lab Results   Component Value Date    WBC 7.3 03/04/2022    HGB 16.3 03/04/2022    HCT 49.2 03/04/2022     03/04/2022    CHOL 152 03/04/2022    TRIG 77 03/04/2022    HDL 51 03/04/2022    ALT 19 03/04/2022    AST 20 03/04/2022     03/04/2022    K 4.5 03/04/2022     03/04/2022    CREATININE 0.95 03/04/2022    BUN 16 03/04/2022    CO2 29 03/04/2022    INR 0.9 10/17/2018    MICROALBUR 0.4 03/22/2021        ECHOCARDIOGRAM RESULTS  Results for orders placed in visit on 07/22/22    Stress Echo Which stress agent will be used? Pharmacological; Color Flow Doppler? No    Interpretation Summary  · The left ventricle is normal in size with normal systolic function.  · The estimated ejection fraction is 64%.  · The stress echo portion of this study is negative for myocardial ischemia.  · There were no arrhythmias during stress.  · The ECG  portion of this study is negative for myocardial ischemia.  · Baseline EKG atrial fibrillation low voltage left axis deviation        CURRENT/PREVIOUS VISIT EKG  Results for orders placed or performed in visit on 04/20/22   IN OFFICE EKG 12-LEAD (to [facility])    Collection Time: 04/20/22  1:55 PM    Narrative    Test Reason : I49.9,R07.9,I25.118,I48.0,I70.0,I24.8,I15.1,N28.89,G47.33,Z99.8~    Vent. Rate : 066 BPM     Atrial Rate : 288 BPM     P-R Int : 000 ms          QRS Dur : 100 ms      QT Int : 400 ms       P-R-T Axes : 000 -74 077 degrees     QTc Int : 419 ms    Atrial fibrillation  Left axis deviation  Incomplete right bundle branch block  Abnormal ECG  When compared with ECG of 16-DEC-2020 09:29,  Atrial fibrillation has replaced Sinus rhythm  Incomplete right bundle branch block is now Present  Criteria for Septal infarct are no longer Present  Confirmed by Ion VELASQUEZ, Osman HINES ([id number]) on 5/4/2022 8:09:32 PM    Referred By: CLARKE ORTIZ           Confirmed By:Osman Lemon MD     No valid procedures specified.   Results for orders placed during the hospital encounter of 05/23/22    Nuclear Stress - Cardiology Interpreted    Interpretation Summary    Equivocal myocardial perfusion scan.    There is a small to moderate sized, reversible perfusion abnormality that is consistent with ischemia in the inferior wall(s).    There are no other significant perfusion abnormalities.    The gated perfusion images showed an ejection fraction of 65% post stress. Normal ejection fraction is greater than 53%.    The EKG portion of this study is negative for ischemia.    The patient reported chest pain during the stress test.      Physical Exam:  CONSTITUTIONAL: No fever, no chills  HEENT: Normocephalic, atraumatic,pupils reactive to light                 NECK:  No JVD no carotid bruit  CVS: S1S2+, RRR, no murmurs,   LUNGS: Clear  ABDOMEN: Soft, NT, BS+  EXTREMITIES: No cyanosis, edema  : No serra catheter  NEURO: AAO X  3  PSY: Normal affect      Medication List with Changes/Refills   Current Medications    ALBUTEROL (PROVENTIL/VENTOLIN HFA) 90 MCG/ACTUATION INHALER    Inhale 1-2 puffs into the lungs every 4 (four) hours as needed for Shortness of Breath (coughing). Rescue    ALBUTEROL-IPRATROPIUM (DUO-NEB) 2.5 MG-0.5 MG/3 ML NEBULIZER SOLUTION    USE 1 AMPULE IN NEBULIZER EVERY 4 HOURS AS NEEDED FOR WHEEZING    ALFUZOSIN (UROXATRAL) 10 MG TB24    TAKE 1 TABLET BY MOUTH ONCE DAILY AFTER BREAKFAST    ALPRAZOLAM (XANAX) 0.5 MG TABLET    Take 0.5 mg by mouth 3 (three) times daily as needed for Anxiety.    ASPIRIN (ECOTRIN) 81 MG EC TABLET        AZELASTINE (ASTELIN) 137 MCG (0.1 %) NASAL SPRAY    1 spray (137 mcg total) by Nasal route 2 (two) times daily.    BECLOMETHASONE (QVAR) 80 MCG/ACTUATION AERO    Inhale 2 puffs into the lungs 2 (two) times a day. Controller    ELIQUIS 5 MG TAB    Take 5 mg by mouth 2 (two) times daily.    ESOMEPRAZOLE (NEXIUM) 40 MG CAPSULE    Take 40 mg by mouth once daily.    EZETIMIBE (ZETIA) 10 MG TABLET    Take 10 mg by mouth once daily.    FINASTERIDE (PROSCAR) 5 MG TABLET    Take 5 mg by mouth once daily.    LIVALO 4 MG TAB    Take 1 tablet by mouth once daily.    METOPROLOL TARTRATE (LOPRESSOR) 25 MG TABLET    Take 12.5 mg by mouth 2 (two) times daily.    NICOTINE (NICODERM CQ) 21 MG/24 HR    Place 1 patch onto the skin once daily.    NITROGLYCERIN (NITROSTAT) 0.4 MG SL TABLET    Place under the tongue.    UMECLIDINIUM-VILANTEROL (ANORO ELLIPTA) 62.5-25 MCG/ACTUATION DSDV    INHALE 1 PUFF BY MOUTH ONCE DAILY (CONTROLLER)    VALACYCLOVIR (VALTREX) 1000 MG TABLET    Take 1 g by mouth 2 (two) times daily.             Assessment:       1. GLENNA on CPAP    2. Chronic atrial fibrillation    3. Tachy-bharath syndrome    4. Chronic anticoagulation    5. Mixed hyperlipidemia    6. Statin intolerance    7. Hypertension secondary to other renal disorders         Plan:     Problem List Items Addressed This Visit           Cardiac/Vascular    HTN (hypertension)    Atrial fibrillation       Other    GLENNA on CPAP - Primary     Other Visit Diagnoses       Tachy-bharath syndrome        Chronic anticoagulation        Mixed hyperlipidemia        Statin intolerance              Continue the same meds. No pacemaker or heart cath.   He wants to have rhythm corrected wants eval for ablation.  Discussed possible cardioversion, might need rhythm control in addition to metoprolol rate control.  Answered questions      No follow-ups on file.    The patients questions were answered, they verbalized understanding, and agreed with the treatment plan.     BILLIE RUELAS MD  [facility]

## 2022-09-01 ENCOUNTER — OFFICE VISIT (OUTPATIENT)
Dept: CARDIOLOGY | Facility: CLINIC | Age: 67
End: 2022-09-01
Payer: MEDICARE

## 2022-09-01 VITALS
SYSTOLIC BLOOD PRESSURE: 112 MMHG | WEIGHT: 163.5 LBS | HEART RATE: 67 BPM | OXYGEN SATURATION: 97 % | DIASTOLIC BLOOD PRESSURE: 80 MMHG | BODY MASS INDEX: 26.38 KG/M2

## 2022-09-01 DIAGNOSIS — I15.1 HYPERTENSION SECONDARY TO OTHER RENAL DISORDERS: ICD-10-CM

## 2022-09-01 DIAGNOSIS — G47.33 OSA ON CPAP: Primary | ICD-10-CM

## 2022-09-01 DIAGNOSIS — Z79.01 CHRONIC ANTICOAGULATION: ICD-10-CM

## 2022-09-01 DIAGNOSIS — I49.5 TACHY-BRADY SYNDROME: ICD-10-CM

## 2022-09-01 DIAGNOSIS — Z78.9 STATIN INTOLERANCE: ICD-10-CM

## 2022-09-01 DIAGNOSIS — I48.20 CHRONIC ATRIAL FIBRILLATION: ICD-10-CM

## 2022-09-01 DIAGNOSIS — E78.2 MIXED HYPERLIPIDEMIA: ICD-10-CM

## 2022-09-01 PROCEDURE — 93000 EKG 12-LEAD: ICD-10-PCS | Mod: S$GLB,,, | Performed by: GENERAL PRACTICE

## 2022-09-01 PROCEDURE — 99214 PR OFFICE/OUTPT VISIT, EST, LEVL IV, 30-39 MIN: ICD-10-PCS | Mod: S$GLB,,, | Performed by: GENERAL PRACTICE

## 2022-09-01 PROCEDURE — 93000 ELECTROCARDIOGRAM COMPLETE: CPT | Mod: S$GLB,,, | Performed by: GENERAL PRACTICE

## 2022-09-01 PROCEDURE — 99214 OFFICE O/P EST MOD 30 MIN: CPT | Mod: S$GLB,,, | Performed by: GENERAL PRACTICE

## 2022-09-07 ENCOUNTER — TELEPHONE (OUTPATIENT)
Dept: PULMONOLOGY | Facility: CLINIC | Age: 67
End: 2022-09-07
Payer: MEDICARE

## 2022-09-07 ENCOUNTER — HOSPITAL ENCOUNTER (OUTPATIENT)
Dept: RADIOLOGY | Facility: HOSPITAL | Age: 67
Discharge: HOME | End: 2022-09-07
Attending: NURSE PRACTITIONER
Payer: MEDICARE

## 2022-09-07 DIAGNOSIS — R93.89 ABNORMAL CT OF THE CHEST: ICD-10-CM

## 2022-09-07 LAB
CREAT SERPL-MCNC: 1 MG/DL (ref 0.5–1.4)
SAMPLE: NORMAL

## 2022-09-07 PROCEDURE — 71260 CT THORAX DX C+: CPT | Mod: TC

## 2022-09-07 PROCEDURE — 25500020 PHARM REV CODE 255

## 2022-09-07 PROCEDURE — 71260 CT CHEST WITH CONTRAST: ICD-10-PCS | Mod: 26,,, | Performed by: RADIOLOGY

## 2022-09-07 PROCEDURE — 71260 CT THORAX DX C+: CPT | Mod: 26,,, | Performed by: RADIOLOGY

## 2022-09-07 RX ADMIN — IOHEXOL 75 ML: 350 INJECTION, SOLUTION INTRAVENOUS at 10:09

## 2022-09-07 NOTE — TELEPHONE ENCOUNTER
Lm that appt with eder needed to be rescheduled. I have him with Dr. Carolina at 11:20 on the same day. Let us know if that works for you or not.

## 2022-09-09 ENCOUNTER — HOSPITAL ENCOUNTER (OUTPATIENT)
Dept: RADIOLOGY | Facility: HOSPITAL | Age: 67
Discharge: HOME | End: 2022-09-09
Attending: INTERNAL MEDICINE
Payer: MEDICARE

## 2022-09-09 DIAGNOSIS — Z12.11 SCREENING FOR MALIGNANT NEOPLASM OF COLON: ICD-10-CM

## 2022-09-09 DIAGNOSIS — C67.9 BLADDER CANCER: ICD-10-CM

## 2022-09-09 DIAGNOSIS — Z79.01 LONG TERM (CURRENT) USE OF ANTICOAGULANTS: ICD-10-CM

## 2022-09-09 DIAGNOSIS — R10.32 LLQ ABDOMINAL PAIN: ICD-10-CM

## 2022-09-09 PROCEDURE — 74178 CT ABD&PLV WO CNTR FLWD CNTR: CPT | Mod: TC,PO

## 2022-09-09 PROCEDURE — 25500020 PHARM REV CODE 255: Mod: PO | Performed by: INTERNAL MEDICINE

## 2022-09-09 RX ADMIN — IOHEXOL 100 ML: 350 INJECTION, SOLUTION INTRAVENOUS at 10:09

## 2022-09-13 ENCOUNTER — OFFICE VISIT (OUTPATIENT)
Dept: PULMONOLOGY | Facility: CLINIC | Age: 67
End: 2022-09-13
Payer: MEDICARE

## 2022-09-13 VITALS
OXYGEN SATURATION: 98 % | HEART RATE: 56 BPM | HEIGHT: 66 IN | SYSTOLIC BLOOD PRESSURE: 135 MMHG | BODY MASS INDEX: 26.12 KG/M2 | WEIGHT: 162.5 LBS | DIASTOLIC BLOOD PRESSURE: 86 MMHG

## 2022-09-13 DIAGNOSIS — F41.9 ANXIETY: ICD-10-CM

## 2022-09-13 DIAGNOSIS — F17.200 TOBACCO DEPENDENCE: ICD-10-CM

## 2022-09-13 DIAGNOSIS — G47.33 OSA ON CPAP: ICD-10-CM

## 2022-09-13 DIAGNOSIS — J43.2 CENTRILOBULAR EMPHYSEMA: Primary | ICD-10-CM

## 2022-09-13 DIAGNOSIS — J98.59 MEDIASTINAL MASS: ICD-10-CM

## 2022-09-13 PROCEDURE — 99215 OFFICE O/P EST HI 40 MIN: CPT | Mod: S$PBB,,, | Performed by: INTERNAL MEDICINE

## 2022-09-13 PROCEDURE — 99999 PR PBB SHADOW E&M-EST. PATIENT-LVL V: ICD-10-PCS | Mod: PBBFAC,,, | Performed by: INTERNAL MEDICINE

## 2022-09-13 PROCEDURE — 99215 OFFICE O/P EST HI 40 MIN: CPT | Mod: PBBFAC,PO | Performed by: INTERNAL MEDICINE

## 2022-09-13 PROCEDURE — 99999 PR PBB SHADOW E&M-EST. PATIENT-LVL V: CPT | Mod: PBBFAC,,, | Performed by: INTERNAL MEDICINE

## 2022-09-13 PROCEDURE — 99215 PR OFFICE/OUTPT VISIT, EST, LEVL V, 40-54 MIN: ICD-10-PCS | Mod: S$PBB,,, | Performed by: INTERNAL MEDICINE

## 2022-09-13 RX ORDER — METOPROLOL SUCCINATE 25 MG/1
25 TABLET, EXTENDED RELEASE ORAL DAILY
COMMUNITY
Start: 2022-07-09 | End: 2022-10-12

## 2022-09-13 RX ORDER — ALPRAZOLAM 0.5 MG/1
0.5 TABLET ORAL 3 TIMES DAILY PRN
Qty: 60 TABLET | Refills: 1 | Status: SHIPPED | OUTPATIENT
Start: 2022-09-13 | End: 2023-03-07

## 2022-09-13 NOTE — PROGRESS NOTES
9/13/2022    Martell Corcoran  Follow up    Chief Complaint   Patient presents with    Follow-up     3 month f/u        HPI:     Sept 13, 2022  in past, had own company.  Lung dz developed 4 + yrs ago.  Bladder cancer removed.  Had bcg infusions 2020 . Some garcia. Problem nasal stuffy tolerating cpap.      Below from [facility] Salgado  6/1/2022- SOB persistent complaint, pain with deep inspiration on left chest. Followed by cardiology for A fib and vascular blockages pacemaker procedure postponed.    Wearing CPAP with nose mask, moves across face when sleeping. Does not feel better after wearing.   Cough- improved, less often and severe, most days, worse when weather changes, productive clear mucous  Currently smoking 3-4 cigarettes daily, trying to cut down.   Patient Instructions   CT of chest shows clear lungs, no change to previously seen lung nodules.     A cyst is seen in muscle could be cause of chest pain    Recommend smoking cessation    Continue COPD medication regiment    Recommend trying CPAP therapy again.     2/4/2022-  States breathing is labored. States usually worse after infusion for bladder cancer. States does have benefit with qvar and Anoro, using albuterol as needed 2x daily.     Daytime fatigue persistent.  purchased CPAP from third party. Has not started to use.     Scheduled for pacemaker to treat A-fib. Followed by Dr. Miranda.     Cough- decreased, 2x daily, productive clear mucous, cut back on smoking to 6 cigarettes daily    1/14/2022- not able to get CPAP due to high co-pay.   States breathing improved after decreasing smoking to 3-6 cigarettes daily.   Shortness of breath- daily complaint, slightly improved, worse with exertion, improves with rest. Daytime fatigue unchanged.   Complaint of cough- varies in severity, currently mild. Productive small amount clear mucous, did notice worsening after first starting to cut back on smoking. Has returned to normal   Followed by  Urologist Dr. Pham for bladder cancer. Undergoing infusion therapy.     10/4/2021- concerned he had COVID 19 late July early August, lost sense of taste and smell. Complaint of fatigue, body aches, shortness of breath when walking,   No fever. Gradually improved over 4 weeks. Taste and smell has not returned. Experiencing short term memory loss and metal grogginess. Has daytime drowsiness onset years worsened in past two months  Persistent cough- productive clear mucous, associated with wheeze. Improves with albuterol inhaler.   Currently on Anoro, QVAR, using albuterol when needed 1-3 x weekly.     2021- Admitted To hospital in [place] while on vacation 2021 for COPD exacerbation. Seen at [facility] ER 2020 for COPD exacerbation.   Complaint of SOB- daily, worse with exertion, improves with rest. Treated with antibiotics and steroid therapy April.   Cough- recurrent complaint,  improved since hospital discharge, worsened in last 2 weeks after spending time with grandson who had bronchitis that has improved with nebulizer treatments every 4 hours. Associated with chest tightness and wheeze.       2020- he had bladder biopsy at [place] with Dr. Pham on 11/3- per outside records path with High grade papillary urothelial cell carcinoma. He has cough w/ postnasal drip and allergies but breathing is much better. He has been dealing with a lot for the family- 2 brothers just . Hematuria is much better. Not getting bladder infections any more. He continues to smoke 1/2 ppd. Wants to quit but too much stress recently- not ready.    2020-   Start taking prednisone again- long taper over 3 weeks then try to stop. If lungs flaring while decreasing prednisone go up to the last dose that helped you and call our office.  Continue trelegy  Refilled duo nebs to use as needed  Use albuterol as needed  Quit smoking- go to program  Follow up with urologist  Follow up with PCP for kidney  testing and possible urinary infection  pt was recently hospitalized for COPD exacerbation, seen by me while admitted 20 and also having worsening hematuria at that time. He was sent home with a course of levaquin and steroid taper. He did not qualify for home O2. He is being worked up for a bladder mass per urology and pending transurethral resection.  Pt c/o pain around L kidney after doing yard work and urine turned darker. He was coughing last night and woke up with face feeling puffy. Switched urologists to a Dr. Pham at [facility] and has appt at end of September.  He finished prednisone taper. Still taking levaquin. Still feels some congestion in chest but it is getting better. Also has sinus problems w/ nose blocked. He denies frequent exacerbations but this one has been very bad. Feels like worsening since stopped prednisone. Still smokes but trying to cut back. Has smoking cessation appt later this month.  Inhalers: nebs 4-5x/day, trelegy daily, albuterol rescue 1-2x/day    7/15/20- Pt is a 64 yo male with HTN, GERD and BPH presents for new evaluation of breathing issues. He complains of SOB especially if he carries anything like taking out trash to the dumpster. This has been progressive over about 3.5 yrs. He wheezes and coughs up clear mucous, throughout the day.  Pt smokes 1 PPD x 55 yrs.  At age 5 pt had pna and a collapsed lung requiring chest tube on the left side and hospitalization. Didn't have any other problems w/ breathing growing up. He took up playing [employer] to help lung function.  Pt had abd/p scan for UTI recently which showed some emphysematous changes.    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 One brother  at 65 from leukemia.    Grew up in [place]    The chief compliant  problem varies with instablilty at time      PFSH:  Past Medical History:   Diagnosis Date    Benign  "enlargement of prostate     Had a biopsy in around 2015    Elevated PSA     GERD (gastroesophageal reflux disease)     Hypertension     Started with meds in 2012.    Kidney stone     Urinary tract infection          Past Surgical History:   Procedure Laterality Date    FISTULA REPAIR      LEFT HEART CATHETERIZATION N/A 10/23/2018    Procedure: Left heart cath;  Surgeon: Niharika Squires MD;  Location: [facility] CATH;  Service: Cardiology;  Laterality: N/A;     Social History     Tobacco Use    Smoking status: Every Day     Packs/day: 1.00     Years: 55.00     Pack years: 55.00     Types: Cigarettes    Smokeless tobacco: Never   Substance Use Topics    Alcohol use: Yes     Comment: Previous 12 beer a day for 20 years. Now occ.    Drug use: No     Family History   Problem Relation Age of Onset    Heart failure Mother     Cancer Father     Heart disease Brother     Heart attack Brother     Cancer Brother     Heart disease Brother     Drug abuse Brother      Review of patient's allergies indicates:   Allergen Reactions    Msg [glutamic acid] Nausea Only, Palpitations, Shortness Of Breath and Swelling    Oyster extract Swelling     PT swells in his throat after eating oysters on occasion       Performance Status:The patient's activity level is functions out of house. QUACH improved and does not limit him. Back pain limits activity.    Review of Systems:  a review of eleven systems covering constitutional, Eye, HEENT, Psych, Respiratory, Cardiac, GI, , Musculoskeletal, Endocrine, Dermatologic was negative except for pertinent findings as listed ABOVE and below:   wheeze, shortness of breath, fatigue      Exam:Comprehensive exam done. /86 (BP Location: Left arm, Patient Position: Sitting, BP Method: Medium (Automatic))   Pulse (!) 56   Ht 5' 6" (1.676 m)   Wt 73.7 kg (162 lb 7.7 oz)   SpO2 98% Comment: on room air at rest  BMI 26.22 kg/m²   Exam included Vitals as listed, and patient's appearance and affect and " alertness and mood, oral exam for yeast and hygiene and pharynx lesions and Mallapatti (M) score, neck with inspection for jvd and masses and thyroid abnormalities and lymph nodes (supraclavicular and infraclavicular nodes and axillary also examined and noted if abn), chest exam included symmetry and effort and fremitus and percussion and auscultation, cardiac exam included rhythm and gallops and murmur and rubs and jvd and edema, abdominal exam for mass and hepatosplenomegaly and tenderness and hernias and bowel sounds, Musculoskeletal exam with muscle tone and posture and mobility/gait and  strength, and skin for rashes and cyanosis and pallor and turgor, extremity for clubbing.  Findings were normal except for pertinent findings listed below:  M1  Bilateral clear lungs w/ faint rhonchi bilateral lower lung zones  No clubbing or edema    Radiographs (ct chest and cxr) reviewed: view by direct vision   CT scan of the chest September 7, 2022 viewed by direct vision.  Fairly small mediastinal cystic structure looks to be a little smaller than March.  There was very very difficult to see in 2021 however.    CT Chest Without Contrast 03/14/22 Emphysema and unchanged lung nodules  Indeterminate smoothly marginated structure along the anterior superior mediastinum, measuring just above simple fluid attenuation suggestive of a minimally complex/complicated cyst, possibly a lymphovascular malformation, synovial cyst (extending from the sternoclavicular joint), foregut duplication cyst, thymic epithelial lesion/cyst, and much less likely malignancy, however this has slightly increased in size from the previous exam and may warrant interval attention on follow-up imaging.       CT Chest Without Contrast  05/20/2021   In the left upper lobe is a confluence of vessels and a possible 4 mm nodule decreased in size and conspicuousness since the prior study of July 16, 2020.  No new or enlarging pulmonary nodules are  identified.  Emphysema.     CT chest/abd/p 11/20/20- mild emphysema, lungs clear aside from small 6mm nodule DANA which is unchanged from prior exam  1. No metastatic disease.  2. Nodular thickening of the left posterior aspect of the bladder.  3. Enlarged prostate.  3. Mild pulmonary emphysema.   CXR 8/14/20- possible vague infiltrate/opacifications bilateral bases  CT chest 7/16/20- DANA 6mm nodule  CT abd/p 6/26/20- bases of lungs w/ scant emphysematous changes, some strands of atelectasis    Labs reviewed    Lab Results   Component Value Date    WBC 7.3 03/04/2022    RBC 5.23 03/04/2022    HGB 16.3 03/04/2022    HCT 49.2 03/04/2022    MCV 94.1 03/04/2022    MCH 31.2 03/04/2022    MCHC 33.1 03/04/2022    RDW 13.0 03/04/2022     03/04/2022    MPV 8.9 03/04/2022    GRAN 10.5 (H) 12/16/2020    GRAN 74.4 (H) 12/16/2020    LYMPH 1,570 03/04/2022    LYMPH 21.5 03/04/2022    MONO 832 03/04/2022    MONO 11.4 03/04/2022     03/04/2022    BASO 29 03/04/2022    EOSINOPHIL 2.2 03/04/2022    BASOPHIL 0.4 03/04/2022     Results for AMBROCIO MARTELL RAMEY (MRN [id number]) as of 5/24/2021 15:33   Ref. Range 8/13/2020 02:30 8/13/2020 06:01 8/14/2020 05:03 12/16/2020 09:20 3/22/2021 15:14   Eos # Latest Ref Range: 15 - 500 cells/uL 1.4 (H) 0.3 0.0 0.5 122       PFT reviewed  7/16/20- moderately severe obstruction, reduced DLCO, air trapping    EPWORTH SLEEPINESS SCALE SCORE 13 on 10/4/2021  Sleep study 10/7/2021 AHI Significant obstructive sleep apnea with a TRUPTI of 8.1/hr      Plan:  Clinical impression is apparently straight forward and impression with management as below.    Martell was seen today for follow-up.    Diagnoses and all orders for this visit:    Centrilobular emphysema    Tobacco dependence    Mediastinal mass  -     Ambulatory referral/consult to Cardiothoracic Surgery; Future    Anxiety  -     ALPRAZolam (XANAX) 0.5 MG tablet; Take 1 tablet (0.5 mg total) by mouth 3 (three) times daily as needed for  Anxiety.    GLENNA on CPAP     - continue COPD medication regiment   - lung nodules unchanged.        Follow up in about 6 months (around 3/13/2023), or if symptoms worsen or fail to improve.    Discussed with patient above for education the following:      Patient Instructions   Nasal stuffy -- afrin or generic afrin --- use one to 2 sprays up to once daily -- alternate nares ?  If regular use may get tolerant and afrin ineffective.  Could see ent to improve passages.      Flonase should work better (or astelin) if passages open.     Sleep study was only 8 episodes an hour-- less than 5 is normal.     Cpap titration needed 8-9 cm pressure    Mediastinal abnormality varies-- would send to cardiothoracic surgery.    Copd is moderate at 55% or so.  Anoro and qvar effective -- as needed albuterol.  Would stop smokes.     CT films are reviewed directly with the patient.  Extensive discussion is made.  Patient's evaluation took 56 minutes today.

## 2022-09-13 NOTE — PATIENT INSTRUCTIONS
Nasal stuffy -- afrin or generic afrin --- use one to 2 sprays up to once daily -- alternate nares ?  If regular use may get tolerant and afrin ineffective.  Could see ent to improve passages.      Flonase should work better (or astelin) if passages open.     Sleep study was only 8 episodes an hour-- less than 5 is normal.     Cpap titration needed 8-9 cm pressure    Mediastinal abnormality varies-- would send to cardiothoracic surgery.    Copd is moderate at 55% or so.  Anoro and qvar effective -- as needed albuterol.  Would stop smokes.

## 2022-09-14 ENCOUNTER — OFFICE VISIT (OUTPATIENT)
Dept: FAMILY MEDICINE | Facility: CLINIC | Age: 67
End: 2022-09-14
Payer: MEDICARE

## 2022-09-14 ENCOUNTER — TELEPHONE (OUTPATIENT)
Dept: PULMONOLOGY | Facility: CLINIC | Age: 67
End: 2022-09-14
Payer: MEDICARE

## 2022-09-14 VITALS
WEIGHT: 163 LBS | SYSTOLIC BLOOD PRESSURE: 142 MMHG | HEART RATE: 62 BPM | DIASTOLIC BLOOD PRESSURE: 86 MMHG | HEIGHT: 66 IN | BODY MASS INDEX: 26.2 KG/M2

## 2022-09-14 DIAGNOSIS — Z13.29 SCREENING FOR ENDOCRINE DISORDER: ICD-10-CM

## 2022-09-14 DIAGNOSIS — Z13.6 SCREENING FOR ISCHEMIC HEART DISEASE (IHD): ICD-10-CM

## 2022-09-14 DIAGNOSIS — F17.200 TOBACCO DEPENDENCE: ICD-10-CM

## 2022-09-14 DIAGNOSIS — I48.91 ATRIAL FIBRILLATION, UNSPECIFIED TYPE: ICD-10-CM

## 2022-09-14 DIAGNOSIS — I10 ESSENTIAL HYPERTENSION: Primary | ICD-10-CM

## 2022-09-14 DIAGNOSIS — Z79.899 LONG-TERM USE OF HIGH-RISK MEDICATION: ICD-10-CM

## 2022-09-14 DIAGNOSIS — G47.33 OSA ON CPAP: ICD-10-CM

## 2022-09-14 DIAGNOSIS — Z13.89 SCREENING FOR BLOOD OR PROTEIN IN URINE: ICD-10-CM

## 2022-09-14 DIAGNOSIS — J30.9 ALLERGIC RHINITIS, UNSPECIFIED SEASONALITY, UNSPECIFIED TRIGGER: ICD-10-CM

## 2022-09-14 DIAGNOSIS — K21.9 GASTROESOPHAGEAL REFLUX DISEASE WITHOUT ESOPHAGITIS: ICD-10-CM

## 2022-09-14 PROCEDURE — 99214 PR OFFICE/OUTPT VISIT, EST, LEVL IV, 30-39 MIN: ICD-10-PCS | Mod: S$GLB,,, | Performed by: FAMILY MEDICINE

## 2022-09-14 PROCEDURE — 99214 OFFICE O/P EST MOD 30 MIN: CPT | Mod: S$GLB,,, | Performed by: FAMILY MEDICINE

## 2022-09-14 RX ORDER — AZELASTINE 1 MG/ML
1 SPRAY, METERED NASAL 2 TIMES DAILY
Qty: 30 ML | Refills: 2 | Status: SHIPPED | OUTPATIENT
Start: 2022-09-14

## 2022-09-14 NOTE — PROGRESS NOTES
SUBJECTIVE:    Patient ID: Martell Corcoran is a 67 y.o. male.    Chief Complaint: Hypertension (Did not bring bottles//needs refills//colonoscopy scheduled 9/16/22 [facility]//fozia) and Hyperlipidemia    Pt here to follow up on acute and chronic conditions (Anxiety, Pepcid, HTN, CAD (65%), HLD, GERD)    Heartburn is doing ok on nexium.  (failed prilosec,protonix)    BP is borderline today. Didn't eat breakfast or lunch, so hasn't taken toprol. Has lost 17 pounds since last visit. Feels better. Not as achy and can breathe better. Not as labored in the shower. Not as dizzy anymore.  Hx afib on Eliquis ([place]) Angiogram and told has a 50% blockage in one of his arteries.     Has not been using CPAP. Told he may not need it clara as he had lost weight. Not tired during the day.   Has been sleeping ok. (Ge/Damian). Has an upcoming appt with Dr. Thornton to check out a mediastinal mass that may need to be biopsies.      Has not been able to remember much since he had COVID in 2020.  Still doesn't have any sense of taste or smell.     Anxiety is doing ok.     No longer on smoking cessation program. Smoking 3 cigarettes a day.     Cough is doing better. Takes astelin as needed.     Has chronic dry eye, using argelia eye drop that doesn't work.    No longer seeing Dr. Pham ([place]) due to location and the long drive on the way back in pain, having to use the bathroom. Now seeing Dr. Felix. Has not had tuberculin (urothelial cell CA) bladder infusions. Has been having bladder cystoscopes and prostate biopsies every 6 months.     No recent labs.     Has had a CT abdomen/pelvis and nothing to worry about. Still taking nexium 40mg.   -----------------------------------------------  claudio 2011 ([place]), scheduled on 9/16/22 [facility] Outpatient Visit on 09/07/2022   Component Date Value Ref Range Status    POC Creatinine 09/07/2022 1.0  0.5 - 1.4 mg/dL Final    Sample 09/07/2022 unknown   Final   Clinical  Support on 07/22/2022   Component Date Value Ref Range Status    Systolic blood pressure 07/22/2022 133  mmHg Final    Diastolic blood pressure 07/22/2022 88  mmHg Final    HR at rest 07/22/2022 59  bpm Final    RPP 07/22/2022 7,847   Final    Peak HR 07/22/2022 143  bpm Final    Peak Systolic BP 07/22/2022 157  mmHg Final    Peak Diatolic BP 07/22/2022 66  mmHg Final    Peak RPP 07/22/2022 22,451   Final    Max Predicted HR 07/22/2022 153   Final    85% Max Predicted HR 07/22/2022 130   Final    % Max HR Achieved 07/22/2022 93   Final    1 Minute Recovery HR 07/22/2022 131  bpm Final    [facility] CV CPX PATIENT IS MALE 07/22/2022 1   Final    [facility] CV CPX PATIENT IS FEMALE 07/22/2022 0   Final    Exercise duration (sec) 07/22/2022 0  seconds Final    Exercise duration (min) 07/22/2022 13  minutes Final    EF 07/22/2022 64  % Final   Hospital Outpatient Visit on 05/23/2022   Component Date Value Ref Range Status    AORTIC VALVE CUSP SEPERATION 05/23/2022 1.60  cm Final    AV mean gradient 05/23/2022 5  mmHg Final    Ao VTI 05/23/2022 31.70  cm Final    Ao peak [place] 05/23/2022 1.79  m/s Final    AV peak gradient 05/23/2022 13  mmHg Final    Ao root annulus 05/23/2022 3.50  cm Final    IVRT 05/23/2022 79.00  ms Final    IVS 05/23/2022 1.27 (A)  0.6 - 1.1 cm Final    LVIDd 05/23/2022 4.33  3.5 - 6.0 cm Final    LVIDs 05/23/2022 2.95  2.1 - 4.0 cm Final    LVOT diameter 05/23/2022 2.00  cm Final    LVOT peak VTI 05/23/2022 22.80  cm Final    LVOT peak dakota 05/23/2022 0.98  m/s Final    Posterior Wall 05/23/2022 0.89  0.6 - 1.1 cm Final    Left Atrium Major Axis 05/23/2022 3.40  cm Final    LA size 05/23/2022 3.70  cm Final    E wave deceleration time 05/23/2022 179.00  ms Final    MV Peak E [place] 05/23/2022 1.17  m/s Final    RVDD 05/23/2022 2.58  cm Final    BSA 05/23/2022 1.95  m2 Final    TDI SEPTAL 05/23/2022 0.08  m/s Final    LV LATERAL E/E' RATIO 05/23/2022 10.64  m/s Final    LV SEPTAL E/E' RATIO 05/23/2022 14.63  m/s Final     TDI LATERAL 05/23/2022 0.11  m/s Final    FS 05/23/2022 32  28 - 44 % Final    LV mass 05/23/2022 160.49  g Final    Left Ventricle Relative Wall Thick* 05/23/2022 0.41  cm Final    AV valve area 05/23/2022 2.26  cm2 Final    AV Velocity Ratio 05/23/2022 0.55   Final    AV index (prosthetic) 05/23/2022 0.72   Final    Mean e' 05/23/2022 0.10  m/s Final    LVOT area 05/23/2022 3.1  cm2 Final    LVOT stroke volume 05/23/2022 71.59  cm3 Final    E/E' ratio 05/23/2022 12.32  m/s Final    LV Mass Index 05/23/2022 84  g/m2 Final    Right Atrial Pressure (from IVC) 05/23/2022 3  mmHg Final    EF 05/23/2022 70  % Final   Hospital Outpatient Visit on 05/23/2022   Component Date Value Ref Range Status    85% Max Predicted HR 05/23/2022 131   Final    Max Predicted HR 05/23/2022 154   Final    OHS CV CPX PATIENT IS MALE 05/23/2022 1.0   Final    OHS CV CPX PATIENT IS FEMALE 05/23/2022 0.0   Final    Peak HR 05/23/2022 68.0  bpm Final    % Max HR Achieved 05/23/2022 44   Final    EF + QEF 05/23/2022 53  % Final    Ejection Fraction- High Stress 05/23/2022 65  % Final    End diastolic index (mL/m2) 05/23/2022 94  mL/m2 Final    End diastolic index (mL/m2) 05/23/2022 158  mL/m2 Final    End systolic index (mL/m2) 05/23/2022 33  mL/m2 Final    End systolic index (mL/m2) 05/23/2022 71  mL/m2 Final    Nuc Stress EF 05/23/2022 65  % Final    Nuc Rest Diastolic Volume Index 05/23/2022 71   Final    Nuc Rest Systolic Volume Index 05/23/2022 25   Final    dose 05/23/2022 0.4  mg Final    HR at rest 05/23/2022 59  bpm Final    Systolic blood pressure 05/23/2022 142  mmHg Final    Diastolic blood pressure 05/23/2022 84  mmHg Final    RPP 05/23/2022 8,378   Final    Peak Systolic BP 05/23/2022 130  mmHg Final    Peak Diatolic BP 05/23/2022 70  mmHg Final    Peak RPP 05/23/2022 8,840   Final    1 Minute Recovery HR 05/23/2022 54  bpm Final       Past Medical History:   Diagnosis Date    Benign enlargement of prostate     Had a biopsy in  around 2015    Elevated PSA     GERD (gastroesophageal reflux disease)     Hypertension     Started with meds in 2012.    Kidney stone     Urinary tract infection      Social History     Socioeconomic History    Marital status: Significant Other   Tobacco Use    Smoking status: Every Day     Packs/day: 1.00     Years: 55.00     Pack years: 55.00     Types: Cigarettes    Smokeless tobacco: Never   Substance and Sexual Activity    Alcohol use: Yes     Comment: Previous 12 beer a day for 20 years. Now occ.    Drug use: No     Social Determinants of Health     Financial Resource Strain: High Risk    Difficulty of Paying Living Expenses: Very hard   Food Insecurity: Food Insecurity Present    Worried About Running Out of Food in the Last Year: Sometimes true    Ran Out of Food in the Last Year: Sometimes true   Transportation Needs: No Transportation Needs    Lack of Transportation (Medical): No    Lack of Transportation (Non-Medical): No   Physical Activity: Inactive    Days of Exercise per Week: 0 days    Minutes of Exercise per Session: 0 min   Stress: Stress Concern Present    Feeling of Stress : Very much   Social Connections: Unknown    Frequency of Communication with Friends and Family: Once a week    Frequency of Social Gatherings with Friends and Family: Never    Active Member of Clubs or Organizations: No    Attends Club or Organization Meetings: Never    Marital Status:    Housing Stability: Low Risk     Unable to Pay for Housing in the Last Year: No    Number of Places Lived in the Last Year: 1    Unstable Housing in the Last Year: No     Past Surgical History:   Procedure Laterality Date    FISTULA REPAIR      LEFT HEART CATHETERIZATION N/A 10/23/2018    Procedure: Left heart cath;  Surgeon: Niharika Squires MD;  Location: [facility] CATH;  Service: Cardiology;  Laterality: N/A;     Family History   Problem Relation Age of Onset    Heart failure Mother     Cancer Father     Heart disease Brother     Heart  attack Brother     Cancer Brother     Heart disease Brother     Drug abuse Brother        Review of patient's allergies indicates:   Allergen Reactions    Msg [glutamic acid] Nausea Only, Palpitations, Shortness Of Breath and Swelling    Oyster extract Swelling     PT swells in his throat after eating oysters on occasion       Current Outpatient Medications:     albuterol (PROVENTIL/VENTOLIN HFA) 90 mcg/actuation inhaler, Inhale 1-2 puffs into the lungs every 4 (four) hours as needed for Shortness of Breath (coughing). Rescue, Disp: 18 g, Rfl: 11    albuterol-ipratropium (DUO-NEB) 2.5 mg-0.5 mg/3 mL nebulizer solution, USE 1 AMPULE IN NEBULIZER EVERY 4 HOURS AS NEEDED FOR WHEEZING, Disp: 180 mL, Rfl: 0    alfuzosin (UROXATRAL) 10 mg Tb24, TAKE 1 TABLET BY MOUTH ONCE DAILY AFTER BREAKFAST, Disp: , Rfl:     ALPRAZolam (XANAX) 0.5 MG tablet, Take 1 tablet (0.5 mg total) by mouth 3 (three) times daily as needed for Anxiety., Disp: 60 tablet, Rfl: 1    aspirin (ECOTRIN) 81 MG EC tablet, , Disp: , Rfl:     beclomethasone (QVAR) 80 mcg/actuation Aero, Inhale 2 puffs into the lungs 2 (two) times a day. Controller, Disp: 8 g, Rfl: 11    ELIQUIS 5 mg Tab, Take 5 mg by mouth 2 (two) times daily., Disp: , Rfl:     esomeprazole (NEXIUM) 40 MG capsule, Take 40 mg by mouth once daily., Disp: , Rfl:     ezetimibe (ZETIA) 10 mg tablet, Take 10 mg by mouth once daily., Disp: , Rfl:     finasteride (PROSCAR) 5 mg tablet, Take 5 mg by mouth once daily., Disp: , Rfl:     LIVALO 4 mg Tab, Take 1 tablet by mouth once daily., Disp: , Rfl:     metoprolol succinate (TOPROL-XL) 25 MG 24 hr tablet, Take 25 mg by mouth once daily., Disp: , Rfl:     nitroGLYCERIN (NITROSTAT) 0.4 MG SL tablet, Place under the tongue., Disp: , Rfl:     umeclidinium-vilanteroL (ANORO ELLIPTA) 62.5-25 mcg/actuation DsDv, INHALE 1 PUFF BY MOUTH ONCE DAILY (CONTROLLER), Disp: 180 each, Rfl: 3    valACYclovir (VALTREX) 1000 MG tablet, Take 1 g by mouth 2 (two) times  "daily., Disp: , Rfl:     azelastine (ASTELIN) 137 mcg (0.1 %) nasal spray, 1 spray (137 mcg total) by Nasal route 2 (two) times daily., Disp: 30 mL, Rfl: 2    Review of Systems   Constitutional:  Negative for appetite change, fatigue, fever and unexpected weight change.   Respiratory:  Positive for cough. Negative for chest tightness, shortness of breath and wheezing.    Cardiovascular:  Negative for chest pain and leg swelling.   Gastrointestinal:  Negative for abdominal pain, constipation, nausea and vomiting.        -heartburn   Genitourinary:  Negative for decreased urine volume, difficulty urinating, dysuria and frequency.   Musculoskeletal:  Positive for back pain. Negative for arthralgias, myalgias and neck pain.   Skin:  Negative for rash.   Neurological:  Negative for dizziness, weakness, numbness and headaches.   Hematological:  Does not bruise/bleed easily.   Psychiatric/Behavioral:  Negative for dysphoric mood, sleep disturbance and suicidal ideas. The patient is not nervous/anxious.    All other systems reviewed and are negative.       Objective:      Vitals:    09/14/22 1458   BP: (!) 142/86   Pulse: 62   Weight: 73.9 kg (163 lb)   Height: 5' 6" (1.676 m)     Wt Readings from Last 6 Encounters:   09/14/22 73.9 kg (163 lb)   09/13/22 73.7 kg (162 lb 7.7 oz)   09/01/22 74.2 kg (163 lb 7.5 oz)   07/01/22 80.4 kg (177 lb 4 oz)   06/01/22 81.9 kg (180 lb 8.9 oz)   05/23/22 81.6 kg (180 lb)           Physical Exam  Vitals reviewed.   Constitutional:       General: He is not in acute distress.     Appearance: Normal appearance. He is well-developed and overweight.   HENT:      Head: Normocephalic and atraumatic.   Neck:      Thyroid: No thyromegaly.   Cardiovascular:      Rate and Rhythm: Normal rate and regular rhythm.      Heart sounds: Normal heart sounds. No murmur heard.    No friction rub.   Pulmonary:      Effort: Pulmonary effort is normal.      Breath sounds: Normal breath sounds. No wheezing or " rales.   Abdominal:      General: Bowel sounds are normal. There is no distension.      Palpations: Abdomen is soft.      Tenderness: There is abdominal tenderness in the right upper quadrant.   Musculoskeletal:      Cervical back: Neck supple.   Lymphadenopathy:      Cervical: No cervical adenopathy.   Skin:     General: Skin is warm and dry.      Findings: No rash.   Neurological:      Mental Status: He is alert and oriented to person, place, and time.   Psychiatric:         Attention and Perception: He is attentive.         Speech: Speech normal.         Behavior: Behavior normal.         Thought Content: Thought content normal.         Judgment: Judgment normal.         Assessment:       1. Essential hypertension    2. Atrial fibrillation, unspecified type    3. Allergic rhinitis, unspecified seasonality, unspecified trigger    4. Gastroesophageal reflux disease without esophagitis    5. GLENNA on CPAP    6. Tobacco dependence    7. Long-term use of high-risk medication    8. Screening for ischemic heart disease (IHD)    9. Screening for blood or protein in urine    10. Screening for endocrine disorder         Plan:       Essential hypertension  Comments:  Uncontrolled. Pt advised to take his BP med regularly.  Orders:  -     Comprehensive Metabolic Panel; Future; Expected date: 09/14/2022  -     Microalbumin/Creatinine Ratio, Urine; Future; Expected date: 09/14/2022  -     TSH w/reflex to FT4; Future; Expected date: 09/14/2022  -     Urinalysis, Reflex to Urine Culture Urine, Clean Catch; Future; Expected date: 09/14/2022  -     CBC Auto Differential; Future; Expected date: 09/14/2022  -     Lipid Panel; Future; Expected date: 09/14/2022    Atrial fibrillation, unspecified type  Comments:  Rate controlled. On Eliquis. To continue to follow with Cards    Allergic rhinitis, unspecified seasonality, unspecified trigger  Comments:  Symptomatic. Astelin refilled.  Orders:  -     azelastine (ASTELIN) 137 mcg (0.1 %)  nasal spray; 1 spray (137 mcg total) by Nasal route 2 (two) times daily.  Dispense: 30 mL; Refill: 2    Gastroesophageal reflux disease without esophagitis  Comments:  Stable. Will continue to monitor symptoms on PPI    GLENNA on CPAP  Comments:  Pt advised to resume CPAP if he regains weight    Tobacco dependence  Comments:  Pt strongly encouraged to stop smoking    Long-term use of high-risk medication  -     Comprehensive Metabolic Panel; Future; Expected date: 09/14/2022  -     Microalbumin/Creatinine Ratio, Urine; Future; Expected date: 09/14/2022  -     TSH w/reflex to FT4; Future; Expected date: 09/14/2022  -     Urinalysis, Reflex to Urine Culture Urine, Clean Catch; Future; Expected date: 09/14/2022  -     CBC Auto Differential; Future; Expected date: 09/14/2022  -     Lipid Panel; Future; Expected date: 09/14/2022    Screening for ischemic heart disease (IHD)  -     Comprehensive Metabolic Panel; Future; Expected date: 09/14/2022  -     Lipid Panel; Future; Expected date: 09/14/2022    Screening for blood or protein in urine  -     Microalbumin/Creatinine Ratio, Urine; Future; Expected date: 09/14/2022  -     Urinalysis, Reflex to Urine Culture Urine, Clean Catch; Future; Expected date: 09/14/2022    Screening for endocrine disorder  -     TSH w/reflex to FT4; Future; Expected date: 09/14/2022    Labs and/or tests have been ordered for the evaluation/monitoring of acute/chronic conditions, to be done just before next visit.    Follow up in about 6 months (around 3/14/2023) for HTN, afib, LABS.        9/14/2022 Zo Burrell

## 2022-09-27 ENCOUNTER — TELEPHONE (OUTPATIENT)
Dept: ELECTROPHYSIOLOGY | Facility: CLINIC | Age: 67
End: 2022-09-27
Payer: MEDICARE

## 2022-09-27 NOTE — TELEPHONE ENCOUNTER
----- Message from Gavino Youngblood sent at 9/27/2022  9:53 AM CDT -----  Type:  Sooner Appointment Request    Caller is requesting a sooner appointment.  Caller declined first available appointment listed below.  Caller will not accept being placed on the waitlist and is requesting a message be sent to doctor.    Name of Caller:  Pt  When is the first available appointment?     Symptoms:  Referral   Best Call Back Number: [phone]  Additional Information:  sts the the appt that is set up w/ Dr Lemon in Oct was actually supposed to be set up with Dr Aguilar not Dr Lemon.  Please advise -- Thank you

## 2022-09-27 NOTE — TELEPHONE ENCOUNTER
Called pt in regards to message below. No answer, left a message with this information and call back #.

## 2022-09-27 NOTE — TELEPHONE ENCOUNTER
Pt returned my call about a NP appointment with Dr. Aguilar. Pt stated his appointment was scheduled incorrectly with another doctor and wanted to see Dr. Aguilar. Pt verbally confirmed new appointment date, time, and location, and thanked me for calling.

## 2022-10-11 NOTE — PROGRESS NOTES
Subjective:     HPI    I had the pleasure of seeing Martell Corcoran in consultation at your request for the evaluation of atrial fibrillation. He is a 67F with bladder CA (status-post resection, BCG infusions, currently in remission), HTN, GLENNA on CPAP, COPD/emphysema, who was incidentally noted to be in rate controlled AF at a routine cardiology visit in 11/2021. Started on eliquis at that point. Has noted a reduction in his energy level. On eliquis. No history of antiarrhythmic use or cardioversion.    DSE in 7/2022 showed an EF of 65% and no evidence of ischemia.    HR in the office today 68 bpm.    Review of Systems   Constitutional: Negative for decreased appetite, malaise/fatigue, weight gain and weight loss.   HENT:  Negative for sore throat.    Eyes:  Negative for blurred vision.   Cardiovascular:  Negative for chest pain, dyspnea on exertion, irregular heartbeat, leg swelling, near-syncope, orthopnea, palpitations, paroxysmal nocturnal dyspnea and syncope.   Respiratory:  Negative for shortness of breath.    Skin:  Negative for rash.   Musculoskeletal:  Negative for arthritis.   Gastrointestinal:  Negative for abdominal pain.   Neurological:  Negative for focal weakness.   Psychiatric/Behavioral:  Negative for altered mental status.       Objective:    Physical Exam  Vitals and nursing note reviewed.   Constitutional:       General: He is not in acute distress.     Appearance: He is well-developed.   HENT:      Head: Normocephalic and atraumatic.   Eyes:      General: No scleral icterus.     Pupils: Pupils are equal, round, and reactive to light.   Neck:      Thyroid: No thyromegaly.   Cardiovascular:      Rate and Rhythm: Regular rhythm.      Pulses: Normal pulses.      Heart sounds: Normal heart sounds. No murmur heard.    No friction rub. No gallop.   Pulmonary:      Effort: Pulmonary effort is normal.      Breath sounds: Normal breath sounds.   Abdominal:      General: Bowel sounds are normal.  There is no distension.      Palpations: Abdomen is soft.      Tenderness: There is no abdominal tenderness.   Musculoskeletal:      Cervical back: Neck supple.   Skin:     General: Skin is warm and dry.      Findings: No rash.   Neurological:      Mental Status: He is alert and oriented to person, place, and time.   Psychiatric:         Behavior: Behavior normal.         Assessment:       1. Atrial fibrillation, unspecified type    2. Primary hypertension    3. GLENNA on CPAP         Plan:       In summary, Martell Corcoran is a 67M with symptomatic persistent AF. His [id number] Score is 2 (age, HTN) so he should continue eliquis.    Mr. Corcoran is symptomatic with his AF. We discussed in detail the pathophysiology of AF as well as the myriad of treatment options available to manage it including antiarrhythmics and catheter ablation. We specifically discussed the risks, benefits, indications, and alternatives to PVI. Risks discussed include bleeding, hematoma, vascular damage, cardiac tamponade, stroke, PV stenosis, AE fistula, phrenic nerve damage, and death.  After considering his options he has decided to proceed with DCCV.    Plan is to stop toprol, and start sotalol 80 mg bid. Three days later will performed VALERIE/DCCV either locally or at [facility]. He will see me in 4-6 weeks..     Thank you for allowing me to participate in the care of this patient. Please do not hesitate to call me with any questions or concerns.

## 2022-10-12 ENCOUNTER — OFFICE VISIT (OUTPATIENT)
Dept: CARDIOLOGY | Facility: CLINIC | Age: 67
End: 2022-10-12
Payer: MEDICARE

## 2022-10-12 VITALS
OXYGEN SATURATION: 98 % | HEIGHT: 66 IN | HEART RATE: 68 BPM | BODY MASS INDEX: 26.17 KG/M2 | DIASTOLIC BLOOD PRESSURE: 78 MMHG | RESPIRATION RATE: 16 BRPM | SYSTOLIC BLOOD PRESSURE: 128 MMHG | WEIGHT: 162.81 LBS

## 2022-10-12 DIAGNOSIS — G47.33 OSA ON CPAP: ICD-10-CM

## 2022-10-12 DIAGNOSIS — I10 PRIMARY HYPERTENSION: ICD-10-CM

## 2022-10-12 DIAGNOSIS — I48.91 ATRIAL FIBRILLATION, UNSPECIFIED TYPE: Primary | ICD-10-CM

## 2022-10-12 PROCEDURE — 99205 PR OFFICE/OUTPT VISIT, NEW, LEVL V, 60-74 MIN: ICD-10-PCS | Mod: S$GLB,,, | Performed by: INTERNAL MEDICINE

## 2022-10-12 PROCEDURE — 99205 OFFICE O/P NEW HI 60 MIN: CPT | Mod: S$GLB,,, | Performed by: INTERNAL MEDICINE

## 2022-10-12 RX ORDER — SOTALOL HYDROCHLORIDE 80 MG/1
80 TABLET ORAL 2 TIMES DAILY
Qty: 60 TABLET | Refills: 11 | Status: SHIPPED | OUTPATIENT
Start: 2022-10-12 | End: 2022-12-16

## 2022-10-13 RX ORDER — SODIUM CHLORIDE 9 MG/ML
INJECTION, SOLUTION INTRAVENOUS CONTINUOUS
Status: CANCELLED | OUTPATIENT
Start: 2022-10-13

## 2022-10-17 DIAGNOSIS — Z85.51 PERSONAL HISTORY OF MALIGNANT NEOPLASM OF BLADDER: ICD-10-CM

## 2022-10-17 DIAGNOSIS — R93.5 ABNORMAL ABDOMINAL ULTRASOUND: Primary | ICD-10-CM

## 2022-10-25 ENCOUNTER — PATIENT MESSAGE (OUTPATIENT)
Dept: CARDIOLOGY | Facility: CLINIC | Age: 67
End: 2022-10-25
Payer: MEDICARE

## 2022-10-25 ENCOUNTER — HOSPITAL ENCOUNTER (OUTPATIENT)
Dept: RADIOLOGY | Facility: HOSPITAL | Age: 67
Discharge: HOME | End: 2022-10-25
Attending: THORACIC SURGERY (CARDIOTHORACIC VASCULAR SURGERY)
Payer: MEDICARE

## 2022-10-25 ENCOUNTER — TELEPHONE (OUTPATIENT)
Dept: CARDIOLOGY | Facility: CLINIC | Age: 67
End: 2022-10-25
Payer: MEDICARE

## 2022-10-25 VITALS — BODY MASS INDEX: 26.03 KG/M2 | WEIGHT: 162 LBS | HEIGHT: 66 IN

## 2022-10-25 DIAGNOSIS — Z85.51 PERSONAL HISTORY OF MALIGNANT NEOPLASM OF BLADDER: ICD-10-CM

## 2022-10-25 DIAGNOSIS — R93.5 ABNORMAL ABDOMINAL ULTRASOUND: ICD-10-CM

## 2022-10-25 LAB — GLUCOSE SERPL-MCNC: 92 MG/DL (ref 70–110)

## 2022-10-25 PROCEDURE — 78815 PET IMAGE W/CT SKULL-THIGH: CPT | Mod: TC,PI,PO

## 2022-10-25 NOTE — TELEPHONE ENCOUNTER
----- Message from Jesus Contreras sent at 10/25/2022 10:58 AM CDT -----  Type:  Patient Returning Call  Who Called:  Pt   Who Left Message for Patient:  Aria  Does the patient know what this is regarding?:  procedure for 11/01/22  Best Call Back Number:  [phone]  Additional Information:  Pt states he missed Aria call today, may have been about his procedure.

## 2022-10-31 NOTE — H&P
Martell Corcoran is a 67 y.o. male who presents for follow-up of No chief complaint on file.        HPI:  Dr Gaming   Pt here to follow up on acute and chronic conditions (Anxiety, Pepcid, HTN, CAD (65%), HLD, GERD)     Heartburn is doing ok on nexium.  (failed prilosec,protonix)     BP is doing ok today. Weight is stable. Since last visit, pt was dx with Afib. Was put on Eliquis and toprol. Was being seen by Dr. Miranda. No longer wishes to go to [place] and wants a doctor in [place] and in the network. Had an angiogram and told has a 50% blockage in one of his arteries     Has dx of GLENNA and has been using CPAP. Had PSG upstairs. Has been sleeping ok, but concerned because his face is swollen when he wakes up. He is using nasal pillow right now.       Has not been able to remember much since he had COVID in 2020.  Still doesn't have any sense of taste or smell.      Anxiety is doing ok.      Continues to be part of the smoking cessation program. Still smoking. Was down to 3 cigarettes a day. used to be a 1.5 ppd.  Sometimes up to 1/2 ppd. No using nicotine patches a day.      Has a chronic cough.  His allergies are bothering him a lot. Takes otc allergy med.      Has an appt with Dr. Pham ([place]). Has had tuberculin (urothelial cell CA)bladder infusions, needs to have another cystoscopy, that he was supposed to do in Feb. Had to postpone due to a UTI, followed by a yeast infection.      Had labs done: fBS 88, LDL 84, TSH 1.10.      Dr. Navarro changed his nexium from 20 to 40mg.   -----------------------------------------------  csope 2011 ([place])   2/20/22     THE PATIENT IS HERE TO ESTABLISH CARE.  HE PREVIOUSLY SAW DR. OPAL BOYCE BUT NEEDS A LOCAL CARDIOLOGIST.  He has a history of heart catheterization with a 30% lesion he had a 2nd catheterization done by Dr. Miranda which showed a 50% lesion in March 2021. He developed atrial fibrillation and had a slow response and therefore cardioversion was not  performed.  Holter monitor with a Zio revealed a 2nd pause.  Is not clear if this occurred while he was sleeping but he did obtain a sleep study and is now using CPAP for the last 2 months.  Holter revealed ventricular rate between 35 and 144 and he was recommended for permanent pacemaker but did not have it done because they want to treat the sleep apnea 1st.  The patient complains of midsternal chest tightness which is not necessarily related to activity was getting increasing frequency and severely.  The patient has shortness of breath sometimes with exertion.  He smokes half pack a day, and is trying to quit..  He is taking his statin Livalo.  He has a history of depression but is self weaning of his antidepressant.  He has COPD and uses inhalers.  He sees a pulmonary is.  The patient has been on Eliquis and aspirin.  He is currently being treated with BCG for bladder cancer.  He is scheduled for a cystoscopy early May needs clearance to stop the Eliquis and aspirin.  He has a rising PSA which he thinks is secondary to stopping finasteride is previously was 3.4 then went up to 6.0 is currently 12 and in these to have a prostate biopsy his medications were reviewed.     The patient is interested in having cardioversion performed was told he would need a pacemaker or ablation.  He does not feel his normal of endurant her physical status.  He has had no syncope or near syncope does get occasionally dizzy.      7/1/22       Equivocal myocardial perfusion scan.    There is a small to moderate sized, reversible perfusion abnormality that is consistent with ischemia in the inferior wall(s).    There are no other significant perfusion abnormalities.    The gated perfusion images showed an ejection fraction of 65% post stress. Normal ejection fraction is greater than 53%.    The EKG portion of this study is negative for ischemia.    The patient reported chest pain during the stress test.     Summary     The left  ventricle is normal in size with concentric remodeling and  Mild right ventricular enlargement with normal right ventricular systolic function.  Normal central venous pressure (3 mmHg).  The estimated ejection fraction is 70%.  A diastolic pattern consistent with atrial fibrillation observed.        He is here today for follow-up.  He still gets chest pains at rest.  He gets out of breath with little exertion.     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  The patient has bradycardia while sleeping.  The longest was was 3 point 0043 minutes seconds at 7 5859 in the morning.  His heart rate went up to 135 in the afternoon on the day he took go up on the 26th.  He did not record whether he was having chest pain or any kind of activity at that time.  The heart rate went up as high as 170 briefly with the average to 129 to 140 the duration was not recorded.        The patient is had a heart catheterization with Dr. Nicky crews about a year ago was told he had a 50% blockage.  He was told his chest pains from the AFib.  He gets out of breath and has COPD.  His chest pain occurs mostly at rest.  Have some limitation in his activity.  He is supposed to have a CT scan to look at a spot on his lungs.  He has started a chronic more diet with no carbs.     His Holter monitor similar to what Dr. Miranda found with a mild tachy-bharath syndrome and bradycardia is mostly occur while sleeping even though he is wearing CPAP.  He could qualify for pacemaker however he is symptoms are not correlating that well..  We could decrease the metoprolol 12.5 b.i.d. at the risk of having more tachycardias with exercise.  He likely does not need a heart catheterization although there are some subtle findings of possible inferior wall ischemia on the stress test..     Tyrone pritchard commended continue medical management for current time.  He is hopeful his diet will help things if he loses weight..     Recommend come back in 2 months with  a dobutamine stress echo prior to follow-up to rule out ischemic heart disease because of these chest pain a half or more the days of the month.  I suspect the tachy-bharath syndrome would respond to pacemaker.  Did offer placing a pacemaker versus repeating the Holter monitor with a good record of his activities when he is having chest pain, when he short of breath,  He be seen back in 2 months      9/1/22   The left ventricle is normal in size with normal systolic function.  The estimated ejection fraction is 64%.  The stress echo portion of this study is negative for myocardial ischemia.  There were no arrhythmias during stress.  The ECG portion of this study is negative for myocardial ischemia.  Baseline EKG atrial fibrillation low voltage left axis deviation     14 day holter   3.168 sec pause sleeping.   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  Carnivore diet.No carbs. Family problems. Daughter diabetes.   6 beat VT.              Review of patient's allergies indicates:   Allergen Reactions    Msg [glutamic acid] Nausea Only, Palpitations, Shortness Of Breath and Swelling    Oyster extract Swelling       PT swells in his throat after eating oysters on occasion              Past Medical History:   Diagnosis Date    Benign enlargement of prostate       Had a biopsy in around 2015    Elevated PSA      GERD (gastroesophageal reflux disease)      Hypertension       Started with meds in 2012.    Kidney stone      Urinary tract infection              Past Surgical History:   Procedure Laterality Date    FISTULA REPAIR        LEFT HEART CATHETERIZATION N/A 10/23/2018     Procedure: Left heart cath;  Surgeon: Niharika Squires MD;  Location: [facility] CATH;  Service: Cardiology;  Laterality: N/A;      Social History            Tobacco Use    Smoking status: Every Day       Packs/day: 1.00       Years: 55.00       Pack years: 55.00       Types: Cigarettes    Smokeless tobacco: Never   Substance  Use Topics    Alcohol use: Yes       Comment: Previous 12 beer a day for 20 years. Now occ.    Drug use: No            Family History   Problem Relation Age of Onset    Heart failure Mother      Cancer Father      Heart disease Brother      Heart attack Brother      Cancer Brother      Heart disease Brother      Drug abuse Brother           Review of Systems:   Constitution: Negative for diaphoresis and fever.   HEENT: Negative for nosebleeds.    Cardiovascular: Negative for chest pain       No dyspnea on exertion       No leg swelling        No palpitations  Respiratory: Negative for shortness of breath and wheezing.    Hematologic/Lymphatic: Negative for bleeding problem. Does not bruise/bleed easily.   Skin: Negative for color change and rash.   Musculoskeletal: Negative for falls and myalgias.   Gastrointestinal: Negative for hematemesis and hematochezia.   Genitourinary: Negative for hematuria.   Neurological: Negative for dizziness and light-headedness.   Psychiatric/Behavioral: Negative for altered mental status and memory loss.         Objective:      There were no vitals filed for this visit.           Lab Results   Component Value Date     WBC 7.3 03/04/2022     HGB 16.3 03/04/2022     HCT 49.2 03/04/2022      03/04/2022     CHOL 152 03/04/2022     TRIG 77 03/04/2022     HDL 51 03/04/2022     ALT 19 03/04/2022     AST 20 03/04/2022      03/04/2022     K 4.5 03/04/2022      03/04/2022     CREATININE 0.95 03/04/2022     BUN 16 03/04/2022     CO2 29 03/04/2022     INR 0.9 10/17/2018     MICROALBUR 0.4 03/22/2021         ECHOCARDIOGRAM RESULTS  Results for orders placed in visit on 07/22/22     Stress Echo Which stress agent will be used? Pharmacological; Color Flow Doppler? No     Interpretation Summary  · The left ventricle is normal in size with normal systolic function.  · The estimated ejection fraction is 64%.  · The stress echo portion of this study is negative for myocardial  ischemia.  · There were no arrhythmias during stress.  · The ECG portion of this study is negative for myocardial ischemia.  · Baseline EKG atrial fibrillation low voltage left axis deviation           CURRENT/PREVIOUS VISIT EKG      Results for orders placed or performed in visit on 04/20/22   IN OFFICE EKG 12-LEAD (to [place])     Collection Time: 04/20/22  1:55 PM     Narrative     Test Reason : I49.9,R07.9,I25.118,I48.0,I70.0,I24.8,I15.1,N28.89,G47.33,Z99.8~     Vent. Rate : 066 BPM     Atrial Rate : 288 BPM     P-R Int : 000 ms          QRS Dur : 100 ms      QT Int : 400 ms       P-R-T Axes : 000 -74 077 degrees     QTc Int : 419 ms     Atrial fibrillation  Left axis deviation  Incomplete right bundle branch block  Abnormal ECG  When compared with ECG of 16-DEC-2020 09:29,  Atrial fibrillation has replaced Sinus rhythm  Incomplete right bundle branch block is now Present  Criteria for Septal infarct are no longer Present  Confirmed by Ion VELASQUEZ, Osman HINES ([id number]) on 5/4/2022 8:09:32 PM     Referred By: CLARKE ORTIZ           Confirmed By:Osman Lemon MD      No valid procedures specified.   Results for orders placed during the hospital encounter of 05/23/22     Nuclear Stress - Cardiology Interpreted     Interpretation Summary    Equivocal myocardial perfusion scan.    There is a small to moderate sized, reversible perfusion abnormality that is consistent with ischemia in the inferior wall(s).    There are no other significant perfusion abnormalities.    The gated perfusion images showed an ejection fraction of 65% post stress. Normal ejection fraction is greater than 53%.    The EKG portion of this study is negative for ischemia.    The patient reported chest pain during the stress test.        Physical Exam:  CONSTITUTIONAL: No fever, no chills  HEENT: Normocephalic, atraumatic,pupils reactive to light                 NECK:  No JVD no carotid bruit  CVS: S1S2+, RRR, no murmurs,   LUNGS: Clear  ABDOMEN: Soft,  NT, BS+  EXTREMITIES: No cyanosis, edema  : No serra catheter  NEURO: AAO X 3  PSY: Normal affect             Medication List with Changes/Refills   Current Medications     ALBUTEROL (PROVENTIL/VENTOLIN HFA) 90 MCG/ACTUATION INHALER    Inhale 1-2 puffs into the lungs every 4 (four) hours as needed for Shortness of Breath (coughing). Rescue     ALBUTEROL-IPRATROPIUM (DUO-NEB) 2.5 MG-0.5 MG/3 ML NEBULIZER SOLUTION    USE 1 AMPULE IN NEBULIZER EVERY 4 HOURS AS NEEDED FOR WHEEZING     ALFUZOSIN (UROXATRAL) 10 MG TB24    TAKE 1 TABLET BY MOUTH ONCE DAILY AFTER BREAKFAST     ALPRAZOLAM (XANAX) 0.5 MG TABLET    Take 0.5 mg by mouth 3 (three) times daily as needed for Anxiety.     ASPIRIN (ECOTRIN) 81 MG EC TABLET         AZELASTINE (ASTELIN) 137 MCG (0.1 %) NASAL SPRAY    1 spray (137 mcg total) by Nasal route 2 (two) times daily.     BECLOMETHASONE (QVAR) 80 MCG/ACTUATION AERO    Inhale 2 puffs into the lungs 2 (two) times a day. Controller     ELIQUIS 5 MG TAB    Take 5 mg by mouth 2 (two) times daily.     ESOMEPRAZOLE (NEXIUM) 40 MG CAPSULE    Take 40 mg by mouth once daily.     EZETIMIBE (ZETIA) 10 MG TABLET    Take 10 mg by mouth once daily.     FINASTERIDE (PROSCAR) 5 MG TABLET    Take 5 mg by mouth once daily.     LIVALO 4 MG TAB    Take 1 tablet by mouth once daily.     METOPROLOL TARTRATE (LOPRESSOR) 25 MG TABLET    Take 12.5 mg by mouth 2 (two) times daily.     NICOTINE (NICODERM CQ) 21 MG/24 HR    Place 1 patch onto the skin once daily.     NITROGLYCERIN (NITROSTAT) 0.4 MG SL TABLET    Place under the tongue.     UMECLIDINIUM-VILANTEROL (ANORO ELLIPTA) 62.5-25 MCG/ACTUATION DSDV    INHALE 1 PUFF BY MOUTH ONCE DAILY (CONTROLLER)     VALACYCLOVIR (VALTREX) 1000 MG TABLET    Take 1 g by mouth 2 (two) times daily.                Assessment:       1. GLENNA on CPAP    2. Chronic atrial fibrillation    3. Tachy-bharath syndrome    4. Chronic anticoagulation    5. Mixed hyperlipidemia    6. Statin intolerance    7.  Hypertension secondary to other renal disorders       Plan:      Problem List Items Addressed This Visit                  Cardiac/Vascular     HTN (hypertension)     Atrial fibrillation          Other     GLENNA on CPAP - Primary      Other Visit Diagnoses         Tachy-bharath syndrome         Chronic anticoagulation         Mixed hyperlipidemia         Statin intolerance                 Continue the same meds. No pacemaker or heart cath.   He wants to have rhythm corrected wants eval for ablation.  Discussed possible cardioversion, might need rhythm control in addition to metoprolol rate control.  Answered questions        No follow-ups on file.     The patients questions were answered, they verbalized understanding, and agreed with the treatment plan.      OSMAN LEMON MD  [facility]              Other Notes    Patient admitted for VALERIE/CV    HP UNCHANGED.    All notes    Addendum Note from Zayra Espinoza NP (Cardiology)  Communications    View Encounter Conversation Summary  Active Diagnosis Review (HCC)    Active Diagnosis Review ([facility])     All Flowsheet Templates (all recorded)    Advance Directive   Anthropometrics   Custom Formula Data   Encounter Vitals   Patient-Reported Data     All Charges for This Encounter    Code Description Service Date Service Provider Modifiers Qty   58115 ID OFFICE/OUTPT VISIT, EST, LEVL IV, 30-39 MIN 9/1/2022 Osman Lemon MD S$GLB 1   66820 ID ELECTROCARDIOGRAM, COMPLETE 9/1/2022 Osman Lemon MD S$GLB 1     Level of Service    Level of Service   ID OFFICE/OUTPT VISIT, EST, LEVL IV, 30-39 MIN [28194]     Previous Visit    Date & Time   9/1/2022  1:08 PM Department   [facility] Encounter #   [id number]     Orders Placed      X-Ray Chest AP Single View 1 time imaging    IN OFFICE EKG 12-LEAD (to [place])    Ambulatory referral/consult to Cardiac Electrophysiology Authorized    Cardiac Monitoring - Adult Continuous    Case Request-Cath Lab:  TRANSESOPHAGEAL ECHOCARDIOGRAM WITH POSSIBLE CARDIOVERSION (VALERIE W/ POSS CARDIOVERSION)    Chlorhexidine (CHG) 2% Wipes Once    Oxygen Continuous Continuous    Transesophageal echo (VALERIE) with possible cardioversion  Medication Changes      None     Medication List     Visit Diagnoses      GLENNA on CPAP    Chronic atrial fibrillation    Tachy-bharath syndrome    Chronic anticoagulation    Mixed hyperlipidemia    Statin intolerance    Hypertension secondary to other renal disorders     Problem List

## 2022-11-01 ENCOUNTER — HOSPITAL ENCOUNTER (OUTPATIENT)
Facility: HOSPITAL | Age: 67
Discharge: HOME | End: 2022-11-01
Attending: GENERAL PRACTICE | Admitting: GENERAL PRACTICE
Payer: MEDICARE

## 2022-11-01 ENCOUNTER — CLINICAL SUPPORT (OUTPATIENT)
Dept: CARDIOLOGY | Facility: HOSPITAL | Age: 67
End: 2022-11-01
Attending: GENERAL PRACTICE
Payer: MEDICARE

## 2022-11-01 ENCOUNTER — ANESTHESIA EVENT (OUTPATIENT)
Dept: CARDIOLOGY | Facility: HOSPITAL | Age: 67
End: 2022-11-01
Payer: MEDICARE

## 2022-11-01 ENCOUNTER — ANESTHESIA (OUTPATIENT)
Dept: CARDIOLOGY | Facility: HOSPITAL | Age: 67
End: 2022-11-01
Payer: MEDICARE

## 2022-11-01 VITALS
HEART RATE: 66 BPM | SYSTOLIC BLOOD PRESSURE: 136 MMHG | OXYGEN SATURATION: 94 % | DIASTOLIC BLOOD PRESSURE: 64 MMHG | RESPIRATION RATE: 21 BRPM

## 2022-11-01 VITALS — BODY MASS INDEX: 26.05 KG/M2 | HEIGHT: 66 IN | WEIGHT: 162.06 LBS

## 2022-11-01 DIAGNOSIS — I48.91 ATRIAL FIBRILLATION AND FLUTTER: ICD-10-CM

## 2022-11-01 DIAGNOSIS — Z79.01 CHRONIC ANTICOAGULATION: ICD-10-CM

## 2022-11-01 DIAGNOSIS — I48.20 CHRONIC ATRIAL FIBRILLATION: ICD-10-CM

## 2022-11-01 DIAGNOSIS — I48.92 ATRIAL FIBRILLATION AND FLUTTER: ICD-10-CM

## 2022-11-01 DIAGNOSIS — G47.33 OSA ON CPAP: ICD-10-CM

## 2022-11-01 DIAGNOSIS — I48.91 ATRIAL FIBRILLATION STATUS POST CARDIOVERSION: ICD-10-CM

## 2022-11-01 DIAGNOSIS — I49.5 TACHY-BRADY SYNDROME: ICD-10-CM

## 2022-11-01 DIAGNOSIS — I48.91 AFIB: ICD-10-CM

## 2022-11-01 LAB
ANION GAP SERPL CALC-SCNC: 7 MMOL/L (ref 8–16)
BASOPHILS # BLD AUTO: 0.02 K/UL (ref 0–0.2)
BASOPHILS NFR BLD: 0.3 % (ref 0–1.9)
BSA FOR ECHO PROCEDURE: 1.85 M2
BUN SERPL-MCNC: 18 MG/DL (ref 8–23)
CALCIUM SERPL-MCNC: 9 MG/DL (ref 8.7–10.5)
CHLORIDE SERPL-SCNC: 98 MMOL/L (ref 95–110)
CO2 SERPL-SCNC: 31 MMOL/L (ref 23–29)
CREAT SERPL-MCNC: 0.9 MG/DL (ref 0.5–1.4)
DIFFERENTIAL METHOD: ABNORMAL
EJECTION FRACTION: 60 %
EOSINOPHIL # BLD AUTO: 0.1 K/UL (ref 0–0.5)
EOSINOPHIL NFR BLD: 1.6 % (ref 0–8)
ERYTHROCYTE [DISTWIDTH] IN BLOOD BY AUTOMATED COUNT: 13.4 % (ref 11.5–14.5)
EST. GFR  (NO RACE VARIABLE): >60 ML/MIN/1.73 M^2
GLUCOSE SERPL-MCNC: 112 MG/DL (ref 70–110)
HCT VFR BLD AUTO: 48.2 % (ref 40–54)
HGB BLD-MCNC: 16.2 G/DL (ref 14–18)
IMM GRANULOCYTES # BLD AUTO: 0.04 K/UL (ref 0–0.04)
IMM GRANULOCYTES NFR BLD AUTO: 0.6 % (ref 0–0.5)
LYMPHOCYTES # BLD AUTO: 1.6 K/UL (ref 1–4.8)
LYMPHOCYTES NFR BLD: 23.2 % (ref 18–48)
MAGNESIUM SERPL-MCNC: 1.8 MG/DL (ref 1.6–2.6)
MCH RBC QN AUTO: 32.6 PG (ref 27–31)
MCHC RBC AUTO-ENTMCNC: 33.6 G/DL (ref 32–36)
MCV RBC AUTO: 97 FL (ref 82–98)
MONOCYTES # BLD AUTO: 0.8 K/UL (ref 0.3–1)
MONOCYTES NFR BLD: 11.8 % (ref 4–15)
NEUTROPHILS # BLD AUTO: 4.4 K/UL (ref 1.8–7.7)
NEUTROPHILS NFR BLD: 62.5 % (ref 38–73)
NRBC BLD-RTO: 0 /100 WBC
PLATELET # BLD AUTO: 271 K/UL (ref 150–450)
PMV BLD AUTO: 8.6 FL (ref 9.2–12.9)
POTASSIUM SERPL-SCNC: 4.1 MMOL/L (ref 3.5–5.1)
RBC # BLD AUTO: 4.97 M/UL (ref 4.6–6.2)
SODIUM SERPL-SCNC: 136 MMOL/L (ref 136–145)
WBC # BLD AUTO: 6.97 K/UL (ref 3.9–12.7)

## 2022-11-01 PROCEDURE — 83735 ASSAY OF MAGNESIUM: CPT | Performed by: NURSE PRACTITIONER

## 2022-11-01 PROCEDURE — 37000009 HC ANESTHESIA EA ADD 15 MINS: Performed by: GENERAL PRACTICE

## 2022-11-01 PROCEDURE — 93325 DOPPLER ECHO COLOR FLOW MAPG: CPT

## 2022-11-01 PROCEDURE — 80048 BASIC METABOLIC PNL TOTAL CA: CPT | Performed by: NURSE PRACTITIONER

## 2022-11-01 PROCEDURE — 85025 COMPLETE CBC W/AUTO DIFF WBC: CPT | Performed by: NURSE PRACTITIONER

## 2022-11-01 PROCEDURE — 93010 ELECTROCARDIOGRAM REPORT: CPT | Mod: ,,, | Performed by: INTERNAL MEDICINE

## 2022-11-01 PROCEDURE — 37000008 HC ANESTHESIA 1ST 15 MINUTES: Performed by: GENERAL PRACTICE

## 2022-11-01 PROCEDURE — 92960 CARDIOVERSION ELECTRIC EXT: CPT | Mod: ,,, | Performed by: GENERAL PRACTICE

## 2022-11-01 PROCEDURE — 93005 ELECTROCARDIOGRAM TRACING: CPT | Performed by: INTERNAL MEDICINE

## 2022-11-01 PROCEDURE — 93321 DOPPLER ECHO F-UP/LMTD STD: CPT | Mod: 26,,, | Performed by: GENERAL PRACTICE

## 2022-11-01 PROCEDURE — 92960 TRANSESOPHAGEAL ECHO (TEE) W/ POSSIBLE CARDIOVERSION: ICD-10-PCS | Mod: ,,, | Performed by: GENERAL PRACTICE

## 2022-11-01 PROCEDURE — 93312 ECHO TRANSESOPHAGEAL: CPT

## 2022-11-01 PROCEDURE — 93005 ELECTROCARDIOGRAM TRACING: CPT | Mod: 59 | Performed by: INTERNAL MEDICINE

## 2022-11-01 PROCEDURE — 93010 EKG 12-LEAD: ICD-10-PCS | Mod: 76,,, | Performed by: INTERNAL MEDICINE

## 2022-11-01 PROCEDURE — 93312 ECHO TRANSESOPHAGEAL: CPT | Mod: 26,,, | Performed by: GENERAL PRACTICE

## 2022-11-01 PROCEDURE — 93321 TRANSESOPHAGEAL ECHO (TEE) W/ POSSIBLE CARDIOVERSION: ICD-10-PCS | Mod: 26,,, | Performed by: GENERAL PRACTICE

## 2022-11-01 PROCEDURE — 93010 ELECTROCARDIOGRAM REPORT: CPT | Mod: 76,,, | Performed by: INTERNAL MEDICINE

## 2022-11-01 PROCEDURE — 93325 TRANSESOPHAGEAL ECHO (TEE) W/ POSSIBLE CARDIOVERSION: ICD-10-PCS | Mod: 26,,, | Performed by: GENERAL PRACTICE

## 2022-11-01 PROCEDURE — 93312 TRANSESOPHAGEAL ECHO (TEE) W/ POSSIBLE CARDIOVERSION: ICD-10-PCS | Mod: 26,,, | Performed by: GENERAL PRACTICE

## 2022-11-01 PROCEDURE — 93325 DOPPLER ECHO COLOR FLOW MAPG: CPT | Mod: 26,,, | Performed by: GENERAL PRACTICE

## 2022-11-01 RX ORDER — SODIUM CHLORIDE 9 MG/ML
INJECTION, SOLUTION INTRAVENOUS CONTINUOUS
Status: DISCONTINUED | OUTPATIENT
Start: 2022-11-01 | End: 2022-11-01 | Stop reason: HOSPADM

## 2022-11-01 NOTE — ANESTHESIA POSTPROCEDURE EVALUATION
Anesthesia Post Evaluation    Patient: Martell Corcoran    Procedure(s) Performed: Procedure(s) (LRB):  TRANSESOPHAGEAL ECHOCARDIOGRAM WITH POSSIBLE CARDIOVERSION (VALERIE W/ POSS CARDIOVERSION) (N/A)    Final Anesthesia Type: MAC      Patient location: [facility].  Patient participation: Yes- Able to Participate  Level of consciousness: awake and alert, oriented and awake  Post-procedure vital signs: reviewed and stable  Pain management: adequate  Airway patency: patent  GLENNA mitigation strategies: Extubation and recovery carried out in lateral, semiupright, or other nonsupine position  PONV status at discharge: No PONV  Anesthetic complications: no      Cardiovascular status: blood pressure returned to baseline, hemodynamically stable and stable  Respiratory status: unassisted, spontaneous ventilation and room air  Hydration status: euvolemic  Follow-up not needed.          Vitals Value Taken Time   /57 11/01/22 1001   Temp 98.0 11/01/22 1031   Pulse 67 11/01/22 1029   Resp 19 11/01/22 0953   SpO2 95 % 11/01/22 1029   Vitals shown include unvalidated device data.      No case tracking events are documented in the log.      Pain/Inna Score: Inna Score: 6 (11/1/2022  9:37 AM)

## 2022-11-01 NOTE — ANESTHESIA PREPROCEDURE EVALUATION
11/01/2022  Martell Corcoran is a 67 y.o., male.         Tobacco Use:  The patient  reports that he has been smoking cigarettes. He has a 55.00 pack-year smoking history. He has never used smokeless tobacco.     Results for orders placed or performed during the hospital encounter of 11/01/22   EKG 12-LEAD    Collection Time: 11/01/22  6:47 AM    Narrative    Test Reason : I48.20,I49.5,Z79.01,    Vent. Rate : 049 BPM     Atrial Rate : 000 BPM     P-R Int : 000 ms          QRS Dur : 104 ms      QT Int : 432 ms       P-R-T Axes : 000 -88 079 degrees     QTc Int : 390 ms    Atrial fibrillation with slow ventricular response  Left axis deviation  Incomplete right bundle branch block  Septal infarct ,age undetermined  Abnormal ECG  When compared with ECG of 01-SEP-2022 13:43,  Vent. rate has decreased BY  25 BPM  Septal infarct is now Present    Referred By:             Confirmed By:              Lab Results   Component Value Date    WBC 6.97 11/01/2022    HGB 16.2 11/01/2022    HCT 48.2 11/01/2022    MCV 97 11/01/2022     11/01/2022     BMP  Lab Results   Component Value Date     11/01/2022    K 4.1 11/01/2022    CL 98 11/01/2022    CO2 31 (H) 11/01/2022    BUN 18 11/01/2022    CREATININE 0.9 11/01/2022    CALCIUM 9.0 11/01/2022    ANIONGAP 7 (L) 11/01/2022     (H) 11/01/2022    GLU 88 03/04/2022    GLU 98 03/22/2021       Results for orders placed during the hospital encounter of 05/23/22    Echo    Interpretation Summary  · The left ventricle is normal in size with concentric remodeling and  · Mild right ventricular enlargement with normal right ventricular systolic function.  · Normal central venous pressure (3 mmHg).  · The estimated ejection fraction is 70%.  · A diastolic pattern consistent with atrial fibrillation observed.        Pre-op Assessment    I have reviewed the Patient  Summary Reports.     I have reviewed the Nursing Notes. I have reviewed the NPO Status.   I have reviewed the Medications.     Review of Systems  Anesthesia Hx:  No problems with previous Anesthesia  Denies Family Hx of Anesthesia complications.   Denies Personal Hx of Anesthesia complications.   Social:  Alcohol Use, Smoker    Hematology/Oncology:        Hematology Comments: Eliquis therapy --  Cancer in past history:  surgery  Oncology Comments: Bladder cancer     Cardiovascular:   Hypertension, well controlled CAD asymptomatic Dysrhythmias atrial fibrillation QUACH ECG has been reviewed. Patient reports Chest pain secondary to A-Fib only   No Nitrostat use   Pulmonary:   COPD, mild Shortness of breath Sleep Apnea, CPAP Lung nodule  Daily Maintenance Inhaler use  No Albuterol inhaler use  No Home oxygen use  No Hospitalizations   Patient followed by Dr. Porter [place]    Education provided regarding risk of obstructive sleep apnea     Renal/:   Chronic Renal Disease renal calculi BPH    Hepatic/GI:   GERD, well controlled Patient with no active nausea vomiting at this time   Musculoskeletal:  Spine Disorders: cervical and lumbar Degenerative disease, Disc disease and Chronic Pain    Neurological:   Neuromuscular Disease,   Peripheral Neuropathy    Psych:   Psychiatric History depression          Physical Exam  General: Well nourished, Cooperative, Alert and Oriented    Airway:  Mallampati: III / II  Mouth Opening: Normal  TM Distance: > 6 cm  Tongue: Normal  Neck ROM: Normal ROM    Dental:  Intact, Caps / Implants    Chest/Lungs:  Clear to auscultation, Normal Respiratory Rate    Heart:  Rate: Normal  Rhythm: Irregularly Irregular        Anesthesia Plan  Type of Anesthesia, risks & benefits discussed:    Anesthesia Type: MAC  Intra-op Monitoring Plan: Standard ASA Monitors  Induction:  IV  Informed Consent: Informed consent signed with the Patient and all parties understand the risks and agree with anesthesia plan.   All questions answered.   ASA Score: 3  Anesthesia Plan Notes:       MAC with Propofol  POM Mask    Ready For Surgery From Anesthesia Perspective.     .

## 2022-11-01 NOTE — NURSING
Pt ate breakfast devyn with no complication. MD at bedside and gave order to DC home. PT states comfort and vitals/loc WNL. PT given instructions to take half of sotalol prescription and states understanding. . IV and tele removed. Family to drive PT home.

## 2022-11-02 ENCOUNTER — TELEPHONE (OUTPATIENT)
Dept: CARDIOLOGY | Facility: CLINIC | Age: 67
End: 2022-11-02
Payer: MEDICARE

## 2022-11-02 NOTE — LETTER
2022    Martell Corcoran  [address]  [place] [address]             [facility] - Cardiology  [address]  [place] [address]  Phone: [phone]  Fax: [phone] Patient: Martell Corcoran  : 1955    Referring Doctor: Dr. Hermann Jefferson  Procedure:Cystoscopy with possible Bladder bx   Type of Anesthesia:local    Date of Last Stent:  Date of Last Office Visit:2022    Current Outpatient Medications   Medication Sig    albuterol (PROVENTIL/VENTOLIN HFA) 90 mcg/actuation inhaler Inhale 1-2 puffs into the lungs every 4 (four) hours as needed for Shortness of Breath (coughing). Rescue    albuterol-ipratropium (DUO-NEB) 2.5 mg-0.5 mg/3 mL nebulizer solution USE 1 AMPULE IN NEBULIZER EVERY 4 HOURS AS NEEDED FOR WHEEZING    alfuzosin (UROXATRAL) 10 mg Tb24 TAKE 1 TABLET BY MOUTH ONCE DAILY AFTER BREAKFAST    ALPRAZolam (XANAX) 0.5 MG tablet Take 1 tablet (0.5 mg total) by mouth 3 (three) times daily as needed for Anxiety.    aspirin (ECOTRIN) 81 MG EC tablet     azelastine (ASTELIN) 137 mcg (0.1 %) nasal spray 1 spray (137 mcg total) by Nasal route 2 (two) times daily.    beclomethasone (QVAR) 80 mcg/actuation Aero Inhale 2 puffs into the lungs 2 (two) times a day. Controller    ELIQUIS 5 mg Tab Take 5 mg by mouth 2 (two) times daily.    esomeprazole (NEXIUM) 40 MG capsule Take 40 mg by mouth once daily.    ezetimibe (ZETIA) 10 mg tablet Take 10 mg by mouth once daily.    finasteride (PROSCAR) 5 mg tablet Take 5 mg by mouth once daily.    LIVALO 4 mg Tab Take 1 tablet by mouth once daily.    nitroGLYCERIN (NITROSTAT) 0.4 MG SL tablet Place under the tongue.    sotaloL (BETAPACE) 80 MG tablet Take 1 tablet (80 mg total) by mouth 2 (two) times daily. Start this med 3 days before cardioversion    umeclidinium-vilanteroL (ANORO ELLIPTA) 62.5-25 mcg/actuation DsDv INHALE 1 PUFF BY MOUTH ONCE DAILY (CONTROLLER)    valACYclovir (VALTREX) 1000 MG tablet Take 1 g by mouth  2 (two) times daily.     No current facility-administered medications for this visit.       This patient has been assessed for risk factors for clearance of surgery with the following stipulations:    ___ No contraindications    __x_ Recommendations for antiplatelet/anticoagulant medications:Patient may hold  Eliquis 2 Days and Aspirin for 7 days prior to surgery.      __x_ Cleared for surgery with moderate risk.    ___ Not cleared for surgery due to the following reasons:      If you have any questions regarding the above, please contact my office at [phone].    Sincerely,      Zayra Espinoza NP

## 2022-11-02 NOTE — TELEPHONE ENCOUNTER
Patient to have Cystoscopy with possible Bladder bx with Dr. Hermann Jefferson on 11/14/2022.  Patient will need to hold Eliquis and ASA prior to procedure. Please send letter with recommendations.

## 2022-11-10 LAB
ALBUMIN SERPL-MCNC: 4.2 G/DL (ref 3.6–5.1)
ALBUMIN/CREAT UR: 3 MCG/MG CREAT
ALBUMIN/GLOB SERPL: 1.8 (CALC) (ref 1–2.5)
ALP SERPL-CCNC: 74 U/L (ref 35–144)
ALT SERPL-CCNC: 12 U/L (ref 9–46)
APPEARANCE UR: CLEAR
AST SERPL-CCNC: 16 U/L (ref 10–35)
BACTERIA #/AREA URNS HPF: ABNORMAL /HPF
BACTERIA UR CULT: ABNORMAL
BACTERIA UR CULT: ABNORMAL
BASOPHILS # BLD AUTO: 38 CELLS/UL (ref 0–200)
BASOPHILS NFR BLD AUTO: 0.4 %
BILIRUB SERPL-MCNC: 0.6 MG/DL (ref 0.2–1.2)
BILIRUB UR QL STRIP: NEGATIVE
BUN SERPL-MCNC: 14 MG/DL (ref 7–25)
BUN/CREAT SERPL: ABNORMAL (CALC) (ref 6–22)
CALCIUM SERPL-MCNC: 9.5 MG/DL (ref 8.6–10.3)
CHLORIDE SERPL-SCNC: 101 MMOL/L (ref 98–110)
CHOLEST SERPL-MCNC: 225 MG/DL
CHOLEST/HDLC SERPL: 4 (CALC)
CO2 SERPL-SCNC: 28 MMOL/L (ref 20–32)
COLOR UR: YELLOW
CREAT SERPL-MCNC: 0.71 MG/DL (ref 0.7–1.35)
CREAT UR-MCNC: 115 MG/DL (ref 20–320)
EGFR: 101 ML/MIN/1.73M2
EOSINOPHIL # BLD AUTO: 56 CELLS/UL (ref 15–500)
EOSINOPHIL NFR BLD AUTO: 0.6 %
ERYTHROCYTE [DISTWIDTH] IN BLOOD BY AUTOMATED COUNT: 12.9 % (ref 11–15)
GLOBULIN SER CALC-MCNC: 2.4 G/DL (CALC) (ref 1.9–3.7)
GLUCOSE SERPL-MCNC: 103 MG/DL (ref 65–99)
GLUCOSE UR QL STRIP: NEGATIVE
HCT VFR BLD AUTO: 50 % (ref 38.5–50)
HDLC SERPL-MCNC: 56 MG/DL
HGB BLD-MCNC: 17.4 G/DL (ref 13.2–17.1)
HGB UR QL STRIP: NEGATIVE
HYALINE CASTS #/AREA URNS LPF: ABNORMAL /LPF
KETONES UR QL STRIP: NEGATIVE
LDLC SERPL CALC-MCNC: 146 MG/DL (CALC)
LEUKOCYTE ESTERASE UR QL STRIP: ABNORMAL
LYMPHOCYTES # BLD AUTO: 1607 CELLS/UL (ref 850–3900)
LYMPHOCYTES NFR BLD AUTO: 17.1 %
MCH RBC QN AUTO: 33.4 PG (ref 27–33)
MCHC RBC AUTO-ENTMCNC: 34.8 G/DL (ref 32–36)
MCV RBC AUTO: 96 FL (ref 80–100)
MICROALBUMIN UR-MCNC: 0.4 MG/DL
MONOCYTES # BLD AUTO: 818 CELLS/UL (ref 200–950)
MONOCYTES NFR BLD AUTO: 8.7 %
NEUTROPHILS # BLD AUTO: 6881 CELLS/UL (ref 1500–7800)
NEUTROPHILS NFR BLD AUTO: 73.2 %
NITRITE UR QL STRIP: NEGATIVE
NONHDLC SERPL-MCNC: 169 MG/DL (CALC)
PH UR STRIP: 6.5 [PH] (ref 5–8)
PLATELET # BLD AUTO: 259 THOUSAND/UL (ref 140–400)
PMV BLD REES-ECKER: 9 FL (ref 7.5–12.5)
POTASSIUM SERPL-SCNC: 4.2 MMOL/L (ref 3.5–5.3)
PROT SERPL-MCNC: 6.6 G/DL (ref 6.1–8.1)
PROT UR QL STRIP: NEGATIVE
RBC # BLD AUTO: 5.21 MILLION/UL (ref 4.2–5.8)
RBC #/AREA URNS HPF: ABNORMAL /HPF
SERVICE CMNT-IMP: ABNORMAL
SODIUM SERPL-SCNC: 140 MMOL/L (ref 135–146)
SP GR UR STRIP: 1.01 (ref 1–1.03)
SQUAMOUS #/AREA URNS HPF: ABNORMAL /HPF
TRIGL SERPL-MCNC: 110 MG/DL
TSH SERPL-ACNC: 0.62 MIU/L (ref 0.4–4.5)
WBC # BLD AUTO: 9.4 THOUSAND/UL (ref 3.8–10.8)
WBC #/AREA URNS HPF: ABNORMAL /HPF

## 2022-11-14 DIAGNOSIS — C67.9 BLADDER CANCER: Primary | ICD-10-CM

## 2022-11-14 DIAGNOSIS — R97.20 ELEVATED PROSTATE SPECIFIC ANTIGEN (PSA): ICD-10-CM

## 2022-11-15 DIAGNOSIS — R97.20 ELEVATED PROSTATE SPECIFIC ANTIGEN (PSA): ICD-10-CM

## 2022-11-15 DIAGNOSIS — C67.9 BLADDER CANCER: Primary | ICD-10-CM

## 2022-11-21 NOTE — PROGRESS NOTES
Subjective:     HPI    I had the pleasure of seeing Martell Corcoran in follow-up for his history of atrial fibrillation. Last seen in 10/2022. He is a 67M with bladder CA (status-post resection, BCG infusions, currently in remission), HTN, GLENNA on CPAP, COPD/emphysema, who was incidentally noted to be in rate controlled AF at a routine cardiology visit in 11/2021. Started on eliquis at that point. Has noted a reduction in his energy level. On eliquis. No history of antiarrhythmic use or cardioversion.    DSE in 7/2022 showed an EF of 65% and no evidence of ischemia.    At his initial visit we discussed options including sotalol/DCCV and catheter ablation. He chose the former, which was performed on 11/1/2022. Notably, VALERIE notable for PFO with possible R-->L shunting, mild-moderate MR.    Today in the office Mr. Corcoran describes worsening SOB, increased chest pain and fatigue, increased dizziness and weakness.    My interpretation of today's ECG is sinus bradycardia at 59 bpm.    Review of Systems   Constitutional: Negative for decreased appetite, malaise/fatigue, weight gain and weight loss.   HENT:  Negative for sore throat.    Eyes:  Negative for blurred vision.   Cardiovascular:  Negative for chest pain, dyspnea on exertion, irregular heartbeat, leg swelling, near-syncope, orthopnea, palpitations, paroxysmal nocturnal dyspnea and syncope.   Respiratory:  Negative for shortness of breath.    Skin:  Negative for rash.   Musculoskeletal:  Negative for arthritis.   Gastrointestinal:  Negative for abdominal pain.   Neurological:  Negative for focal weakness.   Psychiatric/Behavioral:  Negative for altered mental status.       Objective:    Physical Exam  Vitals and nursing note reviewed.   Constitutional:       General: He is not in acute distress.     Appearance: He is well-developed.   HENT:      Head: Normocephalic and atraumatic.   Eyes:      General: No scleral icterus.     Pupils: Pupils are equal,  round, and reactive to light.   Neck:      Thyroid: No thyromegaly.   Cardiovascular:      Rate and Rhythm: Regular rhythm.      Pulses: Normal pulses.      Heart sounds: Normal heart sounds. No murmur heard.    No friction rub. No gallop.   Pulmonary:      Effort: Pulmonary effort is normal.      Breath sounds: Normal breath sounds.   Abdominal:      General: Bowel sounds are normal. There is no distension.      Palpations: Abdomen is soft.      Tenderness: There is no abdominal tenderness.   Musculoskeletal:      Cervical back: Neck supple.   Skin:     General: Skin is warm and dry.      Findings: No rash.   Neurological:      Mental Status: He is alert and oriented to person, place, and time.   Psychiatric:         Behavior: Behavior normal.         Assessment:       1. Atrial fibrillation, unspecified type    2. Ischemia due to increased oxygen demand    3. Primary hypertension         Plan:       In summary, Martell Corcoran is a 67M with symptomatic persistent AF. His [id number] Score is 2 (age, HTN) so he should continue eliquis.    Mr. Corcoran is symptomatic with his AF. He is now several weeks status-post sotalol initiation and DCCV. Maintaining sinus rhythm but feels worse (SOB, fatigue). Plan is to stop sotalol. If after 1-2 weeks not feeling any better resume sotalol 40 mg bid.    He will see me in 3 months.    Thank you for allowing me to participate in the care of this patient. Please do not hesitate to call me with any questions or concerns.

## 2022-11-23 ENCOUNTER — OFFICE VISIT (OUTPATIENT)
Dept: CARDIOLOGY | Facility: CLINIC | Age: 67
End: 2022-11-23
Payer: MEDICARE

## 2022-11-23 VITALS
RESPIRATION RATE: 16 BRPM | SYSTOLIC BLOOD PRESSURE: 150 MMHG | OXYGEN SATURATION: 99 % | HEART RATE: 71 BPM | DIASTOLIC BLOOD PRESSURE: 86 MMHG | HEIGHT: 66 IN | BODY MASS INDEX: 26.03 KG/M2 | WEIGHT: 162 LBS

## 2022-11-23 DIAGNOSIS — I48.91 ATRIAL FIBRILLATION, UNSPECIFIED TYPE: Primary | ICD-10-CM

## 2022-11-23 DIAGNOSIS — I10 PRIMARY HYPERTENSION: ICD-10-CM

## 2022-11-23 DIAGNOSIS — I24.89 ISCHEMIA DUE TO INCREASED OXYGEN DEMAND: ICD-10-CM

## 2022-11-23 PROCEDURE — 93005 ELECTROCARDIOGRAM TRACING: CPT | Mod: S$GLB,,, | Performed by: INTERNAL MEDICINE

## 2022-11-23 PROCEDURE — 99214 OFFICE O/P EST MOD 30 MIN: CPT | Mod: S$GLB,,, | Performed by: INTERNAL MEDICINE

## 2022-11-23 PROCEDURE — 93010 EKG 12-LEAD: ICD-10-PCS | Mod: S$PBB,,, | Performed by: INTERNAL MEDICINE

## 2022-11-23 PROCEDURE — 93005 EKG 12-LEAD: ICD-10-PCS | Mod: S$GLB,,, | Performed by: INTERNAL MEDICINE

## 2022-11-23 PROCEDURE — 99214 PR OFFICE/OUTPT VISIT, EST, LEVL IV, 30-39 MIN: ICD-10-PCS | Mod: S$GLB,,, | Performed by: INTERNAL MEDICINE

## 2022-11-23 PROCEDURE — 93010 ELECTROCARDIOGRAM REPORT: CPT | Mod: S$PBB,,, | Performed by: INTERNAL MEDICINE

## 2022-12-14 ENCOUNTER — PATIENT MESSAGE (OUTPATIENT)
Dept: CARDIOLOGY | Facility: CLINIC | Age: 67
End: 2022-12-14
Payer: MEDICARE

## 2022-12-15 ENCOUNTER — HOSPITAL ENCOUNTER (OUTPATIENT)
Dept: PREADMISSION TESTING | Facility: HOSPITAL | Age: 67
Discharge: HOME | End: 2022-12-15
Attending: INTERNAL MEDICINE
Payer: MEDICARE

## 2022-12-15 DIAGNOSIS — I48.3 TYPICAL ATRIAL FLUTTER: ICD-10-CM

## 2022-12-15 DIAGNOSIS — I48.0 PAROXYSMAL ATRIAL FIBRILLATION: Primary | ICD-10-CM

## 2022-12-15 DIAGNOSIS — I48.0 PAROXYSMAL ATRIAL FIBRILLATION: ICD-10-CM

## 2022-12-15 PROCEDURE — 93010 EKG 12-LEAD: ICD-10-PCS | Mod: ,,, | Performed by: INTERNAL MEDICINE

## 2022-12-15 PROCEDURE — 93010 ELECTROCARDIOGRAM REPORT: CPT | Mod: ,,, | Performed by: INTERNAL MEDICINE

## 2022-12-15 PROCEDURE — 93005 ELECTROCARDIOGRAM TRACING: CPT | Performed by: INTERNAL MEDICINE

## 2022-12-16 RX ORDER — METOPROLOL SUCCINATE 25 MG/1
TABLET, EXTENDED RELEASE ORAL
Qty: 45 TABLET | Refills: 3
Start: 2022-12-16 | End: 2023-03-07

## 2022-12-16 RX ORDER — FLECAINIDE ACETATE 100 MG/1
100 TABLET ORAL EVERY 12 HOURS
Qty: 14 TABLET | Refills: 0 | Status: SHIPPED | OUTPATIENT
Start: 2022-12-16 | End: 2023-03-07

## 2022-12-16 RX ORDER — FLECAINIDE ACETATE 100 MG/1
100 TABLET ORAL EVERY 12 HOURS
Qty: 180 TABLET | Refills: 3 | Status: SHIPPED | OUTPATIENT
Start: 2022-12-16 | End: 2023-03-07

## 2022-12-16 NOTE — TELEPHONE ENCOUNTER
Contacted pt to advise of Dr Aguilar's recommendations. Pt will start Toprol XL 12.5 mg once daily and Flecainide 100 mg BID. He will not restart SOTALOL. Rx sen to Dr Aguilar to sign

## 2022-12-20 ENCOUNTER — PATIENT MESSAGE (OUTPATIENT)
Dept: CARDIOLOGY | Facility: CLINIC | Age: 67
End: 2022-12-20
Payer: MEDICARE

## 2022-12-20 NOTE — TELEPHONE ENCOUNTER
Contacted pt to discuss Dr Aguilar's recommendations. Pt as opted to not take anything at this time and to follow up with Dr Aguilar on 2/01/2023 in [place] to discuss an ablation. Pt will monitor his blood pressure and contact office if elevated. Pt stated that the sotalol and flecainide helped keep his B/P in control. Now that he stopped both he may need to adjust his metoprolol or have another medication added.

## 2023-01-04 ENCOUNTER — PATIENT MESSAGE (OUTPATIENT)
Dept: FAMILY MEDICINE | Facility: CLINIC | Age: 68
End: 2023-01-04

## 2023-01-05 ENCOUNTER — PATIENT MESSAGE (OUTPATIENT)
Dept: FAMILY MEDICINE | Facility: CLINIC | Age: 68
End: 2023-01-05

## 2023-01-30 NOTE — PROGRESS NOTES
Subjective:     HPI    I had the pleasure of seeing Martell Corcoran in follow-up for his history of atrial fibrillation. Last seen in 11/2022. He is a 67M with bladder CA (status-post resection, BCG infusions, currently in remission), HTN, GLENNA on CPAP, COPD/emphysema, who was incidentally noted to be in rate controlled AF at a routine cardiology visit in 11/2021. Started on eliquis at that point. Has noted a reduction in his energy level. On eliquis. No history of antiarrhythmic use or cardioversion.    DSE in 7/2022 showed an EF of 65% and no evidence of ischemia.    At his initial visit we discussed options including sotalol/DCCV and catheter ablation. He chose the former, which was performed on 11/1/2022. Notably, VALERIE notable for PFO with possible R-->L shunting, mild-moderate MR.    At his 11/2022 visit, Mr. Corcoran described worsening SOB, increased chest pain and fatigue, increased dizziness and weakness. Sinus bharath at 59 bpm. Sotalol stopped at that point, which improved his symptoms. Flecainide started, which caused similar problems and had to be discontinued. Currently not on an antiarrhythmic. Complaining of blurry vision, leg pain, fatigue, dizziness which he attributes to toprol.    My interpretation of today's ECG is sinus bradycardia at 65 bpm.    Review of Systems   Constitutional: Negative for decreased appetite, malaise/fatigue, weight gain and weight loss.   HENT:  Negative for sore throat.    Eyes:  Negative for blurred vision.   Cardiovascular:  Negative for chest pain, dyspnea on exertion, irregular heartbeat, leg swelling, near-syncope, orthopnea, palpitations, paroxysmal nocturnal dyspnea and syncope.   Respiratory:  Negative for shortness of breath.    Skin:  Negative for rash.   Musculoskeletal:  Negative for arthritis.   Gastrointestinal:  Negative for abdominal pain.   Neurological:  Negative for focal weakness.   Psychiatric/Behavioral:  Negative for altered mental status.        Objective:    Physical Exam  Vitals and nursing note reviewed.   Constitutional:       General: He is not in acute distress.     Appearance: He is well-developed.   HENT:      Head: Normocephalic and atraumatic.   Eyes:      General: No scleral icterus.     Pupils: Pupils are equal, round, and reactive to light.   Neck:      Thyroid: No thyromegaly.   Cardiovascular:      Rate and Rhythm: Regular rhythm.      Pulses: Normal pulses.      Heart sounds: Normal heart sounds. No murmur heard.    No friction rub. No gallop.   Pulmonary:      Effort: Pulmonary effort is normal.      Breath sounds: Normal breath sounds.   Abdominal:      General: Bowel sounds are normal. There is no distension.      Palpations: Abdomen is soft.      Tenderness: There is no abdominal tenderness.   Musculoskeletal:      Cervical back: Neck supple.   Skin:     General: Skin is warm and dry.      Findings: No rash.   Neurological:      Mental Status: He is alert and oriented to person, place, and time.   Psychiatric:         Behavior: Behavior normal.         Assessment:       1. Atrial fibrillation, unspecified type    2. Primary hypertension    3. GLENNA on CPAP         Plan:       In summary, Martell Corcoran is a 67M with symptomatic persistent AF. His [id number] Score is 2 (age, HTN) so he should continue eliquis.    Mr. Corcoran is symptomatic with his AF. He underwent VALERIE/DCCV in 11/2022, but is currently not on an antiarrhythmic as he tolerated neither sotalol nor flecainide.    We discussed in detail the pathophysiology of AF as well as the myriad of treatment options available to manage it including antiarrhythmics and catheter ablation. We specifically discussed the risks, benefits, indications, and alternatives to PVI. Risks discussed include bleeding, hematoma, vascular damage, cardiac tamponade, stroke, PV stenosis, AE fistula, phrenic nerve damage, and death.  After considering his options he has decided to proceed.      CARTO. Hold eliquis AM of procedure. VALERIE--cancel if sinus. Post-procedure will not start an antiarrhythmic.    Thank you for allowing me to participate in the care of this patient. Please do not hesitate to call me with any questions or concerns.

## 2023-02-01 ENCOUNTER — OFFICE VISIT (OUTPATIENT)
Dept: CARDIOLOGY | Facility: CLINIC | Age: 68
End: 2023-02-01
Payer: MEDICARE

## 2023-02-01 ENCOUNTER — PATIENT MESSAGE (OUTPATIENT)
Dept: CARDIOLOGY | Facility: CLINIC | Age: 68
End: 2023-02-01
Payer: MEDICARE

## 2023-02-01 VITALS
WEIGHT: 159.94 LBS | HEART RATE: 83 BPM | OXYGEN SATURATION: 97 % | HEIGHT: 66 IN | RESPIRATION RATE: 16 BRPM | BODY MASS INDEX: 25.71 KG/M2 | SYSTOLIC BLOOD PRESSURE: 130 MMHG | DIASTOLIC BLOOD PRESSURE: 78 MMHG

## 2023-02-01 DIAGNOSIS — I10 PRIMARY HYPERTENSION: ICD-10-CM

## 2023-02-01 DIAGNOSIS — I48.91 ATRIAL FIBRILLATION, UNSPECIFIED TYPE: Primary | ICD-10-CM

## 2023-02-01 DIAGNOSIS — G47.33 OSA ON CPAP: ICD-10-CM

## 2023-02-01 PROCEDURE — 93010 ELECTROCARDIOGRAM REPORT: CPT | Mod: S$PBB,,, | Performed by: INTERNAL MEDICINE

## 2023-02-01 PROCEDURE — 93005 EKG 12-LEAD: ICD-10-PCS | Mod: S$GLB,,, | Performed by: INTERNAL MEDICINE

## 2023-02-01 PROCEDURE — 93005 ELECTROCARDIOGRAM TRACING: CPT | Mod: S$GLB,,, | Performed by: INTERNAL MEDICINE

## 2023-02-01 PROCEDURE — 93010 EKG 12-LEAD: ICD-10-PCS | Mod: S$PBB,,, | Performed by: INTERNAL MEDICINE

## 2023-02-01 PROCEDURE — 99215 OFFICE O/P EST HI 40 MIN: CPT | Mod: S$GLB,,, | Performed by: INTERNAL MEDICINE

## 2023-02-01 PROCEDURE — 99215 PR OFFICE/OUTPT VISIT, EST, LEVL V, 40-54 MIN: ICD-10-PCS | Mod: S$GLB,,, | Performed by: INTERNAL MEDICINE

## 2023-02-02 ENCOUNTER — TELEPHONE (OUTPATIENT)
Dept: ELECTROPHYSIOLOGY | Facility: CLINIC | Age: 68
End: 2023-02-02
Payer: MEDICARE

## 2023-02-02 DIAGNOSIS — Z01.818 PRE-OP TESTING: ICD-10-CM

## 2023-02-02 DIAGNOSIS — I48.0 PAROXYSMAL ATRIAL FIBRILLATION: Primary | ICD-10-CM

## 2023-02-02 DIAGNOSIS — I48.3 TYPICAL ATRIAL FLUTTER: ICD-10-CM

## 2023-02-02 NOTE — TELEPHONE ENCOUNTER
Contacted pt to schedule procedure. Spoke with pt and PVI cardiac ablation with Dr Aguilar scheduled for 3/30/2023

## 2023-02-24 ENCOUNTER — PATIENT MESSAGE (OUTPATIENT)
Dept: ELECTROPHYSIOLOGY | Facility: CLINIC | Age: 68
End: 2023-02-24
Payer: MEDICARE

## 2023-03-07 ENCOUNTER — OFFICE VISIT (OUTPATIENT)
Dept: PULMONOLOGY | Facility: CLINIC | Age: 68
End: 2023-03-07
Payer: MEDICARE

## 2023-03-07 VITALS
BODY MASS INDEX: 24.8 KG/M2 | WEIGHT: 154.31 LBS | DIASTOLIC BLOOD PRESSURE: 85 MMHG | OXYGEN SATURATION: 97 % | HEART RATE: 90 BPM | SYSTOLIC BLOOD PRESSURE: 146 MMHG | HEIGHT: 66 IN

## 2023-03-07 DIAGNOSIS — F17.200 TOBACCO DEPENDENCE: ICD-10-CM

## 2023-03-07 DIAGNOSIS — Z87.891 PERSONAL HISTORY OF NICOTINE DEPENDENCE: ICD-10-CM

## 2023-03-07 DIAGNOSIS — J44.9 CHRONIC OBSTRUCTIVE PULMONARY DISEASE, UNSPECIFIED COPD TYPE: Primary | ICD-10-CM

## 2023-03-07 PROCEDURE — 99214 PR OFFICE/OUTPT VISIT, EST, LEVL IV, 30-39 MIN: ICD-10-PCS | Mod: S$PBB,,, | Performed by: INTERNAL MEDICINE

## 2023-03-07 PROCEDURE — 99999 PR PBB SHADOW E&M-EST. PATIENT-LVL IV: ICD-10-PCS | Mod: PBBFAC,,, | Performed by: INTERNAL MEDICINE

## 2023-03-07 PROCEDURE — 99999 PR PBB SHADOW E&M-EST. PATIENT-LVL IV: CPT | Mod: PBBFAC,,, | Performed by: INTERNAL MEDICINE

## 2023-03-07 PROCEDURE — 99214 OFFICE O/P EST MOD 30 MIN: CPT | Mod: S$PBB,,, | Performed by: INTERNAL MEDICINE

## 2023-03-07 PROCEDURE — 99214 OFFICE O/P EST MOD 30 MIN: CPT | Mod: PBBFAC,PO | Performed by: INTERNAL MEDICINE

## 2023-03-07 RX ORDER — TIOTROPIUM BROMIDE AND OLODATEROL 3.124; 2.736 UG/1; UG/1
2 SPRAY, METERED RESPIRATORY (INHALATION) DAILY
Qty: 12 G | Refills: 3 | Status: SHIPPED | OUTPATIENT
Start: 2023-03-07 | End: 2023-09-06

## 2023-03-07 RX ORDER — ZOLPIDEM TARTRATE 5 MG/1
5 TABLET ORAL NIGHTLY
Qty: 20 TABLET | Refills: 2 | Status: SHIPPED | OUTPATIENT
Start: 2023-03-07 | End: 2023-09-06 | Stop reason: ALTCHOICE

## 2023-03-07 RX ORDER — ESCITALOPRAM OXALATE 20 MG/1
TABLET ORAL
COMMUNITY
Start: 2022-12-04 | End: 2023-11-02 | Stop reason: SDUPTHER

## 2023-03-07 NOTE — PATIENT INSTRUCTIONS
Pet scan viewed.  Deviated septum noted.     Would use stiolto 2 daily -- could skip -- anoro not good for regular use.    Novmeber screening ct chest     Nerves may cause fatigue    Sleep apnea may cause excess sleepiness-- would re try cpap once nerves stable    Would re try cholesterol  medications.     Try cpap -- may use ambein to sleep--dedicate 8 hrs sleep to use ambein.

## 2023-03-07 NOTE — PROGRESS NOTES
3/7/2023    Martell Corcoran  Follow up    Chief Complaint   Patient presents with    Follow-up     6 month f/u        HPI:   3/7/2023 quit smokes, feels fatigued chronically. Had naila and cardioversion November.  Back to rhythm but could not use meds to continue.   Uses anoro and qvar--- not using reg as before.    Pt missed lexapro somehow-- had increased anxiety and stomach issues.  Uses occ xanax.   Uses astelin      Has occ blurred vision.      Sept 13, 2022  in past, had own company.  Lung dz developed 4 + yrs ago.  Bladder cancer removed.  Had bcg infusions 2020 . Some garcia. Problem nasal stuffy tolerating cpap.    Patient Instructions   Nasal stuffy -- afrin or generic afrin --- use one to 2 sprays up to once daily -- alternate nares ?  If regular use may get tolerant and afrin ineffective.  Could see ent to improve passages.      Flonase should work better (or astelin) if passages open.     Sleep study was only 8 episodes an hour-- less than 5 is normal.     Cpap titration needed 8-9 cm pressure    Mediastinal abnormality varies-- would send to cardiothoracic surgery.    Copd is moderate at 55% or so.  Anoro and qvar effective -- as needed albuterol.  Would stop smokes.     CT films are reviewed directly with the patient.  Extensive discussion is made.  Patient's evaluation took 56 minutes today.          Below from RJ Salgado  6/1/2022- SOB persistent complaint, pain with deep inspiration on left chest. Followed by cardiology for A fib and vascular blockages pacemaker procedure postponed.    Wearing CPAP with nose mask, moves across face when sleeping. Does not feel better after wearing.   Cough- improved, less often and severe, most days, worse when weather changes, productive clear mucous  Currently smoking 3-4 cigarettes daily, trying to cut down.   Patient Instructions   CT of chest shows clear lungs, no change to previously seen lung nodules.     A cyst is seen in muscle could be  cause of chest pain    Recommend smoking cessation    Continue COPD medication regiment    Recommend trying CPAP therapy again.     2/4/2022-  States breathing is labored. States usually worse after infusion for bladder cancer. States does have benefit with qvar and Anoro, using albuterol as needed 2x daily.     Daytime fatigue persistent.  purchased CPAP from third party. Has not started to use.     Scheduled for pacemaker to treat A-fib. Followed by Dr. Miranda.     Cough- decreased, 2x daily, productive clear mucous, cut back on smoking to 6 cigarettes daily    1/14/2022- not able to get CPAP due to high co-pay.   States breathing improved after decreasing smoking to 3-6 cigarettes daily.   Shortness of breath- daily complaint, slightly improved, worse with exertion, improves with rest. Daytime fatigue unchanged.   Complaint of cough- varies in severity, currently mild. Productive small amount clear mucous, did notice worsening after first starting to cut back on smoking. Has returned to normal   Followed by Urologist Dr. Pham for bladder cancer. Undergoing infusion therapy.     10/4/2021- concerned he had COVID 19 late July early August, lost sense of taste and smell. Complaint of fatigue, body aches, shortness of breath when walking,   No fever. Gradually improved over 4 weeks. Taste and smell has not returned. Experiencing short term memory loss and metal grogginess. Has daytime drowsiness onset years worsened in past two months  Persistent cough- productive clear mucous, associated with wheeze. Improves with albuterol inhaler.   Currently on Anoro, QVAR, using albuterol when needed 1-3 x weekly.     5/24/2021- Admitted To hospital in [place] while on vacation April 27, 2021 for COPD exacerbation. Seen at [facility] December 16, 2020 for COPD exacerbation.   Complaint of SOB- daily, worse with exertion, improves with rest. Treated with antibiotics and steroid therapy April.   Cough- recurrent complaint,   improved since hospital discharge, worsened in last 2 weeks after spending time with grandson who had bronchitis that has improved with nebulizer treatments every 4 hours. Associated with chest tightness and wheeze.       2020- he had bladder biopsy at [facility] with Dr. Pham on 11/3- per outside records path with High grade papillary urothelial cell carcinoma. He has cough w/ postnasal drip and allergies but breathing is much better. He has been dealing with a lot for the family- 2 brothers just . Hematuria is much better. Not getting bladder infections any more. He continues to smoke 1/2 ppd. Wants to quit but too much stress recently- not ready.    2020-   Start taking prednisone again- long taper over 3 weeks then try to stop. If lungs flaring while decreasing prednisone go up to the last dose that helped you and call our office.  Continue trelegy  Refilled duo nebs to use as needed  Use albuterol as needed  Quit smoking- go to program  Follow up with urologist  Follow up with PCP for kidney testing and possible urinary infection  pt was recently hospitalized for COPD exacerbation, seen by me while admitted 20 and also having worsening hematuria at that time. He was sent home with a course of levaquin and steroid taper. He did not qualify for home O2. He is being worked up for a bladder mass per urology and pending transurethral resection.  Pt c/o pain around L kidney after doing yard work and urine turned darker. He was coughing last night and woke up with face feeling puffy. Switched urologists to a Dr. Pham at [facility] and has appt at end of September.  He finished prednisone taper. Still taking levaquin. Still feels some congestion in chest but it is getting better. Also has sinus problems w/ nose blocked. He denies frequent exacerbations but this one has been very bad. Feels like worsening since stopped prednisone. Still smokes but trying to cut back. Has smoking cessation appt  later this month.  Inhalers: nebs 4-5x/day, trelegy daily, albuterol rescue 1-2x/day    7/15/20- Pt is a 66 yo male with HTN, GERD and BPH presents for new evaluation of breathing issues. He complains of SOB especially if he carries anything like taking out trash to the dumpster. This has been progressive over about 3.5 yrs. He wheezes and coughs up clear mucous, throughout the day.  Pt smokes 1 PPD x 55 yrs.  At age 5 pt had pna and a collapsed lung requiring chest tube on the left side and hospitalization. Didn't have any other problems w/ breathing growing up. He took up playing Nomi to help lung function.  Pt had abd/p scan for UTI recently which showed some emphysematous changes.    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 One brother  at 65 from leukemia.    Grew up in [place]    The chief compliant  problem varies with instablilty at time      PFSH:  Past Medical History:   Diagnosis Date    Benign enlargement of prostate     Had a biopsy in around     Elevated PSA     GERD (gastroesophageal reflux disease)     Hypertension     Started with meds in .    Kidney stone     Urinary tract infection          Past Surgical History:   Procedure Laterality Date    FISTULA REPAIR      LEFT HEART CATHETERIZATION N/A 10/23/2018    Procedure: Left heart cath;  Surgeon: Niharika Squires MD;  Location: [facility];  Service: Cardiology;  Laterality: N/A;     Social History     Tobacco Use    Smoking status: Every Day     Packs/day: 1.00     Years: 55.00     Pack years: 55.00     Types: Cigarettes    Smokeless tobacco: Never   Substance Use Topics    Alcohol use: Yes     Comment: Previous 12 beer a day for 20 years. Now occ.    Drug use: No     Family History   Problem Relation Age of Onset    Heart failure Mother     Cancer Father     Heart disease Brother     Heart attack Brother     Cancer Brother     Heart  "disease Brother     Drug abuse Brother      Review of patient's allergies indicates:   Allergen Reactions    Msg [glutamic acid] Nausea Only, Palpitations, Shortness Of Breath and Swelling    Oyster extract Swelling     PT swells in his throat after eating oysters on occasion       Performance Status:The patient's activity level is functions out of house. QUACH improved and does not limit him. Back pain limits activity.    Review of Systems:  a review of eleven systems covering constitutional, Eye, HEENT, Psych, Respiratory, Cardiac, GI, , Musculoskeletal, Endocrine, Dermatologic was negative except for pertinent findings as listed ABOVE and below:   wheeze, shortness of breath, fatigue      Exam:Comprehensive exam done. BP (!) 146/85 (BP Location: Left arm, Patient Position: Sitting, BP Method: Medium (Automatic))   Pulse 90   Ht 5' 6" (1.676 m)   Wt 70 kg (154 lb 5.2 oz)   SpO2 97% Comment: on room air at rest  BMI 24.91 kg/m²   Exam included Vitals as listed, and patient's appearance and affect and alertness and mood, oral exam for yeast and hygiene and pharynx lesions and Mallapatti (M) score, neck with inspection for jvd and masses and thyroid abnormalities and lymph nodes (supraclavicular and infraclavicular nodes and axillary also examined and noted if abn), chest exam included symmetry and effort and fremitus and percussion and auscultation, cardiac exam included rhythm and gallops and murmur and rubs and jvd and edema, abdominal exam for mass and hepatosplenomegaly and tenderness and hernias and bowel sounds, Musculoskeletal exam with muscle tone and posture and mobility/gait and  strength, and skin for rashes and cyanosis and pallor and turgor, extremity for clubbing.  Findings were normal except for pertinent findings listed below:  M1  Bilateral clear lungs w/ faint rhonchi bilateral lower lung zones  No clubbing or edema    Radiographs (ct chest and cxr) reviewed: view by direct vision   CT " scan of the chest September 7, 2022 viewed by direct vision.  Fairly small mediastinal cystic structure looks to be a little smaller than March.  There was very very difficult to see in 2021 however.    CT Chest Without Contrast 03/14/22 Emphysema and unchanged lung nodules  Indeterminate smoothly marginated structure along the anterior superior mediastinum, measuring just above simple fluid attenuation suggestive of a minimally complex/complicated cyst, possibly a lymphovascular malformation, synovial cyst (extending from the sternoclavicular joint), foregut duplication cyst, thymic epithelial lesion/cyst, and much less likely malignancy, however this has slightly increased in size from the previous exam and may warrant interval attention on follow-up imaging.       CT Chest Without Contrast  05/20/2021   In the left upper lobe is a confluence of vessels and a possible 4 mm nodule decreased in size and conspicuousness since the prior study of July 16, 2020.  No new or enlarging pulmonary nodules are identified.  Emphysema.     CT chest/abd/p 11/20/20- mild emphysema, lungs clear aside from small 6mm nodule DANA which is unchanged from prior exam  1. No metastatic disease.  2. Nodular thickening of the left posterior aspect of the bladder.  3. Enlarged prostate.  3. Mild pulmonary emphysema.   CXR 8/14/20- possible vague infiltrate/opacifications bilateral bases  CT chest 7/16/20- DANA 6mm nodule  CT abd/p 6/26/20- bases of lungs w/ scant emphysematous changes, some strands of atelectasis    Labs reviewed    Lab Results   Component Value Date    WBC 9.4 11/08/2022    RBC 5.21 11/08/2022    HGB 17.4 (H) 11/08/2022    HCT 50.0 11/08/2022    MCV 96.0 11/08/2022    MCH 33.4 (H) 11/08/2022    MCHC 34.8 11/08/2022    RDW 12.9 11/08/2022     11/08/2022    MPV 9.0 11/08/2022    GRAN 4.4 11/01/2022    GRAN 62.5 11/01/2022    LYMPH 1,607 11/08/2022    LYMPH 17.1 11/08/2022    MONO 818 11/08/2022    MONO 8.7 11/08/2022     EOS 56 11/08/2022    BASO 38 11/08/2022    EOSINOPHIL 0.6 11/08/2022    BASOPHIL 0.4 11/08/2022     Results for MARTELL ALBRECHT (MRN [id number]) as of 5/24/2021 15:33   Ref. Range 8/13/2020 02:30 8/13/2020 06:01 8/14/2020 05:03 12/16/2020 09:20 3/22/2021 15:14   Eos # Latest Ref Range: 15 - 500 cells/uL 1.4 (H) 0.3 0.0 0.5 122       PFT reviewed  7/16/20- moderately severe obstruction, reduced DLCO, air trapping    EPWORTH SLEEPINESS SCALE SCORE 13 on 10/4/2021  Sleep study 10/7/2021 A[facility] Significant obstructive sleep apnea with a TRUPTI of 8.1/hr      Plan:  Clinical impression is apparently straight forward and impression with management as below.    Martell was seen today for follow-up.    Diagnoses and all orders for this visit:    Chronic obstructive pulmonary disease, unspecified COPD type  -     tiotropium-olodateroL (STIOLTO RESPIMAT) 2.5-2.5 mcg/actuation Mist; Inhale 2 puffs into the lungs once daily. Controller    Tobacco dependence  -     CT Chest Lung Screening Low Dose; Future    Personal history of nicotine dependence  -     CT Chest Lung Screening Low Dose; Future    Other orders  -     zolpidem (AMBIEN) 5 MG Tab; Take 1 tablet (5 mg total) by mouth every evening.         - continue COPD medication regiment   - lung nodules unchanged.        Follow up in about 1 year (around 3/7/2024), or if symptoms worsen or fail to improve.    Discussed with patient above for education the following:      Patient Instructions   Pet scan viewed.  Deviated septum noted.     Would use stiolto 2 daily -- could skip -- anoro not good for regular use.    Novmeber screening ct chest     Nerves may cause fatigue    Sleep apnea may cause excess sleepiness-- would re try cpap once nerves stable    Would re try cholesterol  medications.     Try cpap -- may use ambein to sleep--dedicate 8 hrs sleep to use ambein.                     Eval took 46 min

## 2023-03-16 ENCOUNTER — OFFICE VISIT (OUTPATIENT)
Dept: FAMILY MEDICINE | Facility: CLINIC | Age: 68
End: 2023-03-16
Payer: MEDICARE

## 2023-03-16 VITALS
OXYGEN SATURATION: 98 % | SYSTOLIC BLOOD PRESSURE: 128 MMHG | HEIGHT: 66 IN | WEIGHT: 155.19 LBS | DIASTOLIC BLOOD PRESSURE: 74 MMHG | BODY MASS INDEX: 24.94 KG/M2 | HEART RATE: 68 BPM

## 2023-03-16 DIAGNOSIS — Z87.891 FORMER SMOKER: ICD-10-CM

## 2023-03-16 DIAGNOSIS — K21.9 GASTROESOPHAGEAL REFLUX DISEASE WITHOUT ESOPHAGITIS: ICD-10-CM

## 2023-03-16 DIAGNOSIS — Z79.899 LONG-TERM USE OF HIGH-RISK MEDICATION: ICD-10-CM

## 2023-03-16 DIAGNOSIS — I10 ESSENTIAL HYPERTENSION: ICD-10-CM

## 2023-03-16 DIAGNOSIS — R19.7 DIARRHEA OF PRESUMED INFECTIOUS ORIGIN: Primary | ICD-10-CM

## 2023-03-16 DIAGNOSIS — I48.91 ATRIAL FIBRILLATION, UNSPECIFIED TYPE: ICD-10-CM

## 2023-03-16 DIAGNOSIS — R11.0 NAUSEA: ICD-10-CM

## 2023-03-16 PROCEDURE — 99214 OFFICE O/P EST MOD 30 MIN: CPT | Mod: S$GLB,,, | Performed by: FAMILY MEDICINE

## 2023-03-16 PROCEDURE — 99214 PR OFFICE/OUTPT VISIT, EST, LEVL IV, 30-39 MIN: ICD-10-PCS | Mod: S$GLB,,, | Performed by: FAMILY MEDICINE

## 2023-03-16 RX ORDER — ONDANSETRON 4 MG/1
4 TABLET, FILM COATED ORAL EVERY 6 HOURS PRN
Qty: 20 TABLET | Refills: 0 | Status: SHIPPED | OUTPATIENT
Start: 2023-03-16 | End: 2023-05-23

## 2023-03-16 RX ORDER — METRONIDAZOLE 500 MG/1
500 TABLET ORAL EVERY 6 HOURS
Qty: 40 TABLET | Refills: 0 | Status: SHIPPED | OUTPATIENT
Start: 2023-03-16 | End: 2023-05-23

## 2023-03-16 RX ORDER — DICYCLOMINE HYDROCHLORIDE 10 MG/1
10 CAPSULE ORAL
Qty: 40 CAPSULE | Refills: 1 | Status: SHIPPED | OUTPATIENT
Start: 2023-03-16 | End: 2023-06-26

## 2023-03-16 NOTE — PROGRESS NOTES
SUBJECTIVE:    Patient ID: Martell Corcoran is a 67 y.o. male.    Chief Complaint: Follow-up (Meds verbally confirmed/ hasn't been feeling well x 8 days/having a cardio cathoblasion on 3/30/23//dp)      Pt here to follow up on acute and chronic conditions (Anxiety, Pepcid, HTN, CAD (65%), HLD, GERD)        Has not been feeling good every since he got dx with Afib. Has been very fatigued. Has had cardioablation in 11/2022. and antiarrythmics, but has had trouble with those. So was put on toprol but not able to really tolerate, so not taking that either. Has another procedure set up 3/30/23 with Dr. Aguilar.     Having some stomach issues, with gas buildup, diarrhea, and heartburn. Has not been on nexium due to some constipation for a while.  (failed prilosec,protonix) Has been these symptoms for about 9 days. Taking otc immitrol.  Has been to the bathroom 3 times today. Stool is watery. Has not been on antibiotics in the last few months. Has been waves of weird sensation starting at his abdomen and going up to this head. Denies fever or sick contacts. Getting a lot of abdominal cramps. Has not been eating raw food. Has had a weird taste in his mouth    Has quit smoking about 2 months ago.      BP is doing ok today. Hx afib on Eliquis ([facility])      No longer seeing Dr. Pham ([facility]) due to location and the long drive on the way back in pain, having to use the bathroom. Now seeing Dr. Felix. Has not had tuberculin (urothelial cell CA) bladder infusions. Has been having bladder cystoscopes and prostate biopsies every 6 months.     No recent labs.     Has had a CT abdomen/pelvis and nothing to worry about. Still taking nexium 40mg.   -----------------------------------------------  claudio 2011 ([facility]), scheduled on 9/16/22 w/ Ramon      Admission on 11/01/2022, Discharged on 11/01/2022   Component Date Value Ref Range Status    Sodium 11/01/2022 136  136 - 145 mmol/L Final    Potassium 11/01/2022 4.1   3.5 - 5.1 mmol/L Final    Chloride 11/01/2022 98  95 - 110 mmol/L Final    CO2 11/01/2022 31 (H)  23 - 29 mmol/L Final    Glucose 11/01/2022 112 (H)  70 - 110 mg/dL Final    BUN 11/01/2022 18  8 - 23 mg/dL Final    Creatinine 11/01/2022 0.9  0.5 - 1.4 mg/dL Final    Calcium 11/01/2022 9.0  8.7 - 10.5 mg/dL Final    Anion Gap 11/01/2022 7 (L)  8 - 16 mmol/L Final    eGFR 11/01/2022 >60.0  >60 mL/min/1.73 m^2 Final    Magnesium 11/01/2022 1.8  1.6 - 2.6 mg/dL Final    WBC 11/01/2022 6.97  3.90 - 12.70 K/uL Final    RBC 11/01/2022 4.97  4.60 - 6.20 M/uL Final    Hemoglobin 11/01/2022 16.2  14.0 - 18.0 g/dL Final    Hematocrit 11/01/2022 48.2  40.0 - 54.0 % Final    MCV 11/01/2022 97  82 - 98 fL Final    MCH 11/01/2022 32.6 (H)  27.0 - 31.0 pg Final    MCHC 11/01/2022 33.6  32.0 - 36.0 g/dL Final    RDW 11/01/2022 13.4  11.5 - 14.5 % Final    Platelets 11/01/2022 271  150 - 450 K/uL Final    MPV 11/01/2022 8.6 (L)  9.2 - 12.9 fL Final    Immature Granulocytes 11/01/2022 0.6 (H)  0.0 - 0.5 % Final    Gran # (ANC) 11/01/2022 4.4  1.8 - 7.7 K/uL Final    Immature Grans (Abs) 11/01/2022 0.04  0.00 - 0.04 K/uL Final    Lymph # 11/01/2022 1.6  1.0 - 4.8 K/uL Final    Mono # 11/01/2022 0.8  0.3 - 1.0 K/uL Final    Eos # 11/01/2022 0.1  0.0 - 0.5 K/uL Final    Baso # 11/01/2022 0.02  0.00 - 0.20 K/uL Final    nRBC 11/01/2022 0  0 /100 WBC Final    Gran % 11/01/2022 62.5  38.0 - 73.0 % Final    Lymph % 11/01/2022 23.2  18.0 - 48.0 % Final    Mono % 11/01/2022 11.8  4.0 - 15.0 % Final    Eosinophil % 11/01/2022 1.6  0.0 - 8.0 % Final    Basophil % 11/01/2022 0.3  0.0 - 1.9 % Final    Differential Method 11/01/2022 Automated   Final    BSA 11/01/2022 1.85  m2 Final    EF 11/01/2022 60  % Final   Hospital Outpatient Visit on 10/25/2022   Component Date Value Ref Range Status    POC Glucose 10/25/2022 92  70 - 110 Final       Past Medical History:   Diagnosis Date    Benign enlargement of prostate     Had a biopsy in around 2015     Elevated PSA     GERD (gastroesophageal reflux disease)     Hypertension     Started with meds in 2012.    Kidney stone     Urinary tract infection      Social History     Socioeconomic History    Marital status: Significant Other   Tobacco Use    Smoking status: Every Day     Packs/day: 1.00     Years: 55.00     Pack years: 55.00     Types: Cigarettes    Smokeless tobacco: Never   Substance and Sexual Activity    Alcohol use: Yes     Comment: Previous 12 beer a day for 20 years. Now occ.    Drug use: No     Social Determinants of Health     Financial Resource Strain: High Risk    Difficulty of Paying Living Expenses: Very hard   Food Insecurity: Food Insecurity Present    Worried About Running Out of Food in the Last Year: Often true    Ran Out of Food in the Last Year: Never true   Transportation Needs: No Transportation Needs    Lack of Transportation (Medical): No    Lack of Transportation (Non-Medical): No   Physical Activity: Unknown    Days of Exercise per Week: Patient refused    Minutes of Exercise per Session: Patient refused   Stress: Stress Concern Present    Feeling of Stress : Very much   Social Connections: Unknown    Frequency of Communication with Friends and Family: Patient refused    Frequency of Social Gatherings with Friends and Family: Patient refused    Active Member of Clubs or Organizations: Patient refused    Attends Club or Organization Meetings: Patient refused    Marital Status:    Housing Stability: Low Risk     Unable to Pay for Housing in the Last Year: No    Number of Places Lived in the Last Year: 1    Unstable Housing in the Last Year: No     Past Surgical History:   Procedure Laterality Date    FISTULA REPAIR      LEFT HEART CATHETERIZATION N/A 10/23/2018    Procedure: Left heart cath;  Surgeon: Niharika Squires MD;  Location: [facility] CATH;  Service: Cardiology;  Laterality: N/A;     Family History   Problem Relation Age of Onset    Heart failure Mother     Cancer Father      Heart disease Brother     Heart attack Brother     Cancer Brother     Heart disease Brother     Drug abuse Brother        Review of patient's allergies indicates:   Allergen Reactions    Msg [glutamic acid] Nausea Only, Palpitations, Shortness Of Breath and Swelling    Oyster extract Swelling     PT swells in his throat after eating oysters on occasion       Current Outpatient Medications:     albuterol (PROVENTIL/VENTOLIN HFA) 90 mcg/actuation inhaler, Inhale 1-2 puffs into the lungs every 4 (four) hours as needed for Shortness of Breath (coughing). Rescue, Disp: 18 g, Rfl: 11    albuterol-ipratropium (DUO-NEB) 2.5 mg-0.5 mg/3 mL nebulizer solution, USE 1 AMPULE IN NEBULIZER EVERY 4 HOURS AS NEEDED FOR WHEEZING, Disp: 180 mL, Rfl: 0    alfuzosin (UROXATRAL) 10 mg Tb24, TAKE 1 TABLET BY MOUTH ONCE DAILY AFTER BREAKFAST, Disp: , Rfl:     aspirin (ECOTRIN) 81 MG EC tablet, , Disp: , Rfl:     azelastine (ASTELIN) 137 mcg (0.1 %) nasal spray, 1 spray (137 mcg total) by Nasal route 2 (two) times daily., Disp: 30 mL, Rfl: 2    ELIQUIS 5 mg Tab, Take 5 mg by mouth 2 (two) times daily., Disp: , Rfl:     EScitalopram oxalate (LEXAPRO) 20 MG tablet, Take by mouth., Disp: , Rfl:     esomeprazole (NEXIUM) 40 MG capsule, Take 40 mg by mouth once daily., Disp: , Rfl:     ezetimibe (ZETIA) 10 mg tablet, Take 10 mg by mouth once daily., Disp: , Rfl:     finasteride (PROSCAR) 5 mg tablet, Take 5 mg by mouth once daily., Disp: , Rfl:     LIVALO 4 mg Tab, Take 1 tablet by mouth once daily., Disp: , Rfl:     nitroGLYCERIN (NITROSTAT) 0.4 MG SL tablet, Place under the tongue., Disp: , Rfl:     tiotropium-olodateroL (STIOLTO RESPIMAT) 2.5-2.5 mcg/actuation Mist, Inhale 2 puffs into the lungs once daily. Controller, Disp: 12 g, Rfl: 3    valACYclovir (VALTREX) 1000 MG tablet, Take 1 g by mouth 2 (two) times daily., Disp: , Rfl:     zolpidem (AMBIEN) 5 MG Tab, Take 1 tablet (5 mg total) by mouth every evening., Disp: 20 tablet, Rfl:  "2    Review of Systems   Constitutional:  Negative for appetite change, fatigue, fever and unexpected weight change.   Respiratory:  Positive for cough. Negative for chest tightness, shortness of breath and wheezing.    Cardiovascular:  Negative for chest pain and leg swelling.   Gastrointestinal:  Negative for abdominal pain, constipation, nausea and vomiting.        -heartburn   Genitourinary:  Negative for decreased urine volume, difficulty urinating, dysuria and frequency.   Musculoskeletal:  Positive for back pain. Negative for arthralgias, myalgias and neck pain.   Skin:  Negative for rash.   Neurological:  Negative for dizziness, weakness, numbness and headaches.   Hematological:  Does not bruise/bleed easily.   Psychiatric/Behavioral:  Negative for dysphoric mood, sleep disturbance and suicidal ideas. The patient is not nervous/anxious.    All other systems reviewed and are negative.       Objective:      Vitals:    03/16/23 1057   BP: 128/74   Pulse: 68   SpO2: 98%   Weight: 70.4 kg (155 lb 3.2 oz)   Height: 5' 6" (1.676 m)       Wt Readings from Last 6 Encounters:   03/16/23 70.4 kg (155 lb 3.2 oz)   03/07/23 70 kg (154 lb 5.2 oz)   02/01/23 72.6 kg (159 lb 15.1 oz)   11/23/22 73.5 kg (162 lb)   11/01/22 73.5 kg (162 lb 0.6 oz)   10/25/22 73.5 kg (162 lb)           Physical Exam  Vitals reviewed.   Constitutional:       General: He is not in acute distress.     Appearance: Normal appearance. He is well-developed and overweight.   HENT:      Head: Normocephalic and atraumatic.   Neck:      Thyroid: No thyromegaly.   Cardiovascular:      Rate and Rhythm: Normal rate and regular rhythm.      Heart sounds: Normal heart sounds. No murmur heard.    No friction rub.   Pulmonary:      Effort: Pulmonary effort is normal.      Breath sounds: Normal breath sounds. No wheezing or rales.   Abdominal:      General: Bowel sounds are normal. There is no distension.      Palpations: Abdomen is soft.      Tenderness: There " is abdominal tenderness in the right upper quadrant.   Musculoskeletal:      Cervical back: Neck supple.   Lymphadenopathy:      Cervical: No cervical adenopathy.   Skin:     General: Skin is warm and dry.      Findings: No rash.   Neurological:      Mental Status: He is alert and oriented to person, place, and time.   Psychiatric:         Attention and Perception: He is attentive.         Speech: Speech normal.         Behavior: Behavior normal.         Thought Content: Thought content normal.         Judgment: Judgment normal.         Assessment:       No diagnosis found.       Plan:       There are no diagnoses linked to this encounter.    Labs and/or tests have been ordered for the evaluation/monitoring of acute/chronic conditions, to be done just before next visit.    No follow-ups on file.        3/16/2023 Zo Burrell

## 2023-03-17 ENCOUNTER — PATIENT MESSAGE (OUTPATIENT)
Dept: FAMILY MEDICINE | Facility: CLINIC | Age: 68
End: 2023-03-17

## 2023-03-17 LAB
ALBUMIN SERPL-MCNC: 4.4 G/DL (ref 3.6–5.1)
ALBUMIN/GLOB SERPL: 1.7 (CALC) (ref 1–2.5)
ALP SERPL-CCNC: 72 U/L (ref 35–144)
ALT SERPL-CCNC: 32 U/L (ref 9–46)
AMYLASE SERPL-CCNC: 48 U/L (ref 21–101)
AST SERPL-CCNC: 17 U/L (ref 10–35)
BILIRUB SERPL-MCNC: 0.5 MG/DL (ref 0.2–1.2)
BUN SERPL-MCNC: 19 MG/DL (ref 7–25)
BUN/CREAT SERPL: NORMAL (CALC) (ref 6–22)
CALCIUM SERPL-MCNC: 9.9 MG/DL (ref 8.6–10.3)
CHLORIDE SERPL-SCNC: 102 MMOL/L (ref 98–110)
CO2 SERPL-SCNC: 28 MMOL/L (ref 20–32)
CREAT SERPL-MCNC: 0.84 MG/DL (ref 0.7–1.35)
EGFR: 96 ML/MIN/1.73M2
GLOBULIN SER CALC-MCNC: 2.6 G/DL (CALC) (ref 1.9–3.7)
GLUCOSE SERPL-MCNC: 92 MG/DL (ref 65–99)
LIPASE SERPL-CCNC: 54 U/L (ref 7–60)
POTASSIUM SERPL-SCNC: 4.2 MMOL/L (ref 3.5–5.3)
PROT SERPL-MCNC: 7 G/DL (ref 6.1–8.1)
SODIUM SERPL-SCNC: 141 MMOL/L (ref 135–146)

## 2023-03-24 ENCOUNTER — PATIENT MESSAGE (OUTPATIENT)
Dept: MEDSURG UNIT | Facility: HOSPITAL | Age: 68
End: 2023-03-24
Payer: MEDICARE

## 2023-03-24 ENCOUNTER — PATIENT MESSAGE (OUTPATIENT)
Dept: ELECTROPHYSIOLOGY | Facility: CLINIC | Age: 68
End: 2023-03-24
Payer: MEDICARE

## 2023-03-24 ENCOUNTER — TELEPHONE (OUTPATIENT)
Dept: ELECTROPHYSIOLOGY | Facility: CLINIC | Age: 68
End: 2023-03-24
Payer: MEDICARE

## 2023-03-24 DIAGNOSIS — I48.0 PAROXYSMAL ATRIAL FIBRILLATION: Primary | ICD-10-CM

## 2023-03-24 NOTE — TELEPHONE ENCOUNTER
Returned call to pt, no answer. LVM that lab orders are in again. He may have done on Monday.      ----- Message from Cordell Lemon MA sent at 3/24/2023  4:49 PM CDT -----  Contact: pt at  [phone]    ----- Message -----  From: Ashok Bui  Sent: 3/24/2023   2:07 PM CDT  To: Lauren Dennison Staff    Type: Needs Medical Advice  Who Called:  pt   Best Call Back Number: [phone]  Additional Information: pt is calling the office regarding getting labs at [facility] today and he's there now and no orders have been put in. Please call back and advise.

## 2023-03-27 ENCOUNTER — PATIENT MESSAGE (OUTPATIENT)
Dept: FAMILY MEDICINE | Facility: CLINIC | Age: 68
End: 2023-03-27

## 2023-03-27 NOTE — TELEPHONE ENCOUNTER
Returned call to pt. Pt is having stomach issues and needs to have a GI consult prior to ablation. Procedure cancelled for 3/30/2023 and pushed back to 5/16/2023.

## 2023-03-28 LAB
C DIFF GDH STL QL: NORMAL
C JEJUNI+C COLI AG STL QL: NORMAL
E COLI SXT STL QL IA: NORMAL
G LAMBLIA AG STL QL IA: NORMAL
H PYLORI AG STL QL IA: NORMAL
O+P STL TRI STN: NORMAL
RV AG STL QL IA: NORMAL
SALM + SHIG STL CULT: NORMAL
WBC STL QL MICRO: NORMAL

## 2023-04-10 ENCOUNTER — PATIENT MESSAGE (OUTPATIENT)
Dept: MEDSURG UNIT | Facility: HOSPITAL | Age: 68
End: 2023-04-10
Payer: MEDICARE

## 2023-04-14 NOTE — TELEPHONE ENCOUNTER
Pt has some questions about his cscope, with diverticulosis and polyps. Was curious if this could have been causing his symptoms, which cleared his symptoms up. Also had some questions about his labs as well. All questions were answered to his satisfaction. He is set up for a cystoscope and cardiac ablation later in the month.

## 2023-05-04 ENCOUNTER — HOSPITAL ENCOUNTER (OUTPATIENT)
Dept: RADIOLOGY | Facility: HOSPITAL | Age: 68
Discharge: HOME | End: 2023-05-04
Attending: SPECIALIST
Payer: MEDICARE

## 2023-05-04 DIAGNOSIS — C67.9 BLADDER CANCER: ICD-10-CM

## 2023-05-04 DIAGNOSIS — R97.20 ELEVATED PROSTATE SPECIFIC ANTIGEN (PSA): ICD-10-CM

## 2023-05-04 LAB
CREAT SERPL-MCNC: 0.8 MG/DL (ref 0.5–1.4)
SAMPLE: NORMAL

## 2023-05-04 PROCEDURE — 25500020 PHARM REV CODE 255: Mod: PO | Performed by: SPECIALIST

## 2023-05-04 PROCEDURE — 82565 ASSAY OF CREATININE: CPT | Mod: PO

## 2023-05-04 RX ADMIN — IOHEXOL 100 ML: 350 INJECTION, SOLUTION INTRAVENOUS at 01:05

## 2023-05-15 ENCOUNTER — PATIENT MESSAGE (OUTPATIENT)
Dept: ELECTROPHYSIOLOGY | Facility: CLINIC | Age: 68
End: 2023-05-15
Payer: MEDICARE

## 2023-05-15 DIAGNOSIS — Z01.812 ENCOUNTER FOR PRE-OPERATIVE LABORATORY TESTING: ICD-10-CM

## 2023-05-15 DIAGNOSIS — I48.0 PAROXYSMAL ATRIAL FIBRILLATION: Primary | ICD-10-CM

## 2023-05-17 ENCOUNTER — HOSPITAL ENCOUNTER (EMERGENCY)
Facility: HOSPITAL | Age: 68
Discharge: HOME | End: 2023-05-17
Attending: EMERGENCY MEDICINE
Payer: MEDICARE

## 2023-05-17 VITALS
HEART RATE: 72 BPM | HEIGHT: 66 IN | OXYGEN SATURATION: 97 % | BODY MASS INDEX: 25.39 KG/M2 | WEIGHT: 158 LBS | RESPIRATION RATE: 18 BRPM | DIASTOLIC BLOOD PRESSURE: 69 MMHG | TEMPERATURE: 99 F | SYSTOLIC BLOOD PRESSURE: 129 MMHG

## 2023-05-17 DIAGNOSIS — Z77.098 CHEMICAL EXPOSURE: Primary | ICD-10-CM

## 2023-05-17 DIAGNOSIS — J44.1 COPD WITH ACUTE EXACERBATION: ICD-10-CM

## 2023-05-17 DIAGNOSIS — R06.02 SHORTNESS OF BREATH: ICD-10-CM

## 2023-05-17 LAB
ANION GAP SERPL CALC-SCNC: 10 MMOL/L (ref 8–16)
BASOPHILS # BLD AUTO: 0.04 K/UL (ref 0–0.2)
BASOPHILS NFR BLD: 0.6 % (ref 0–1.9)
BUN SERPL-MCNC: 12 MG/DL (ref 8–23)
CALCIUM SERPL-MCNC: 9.4 MG/DL (ref 8.7–10.5)
CHLORIDE SERPL-SCNC: 105 MMOL/L (ref 95–110)
CO2 SERPL-SCNC: 28 MMOL/L (ref 23–29)
CREAT SERPL-MCNC: 0.8 MG/DL (ref 0.5–1.4)
DIFFERENTIAL METHOD: ABNORMAL
EOSINOPHIL # BLD AUTO: 0.4 K/UL (ref 0–0.5)
EOSINOPHIL NFR BLD: 5.7 % (ref 0–8)
ERYTHROCYTE [DISTWIDTH] IN BLOOD BY AUTOMATED COUNT: 14.3 % (ref 11.5–14.5)
EST. GFR  (NO RACE VARIABLE): >60 ML/MIN/1.73 M^2
GLUCOSE SERPL-MCNC: 94 MG/DL (ref 70–110)
HCT VFR BLD AUTO: 45 % (ref 40–54)
HGB BLD-MCNC: 15.1 G/DL (ref 14–18)
IMM GRANULOCYTES # BLD AUTO: 0.02 K/UL (ref 0–0.04)
IMM GRANULOCYTES NFR BLD AUTO: 0.3 % (ref 0–0.5)
LYMPHOCYTES # BLD AUTO: 1.3 K/UL (ref 1–4.8)
LYMPHOCYTES NFR BLD: 17.9 % (ref 18–48)
MCH RBC QN AUTO: 33 PG (ref 27–31)
MCHC RBC AUTO-ENTMCNC: 33.6 G/DL (ref 32–36)
MCV RBC AUTO: 99 FL (ref 82–98)
MONOCYTES # BLD AUTO: 0.9 K/UL (ref 0.3–1)
MONOCYTES NFR BLD: 12.5 % (ref 4–15)
NEUTROPHILS # BLD AUTO: 4.4 K/UL (ref 1.8–7.7)
NEUTROPHILS NFR BLD: 63 % (ref 38–73)
NRBC BLD-RTO: 0 /100 WBC
PLATELET # BLD AUTO: 262 K/UL (ref 150–450)
PMV BLD AUTO: 8.6 FL (ref 9.2–12.9)
POTASSIUM SERPL-SCNC: 3.9 MMOL/L (ref 3.5–5.1)
RBC # BLD AUTO: 4.57 M/UL (ref 4.6–6.2)
SODIUM SERPL-SCNC: 143 MMOL/L (ref 136–145)
TROPONIN I SERPL DL<=0.01 NG/ML-MCNC: <0.006 NG/ML (ref 0–0.03)
WBC # BLD AUTO: 7.02 K/UL (ref 3.9–12.7)

## 2023-05-17 PROCEDURE — 84484 ASSAY OF TROPONIN QUANT: CPT | Performed by: EMERGENCY MEDICINE

## 2023-05-17 PROCEDURE — 99285 EMERGENCY DEPT VISIT HI MDM: CPT | Mod: 25

## 2023-05-17 PROCEDURE — 63600175 PHARM REV CODE 636 W HCPCS: Performed by: EMERGENCY MEDICINE

## 2023-05-17 PROCEDURE — 94640 AIRWAY INHALATION TREATMENT: CPT

## 2023-05-17 PROCEDURE — 93010 EKG 12-LEAD: ICD-10-PCS | Mod: ,,, | Performed by: SPECIALIST

## 2023-05-17 PROCEDURE — 25000242 PHARM REV CODE 250 ALT 637 W/ HCPCS: Performed by: EMERGENCY MEDICINE

## 2023-05-17 PROCEDURE — 93010 ELECTROCARDIOGRAM REPORT: CPT | Mod: ,,, | Performed by: SPECIALIST

## 2023-05-17 PROCEDURE — 93005 ELECTROCARDIOGRAM TRACING: CPT

## 2023-05-17 PROCEDURE — 80048 BASIC METABOLIC PNL TOTAL CA: CPT | Performed by: EMERGENCY MEDICINE

## 2023-05-17 PROCEDURE — 85025 COMPLETE CBC W/AUTO DIFF WBC: CPT | Performed by: EMERGENCY MEDICINE

## 2023-05-17 PROCEDURE — 36415 COLL VENOUS BLD VENIPUNCTURE: CPT | Performed by: EMERGENCY MEDICINE

## 2023-05-17 RX ORDER — PREDNISONE 20 MG/1
60 TABLET ORAL
Status: COMPLETED | OUTPATIENT
Start: 2023-05-17 | End: 2023-05-17

## 2023-05-17 RX ORDER — IPRATROPIUM BROMIDE AND ALBUTEROL SULFATE 2.5; .5 MG/3ML; MG/3ML
3 SOLUTION RESPIRATORY (INHALATION)
Status: COMPLETED | OUTPATIENT
Start: 2023-05-17 | End: 2023-05-17

## 2023-05-17 RX ORDER — PREDNISONE 20 MG/1
20 TABLET ORAL DAILY
Qty: 4 TABLET | Refills: 0 | Status: SHIPPED | OUTPATIENT
Start: 2023-05-18 | End: 2023-05-22

## 2023-05-17 RX ADMIN — PREDNISONE 60 MG: 20 TABLET ORAL at 03:05

## 2023-05-17 RX ADMIN — IPRATROPIUM BROMIDE AND ALBUTEROL SULFATE 3 ML: .5; 3 SOLUTION RESPIRATORY (INHALATION) at 03:05
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
## 2023-05-22 ENCOUNTER — TELEPHONE (OUTPATIENT)
Dept: PULMONOLOGY | Facility: CLINIC | Age: 68
End: 2023-05-22
Payer: MEDICARE

## 2023-05-22 RX ORDER — APIXABAN 5 MG/1
5 TABLET, FILM COATED ORAL 2 TIMES DAILY
Qty: 180 TABLET | Refills: 3 | Status: SHIPPED | OUTPATIENT
Start: 2023-05-22

## 2023-05-22 RX ORDER — EZETIMIBE 10 MG/1
10 TABLET ORAL DAILY
Qty: 90 TABLET | Refills: 3 | Status: SHIPPED | OUTPATIENT
Start: 2023-05-22

## 2023-05-22 NOTE — TELEPHONE ENCOUNTER
BEN    ----- Message from eDvendra Dean sent at 5/22/2023 10:43 AM CDT -----  Regarding: Return Call  Contact: Patient  Type:  Patient Returning Call    Who Called:Patient  Who Left Message for Patient:office nurse  Does the patient know what this is regarding?:chemical exposure ED visit  Would the patient rather a call back or a response via [facility]? call  Best Call Back Number:[phone]  Additional Information: Please call to advise.

## 2023-05-23 ENCOUNTER — OFFICE VISIT (OUTPATIENT)
Dept: PULMONOLOGY | Facility: CLINIC | Age: 68
End: 2023-05-23
Payer: MEDICARE

## 2023-05-23 VITALS
OXYGEN SATURATION: 94 % | HEIGHT: 66 IN | SYSTOLIC BLOOD PRESSURE: 158 MMHG | HEART RATE: 70 BPM | DIASTOLIC BLOOD PRESSURE: 84 MMHG | BODY MASS INDEX: 24.98 KG/M2 | WEIGHT: 155.44 LBS

## 2023-05-23 DIAGNOSIS — J44.1 COPD WITH ACUTE EXACERBATION: ICD-10-CM

## 2023-05-23 DIAGNOSIS — J43.9 PULMONARY EMPHYSEMA, UNSPECIFIED EMPHYSEMA TYPE: ICD-10-CM

## 2023-05-23 DIAGNOSIS — J44.9 CHRONIC OBSTRUCTIVE PULMONARY DISEASE, UNSPECIFIED COPD TYPE: ICD-10-CM

## 2023-05-23 PROCEDURE — 99214 OFFICE O/P EST MOD 30 MIN: CPT | Mod: PBBFAC,PO | Performed by: INTERNAL MEDICINE

## 2023-05-23 PROCEDURE — 99999 PR PBB SHADOW E&M-EST. PATIENT-LVL IV: ICD-10-PCS | Mod: PBBFAC,,, | Performed by: INTERNAL MEDICINE

## 2023-05-23 PROCEDURE — 99999 PR PBB SHADOW E&M-EST. PATIENT-LVL IV: CPT | Mod: PBBFAC,,, | Performed by: INTERNAL MEDICINE

## 2023-05-23 PROCEDURE — 99214 OFFICE O/P EST MOD 30 MIN: CPT | Mod: S$PBB,,, | Performed by: INTERNAL MEDICINE

## 2023-05-23 PROCEDURE — 99214 PR OFFICE/OUTPT VISIT, EST, LEVL IV, 30-39 MIN: ICD-10-PCS | Mod: S$PBB,,, | Performed by: INTERNAL MEDICINE

## 2023-05-23 RX ORDER — AZITHROMYCIN 500 MG/1
TABLET, FILM COATED ORAL
Qty: 3 TABLET | Refills: 3 | Status: SHIPPED | OUTPATIENT
Start: 2023-05-23 | End: 2023-06-26

## 2023-05-23 RX ORDER — PREDNISONE 20 MG/1
TABLET ORAL
Qty: 36 TABLET | Refills: 1 | Status: ON HOLD | OUTPATIENT
Start: 2023-05-23 | End: 2023-06-16 | Stop reason: HOSPADM

## 2023-05-23 RX ORDER — ALBUTEROL SULFATE 90 UG/1
1-2 AEROSOL, METERED RESPIRATORY (INHALATION) EVERY 4 HOURS PRN
Qty: 18 G | Refills: 11 | Status: SHIPPED | OUTPATIENT
Start: 2023-05-23 | End: 2023-09-13 | Stop reason: SDUPTHER

## 2023-05-23 RX ORDER — CODEINE PHOSPHATE AND GUAIFENESIN 10; 100 MG/5ML; MG/5ML
5 SOLUTION ORAL EVERY 4 HOURS PRN
Qty: 273 ML | Refills: 0 | Status: ON HOLD | OUTPATIENT
Start: 2023-05-23 | End: 2023-06-17 | Stop reason: HOSPADM

## 2023-05-23 RX ORDER — FLUTICASONE FUROATE, UMECLIDINIUM BROMIDE AND VILANTEROL TRIFENATATE 200; 62.5; 25 UG/1; UG/1; UG/1
1 POWDER RESPIRATORY (INHALATION) DAILY
Qty: 60 EACH | Refills: 11 | Status: SHIPPED | OUTPATIENT
Start: 2023-05-23 | End: 2023-06-29 | Stop reason: SDUPTHER

## 2023-05-23 RX ORDER — ALBUTEROL SULFATE 0.83 MG/ML
2.5 SOLUTION RESPIRATORY (INHALATION) EVERY 6 HOURS PRN
Qty: 120 EACH | Refills: 11 | Status: SHIPPED | OUTPATIENT
Start: 2023-05-23 | End: 2024-05-22

## 2023-05-23 NOTE — PATIENT INSTRUCTIONS
Chest xray 5/17/2023 viewed with no problems.    Breathing test 2020 with copd 56% lung capacity      Use trelegy or stiolto-- trelegy has steroids which are more effective to prevent wheezes    Increase steroids-- may need into future?? For now use prednisone 20mg 3 x 3 and taper,,,,,  may repeat.  Would be best to inhale steroids to prevent relapse -- not sure long term need.    Codiene not covered but not $$      Use azithromycin, may need special antibiotic??  Need to have mucous clear.      Call if not back to usual in 2-3 wks???

## 2023-05-23 NOTE — PROGRESS NOTES
5/23/2023    Martell Viverossonancy  Follow up    Chief Complaint   Patient presents with    Cough     Chemical exposure around the 12th of May - continual cough since    Sputum Production     Yellow sputum production        HPI:     5/23/2023 pt was doing cleaning for elderly cat lady- 5/12/2023- had to spray acontainer for roaches- loaded with roaches- he was given fogger,  and was trying to fog container, sprayed and then closed container,  exposed to [place] fogger off and on for roughly 1/2 hr to 40 min. No burning. No sob nor cough til roughly 4-6 hrs later.  Similar exposures to cleaning was no problem in past. Pt was advised to go to er after 4 -5 days -- pt had to use neb q 4-6 hr post exposure whereas was using neb rx every 6 months prior.  Pt seen in er-- prednisone 20/d x 4 done.  Sat 96 and wheezes noted.      Pt did use zpak but mucous still yellow.       3/7/2023 quit smokes, feels fatigued chronically. Had naila and cardioversion November.  Back to rhythm but could not use meds to continue.   Uses anoro and qvar--- not using reg as before.    Pt missed lexapro somehow-- had increased anxiety and stomach issues.  Uses occ xanax.   Uses astelin      Has occ blurred vision.  Patient Instructions   Pet scan viewed.  Deviated septum noted.     Would use stiolto 2 daily -- could skip -- anoro not good for regular use.    Novmeber screening ct chest     Nerves may cause fatigue    Sleep apnea may cause excess sleepiness-- would re try cpap once nerves stable    Would re try cholesterol  medications.     Try cpap -- may use ambein to sleep--dedicate 8 hrs sleep to use ambein.      Sept 13, 2022  in past, had own company.  Lung dz developed 4 + yrs ago.  Bladder cancer removed.  Had bcg infusions 2020 . Some garcia. Problem nasal stuffy tolerating cpap.    Patient Instructions   Nasal stuffy -- afrin or generic afrin --- use one to 2 sprays up to once daily -- alternate nares ?  If regular use  may get tolerant and afrin ineffective.  Could see ent to improve passages.      Flonase should work better (or astelin) if passages open.     Sleep study was only 8 episodes an hour-- less than 5 is normal.     Cpap titration needed 8-9 cm pressure    Mediastinal abnormality varies-- would send to cardiothoracic surgery.    Copd is moderate at 55% or so.  Anoro and qvar effective -- as needed albuterol.  Would stop smokes.     CT films are reviewed directly with the patient.  Extensive discussion is made.  Patient's evaluation took 56 minutes today.          Below from RJ Salgado  6/1/2022- SOB persistent complaint, pain with deep inspiration on left chest. Followed by cardiology for A fib and vascular blockages pacemaker procedure postponed.    Wearing CPAP with nose mask, moves across face when sleeping. Does not feel better after wearing.   Cough- improved, less often and severe, most days, worse when weather changes, productive clear mucous  Currently smoking 3-4 cigarettes daily, trying to cut down.   Patient Instructions   CT of chest shows clear lungs, no change to previously seen lung nodules.     A cyst is seen in muscle could be cause of chest pain    Recommend smoking cessation    Continue COPD medication regiment    Recommend trying CPAP therapy again.     2/4/2022-  States breathing is labored. States usually worse after infusion for bladder cancer. States does have benefit with qvar and Anoro, using albuterol as needed 2x daily.     Daytime fatigue persistent.  purchased CPAP from third party. Has not started to use.     Scheduled for pacemaker to treat A-fib. Followed by Dr. Miranda.     Cough- decreased, 2x daily, productive clear mucous, cut back on smoking to 6 cigarettes daily    1/14/2022- not able to get CPAP due to high co-pay.   States breathing improved after decreasing smoking to 3-6 cigarettes daily.   Shortness of breath- daily complaint, slightly improved, worse with exertion, improves with  rest. Daytime fatigue unchanged.   Complaint of cough- varies in severity, currently mild. Productive small amount clear mucous, did notice worsening after first starting to cut back on smoking. Has returned to normal   Followed by Urologist Dr. Pham for bladder cancer. Undergoing infusion therapy.     10/4/2021- concerned he had COVID 19 late July early August, lost sense of taste and smell. Complaint of fatigue, body aches, shortness of breath when walking,   No fever. Gradually improved over 4 weeks. Taste and smell has not returned. Experiencing short term memory loss and metal grogginess. Has daytime drowsiness onset years worsened in past two months  Persistent cough- productive clear mucous, associated with wheeze. Improves with albuterol inhaler.   Currently on Anoro, QVAR, using albuterol when needed 1-3 x weekly.     2021- Admitted To hospital in [place] while on vacation 2021 for COPD exacerbation. Seen at [facility] 2020 for COPD exacerbation.   Complaint of SOB- daily, worse with exertion, improves with rest. Treated with antibiotics and steroid therapy April.   Cough- recurrent complaint,  improved since hospital discharge, worsened in last 2 weeks after spending time with grandson who had bronchitis that has improved with nebulizer treatments every 4 hours. Associated with chest tightness and wheeze.       2020- he had bladder biopsy at [place] with Dr. Pham on 11/3- per outside records path with High grade papillary urothelial cell carcinoma. He has cough w/ postnasal drip and allergies but breathing is much better. He has been dealing with a lot for the family- 2 brothers just . Hematuria is much better. Not getting bladder infections any more. He continues to smoke 1/2 ppd. Wants to quit but too much stress recently- not ready.    2020-   Start taking prednisone again- long taper over 3 weeks then try to stop. If lungs flaring while decreasing  prednisone go up to the last dose that helped you and call our office.  Continue trelegy  Refilled duo nebs to use as needed  Use albuterol as needed  Quit smoking- go to program  Follow up with urologist  Follow up with PCP for kidney testing and possible urinary infection  pt was recently hospitalized for COPD exacerbation, seen by me while admitted 8/13/20 and also having worsening hematuria at that time. He was sent home with a course of levaquin and steroid taper. He did not qualify for home O2. He is being worked up for a bladder mass per urology and pending transurethral resection.  Pt c/o pain around L kidney after doing yard work and urine turned darker. He was coughing last night and woke up with face feeling puffy. Switched urologists to a Dr. Pham at [place] and has appt at end of September.  He finished prednisone taper. Still taking levaquin. Still feels some congestion in chest but it is getting better. Also has sinus problems w/ nose blocked. He denies frequent exacerbations but this one has been very bad. Feels like worsening since stopped prednisone. Still smokes but trying to cut back. Has smoking cessation appt later this month.  Inhalers: nebs 4-5x/day, trelegy daily, albuterol rescue 1-2x/day    7/15/20- Pt is a 66 yo male with HTN, GERD and BPH presents for new evaluation of breathing issues. He complains of SOB especially if he carries anything like taking out trash to the dumpster. This has been progressive over about 3.5 yrs. He wheezes and coughs up clear mucous, throughout the day.  Pt smokes 1 PPD x 55 yrs.  At age 5 pt had pna and a collapsed lung requiring chest tube on the left side and hospitalization. Didn't have any other problems w/ breathing growing up. He took up playing Overwatch to help lung function.  Pt had abd/p scan for UTI recently which showed some emphysematous changes.    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 One brother  at 65 from leukemia.    Grew up in [place]    The chief compliant  problem varies with instablilty at time      PFSH:  Past Medical History:   Diagnosis Date    Benign enlargement of prostate     Had a biopsy in around     Elevated PSA     GERD (gastroesophageal reflux disease)     Hypertension     Started with meds in .    Kidney stone     Urinary tract infection          Past Surgical History:   Procedure Laterality Date    FISTULA REPAIR      LEFT HEART CATHETERIZATION N/A 10/23/2018    Procedure: Left heart cath;  Surgeon: Niharika Squires MD;  Location: [facility] CATH;  Service: Cardiology;  Laterality: N/A;     Social History     Tobacco Use    Smoking status: Former     Packs/day: 1.00     Years: 55.00     Pack years: 55.00     Types: Cigarettes     Quit date: 2023     Years since quittin.3    Smokeless tobacco: Never   Substance Use Topics    Alcohol use: Yes     Comment: Previous 12 beer a day for 20 years. Now occ.    Drug use: No     Family History   Problem Relation Age of Onset    Heart failure Mother     Cancer Father     Heart disease Brother     Heart attack Brother     Cancer Brother     Heart disease Brother     Drug abuse Brother      Review of patient's allergies indicates:   Allergen Reactions    Msg [glutamic acid] Nausea Only, Palpitations, Shortness Of Breath and Swelling    Oyster extract Swelling     PT swells in his throat after eating oysters on occasion       Performance Status:The patient's activity level is functions out of house. QUACH improved and does not limit him. Back pain limits activity.    Review of Systems:  a review of eleven systems covering constitutional, Eye, HEENT, Psych, Respiratory, Cardiac, GI, , Musculoskeletal, Endocrine, Dermatologic was negative except for pertinent findings as listed ABOVE and below:   wheeze, shortness of breath, fatigue      Exam:Comprehensive exam done. BP (!) 158/84  "(BP Location: Right arm, Patient Position: Sitting, BP Method: Medium (Automatic))   Pulse 70   Ht 5' 6" (1.676 m)   Wt 70.5 kg (155 lb 6.8 oz)   SpO2 (!) 94% Comment: on room air at rest  BMI 25.09 kg/m²   Exam included Vitals as listed, and patient's appearance and affect and alertness and mood, oral exam for yeast and hygiene and pharynx lesions and Mallapatti (M) score, neck with inspection for jvd and masses and thyroid abnormalities and lymph nodes (supraclavicular and infraclavicular nodes and axillary also examined and noted if abn), chest exam included symmetry and effort and fremitus and percussion and auscultation, cardiac exam included rhythm and gallops and murmur and rubs and jvd and edema, abdominal exam for mass and hepatosplenomegaly and tenderness and hernias and bowel sounds, Musculoskeletal exam with muscle tone and posture and mobility/gait and  strength, and skin for rashes and cyanosis and pallor and turgor, extremity for clubbing.  Findings were normal except for pertinent findings listed below:  M1  Bilateral clear lungs w/ faint rhonchi bilateral lower lung zones  No clubbing or edema    Radiographs (ct chest and cxr) reviewed: view by direct vision   CT scan of the chest September 7, 2022 viewed by direct vision.  Fairly small mediastinal cystic structure looks to be a little smaller than March.  There was very very difficult to see in 2021 however.    CT Chest Without Contrast 03/14/22 Emphysema and unchanged lung nodules  Indeterminate smoothly marginated structure along the anterior superior mediastinum, measuring just above simple fluid attenuation suggestive of a minimally complex/complicated cyst, possibly a lymphovascular malformation, synovial cyst (extending from the sternoclavicular joint), foregut duplication cyst, thymic epithelial lesion/cyst, and much less likely malignancy, however this has slightly increased in size from the previous exam and may warrant interval " attention on follow-up imaging.       CT Chest Without Contrast  05/20/2021   In the left upper lobe is a confluence of vessels and a possible 4 mm nodule decreased in size and conspicuousness since the prior study of July 16, 2020.  No new or enlarging pulmonary nodules are identified.  Emphysema.     CT chest/abd/p 11/20/20- mild emphysema, lungs clear aside from small 6mm nodule DANA which is unchanged from prior exam  1. No metastatic disease.  2. Nodular thickening of the left posterior aspect of the bladder.  3. Enlarged prostate.  3. Mild pulmonary emphysema.   CXR 8/14/20- possible vague infiltrate/opacifications bilateral bases  CT chest 7/16/20- DANA 6mm nodule  CT abd/p 6/26/20- bases of lungs w/ scant emphysematous changes, some strands of atelectasis    Labs reviewed    Lab Results   Component Value Date    WBC 7.02 05/17/2023    RBC 4.57 (L) 05/17/2023    HGB 15.1 05/17/2023    HCT 45.0 05/17/2023    MCV 99 (H) 05/17/2023    MCH 33.0 (H) 05/17/2023    MCHC 33.6 05/17/2023    RDW 14.3 05/17/2023     05/17/2023    MPV 8.6 (L) 05/17/2023    GRAN 4.4 05/17/2023    GRAN 63.0 05/17/2023    LYMPH 1.3 05/17/2023    LYMPH 17.9 (L) 05/17/2023    MONO 0.9 05/17/2023    MONO 12.5 05/17/2023    EOS 0.4 05/17/2023    BASO 0.04 05/17/2023    EOSINOPHIL 5.7 05/17/2023    BASOPHIL 0.6 05/17/2023     Results for AMBROCIO MARTELL KAROLINE (MRN [id number]) as of 5/24/2021 15:33   Ref. Range 8/13/2020 02:30 8/13/2020 06:01 8/14/2020 05:03 12/16/2020 09:20 3/22/2021 15:14   Eos # Latest Ref Range: 15 - 500 cells/uL 1.4 (H) 0.3 0.0 0.5 122       PFT reviewed  7/16/20- moderately severe obstruction, reduced DLCO, air trapping    EPWORTH SLEEPINESS SCALE SCORE 13 on 10/4/2021  Sleep study 10/7/2021 A[facility] Significant obstructive sleep apnea with a TRUPTI of 8.1/hr      Plan:  Clinical impression is apparently straight forward and impression with management as below.    Martell was seen today for cough and sputum production.    Diagnoses  and all orders for this visit:    COPD with acute exacerbation  -     predniSONE (DELTASONE) 20 MG tablet; 3 for 3 days then 2 for 3 days then one for 3 days and repeat for breathing problems  -     fluticasone-umeclidin-vilanter (TRELEGY ELLIPTA) 200-62.5-25 mcg inhaler; Inhale 1 puff into the lungs once daily.  -     guaiFENesin-codeine 100-10 mg/5 ml (TUSSI-ORGANIDIN NR)  mg/5 mL syrup; Take 5 mLs by mouth every 4 (four) hours as needed for Cough.  -     azithromycin (ZITHROMAX) 500 MG tablet; One daily for yellow mucous, repeat if needed  -     albuterol (PROVENTIL) 2.5 mg /3 mL (0.083 %) nebulizer solution; Take 3 mLs (2.5 mg total) by nebulization every 6 (six) hours as needed.    Pulmonary emphysema, unspecified emphysema type  -     albuterol (PROVENTIL/VENTOLIN HFA) 90 mcg/actuation inhaler; Inhale 1-2 puffs into the lungs every 4 (four) hours as needed for Shortness of Breath (coughing). Rescue    Chronic obstructive pulmonary disease, unspecified COPD type  -     albuterol (PROVENTIL/VENTOLIN HFA) 90 mcg/actuation inhaler; Inhale 1-2 puffs into the lungs every 4 (four) hours as needed for Shortness of Breath (coughing). Rescue           - continue COPD medication regiment   - lung nodules unchanged.        Follow up in about 6 months (around 11/23/2023), or if symptoms worsen or fail to improve.    Discussed with patient above for education the following:      Patient Instructions   Chest xray 5/17/2023 viewed with no problems.    Breathing test 2020 with copd 56% lung capacity      Use trelegy or stiolto-- trelegy has steroids which are more effective to prevent wheezes    Increase steroids-- may need into future?? For now use prednisone 20mg 3 x 3 and taper,,,,,  may repeat.  Would be best to inhale steroids to prevent relapse -- not sure long term need.    Codiene not covered but not $$      Use azithromycin, may need special antibiotic??  Need to have mucous clear.      Call if not back to usual  in 2-3 wks???             Eirn took 34 min

## 2023-06-10 LAB
APTT PPP: 30 SEC (ref 23–32)
BASOPHILS # BLD AUTO: 21 CELLS/UL (ref 0–200)
BASOPHILS NFR BLD AUTO: 0.2 %
BUN SERPL-MCNC: 17 MG/DL (ref 7–25)
BUN/CREAT SERPL: ABNORMAL (CALC) (ref 6–22)
CALCIUM SERPL-MCNC: 9.6 MG/DL (ref 8.6–10.3)
CHLORIDE SERPL-SCNC: 103 MMOL/L (ref 98–110)
CO2 SERPL-SCNC: 26 MMOL/L (ref 20–32)
CREAT SERPL-MCNC: 0.77 MG/DL (ref 0.7–1.35)
EGFR: 98 ML/MIN/1.73M2
EOSINOPHIL # BLD AUTO: 32 CELLS/UL (ref 15–500)
EOSINOPHIL NFR BLD AUTO: 0.3 %
ERYTHROCYTE [DISTWIDTH] IN BLOOD BY AUTOMATED COUNT: 13.3 % (ref 11–15)
GLUCOSE SERPL-MCNC: 157 MG/DL (ref 65–99)
HCT VFR BLD AUTO: 49.2 % (ref 38.5–50)
HGB BLD-MCNC: 16.6 G/DL (ref 13.2–17.1)
INR PPP: 0.9
LYMPHOCYTES # BLD AUTO: 706 CELLS/UL (ref 850–3900)
LYMPHOCYTES NFR BLD AUTO: 6.6 %
MCH RBC QN AUTO: 33.5 PG (ref 27–33)
MCHC RBC AUTO-ENTMCNC: 33.7 G/DL (ref 32–36)
MCV RBC AUTO: 99.4 FL (ref 80–100)
MONOCYTES # BLD AUTO: 610 CELLS/UL (ref 200–950)
MONOCYTES NFR BLD AUTO: 5.7 %
NEUTROPHILS # BLD AUTO: 9330 CELLS/UL (ref 1500–7800)
NEUTROPHILS NFR BLD AUTO: 87.2 %
PLATELET # BLD AUTO: 243 THOUSAND/UL (ref 140–400)
PMV BLD REES-ECKER: 8.9 FL (ref 7.5–12.5)
POTASSIUM SERPL-SCNC: 4.5 MMOL/L (ref 3.5–5.3)
PROTHROMBIN TIME: 9.8 SEC (ref 9–11.5)
RBC # BLD AUTO: 4.95 MILLION/UL (ref 4.2–5.8)
SODIUM SERPL-SCNC: 139 MMOL/L (ref 135–146)
WBC # BLD AUTO: 10.7 THOUSAND/UL (ref 3.8–10.8)

## 2023-06-12 ENCOUNTER — PATIENT MESSAGE (OUTPATIENT)
Dept: PULMONOLOGY | Facility: CLINIC | Age: 68
End: 2023-06-12
Payer: MEDICARE

## 2023-06-12 ENCOUNTER — PATIENT MESSAGE (OUTPATIENT)
Dept: MEDSURG UNIT | Facility: HOSPITAL | Age: 68
End: 2023-06-12
Payer: MEDICARE

## 2023-06-12 ENCOUNTER — PATIENT MESSAGE (OUTPATIENT)
Dept: ELECTROPHYSIOLOGY | Facility: CLINIC | Age: 68
End: 2023-06-12
Payer: MEDICARE

## 2023-06-12 ENCOUNTER — TELEPHONE (OUTPATIENT)
Dept: PULMONOLOGY | Facility: CLINIC | Age: 68
End: 2023-06-12
Payer: MEDICARE

## 2023-06-12 DIAGNOSIS — Z01.812 ENCOUNTER FOR PRE-OPERATIVE LABORATORY TESTING: ICD-10-CM

## 2023-06-12 DIAGNOSIS — I42.7 DILATED CARDIOMYOPATHY SECONDARY TO DRUG: ICD-10-CM

## 2023-06-12 DIAGNOSIS — I48.0 PAROXYSMAL ATRIAL FIBRILLATION: Primary | ICD-10-CM

## 2023-06-12 DIAGNOSIS — J44.1 CHRONIC OBSTRUCTIVE PULMONARY DISEASE WITH ACUTE EXACERBATION: Primary | ICD-10-CM

## 2023-06-12 RX ORDER — LEVOFLOXACIN 500 MG/1
500 TABLET, FILM COATED ORAL DAILY
Qty: 10 TABLET | Refills: 0 | Status: SHIPPED | OUTPATIENT
Start: 2023-06-12 | End: 2023-06-26

## 2023-06-12 NOTE — TELEPHONE ENCOUNTER
Contacted pt per message sent thru [facility]. Pt continues with SOB and productive cough. He is asking to push procedure back. He is trying to get in touch with Dr Lexx Carolina at this time. Pt feels like he is not getting better.      Cardiac Ablation with Dr Aguilar cancelled , pt will reschedule at later date.

## 2023-06-12 NOTE — TELEPHONE ENCOUNTER
Patient reached out to us via [employer].  He states:    Symptoms are not getting better but actually worse, did all of the meds prescribed plus a few from other peoples referrals but only short term relief, need to know what to do because I have a cardiac catheter ablation procedure scheduled for Wednesday the 14 th of June which is 2 days away. Please contact me asap to let me know what i should do! T    I had advised ER but he wanted me to send message to you.  Please advise.

## 2023-06-12 NOTE — PROGRESS NOTES
pt still yellow mucous despite azithro, feels weak, cbc and bmp  was agood.  sob and weak and  appetite. will ask staff to bring in .

## 2023-06-13 ENCOUNTER — HOSPITAL ENCOUNTER (INPATIENT)
Facility: HOSPITAL | Age: 68
LOS: 3 days | Discharge: HOME | DRG: 281 | End: 2023-06-17
Attending: STUDENT IN AN ORGANIZED HEALTH CARE EDUCATION/TRAINING PROGRAM | Admitting: INTERNAL MEDICINE
Payer: MEDICARE

## 2023-06-13 ENCOUNTER — HOSPITAL ENCOUNTER (OUTPATIENT)
Dept: RADIOLOGY | Facility: CLINIC | Age: 68
Discharge: HOME | End: 2023-06-13
Attending: INTERNAL MEDICINE
Payer: MEDICARE

## 2023-06-13 ENCOUNTER — PATIENT MESSAGE (OUTPATIENT)
Dept: PULMONOLOGY | Facility: CLINIC | Age: 68
End: 2023-06-13
Payer: MEDICARE

## 2023-06-13 DIAGNOSIS — J44.1 CHRONIC OBSTRUCTIVE PULMONARY DISEASE WITH ACUTE EXACERBATION: ICD-10-CM

## 2023-06-13 DIAGNOSIS — J68.0 ACUTE CHEMICAL BRONCHITIS: ICD-10-CM

## 2023-06-13 DIAGNOSIS — R06.09 DOE (DYSPNEA ON EXERTION): ICD-10-CM

## 2023-06-13 DIAGNOSIS — Z13.6 ENCOUNTER FOR SCREENING FOR CARDIOVASCULAR DISORDERS: ICD-10-CM

## 2023-06-13 DIAGNOSIS — R07.9 CHEST PAIN: ICD-10-CM

## 2023-06-13 DIAGNOSIS — I48.0 PAROXYSMAL ATRIAL FIBRILLATION: ICD-10-CM

## 2023-06-13 DIAGNOSIS — I21.4 NSTEMI (NON-ST ELEVATED MYOCARDIAL INFARCTION): Primary | ICD-10-CM

## 2023-06-13 DIAGNOSIS — J44.1 COPD EXACERBATION: ICD-10-CM

## 2023-06-13 LAB
ALBUMIN SERPL BCP-MCNC: 3.2 G/DL (ref 3.5–5.2)
ALP SERPL-CCNC: 71 U/L (ref 55–135)
ALT SERPL W/O P-5'-P-CCNC: 25 U/L (ref 10–44)
ANION GAP SERPL CALC-SCNC: 12 MMOL/L (ref 8–16)
AST SERPL-CCNC: 29 U/L (ref 10–40)
BASOPHILS # BLD AUTO: 0.05 K/UL (ref 0–0.2)
BASOPHILS NFR BLD: 0.5 % (ref 0–1.9)
BILIRUB SERPL-MCNC: 0.4 MG/DL (ref 0.1–1)
BNP SERPL-MCNC: 16 PG/ML (ref 0–99)
BUN SERPL-MCNC: 21 MG/DL (ref 8–23)
CALCIUM SERPL-MCNC: 8.6 MG/DL (ref 8.7–10.5)
CHLORIDE SERPL-SCNC: 104 MMOL/L (ref 95–110)
CO2 SERPL-SCNC: 24 MMOL/L (ref 23–29)
CREAT SERPL-MCNC: 0.8 MG/DL (ref 0.5–1.4)
DIFFERENTIAL METHOD: ABNORMAL
EOSINOPHIL # BLD AUTO: 0.8 K/UL (ref 0–0.5)
EOSINOPHIL NFR BLD: 8.9 % (ref 0–8)
ERYTHROCYTE [DISTWIDTH] IN BLOOD BY AUTOMATED COUNT: 13.2 % (ref 11.5–14.5)
EST. GFR  (NO RACE VARIABLE): >60 ML/MIN/1.73 M^2
GLUCOSE SERPL-MCNC: 101 MG/DL (ref 70–110)
HCT VFR BLD AUTO: 44.6 % (ref 40–54)
HGB BLD-MCNC: 14.7 G/DL (ref 14–18)
IMM GRANULOCYTES # BLD AUTO: 0.02 K/UL (ref 0–0.04)
IMM GRANULOCYTES NFR BLD AUTO: 0.2 % (ref 0–0.5)
LYMPHOCYTES # BLD AUTO: 1.1 K/UL (ref 1–4.8)
LYMPHOCYTES NFR BLD: 12.4 % (ref 18–48)
MCH RBC QN AUTO: 32.4 PG (ref 27–31)
MCHC RBC AUTO-ENTMCNC: 33 G/DL (ref 32–36)
MCV RBC AUTO: 98 FL (ref 82–98)
MONOCYTES # BLD AUTO: 0.9 K/UL (ref 0.3–1)
MONOCYTES NFR BLD: 9.4 % (ref 4–15)
NEUTROPHILS # BLD AUTO: 6.3 K/UL (ref 1.8–7.7)
NEUTROPHILS NFR BLD: 68.6 % (ref 38–73)
NRBC BLD-RTO: 0 /100 WBC
PLATELET # BLD AUTO: 226 K/UL (ref 150–450)
PMV BLD AUTO: 8.4 FL (ref 9.2–12.9)
POTASSIUM SERPL-SCNC: 3.9 MMOL/L (ref 3.5–5.1)
PROT SERPL-MCNC: 6.3 G/DL (ref 6–8.4)
RBC # BLD AUTO: 4.54 M/UL (ref 4.6–6.2)
SODIUM SERPL-SCNC: 140 MMOL/L (ref 136–145)
TROPONIN I SERPL DL<=0.01 NG/ML-MCNC: 0.25 NG/ML (ref 0–0.03)
WBC # BLD AUTO: 9.21 K/UL (ref 3.9–12.7)

## 2023-06-13 PROCEDURE — 71046 X-RAY EXAM CHEST 2 VIEWS: CPT | Mod: 26,,, | Performed by: RADIOLOGY

## 2023-06-13 PROCEDURE — 84484 ASSAY OF TROPONIN QUANT: CPT | Performed by: STUDENT IN AN ORGANIZED HEALTH CARE EDUCATION/TRAINING PROGRAM

## 2023-06-13 PROCEDURE — 83880 ASSAY OF NATRIURETIC PEPTIDE: CPT | Performed by: STUDENT IN AN ORGANIZED HEALTH CARE EDUCATION/TRAINING PROGRAM

## 2023-06-13 PROCEDURE — 71046 X-RAY EXAM CHEST 2 VIEWS: CPT | Mod: TC,FY,PO

## 2023-06-13 PROCEDURE — 71046 XR CHEST PA AND LATERAL: ICD-10-PCS | Mod: 26,,, | Performed by: RADIOLOGY

## 2023-06-13 PROCEDURE — 25000003 PHARM REV CODE 250: Performed by: STUDENT IN AN ORGANIZED HEALTH CARE EDUCATION/TRAINING PROGRAM

## 2023-06-13 PROCEDURE — 94761 N-INVAS EAR/PLS OXIMETRY MLT: CPT

## 2023-06-13 PROCEDURE — 93005 ELECTROCARDIOGRAM TRACING: CPT

## 2023-06-13 PROCEDURE — 94640 AIRWAY INHALATION TREATMENT: CPT

## 2023-06-13 PROCEDURE — 25000242 PHARM REV CODE 250 ALT 637 W/ HCPCS: Performed by: STUDENT IN AN ORGANIZED HEALTH CARE EDUCATION/TRAINING PROGRAM

## 2023-06-13 PROCEDURE — 93010 ELECTROCARDIOGRAM REPORT: CPT | Mod: ,,, | Performed by: INTERNAL MEDICINE

## 2023-06-13 PROCEDURE — 85025 COMPLETE CBC W/AUTO DIFF WBC: CPT | Performed by: STUDENT IN AN ORGANIZED HEALTH CARE EDUCATION/TRAINING PROGRAM

## 2023-06-13 PROCEDURE — 93010 EKG 12-LEAD: ICD-10-PCS | Mod: ,,, | Performed by: INTERNAL MEDICINE

## 2023-06-13 PROCEDURE — 99285 EMERGENCY DEPT VISIT HI MDM: CPT | Mod: 25

## 2023-06-13 PROCEDURE — 84484 ASSAY OF TROPONIN QUANT: CPT | Mod: 91 | Performed by: STUDENT IN AN ORGANIZED HEALTH CARE EDUCATION/TRAINING PROGRAM

## 2023-06-13 PROCEDURE — 80053 COMPREHEN METABOLIC PANEL: CPT | Performed by: STUDENT IN AN ORGANIZED HEALTH CARE EDUCATION/TRAINING PROGRAM

## 2023-06-13 PROCEDURE — 36415 COLL VENOUS BLD VENIPUNCTURE: CPT | Performed by: STUDENT IN AN ORGANIZED HEALTH CARE EDUCATION/TRAINING PROGRAM

## 2023-06-13 RX ORDER — ASPIRIN 325 MG
325 TABLET, DELAYED RELEASE (ENTERIC COATED) ORAL
Status: COMPLETED | OUTPATIENT
Start: 2023-06-13 | End: 2023-06-13

## 2023-06-13 RX ORDER — METHYLPREDNISOLONE SOD SUCC 125 MG
125 VIAL (EA) INJECTION
Status: DISPENSED | OUTPATIENT
Start: 2023-06-13 | End: 2023-06-14

## 2023-06-13 RX ORDER — NITROGLYCERIN 0.4 MG/1
0.4 TABLET SUBLINGUAL EVERY 5 MIN PRN
Status: DISCONTINUED | OUTPATIENT
Start: 2023-06-14 | End: 2023-06-17 | Stop reason: HOSPADM

## 2023-06-13 RX ORDER — IPRATROPIUM BROMIDE AND ALBUTEROL SULFATE 2.5; .5 MG/3ML; MG/3ML
3 SOLUTION RESPIRATORY (INHALATION)
Status: COMPLETED | OUTPATIENT
Start: 2023-06-13 | End: 2023-06-13

## 2023-06-13 RX ADMIN — NITROGLYCERIN 0.4 MG: 0.4 TABLET, ORALLY DISINTEGRATING SUBLINGUAL at 11:06

## 2023-06-13 RX ADMIN — IPRATROPIUM BROMIDE AND ALBUTEROL SULFATE 3 ML: .5; 3 SOLUTION RESPIRATORY (INHALATION) at 11:06

## 2023-06-13 RX ADMIN — ASPIRIN 325 MG: 325 TABLET, COATED ORAL at 11:06

## 2023-06-13 NOTE — PROGRESS NOTES
Last Visit:Monday, January 28, 2019   Next Visit:Thursday, March 28, 2019 Notify chest x-ray from the 13th of June did not show any evidence for pneumonia.  Suggest follow-up soon

## 2023-06-14 ENCOUNTER — PATIENT MESSAGE (OUTPATIENT)
Dept: PULMONOLOGY | Facility: CLINIC | Age: 68
End: 2023-06-14
Payer: MEDICARE

## 2023-06-14 PROBLEM — I21.4 NSTEMI (NON-ST ELEVATED MYOCARDIAL INFARCTION): Status: ACTIVE | Noted: 2023-06-14

## 2023-06-14 LAB
ALBUMIN SERPL BCP-MCNC: 3.1 G/DL (ref 3.5–5.2)
ALP SERPL-CCNC: 68 U/L (ref 55–135)
ALT SERPL W/O P-5'-P-CCNC: 24 U/L (ref 10–44)
ANION GAP SERPL CALC-SCNC: 9 MMOL/L (ref 8–16)
AORTIC ROOT ANNULUS: 3.25 CM
AORTIC VALVE CUSP SEPERATION: 1.85 CM
AST SERPL-CCNC: 23 U/L (ref 10–40)
AV INDEX (PROSTH): 0.77
AV MEAN GRADIENT: 3 MMHG
AV PEAK GRADIENT: 6 MMHG
AV VALVE AREA: 2.31 CM2
AV VELOCITY RATIO: 0.83
BASOPHILS # BLD AUTO: 0.05 K/UL (ref 0–0.2)
BASOPHILS NFR BLD: 0.6 % (ref 0–1.9)
BILIRUB SERPL-MCNC: 0.3 MG/DL (ref 0.1–1)
BSA FOR ECHO PROCEDURE: 1.81 M2
BUN SERPL-MCNC: 15 MG/DL (ref 8–23)
CALCIUM SERPL-MCNC: 8.7 MG/DL (ref 8.7–10.5)
CHLORIDE SERPL-SCNC: 107 MMOL/L (ref 95–110)
CO2 SERPL-SCNC: 25 MMOL/L (ref 23–29)
CREAT SERPL-MCNC: 0.7 MG/DL (ref 0.5–1.4)
CV ECHO LV RWT: 0.3 CM
DIFFERENTIAL METHOD: ABNORMAL
DOP CALC AO PEAK VEL: 1.2 M/S
DOP CALC AO VTI: 23.3 CM
DOP CALC LVOT AREA: 3 CM2
DOP CALC LVOT DIAMETER: 1.95 CM
DOP CALC LVOT PEAK VEL: 0.99 M/S
DOP CALC LVOT STROKE VOLUME: 53.73 CM3
DOP CALC MV VTI: 12.1 CM
DOP CALCLVOT PEAK VEL VTI: 18 CM
E WAVE DECELERATION TIME: 217.51 MSEC
E/A RATIO: 0.89
E/E' RATIO: 10.36 M/S
ECHO LV POSTERIOR WALL: 0.8 CM (ref 0.6–1.1)
EJECTION FRACTION: 50 %
EOSINOPHIL # BLD AUTO: 0.9 K/UL (ref 0–0.5)
EOSINOPHIL NFR BLD: 11.3 % (ref 0–8)
ERYTHROCYTE [DISTWIDTH] IN BLOOD BY AUTOMATED COUNT: 13.2 % (ref 11.5–14.5)
EST. GFR  (NO RACE VARIABLE): >60 ML/MIN/1.73 M^2
FRACTIONAL SHORTENING: 29 % (ref 28–44)
GLUCOSE SERPL-MCNC: 105 MG/DL (ref 70–110)
HCT VFR BLD AUTO: 44.7 % (ref 40–54)
HGB BLD-MCNC: 14.4 G/DL (ref 14–18)
IMM GRANULOCYTES # BLD AUTO: 0.03 K/UL (ref 0–0.04)
IMM GRANULOCYTES NFR BLD AUTO: 0.4 % (ref 0–0.5)
INTERVENTRICULAR SEPTUM: 0.8 CM (ref 0.6–1.1)
IVC DIAMETER: 1.5 CM
LA MAJOR: 4.53 CM
LA MINOR: 3.97 CM
LEFT ATRIUM SIZE: 2.93 CM
LEFT INTERNAL DIMENSION IN SYSTOLE: 3.78 CM (ref 2.1–4)
LEFT VENTRICLE DIASTOLIC VOLUME INDEX: 75.24 ML/M2
LEFT VENTRICLE DIASTOLIC VOLUME: 134.68 ML
LEFT VENTRICLE MASS INDEX: 84 G/M2
LEFT VENTRICLE SYSTOLIC VOLUME INDEX: 34.1 ML/M2
LEFT VENTRICLE SYSTOLIC VOLUME: 61 ML
LEFT VENTRICULAR INTERNAL DIMENSION IN DIASTOLE: 5.29 CM (ref 3.5–6)
LEFT VENTRICULAR MASS: 149.57 G
LV LATERAL E/E' RATIO: 9.5 M/S
LV SEPTAL E/E' RATIO: 11.4 M/S
LVOT MG: 1.93 MMHG
LVOT MV: 0.66 CM/S
LYMPHOCYTES # BLD AUTO: 1.5 K/UL (ref 1–4.8)
LYMPHOCYTES NFR BLD: 18.8 % (ref 18–48)
MAGNESIUM SERPL-MCNC: 1.8 MG/DL (ref 1.6–2.6)
MCH RBC QN AUTO: 31.8 PG (ref 27–31)
MCHC RBC AUTO-ENTMCNC: 32.2 G/DL (ref 32–36)
MCV RBC AUTO: 99 FL (ref 82–98)
MONOCYTES # BLD AUTO: 0.9 K/UL (ref 0.3–1)
MONOCYTES NFR BLD: 10.8 % (ref 4–15)
MV MEAN GRADIENT: 1 MMHG
MV PEAK A VEL: 0.64 M/S
MV PEAK E VEL: 0.57 M/S
MV PEAK GRADIENT: 2 MMHG
MV STENOSIS PRESSURE HALF TIME: 59.65 MS
MV VALVE AREA BY CONTINUITY EQUATION: 4.44 CM2
MV VALVE AREA P 1/2 METHOD: 3.69 CM2
NEUTROPHILS # BLD AUTO: 4.7 K/UL (ref 1.8–7.7)
NEUTROPHILS NFR BLD: 58.1 % (ref 38–73)
NRBC BLD-RTO: 0 /100 WBC
OHS LV EJECTION FRACTION SIMPSONS BIPLANE MOD: 5 %
PLATELET # BLD AUTO: 239 K/UL (ref 150–450)
PMV BLD AUTO: 8.3 FL (ref 9.2–12.9)
POTASSIUM SERPL-SCNC: 3.7 MMOL/L (ref 3.5–5.1)
PROT SERPL-MCNC: 6.1 G/DL (ref 6–8.4)
PV MV: 0.52 M/S
PV PEAK VELOCITY: 0.69 CM/S
RA MAJOR: 4.26 CM
RA PRESSURE: 3 MMHG
RBC # BLD AUTO: 4.53 M/UL (ref 4.6–6.2)
RV TISSUE DOPPLER FREE WALL SYSTOLIC VELOCITY 1 (APICAL 4 CHAMBER VIEW): 8.2 CM/S
SODIUM SERPL-SCNC: 141 MMOL/L (ref 136–145)
TDI LATERAL: 0.06 M/S
TDI SEPTAL: 0.05 M/S
TDI: 0.06 M/S
TROPONIN I SERPL DL<=0.01 NG/ML-MCNC: 0.36 NG/ML (ref 0–0.03)
TROPONIN I SERPL DL<=0.01 NG/ML-MCNC: 0.43 NG/ML (ref 0–0.03)
TROPONIN I SERPL DL<=0.01 NG/ML-MCNC: 0.61 NG/ML (ref 0–0.03)
TROPONIN I SERPL DL<=0.01 NG/ML-MCNC: 0.78 NG/ML (ref 0–0.03)
TROPONIN I SERPL DL<=0.01 NG/ML-MCNC: 0.83 NG/ML (ref 0–0.03)
TROPONIN I SERPL DL<=0.01 NG/ML-MCNC: 0.95 NG/ML (ref 0–0.03)
WBC # BLD AUTO: 8.08 K/UL (ref 3.9–12.7)

## 2023-06-14 PROCEDURE — 99223 PR INITIAL HOSPITAL CARE,LEVL III: ICD-10-PCS | Mod: 25,,, | Performed by: INTERNAL MEDICINE

## 2023-06-14 PROCEDURE — 25000242 PHARM REV CODE 250 ALT 637 W/ HCPCS: Performed by: NURSE PRACTITIONER

## 2023-06-14 PROCEDURE — 83735 ASSAY OF MAGNESIUM: CPT | Performed by: NURSE PRACTITIONER

## 2023-06-14 PROCEDURE — 80053 COMPREHEN METABOLIC PANEL: CPT | Performed by: NURSE PRACTITIONER

## 2023-06-14 PROCEDURE — 94640 AIRWAY INHALATION TREATMENT: CPT

## 2023-06-14 PROCEDURE — 99223 1ST HOSP IP/OBS HIGH 75: CPT | Mod: 25,,, | Performed by: INTERNAL MEDICINE

## 2023-06-14 PROCEDURE — 36415 COLL VENOUS BLD VENIPUNCTURE: CPT

## 2023-06-14 PROCEDURE — 25000003 PHARM REV CODE 250

## 2023-06-14 PROCEDURE — 25000242 PHARM REV CODE 250 ALT 637 W/ HCPCS: Performed by: STUDENT IN AN ORGANIZED HEALTH CARE EDUCATION/TRAINING PROGRAM

## 2023-06-14 PROCEDURE — 85025 COMPLETE CBC W/AUTO DIFF WBC: CPT | Performed by: NURSE PRACTITIONER

## 2023-06-14 PROCEDURE — 84484 ASSAY OF TROPONIN QUANT: CPT | Mod: 91

## 2023-06-14 PROCEDURE — 36415 COLL VENOUS BLD VENIPUNCTURE: CPT | Performed by: STUDENT IN AN ORGANIZED HEALTH CARE EDUCATION/TRAINING PROGRAM

## 2023-06-14 PROCEDURE — 12000002 HC ACUTE/MED SURGE SEMI-PRIVATE ROOM

## 2023-06-14 PROCEDURE — 84484 ASSAY OF TROPONIN QUANT: CPT | Mod: 91 | Performed by: NURSE PRACTITIONER

## 2023-06-14 PROCEDURE — 36415 COLL VENOUS BLD VENIPUNCTURE: CPT | Performed by: NURSE PRACTITIONER

## 2023-06-14 PROCEDURE — 94761 N-INVAS EAR/PLS OXIMETRY MLT: CPT

## 2023-06-14 PROCEDURE — 63600175 PHARM REV CODE 636 W HCPCS: Performed by: STUDENT IN AN ORGANIZED HEALTH CARE EDUCATION/TRAINING PROGRAM

## 2023-06-14 PROCEDURE — 27000221 HC OXYGEN, UP TO 24 HOURS

## 2023-06-14 PROCEDURE — 25000003 PHARM REV CODE 250: Performed by: NURSE PRACTITIONER

## 2023-06-14 PROCEDURE — 63600175 PHARM REV CODE 636 W HCPCS: Performed by: NURSE PRACTITIONER

## 2023-06-14 RX ORDER — IBUPROFEN 200 MG
16 TABLET ORAL
Status: DISCONTINUED | OUTPATIENT
Start: 2023-06-14 | End: 2023-06-17 | Stop reason: HOSPADM

## 2023-06-14 RX ORDER — LANOLIN ALCOHOL/MO/W.PET/CERES
800 CREAM (GRAM) TOPICAL
Status: DISCONTINUED | OUTPATIENT
Start: 2023-06-14 | End: 2023-06-17 | Stop reason: HOSPADM

## 2023-06-14 RX ORDER — MAG HYDROX/ALUMINUM HYD/SIMETH 200-200-20
30 SUSPENSION, ORAL (FINAL DOSE FORM) ORAL 4 TIMES DAILY PRN
Status: DISCONTINUED | OUTPATIENT
Start: 2023-06-14 | End: 2023-06-17 | Stop reason: HOSPADM

## 2023-06-14 RX ORDER — INSULIN ASPART 100 [IU]/ML
1-10 INJECTION, SOLUTION INTRAVENOUS; SUBCUTANEOUS
Status: DISCONTINUED | OUTPATIENT
Start: 2023-06-14 | End: 2023-06-14

## 2023-06-14 RX ORDER — SIMETHICONE 80 MG
1 TABLET,CHEWABLE ORAL 4 TIMES DAILY PRN
Status: DISCONTINUED | OUTPATIENT
Start: 2023-06-14 | End: 2023-06-17 | Stop reason: HOSPADM

## 2023-06-14 RX ORDER — GLUCAGON 1 MG
1 KIT INJECTION
Status: DISCONTINUED | OUTPATIENT
Start: 2023-06-14 | End: 2023-06-17 | Stop reason: HOSPADM

## 2023-06-14 RX ORDER — SODIUM CHLORIDE 0.9 % (FLUSH) 0.9 %
10 SYRINGE (ML) INJECTION EVERY 8 HOURS PRN
Status: DISCONTINUED | OUTPATIENT
Start: 2023-06-14 | End: 2023-06-17 | Stop reason: HOSPADM

## 2023-06-14 RX ORDER — FINASTERIDE 5 MG/1
5 TABLET, FILM COATED ORAL DAILY
Status: DISCONTINUED | OUTPATIENT
Start: 2023-06-14 | End: 2023-06-17 | Stop reason: HOSPADM

## 2023-06-14 RX ORDER — ESCITALOPRAM OXALATE 10 MG/1
10 TABLET ORAL DAILY
Status: DISCONTINUED | OUTPATIENT
Start: 2023-06-14 | End: 2023-06-17 | Stop reason: HOSPADM

## 2023-06-14 RX ORDER — NALOXONE HCL 0.4 MG/ML
0.02 VIAL (ML) INJECTION
Status: DISCONTINUED | OUTPATIENT
Start: 2023-06-14 | End: 2023-06-17 | Stop reason: HOSPADM

## 2023-06-14 RX ORDER — IBUPROFEN 200 MG
24 TABLET ORAL
Status: DISCONTINUED | OUTPATIENT
Start: 2023-06-14 | End: 2023-06-17 | Stop reason: HOSPADM

## 2023-06-14 RX ORDER — SODIUM,POTASSIUM PHOSPHATES 280-250MG
2 POWDER IN PACKET (EA) ORAL
Status: DISCONTINUED | OUTPATIENT
Start: 2023-06-14 | End: 2023-06-17 | Stop reason: HOSPADM

## 2023-06-14 RX ORDER — PANTOPRAZOLE SODIUM 40 MG/1
40 TABLET, DELAYED RELEASE ORAL DAILY
Status: DISCONTINUED | OUTPATIENT
Start: 2023-06-14 | End: 2023-06-17 | Stop reason: HOSPADM

## 2023-06-14 RX ORDER — TALC
9 POWDER (GRAM) TOPICAL NIGHTLY PRN
Status: DISCONTINUED | OUTPATIENT
Start: 2023-06-14 | End: 2023-06-17 | Stop reason: HOSPADM

## 2023-06-14 RX ORDER — FAMOTIDINE 10 MG/ML
20 INJECTION INTRAVENOUS DAILY
Status: DISCONTINUED | OUTPATIENT
Start: 2023-06-14 | End: 2023-06-14

## 2023-06-14 RX ORDER — IPRATROPIUM BROMIDE AND ALBUTEROL SULFATE 2.5; .5 MG/3ML; MG/3ML
3 SOLUTION RESPIRATORY (INHALATION) EVERY 4 HOURS PRN
Status: DISCONTINUED | OUTPATIENT
Start: 2023-06-14 | End: 2023-06-17 | Stop reason: HOSPADM

## 2023-06-14 RX ORDER — EZETIMIBE 10 MG/1
10 TABLET ORAL DAILY
Status: DISCONTINUED | OUTPATIENT
Start: 2023-06-14 | End: 2023-06-17 | Stop reason: HOSPADM

## 2023-06-14 RX ORDER — ENOXAPARIN SODIUM 100 MG/ML
70 INJECTION SUBCUTANEOUS
Status: COMPLETED | OUTPATIENT
Start: 2023-06-14 | End: 2023-06-14

## 2023-06-14 RX ORDER — ASPIRIN 81 MG/1
81 TABLET ORAL DAILY
Status: DISCONTINUED | OUTPATIENT
Start: 2023-06-14 | End: 2023-06-17 | Stop reason: HOSPADM

## 2023-06-14 RX ORDER — ONDANSETRON 2 MG/ML
4 INJECTION INTRAMUSCULAR; INTRAVENOUS EVERY 8 HOURS PRN
Status: DISCONTINUED | OUTPATIENT
Start: 2023-06-14 | End: 2023-06-17 | Stop reason: HOSPADM

## 2023-06-14 RX ORDER — TAMSULOSIN HYDROCHLORIDE 0.4 MG/1
0.4 CAPSULE ORAL DAILY
Status: DISCONTINUED | OUTPATIENT
Start: 2023-06-14 | End: 2023-06-17 | Stop reason: HOSPADM

## 2023-06-14 RX ORDER — ENOXAPARIN SODIUM 100 MG/ML
1 INJECTION SUBCUTANEOUS
Status: DISCONTINUED | OUTPATIENT
Start: 2023-06-14 | End: 2023-06-14

## 2023-06-14 RX ORDER — ACETAMINOPHEN 325 MG/1
650 TABLET ORAL EVERY 6 HOURS PRN
Status: DISCONTINUED | OUTPATIENT
Start: 2023-06-14 | End: 2023-06-17 | Stop reason: HOSPADM

## 2023-06-14 RX ORDER — LEVOFLOXACIN 500 MG/1
500 TABLET, FILM COATED ORAL DAILY
Status: DISCONTINUED | OUTPATIENT
Start: 2023-06-15 | End: 2023-06-15

## 2023-06-14 RX ORDER — IPRATROPIUM BROMIDE AND ALBUTEROL SULFATE 2.5; .5 MG/3ML; MG/3ML
3 SOLUTION RESPIRATORY (INHALATION) EVERY 4 HOURS
Status: DISCONTINUED | OUTPATIENT
Start: 2023-06-14 | End: 2023-06-17 | Stop reason: HOSPADM

## 2023-06-14 RX ORDER — PITAVASTATIN CALCIUM 4.18 MG/1
4 TABLET, FILM COATED ORAL DAILY
Status: DISCONTINUED | OUTPATIENT
Start: 2023-06-14 | End: 2023-06-17 | Stop reason: HOSPADM

## 2023-06-14 RX ORDER — ACETAMINOPHEN 325 MG/1
650 TABLET ORAL EVERY 4 HOURS PRN
Status: DISCONTINUED | OUTPATIENT
Start: 2023-06-14 | End: 2023-06-17 | Stop reason: HOSPADM

## 2023-06-14 RX ORDER — REGADENOSON 0.08 MG/ML
0.4 INJECTION, SOLUTION INTRAVENOUS ONCE
Status: COMPLETED | OUTPATIENT
Start: 2023-06-14 | End: 2023-06-15

## 2023-06-14 RX ADMIN — METHYLPREDNISOLONE SODIUM SUCCINATE 40 MG: 40 INJECTION, POWDER, FOR SOLUTION INTRAMUSCULAR; INTRAVENOUS at 11:06

## 2023-06-14 RX ADMIN — TAMSULOSIN HYDROCHLORIDE 0.4 MG: 0.4 CAPSULE ORAL at 01:06

## 2023-06-14 RX ADMIN — NITROGLYCERIN 0.5 INCH: 20 OINTMENT TOPICAL at 01:06

## 2023-06-14 RX ADMIN — IPRATROPIUM BROMIDE AND ALBUTEROL SULFATE 3 ML: .5; 3 SOLUTION RESPIRATORY (INHALATION) at 03:06

## 2023-06-14 RX ADMIN — FAMOTIDINE 20 MG: 10 INJECTION, SOLUTION INTRAVENOUS at 09:06

## 2023-06-14 RX ADMIN — IPRATROPIUM BROMIDE AND ALBUTEROL SULFATE 3 ML: .5; 3 SOLUTION RESPIRATORY (INHALATION) at 12:06

## 2023-06-14 RX ADMIN — PANTOPRAZOLE SODIUM 40 MG: 40 TABLET, DELAYED RELEASE ORAL at 01:06

## 2023-06-14 RX ADMIN — ENOXAPARIN SODIUM 70 MG: 80 INJECTION SUBCUTANEOUS at 01:06

## 2023-06-14 RX ADMIN — APIXABAN 5 MG: 2.5 TABLET, FILM COATED ORAL at 08:06

## 2023-06-14 RX ADMIN — FLUTICASONE FUROATE, UMECLIDINIUM BROMIDE AND VILANTEROL TRIFENATATE 1 PUFF: 200; 62.5; 25 POWDER RESPIRATORY (INHALATION) at 06:06

## 2023-06-14 RX ADMIN — IPRATROPIUM BROMIDE AND ALBUTEROL SULFATE 3 ML: .5; 3 SOLUTION RESPIRATORY (INHALATION) at 07:06

## 2023-06-14 RX ADMIN — IPRATROPIUM BROMIDE AND ALBUTEROL SULFATE 3 ML: .5; 3 SOLUTION RESPIRATORY (INHALATION) at 06:06

## 2023-06-14 RX ADMIN — ASPIRIN 81 MG: 81 TABLET, COATED ORAL at 09:06

## 2023-06-14 RX ADMIN — ESCITALOPRAM OXALATE 10 MG: 10 TABLET, FILM COATED ORAL at 01:06

## 2023-06-14 RX ADMIN — EZETIMIBE 10 MG: 10 TABLET ORAL at 01:06

## 2023-06-14 NOTE — ASSESSMENT & PLAN NOTE
Acute on chronic:  - continue home Levaquin  - scheduled breathing treatments  - s/p IV steroids in ED  - prn supplemental oxygen   - no wheezing on my evaluation

## 2023-06-14 NOTE — ED NOTES
Assumed patient care.   Pt resting on stretcher in NAD, respirations even and unlabored, 2 L nasal cannula in place. Stretcher locked and in lowest position, side rails x 2, call bell within reach. Cardiac monitor, pulse ox and BP cuff applied cycling Q 30 minute vitals. Pt offered restroom assistance, pt states no needs at this time, pt visitor at the bedside. Will continue to monitor and update pt on plan of care.

## 2023-06-14 NOTE — ED PROVIDER NOTES
Encounter Date: 6/13/2023    SCRIBE #1 NOTE: I, Ellyn Young, am scribing for, and in the presence of,  Axel Lindsey MD.     History     Chief Complaint   Patient presents with    Shortness of Breath     Pt comes in via ems with c/o SOB starting today worsening after using OTC medications. Pt found with a room air of 90%. Pt given one duo neb.      Time seen by provider: 10:20 PM on 06/13/2023    Martell Corcoran is a 67 y.o. male who presents to the ED via EMS with an onset of worsening shortness of breath that began on 5/17/2023 s/p chemical exposure. Patient reports cough, chills, and focal mid-sternal chest pain described as heaviness began at 8 PM today. Patient states chest pain worsens with deep breaths and certain movements. He reports using a Nebulizer at home today, and he notes EMS gave a Nebulizer en route to the ED. He denies use of home O2. Patient states he had an ablation scheduled for tomorrow for A-Fib Tx, but he rescheduled the procedure 2 ° to Sx. He endorses proper use of Eliquis. The patient denies fever, leg swelling, N/V, diaphoresis, or any other symptoms at this time. PMHx of asthma, COPD, emphysema, A-Fib, and HTN. PSHx of left heart catheterization.       The history is provided by the patient and the spouse.   Review of patient's allergies indicates:   Allergen Reactions    Msg [glutamic acid] Nausea Only, Palpitations, Shortness Of Breath and Swelling    Oyster extract Swelling     PT swells in his throat after eating oysters on occasion     Past Medical History:   Diagnosis Date    Benign enlargement of prostate     Had a biopsy in around 2015    Elevated PSA     GERD (gastroesophageal reflux disease)     Hypertension     Started with meds in 2012.    Kidney stone     Urinary tract infection      Past Surgical History:   Procedure Laterality Date    FISTULA REPAIR      LEFT HEART CATHETERIZATION N/A 10/23/2018    Procedure: Left heart cath;  Surgeon: Niharika Squires MD;   Location: [facility];  Service: Cardiology;  Laterality: N/A;     Family History   Problem Relation Age of Onset    Heart failure Mother     Cancer Father     Heart disease Brother     Heart attack Brother     Cancer Brother     Heart disease Brother     Drug abuse Brother      Social History     Tobacco Use    Smoking status: Former     Packs/day: 1.00     Years: 55.00     Pack years: 55.00     Types: Cigarettes     Quit date: 2023     Years since quittin.4    Smokeless tobacco: Never   Substance Use Topics    Alcohol use: Yes     Comment: Previous 12 beer a day for 20 years. Now occ.    Drug use: No     Review of Systems   Constitutional:  Positive for chills. Negative for diaphoresis and fever.   HENT:  Negative for facial swelling and sore throat.    Eyes:  Negative for visual disturbance.   Respiratory:  Positive for cough and shortness of breath.    Cardiovascular:  Positive for chest pain. Negative for palpitations and leg swelling.   Gastrointestinal:  Negative for abdominal pain, nausea and vomiting.   Genitourinary:  Negative for dysuria and hematuria.   Musculoskeletal:  Negative for back pain.   Skin:  Negative for rash.   Neurological:  Negative for weakness and headaches.   Hematological:  Does not bruise/bleed easily.   Psychiatric/Behavioral: Negative.       Physical Exam     Initial Vitals [23]   BP Pulse Resp Temp SpO2   132/88 99 20 98.8 °F (37.1 °C) 99 %      MAP       --         Physical Exam    Nursing note and vitals reviewed.  Constitutional: He appears well-developed and well-nourished. He is not diaphoretic. No distress.   Patient is able to speak full sentences on exam.    HENT:   Head: Normocephalic and atraumatic.   Right Ear: External ear normal.   Left Ear: External ear normal.   Nose: Nose normal.   Eyes: Conjunctivae are normal. No scleral icterus.   Neck: Neck supple. No tracheal deviation present.   Cardiovascular:  Normal rate, regular rhythm and normal heart  sounds.     Exam reveals no gallop and no friction rub.       No murmur heard.  Pulmonary/Chest: No respiratory distress. He has no decreased breath sounds. He has wheezes (diffuse, inspiratory). He has no rales.   Abdominal: Abdomen is soft. Bowel sounds are normal. There is no abdominal tenderness.   Musculoskeletal:      Cervical back: Neck supple.      Right lower leg: No edema.      Left lower leg: No edema.     Neurological: He is alert and oriented to person, place, and time. GCS score is 15. GCS eye subscore is 4. GCS verbal subscore is 5. GCS motor subscore is 6.   Skin: Skin is warm and dry.   Psychiatric: He has a normal mood and affect. Thought content normal.       ED Course   Critical Care    Date/Time: 7/8/2023 9:36 AM  Performed by: Axel Lindsey MD  Authorized by: Valeria Braun MD   Direct patient critical care time: 35 minutes  Additional history critical care time: 5 minutes  Ordering / reviewing critical care time: 5 minutes  Documentation critical care time: 5 minutes  Consulting other physicians critical care time: 5 minutes  Total critical care time (exclusive of procedural time) : 55 minutes  Critical care time was exclusive of teaching time and separately billable procedures and treating other patients.  Critical care was necessary to treat or prevent imminent or life-threatening deterioration of the following conditions: cardiac failure.  Critical care was time spent personally by me on the following activities: review of old charts, re-evaluation of patient's condition, pulse oximetry, ordering and review of radiographic studies, ordering and review of laboratory studies, ordering and performing treatments and interventions, obtaining history from patient or surrogate, examination of patient, evaluation of patient's response to treatment, discussions with primary provider, discussions with consultants and development of treatment plan with patient or surrogate.    Dictation  #1  MRN:[id number]  [facility]:[id number]   Labs Reviewed   CBC W/ AUTO DIFFERENTIAL - Abnormal; Notable for the following components:       Result Value    RBC 4.54 (*)     MCH 32.4 (*)     MPV 8.4 (*)     Eos # 0.8 (*)     Lymph % 12.4 (*)     Eosinophil % 8.9 (*)     All other components within normal limits   COMPREHENSIVE METABOLIC PANEL - Abnormal; Notable for the following components:    Calcium 8.6 (*)     Albumin 3.2 (*)     All other components within normal limits   TROPONIN I - Abnormal; Notable for the following components:    Troponin I 0.254 (*)     All other components within normal limits   TROPONIN I - Abnormal; Notable for the following components:    Troponin I 0.830 (*)     All other components within normal limits   COMPREHENSIVE METABOLIC PANEL - Abnormal; Notable for the following components:    Albumin 3.1 (*)     All other components within normal limits   TROPONIN I - Abnormal; Notable for the following components:    Troponin I 0.953 (*)     All other components within normal limits    Narrative:     STAT, if not done in ED, then at 2nd and 6th hour from  initial draw.   CBC W/ AUTO DIFFERENTIAL - Abnormal; Notable for the following components:    RBC 4.53 (*)     MCV 99 (*)     MCH 31.8 (*)     MPV 8.3 (*)     Eos # 0.9 (*)     Eosinophil % 11.3 (*)     All other components within normal limits   B-TYPE NATRIURETIC PEPTIDE   MAGNESIUM   TROPONIN I          Imaging Results              X-Ray Chest PA And Lateral (Final result)  Result time 06/13/23 23:05:38      Final result by Ryan Gould DO (06/13/23 23:05:38)                   Impression:      No acute abnormality.      Electronically signed by: Ryan Gould  Date:    06/13/2023  Time:    23:05               Narrative:    EXAMINATION:  XR CHEST PA AND LATERAL    CLINICAL HISTORY:  Chest Pain;    TECHNIQUE:  PA and lateral views of the chest were performed.    COMPARISON:  06/13/2023 taken at 10:08.    FINDINGS:  The lungs are hyperexpanded  and clear.  There is flattening of the hemidiaphragms.  No focal opacities are seen. The pleural spaces are clear.    The cardiac silhouette is unremarkable.  There are calcifications of the aortic arch.    The visualized osseous structures are intact.                                       Medications   methylPREDNISolone sodium succinate injection 125 mg (125 mg Intravenous Not Given 6/13/23 2245)   nitroGLYCERIN SL tablet 0.4 mg (0.4 mg Sublingual Given 6/13/23 3840)   sodium chloride 0.9% flush 10 mL (has no administration in time range)   albuterol-ipratropium 2.5 mg-0.5 mg/3 mL nebulizer solution 3 mL (has no administration in time range)   melatonin tablet 9 mg (has no administration in time range)   ondansetron injection 4 mg (has no administration in time range)   simethicone chewable tablet 80 mg (has no administration in time range)   aluminum-magnesium hydroxide-simethicone 200-200-20 mg/5 mL suspension 30 mL (has no administration in time range)   acetaminophen tablet 650 mg (has no administration in time range)   naloxone 0.4 mg/mL injection 0.02 mg (has no administration in time range)   potassium bicarbonate disintegrating tablet 50 mEq (has no administration in time range)   potassium bicarbonate disintegrating tablet 35 mEq (has no administration in time range)   potassium bicarbonate disintegrating tablet 60 mEq (has no administration in time range)   magnesium oxide tablet 800 mg (has no administration in time range)   magnesium oxide tablet 800 mg (has no administration in time range)   potassium, sodium phosphates 280-160-250 mg packet 2 packet (has no administration in time range)   potassium, sodium phosphates 280-160-250 mg packet 2 packet (has no administration in time range)   potassium, sodium phosphates 280-160-250 mg packet 2 packet (has no administration in time range)   glucose chewable tablet 16 g (has no administration in time range)   glucose chewable tablet 24 g (has no  administration in time range)   glucagon (human recombinant) injection 1 mg (has no administration in time range)   acetaminophen tablet 650 mg (has no administration in time range)   nitroGLYCERIN 2% TD oint ointment 0.5 inch (0 inches Topical (Top) Hold 6/14/23 0150)   dextrose 10% bolus 125 mL 125 mL (has no administration in time range)   dextrose 10% bolus 250 mL 250 mL (has no administration in time range)   aspirin EC tablet 81 mg (has no administration in time range)   famotidine (PF) injection 20 mg (has no administration in time range)   enoxaparin injection 70 mg (has no administration in time range)   albuterol-ipratropium 2.5 mg-0.5 mg/3 mL nebulizer solution 3 mL (3 mLs Nebulization Given 6/14/23 0334)   fluticasone-umeclidin-vilanter 200-62.5-25 mcg inhaler 1 puff (has no administration in time range)   albuterol-ipratropium 2.5 mg-0.5 mg/3 mL nebulizer solution 3 mL (3 mLs Nebulization Given 6/13/23 2302)   aspirin EC tablet 325 mg (325 mg Oral Given 6/13/23 2349)   enoxaparin injection 70 mg (70 mg Subcutaneous Given 6/14/23 0121)     Medical Decision Making:   History:   Old Medical Records: I decided to obtain old medical records.  Independently Interpreted Test(s):   I have ordered and independently interpreted EKG Reading(s) - see prior notes  Clinical Tests:   Lab Tests: Ordered and Reviewed  Radiological Study: Ordered and Reviewed  Medical Tests: Ordered and Reviewed        Scribe Attestation:   Scribe #1: I performed the above scribed service and the documentation accurately describes the services I performed. I attest to the accuracy of the note.      ED Course as of 06/14/23 0535   Tue Jun 13, 2023   3266 EKG: Rate 89, regular rhythm, sinus rhythm, intervals within normal limits, left axis deviation, no ST elevations or depressions noted, interpreted by me, reviewed by me. [CC]   3890 I spoke with Dr. Collado, cardiology on-call, and discussed the case.  He recommends placing the patient  in the hospital, Lovenox for anticoagulation.  I spoke with the mid-level [facility] and plan for admission. [CC]   6442 Patient is 67-year-old male presenting to the emergency department for evaluation of shortness of breath and chest pain.  Noted chest pain started on a p.m..  Does have history of COPD.  Wheezing noted on exam.  I believe likely mild-to-moderate COPD exacerbation.  This was treated in the emergency department.  Troponin elevated at 0.25.  Three weeks ago patient had undetectable troponin.  Concern for NSTEMI.  EKG without noted ischemia.  Patient's vitals are stable.  He is in sinus rhythm.  Normal rate.  Oxygenation has improved.  Electrolytes within normal limits, doubt derangement.  Chest x-ray is unremarkable.  No rales on exam.  BNP is normal.  Doubt CHF exacerbation.  No leukocytosis, no evidence of pneumonia on chest x-ray, patient afebrile, doubt infectious etiology.  NSTEMI.  Plan for admission. [CC]   Wed Jun 14, 2023   0000 X-Ray Chest PA And Lateral     Impression:     No acute abnormality. [CC]      ED Course User Index  [CC] Axel Lindsey MD               I, Axel Lindsey MD    , personally performed the services described in this documentation. All medical record entries made by the scribe were at my direction and in my presence.  I have reviewed the chart and agree that the record reflects my personal performance and is accurate and complete.   Clinical Impression:   Final diagnoses:  [R07.9] Chest pain  [I21.4] NSTEMI (non-ST elevated myocardial infarction) (Primary)  [J44.1] COPD exacerbation        ED Disposition Condition    Admit Stable                Axel Lindsey MD  06/14/23 0535       Axel Lindsey MD  07/08/23 0936

## 2023-06-14 NOTE — HPI
Martell Corcoran is a 67-year-old male who presents emergency room for evaluation of shortness of breath and chest pain.  He reports chest pain onset approximately 8 hours prior to arrival.  He does report intermittent chest pain for the past several weeks after he was exposed to insecticides spray.  He describes chest pain as a heavy sensation in the left anterior chest region.  No reported radiation of the pain, nausea, vomiting, or diaphoresis.  He was given nitroglycerin in the emergency room with improvement in pain.  He denies fever or chills.  No known sick contacts or travel.  Previous medical history includes COPD, AFib, obstructive sleep apnea on CPAP, hypertension, GERD, and coronary artery disease.  Of note patient is scheduled for an AFib ablation at [facility] tomorrow morning.  ER workup:  CBC and CMP unremarkable.  Troponin elevated at 0.254.  EKG demonstrates sinus rhythm without ST or T-wave elevation.  Patient admitted to Hospital Medicine for treatment management.  ER physician spoke with  who is okay with patient staying here for care.

## 2023-06-14 NOTE — CONSULTS
[facility]  Adult Nutrition  Consult Note    SUMMARY     Recommendations  1) Continue cardiac diet   2) Nutrition education and handout provided   3) weigh weekly    Goals: 1) PO intakes > 50% of meals at f/u  Nutrition Goal Status: new  Communication of RD Recs:  (POC, sticky note)    Assessment and Plan    Does not qualify for malnutrition at this time, intentionally losing weight    Excessive saturated fat intakes  R/t Keto diet, nutrition related knowledge deficit  As evidenced by usually recall, NSTEMI , HLD in the past  Intervention: nutrition education and fat/Na modified diet  new     Malnutrition Assessment     Skin (Micronutrient):  (Shravan = 23)  Teeth (Micronutrient): none   Micronutrient Evaluation Summary: suspected deficiency  Micronutrient Evaluation Comments: check iron, B12, folate                             Reason for Assessment    Reason For Assessment: consult  Diagnosis:  (NSTEMI)  Relevant Medical History: COPD, AFIB, GLENNA, HTN, GERD, CAD, UTI  Interdisciplinary Rounds: did not attend    General Information Comments: 68 y/o male admitted with NSTEMI and SOB. PTA pt eats small frequent meals, sometimes larger dinner. Has been following a semi-keto diet to lose weight, claims to have lost 30 lb in a span of 4 months,  lb. Pt is cognizant of his sodium intakes and uses NoSalt and reads labels for sodium content, however he has not been educated on fat content of foods and heart disease. I educated pt on the importance of incorporating more plant based fats/proteins and cutting back on red/processed meats and high fat dairy. NFPE WDL 6/14/23, small frame.    Nutrition Discharge Planning: To be determined- Cardiac diet    Nutrition Risk Screen    Nutrition Risk Screen: no indicators present    Nutrition/Diet History    Patient Reported Diet/Restrictions/Preferences: low salt  Typical Food/Fluid Intake: Eats lots of meats, Greek yogurt and eggs + green veretables  "especially salads of all types. Avoids carbs.  Spiritual, Cultural Beliefs, Mandaen Practices, Values that Affect Care: no  Food Allergies: other (see comments) (oyster and MSG)  Factors Affecting Nutritional Intake: None identified at this time    Anthropometrics    Temp: 97.8 °F (36.6 °C)  Height Method: Stated  Height: 5' 6"  Height (inches): 66 in  Weight Method: Bed Scale  Weight: 70.3 kg (154 lb 15.7 oz)  Weight (lb): 154.98 lb  Ideal Body Weight (IBW), Male: 142 lb  % Ideal Body Weight, Male (lb): 109.14 %  BMI (Calculated): 25  BMI Grade: 25 - 29.9 - overweight  Weight Loss: intentional  Usual Body Weight (UBW), k.9 kg (~ 4 months ago per pt)  % Usual Body Weight: 77.5  % Weight Change From Usual Weight: -22.66 %       Lab/Procedures/Meds    Pertinent Labs Reviewed: reviewed  BMP  Lab Results   Component Value Date     2023    K 3.7 2023     2023    CO2 25 2023    BUN 15 2023    CREATININE 0.7 2023    CALCIUM 8.7 2023    ANIONGAP 9 2023    EGFRNORACEVR >60 2023     Pertinent Medications Reviewed: reviewed  Pertinent Medications Comments: methylprednisolone, zofran, Kbicarb, KNaPhos    Estimated/Assessed Needs    Weight Used For Calorie Calculations: 70.3 kg (154 lb 15.7 oz)  Energy Calorie Requirements (kcal): MSJ ( x 1.2) = 1705 kcal  Energy Need Method: Renée-St Pantoja  Protein Requirements: 0.8-1 g protein/kg ( 56-70g )  Weight Used For Protein Calculations: 70.3 kg (154 lb 15.7 oz)  Fluid Requirements (mL): 1700 ml or per MD  Estimated Fluid Requirement Method: RDA Method  CHO Requirement: N/A      Nutrition Prescription Ordered    Current Diet Order: Cardiac    Evaluation of Received Nutrient/Fluid Intake    Energy Calories Required: not meeting needs  Protein Required: not meeting needs  Fluid Required: not meeting needs  Tolerance: tolerating   No intake or output data in the 24 hours ending 23 1454    % Intake of Estimated " Energy Needs: 25-50% first meal this afternoon  % Meal Intake: 25-50%    Nutrition Risk    Level of Risk/Frequency of Follow-up:  (1 x weekly)       Monitor and Evaluation    Food and Nutrient Intake: energy intake, food and beverage intake  Food and Nutrient Adminstration: diet order  Knowledge/Beliefs/Attitudes: food and nutrition knowledge/skill  Anthropometric Measurements: weight  Biochemical Data, Medical Tests and Procedures: electrolyte and renal panel  Nutrition-Focused Physical Findings: overall appearance       Nutrition Follow-Up    RD Follow-up?: Yes

## 2023-06-14 NOTE — ED NOTES
Pt sleeping on stretcher in NAD, respirations even and unlabored. Stretcher locked and in lowest position, side rails x 2, call bell within reach. Cardiac monitor, pulse ox and BP cuff applied cycling Q 30 minute vitals. Will continue to monitor and update pt on plan of care.

## 2023-06-14 NOTE — ASSESSMENT & PLAN NOTE
Chronic problem   Chronic, controlled.  Latest blood pressure and vitals reviewed-     Temp:  [98.8 °F (37.1 °C)]   Pulse:  [78-99]   Resp:  [20-22]   BP: (105-132)/(59-88)   SpO2:  [92 %-99 %] .   Home meds for hypertension were reviewed and noted below.   Hypertension Medications             nitroGLYCERIN (NITROSTAT) 0.4 MG SL tablet Place under the tongue.          While in the hospital, will manage blood pressure as follows; Continue home antihypertensive regimen    Will utilize p.r.n. blood pressure medication only if patient's blood pressure greater than 180/110 and he develops symptoms such as worsening chest pain or shortness of breath.

## 2023-06-14 NOTE — PLAN OF CARE
Recommendations  1) Continue cardiac diet   2) Nutrition education and handout provided   3) weigh weekly    Goals: 1) PO intakes > 50% of meals at f/u  Nutrition Goal Status: new  Communication of RD Recs:  (POC, sticky note)

## 2023-06-14 NOTE — H&P
[facility] Medicine  History & Physical    Patient Name: Martell Corcoran  MRN: [id number]  Patient Class: IP- Inpatient  Admission Date: 6/13/2023  Attending Physician: Kayli Varela MD   Primary Care Provider: Zo Burrell MD         Patient information was obtained from patient, relative(s), past medical records and ER records.     Subjective:     Principal Problem:NSTEMI (non-ST elevated myocardial infarction)    Chief Complaint:   Chief Complaint   Patient presents with    Shortness of Breath     Pt comes in via ems with c/o SOB starting today worsening after using OTC medications. Pt found with a room air of 90%. Pt given one duo neb.         HPI: Martell Corcoran is a 67-year-old male who presents emergency room for evaluation of shortness of breath and chest pain.  He reports chest pain onset approximately 8 hours prior to arrival.  He does report intermittent chest pain for the past several weeks after he was exposed to insecticides spray.  He describes chest pain as a heavy sensation in the left anterior chest region.  No reported radiation of the pain, nausea, vomiting, or diaphoresis.  He was given nitroglycerin in the emergency room with improvement in pain.  He denies fever or chills.  No known sick contacts or travel.  Previous medical history includes COPD, AFib, obstructive sleep apnea on CPAP, hypertension, GERD, and coronary artery disease.  Of note patient is scheduled for an AFib ablation at [facility] tomorrow morning.  ER workup:  CBC and CMP unremarkable.  Troponin elevated at 0.254.  EKG demonstrates sinus rhythm without ST or T-wave elevation.  Patient admitted to Hospital Medicine for treatment management.  ER physician spoke with  who is okay with patient staying here for care.      Past Medical History:   Diagnosis Date    Benign enlargement of prostate     Had a biopsy in around 2015    Elevated PSA     GERD (gastroesophageal reflux disease)      Hypertension     Started with meds in 2012.    Kidney stone     Urinary tract infection        Past Surgical History:   Procedure Laterality Date    FISTULA REPAIR      LEFT HEART CATHETERIZATION N/A 10/23/2018    Procedure: Left heart cath;  Surgeon: Niharika Squires MD;  Location: [facility] CATH;  Service: Cardiology;  Laterality: N/A;       Review of patient's allergies indicates:   Allergen Reactions    Msg [glutamic acid] Nausea Only, Palpitations, Shortness Of Breath and Swelling    Oyster extract Swelling     PT swells in his throat after eating oysters on occasion       No current facility-administered medications on file prior to encounter.     Current Outpatient Medications on File Prior to Encounter   Medication Sig    albuterol (PROVENTIL) 2.5 mg /3 mL (0.083 %) nebulizer solution Take 3 mLs (2.5 mg total) by nebulization every 6 (six) hours as needed.    albuterol (PROVENTIL/VENTOLIN HFA) 90 mcg/actuation inhaler Inhale 1-2 puffs into the lungs every 4 (four) hours as needed for Shortness of Breath (coughing). Rescue    albuterol-ipratropium (DUO-NEB) 2.5 mg-0.5 mg/3 mL nebulizer solution USE 1 AMPULE IN NEBULIZER EVERY 4 HOURS AS NEEDED FOR WHEEZING    alfuzosin (UROXATRAL) 10 mg Tb24 TAKE 1 TABLET BY MOUTH ONCE DAILY AFTER BREAKFAST    aspirin (ECOTRIN) 81 MG EC tablet     azelastine (ASTELIN) 137 mcg (0.1 %) nasal spray 1 spray (137 mcg total) by Nasal route 2 (two) times daily.    azithromycin (ZITHROMAX) 500 MG tablet One daily for yellow mucous, repeat if needed    dicyclomine (BENTYL) 10 MG capsule Take 1 capsule (10 mg total) by mouth 4 (four) times daily before meals and nightly.    ELIQUIS 5 mg Tab Take 1 tablet (5 mg total) by mouth 2 (two) times daily.    EScitalopram oxalate (LEXAPRO) 20 MG tablet Take by mouth.    esomeprazole (NEXIUM) 40 MG capsule Take 40 mg by mouth once daily.    ezetimibe (ZETIA) 10 mg tablet Take 1 tablet (10 mg total) by mouth once daily.     finasteride (PROSCAR) 5 mg tablet Take 5 mg by mouth once daily.    fluticasone-umeclidin-vilanter (TRELEGY ELLIPTA) 200-62.5-25 mcg inhaler Inhale 1 puff into the lungs once daily.    guaiFENesin-codeine 100-10 mg/5 ml (TUSSI-ORGANIDIN NR)  mg/5 mL syrup Take 5 mLs by mouth every 4 (four) hours as needed for Cough.    levoFLOXacin (LEVAQUIN) 500 MG tablet Take 1 tablet (500 mg total) by mouth once daily.    LIVALO 4 mg Tab Take 1 tablet by mouth once daily.    nitroGLYCERIN (NITROSTAT) 0.4 MG SL tablet Place under the tongue.    predniSONE (DELTASONE) 20 MG tablet 3 for 3 days then 2 for 3 days then one for 3 days and repeat for breathing problems    tiotropium-olodateroL (STIOLTO RESPIMAT) 2.5-2.5 mcg/actuation Mist Inhale 2 puffs into the lungs once daily. Controller    valACYclovir (VALTREX) 1000 MG tablet Take 1 g by mouth 2 (two) times daily.    zolpidem (AMBIEN) 5 MG Tab Take 1 tablet (5 mg total) by mouth every evening.     Family History       Problem Relation (Age of Onset)    Cancer Father, Brother    Drug abuse Brother    Heart attack Brother    Heart disease Brother, Brother    Heart failure Mother          Tobacco Use    Smoking status: Former     Packs/day: 1.00     Years: 55.00     Pack years: 55.00     Types: Cigarettes     Quit date: 2023     Years since quittin.4    Smokeless tobacco: Never   Substance and Sexual Activity    Alcohol use: Yes     Comment: Previous 12 beer a day for 20 years. Now occ.    Drug use: No    Sexual activity: Not on file     Review of Systems   Constitutional:  Negative for activity change, chills, diaphoresis and fever.   HENT:  Negative for congestion, nosebleeds and tinnitus.    Eyes:  Negative for photophobia and visual disturbance.   Respiratory:  Positive for chest tightness and shortness of breath. Negative for cough and wheezing.    Cardiovascular:  Negative for chest pain, palpitations and leg swelling.   Gastrointestinal:  Negative  for abdominal distention, abdominal pain, constipation, diarrhea, nausea and vomiting.   Endocrine: Negative for cold intolerance and heat intolerance.   Genitourinary:  Negative for difficulty urinating, dysuria, frequency, hematuria and urgency.   Musculoskeletal:  Negative for arthralgias, back pain and myalgias.   Skin:  Negative for pallor, rash and wound.   Allergic/Immunologic: Negative for immunocompromised state.   Neurological:  Negative for dizziness, tremors, facial asymmetry, speech difficulty and weakness.   Hematological:  Negative for adenopathy. Does not bruise/bleed easily.   Psychiatric/Behavioral:  Negative for confusion and sleep disturbance. The patient is not nervous/anxious.    Objective:     Vital Signs (Most Recent):  Temp: 98.8 °F (37.1 °C) (06/13/23 2125)  Pulse: 78 (06/13/23 2345)  Resp: (!) 22 (06/13/23 2302)  BP: 121/72 (06/13/23 2302)  SpO2: 95 % (06/13/23 2345) Vital Signs (24h Range):  Temp:  [98.8 °F (37.1 °C)] 98.8 °F (37.1 °C)  Pulse:  [78-99] 78  Resp:  [20-22] 22  SpO2:  [92 %-99 %] 95 %  BP: (105-132)/(59-88) 121/72     Weight: 70.3 kg (155 lb)  Body mass index is 25.02 kg/m².     Physical Exam  Vitals and nursing note reviewed.   Constitutional:       General: He is not in acute distress.     Appearance: He is well-developed. He is not diaphoretic.   HENT:      Head: Normocephalic.      Mouth/Throat:      Mouth: Mucous membranes are moist.      Pharynx: Oropharynx is clear.   Eyes:      General: No scleral icterus.     Conjunctiva/sclera: Conjunctivae normal.      Pupils: Pupils are equal, round, and reactive to light.   Neck:      Vascular: No JVD.   Cardiovascular:      Rate and Rhythm: Normal rate and regular rhythm.      Heart sounds: Normal heart sounds. No murmur heard.    No friction rub. No gallop.   Pulmonary:      Effort: Pulmonary effort is normal. No respiratory distress.      Breath sounds: Wheezing present. No rales.   Abdominal:      General: Bowel sounds are  normal. There is no distension.      Palpations: Abdomen is soft.      Tenderness: There is no abdominal tenderness. There is no guarding or rebound.   Musculoskeletal:         General: No tenderness. Normal range of motion.      Cervical back: Normal range of motion and neck supple.   Lymphadenopathy:      Cervical: No cervical adenopathy.   Skin:     General: Skin is warm and dry.      Capillary Refill: Capillary refill takes less than 2 seconds.      Coloration: Skin is not pale.      Findings: No erythema or rash.   Neurological:      Mental Status: He is alert and oriented to person, place, and time.      Cranial Nerves: No cranial nerve deficit.      Sensory: No sensory deficit.      Coordination: Coordination normal.      Deep Tendon Reflexes: Reflexes normal.   Psychiatric:         Behavior: Behavior normal.         Thought Content: Thought content normal.         Judgment: Judgment normal.            CRANIAL NERVES     CN III, IV, VI   Pupils are equal, round, and reactive to light.     Significant Labs: All pertinent labs within the past 24 hours have been reviewed.  CBC:   Recent Labs   Lab 06/13/23  2250   WBC 9.21   HGB 14.7   HCT 44.6        CMP:   Recent Labs   Lab 06/13/23  2250      K 3.9      CO2 24      BUN 21   CREATININE 0.8   CALCIUM 8.6*   PROT 6.3   ALBUMIN 3.2*   BILITOT 0.4   ALKPHOS 71   AST 29   ALT 25   ANIONGAP 12     Cardiac Markers:   Recent Labs   Lab 06/13/23  2250   BNP 16       Significant Imaging: I have reviewed all pertinent imaging results/findings within the past 24 hours.    Assessment/Plan:     * NSTEMI (non-ST elevated myocardial infarction)  Acute problem   NPO  Lovenox 1 mg/kg/subQ q.12   Trend troponin   Use cardiac monitor   Appreciate recommendations from Cardiology        Atrial fibrillation  Patient with Paroxysmal (<7 days) atrial fibrillation which is controlled currently with Beta Blocker. Patient is currently in sinus rhythm.[id number]  Score: 2. indicated. Anticoagulation done with Lovenox.        GERD (gastroesophageal reflux disease)  Chronic  Pepcid      HTN (hypertension)  Chronic problem   Chronic, controlled.  Latest blood pressure and vitals reviewed-     Temp:  [98.8 °F (37.1 °C)]   Pulse:  [78-99]   Resp:  [20-22]   BP: (105-132)/(59-88)   SpO2:  [92 %-99 %] .   Home meds for hypertension were reviewed and noted below.   Hypertension Medications             nitroGLYCERIN (NITROSTAT) 0.4 MG SL tablet Place under the tongue.          While in the hospital, will manage blood pressure as follows; Continue home antihypertensive regimen    Will utilize p.r.n. blood pressure medication only if patient's blood pressure greater than 180/110 and he develops symptoms such as worsening chest pain or shortness of breath.        VTE Risk Mitigation (From admission, onward)         Ordered     enoxaparin injection 70 mg  ED 1 Time         06/14/23 0002                           Atilio Michelle NP  Department of Hospital Medicine  [facility] - Emergency Dept

## 2023-06-14 NOTE — SUBJECTIVE & OBJECTIVE
Interval History:  Patient seen and examined. Overnight notes reviewed. Patient reports great improvement in chest pain compared to last night. He reports fatigue, intermittent wheezing, and cough intermittently productive of sputum.  He denies palpitations, abdominal pain, nausea or vomiting. Discussed case with cardiologist with plan for stress test tomorrow. Cardiology following.     Review of Systems   Constitutional:  Negative for fever.   Respiratory:  Positive for cough, shortness of breath (QUACH) and wheezing (intermittent).    Cardiovascular:  Positive for chest pain. Negative for palpitations.   Gastrointestinal:  Negative for abdominal pain, nausea and vomiting.   Objective:     Vital Signs (Most Recent):  Temp: 98.8 °F (37.1 °C) (06/13/23 2125)  Pulse: 77 (06/14/23 1226)  Resp: 18 (06/14/23 1226)  BP: 133/74 (06/14/23 1205)  SpO2: 95 % (06/14/23 1226) Vital Signs (24h Range):  Temp:  [98.8 °F (37.1 °C)] 98.8 °F (37.1 °C)  Pulse:  [69-99] 77  Resp:  [18-24] 18  SpO2:  [92 %-99 %] 95 %  BP: ()/(55-88) 133/74     Weight: 70.3 kg (155 lb)  Body mass index is 25.02 kg/m².  No intake or output data in the 24 hours ending 06/14/23 1411      Physical Exam  Vitals and nursing note reviewed.   Constitutional:       General: He is not in acute distress.     Appearance: Normal appearance. He is obese.   HENT:      Head: Normocephalic and atraumatic.      Mouth/Throat:      Mouth: Mucous membranes are moist.      Pharynx: Oropharynx is clear.   Eyes:      Extraocular Movements: Extraocular movements intact.      Pupils: Pupils are equal, round, and reactive to light.   Cardiovascular:      Rate and Rhythm: Normal rate and regular rhythm.      Pulses: Normal pulses.      Heart sounds: Normal heart sounds.   Pulmonary:      Effort: Pulmonary effort is normal.      Breath sounds: Decreased breath sounds present.   Abdominal:      General: Abdomen is flat. Bowel sounds are normal.      Palpations: Abdomen is soft.       Tenderness: There is no abdominal tenderness.   Musculoskeletal:         General: Normal range of motion.      Cervical back: Normal range of motion and neck supple.   Skin:     General: Skin is warm.      Capillary Refill: Capillary refill takes 2 to 3 seconds.   Neurological:      General: No focal deficit present.      Mental Status: He is alert and oriented to person, place, and time. Mental status is at baseline.   Psychiatric:         Mood and Affect: Mood normal.         Behavior: Behavior normal.           Significant Labs: All pertinent labs within the past 24 hours have been reviewed.  CBC:   Recent Labs   Lab 06/13/23 2250 06/14/23  0408   WBC 9.21 8.08   HGB 14.7 14.4   HCT 44.6 44.7    239     CMP:   Recent Labs   Lab 06/13/23  2250 06/14/23  0408    141   K 3.9 3.7    107   CO2 24 25    105   BUN 21 15   CREATININE 0.8 0.7   CALCIUM 8.6* 8.7   PROT 6.3 6.1   ALBUMIN 3.2* 3.1*   BILITOT 0.4 0.3   ALKPHOS 71 68   AST 29 23   ALT 25 24   ANIONGAP 12 9     Cardiac Markers:   Recent Labs   Lab 06/13/23  2250   BNP 16     Troponin:   Recent Labs   Lab 06/14/23  0408 06/14/23  0716 06/14/23  1052   TROPONINI 0.953* 0.781* 0.608*       Significant Imaging: I have reviewed all pertinent imaging results/findings within the past 24 hours.    CXR:  No acute abnormality.

## 2023-06-14 NOTE — CONSULTS
[facility] Emergency Dept  Department of Cardiology  Consult Note      PATIENT NAME: Martell Corcoran    MRN: [id number]  TODAY'S DATE: 06/14/2023  ADMIT DATE: 6/13/2023                          CONSULT REQUESTED BY: Kayli Varela MD    SUBJECTIVE     PRINCIPAL PROBLEM: NSTEMI (non-ST elevated myocardial infarction)      REASON FOR CONSULT:  Abnormal troponin      HPI:  Patient seen and evaluated in the emergency room patient's wife at the bedside giving supplemental history as well  Patient is a 67-year-old gentleman with history of COPD paroxysmal atrial fibrillation reportedly exposed to insecticide in early May.  He has been treated with steroids and inhaler therapy with some improvement he continued to have some persistent cough initiated some antibiotics and more recently on Monday his antibiotic was changed by his pulmonologist.  He would some chest discomfort in the left pectoral area became worse increasing shortness of breath was noted he presented to the emergency room for evaluation.  No radiation of pain to arm neck or jaw noted.    He has history of paroxysmal atrial fibrillation but he is intolerant to several drugs and he was scheduled for elective ablation therapy with Dr. Aguilar at the [facility] because of progressive pulmonary condition with shortness of breath he had postponed this.  Currently he remains in sinus rhythm.  No significant breakthrough arrhythmias identified  At the time of my evaluation patient denies having chest discomfort no arm neck or jaw pain noted.  Appears comfortable does admit to having some shortness of breath however          Patient presents with    Shortness of Breath       Pt comes in via ems with c/o SOB starting today worsening after using OTC medications. Pt found with a room air of 90%. Pt given one duo neb.          HPI: Martell Corcoran is a 67-year-old male who presents emergency room for evaluation of shortness of breath and chest pain.  He reports chest  pain onset approximately 8 hours prior to arrival.  He does report intermittent chest pain for the past several weeks after he was exposed to insecticides spray.  He describes chest pain as a heavy sensation in the left anterior chest region.  No reported radiation of the pain, nausea, vomiting, or diaphoresis.  He was given nitroglycerin in the emergency room with improvement in pain.  He denies fever or chills.  No known sick contacts or travel.  Previous medical history includes COPD, AFib, obstructive sleep apnea on CPAP, hypertension, GERD, and coronary artery disease.  Of note patient is scheduled for an AFib ablation at [facility] tomorrow morning.  ER workup:  CBC and CMP unremarkable.  Troponin elevated at 0.254.  EKG demonstrates sinus rhythm without ST or T-wave elevation.  Patient admitted to Hospital Medicine for treatment management.  ER physician spoke with  who is okay with patient staying here for care.      Review of patient's allergies indicates:   Allergen Reactions    Msg [glutamic acid] Nausea Only, Palpitations, Shortness Of Breath and Swelling    Oyster extract Swelling     PT swells in his throat after eating oysters on occasion       Past Medical History:   Diagnosis Date    Benign enlargement of prostate     Had a biopsy in around     Elevated PSA     GERD (gastroesophageal reflux disease)     Hypertension     Started with meds in .    Kidney stone     Urinary tract infection      Past Surgical History:   Procedure Laterality Date    FISTULA REPAIR      LEFT HEART CATHETERIZATION N/A 10/23/2018    Procedure: Left heart cath;  Surgeon: Niharika Squires MD;  Location: [facility] CATH;  Service: Cardiology;  Laterality: N/A;     Social History     Tobacco Use    Smoking status: Former     Packs/day: 1.00     Years: 55.00     Pack years: 55.00     Types: Cigarettes     Quit date: 2023     Years since quittin.4    Smokeless tobacco: Never   Substance Use Topics    Alcohol  use: Yes     Comment: Previous 12 beer a day for 20 years. Now occ.    Drug use: No        REVIEW OF SYSTEMS  CONSTITUTIONAL: Negative for chills, fatigue and fever.   EYES: No double vision, No blurred vision  NEURO: No headaches, No dizziness  RESPIRATORY:  Progressive shortness of breath cough and congestion associated with wheezing  CARDIOVASCULAR:  Positive for intermittent episodes of atypical chest pain. Negative for palpitations and leg swelling.   GI: Negative for abdominal pain, No melena, diarrhea, nausea and vomiting.   : Negative for dysuria and frequency, Negative for hematuria  SKIN: Negative for bruising, Negative for edema or discoloration noted.   ENDOCRINE: Negative for polyphagia, Negative for heat intolerance, Negative for cold intolerance  PSYCHIATRIC: Negative for depression, Negative for anxiety, Negative for memory loss  MUSCULOSKELETAL: Negative for neck pain, Negative for muscle weakness, Negative for back pain     OBJECTIVE     VITAL SIGNS (Most Recent)  Temp: 98.8 °F (37.1 °C) (06/13/23 2125)  Pulse: 74 (06/14/23 0900)  Resp: 20 (06/14/23 0900)  BP: 109/64 (06/14/23 0900)  SpO2: 95 % (06/14/23 0900)    VENTILATION STATUS  Resp: 20 (06/14/23 0900)  SpO2: 95 % (06/14/23 0900)           I & O (Last 24H):No intake or output data in the 24 hours ending 06/14/23 0946    WEIGHTS  Wt Readings from Last 1 Encounters:   06/13/23 2125 70.3 kg (155 lb)       PHYSICAL EXAM  GENERAL: well built, well nourished, well-developed in no apparent distress alert and oriented.   HEENT: Normocephalic. Pupils normal and conjunctivae normal.  Mucous membranes normal, no cyanosis or icterus, trachea central,no pallor or icterus is noted..   NECK: No JVD. No bruit..   THYROID: Thyroid not enlarged. No nodules present..   CARDIAC: Regular rate and rhythm. S1 is normal.S2 is normal.No gallops, clicks or murmurs noted at this time.  CHEST ANATOMY: normal.   LUNGS:  Diminished breath sounds, end expiratory wheezes,  coarse rhonchi  ABDOMEN: Soft no masses or organomegaly.  No abdomen pulsations or bruits.  Normal bowel sounds. No pulsations and no masses felt, No guarding or rebound.   URINARY: No serra catheter   EXTREMITIES: No cyanosis, clubbing or edema noted at this time., no calf tenderness bilaterally.   PERIPHERAL VASCULAR SYSTEM: Good palpable distal pulses.   CENTRAL NERVOUS SYSTEM: No focal motor or sensory deficits noted.   SKIN: Skin without lesions, moist, well perfused.   MUSCLE STRENGTH & TONE: No noteable weakness, atrophy or abnormal movement.     HOME MEDICATIONS:  No current facility-administered medications on file prior to encounter.     Current Outpatient Medications on File Prior to Encounter   Medication Sig Dispense Refill    albuterol (PROVENTIL) 2.5 mg /3 mL (0.083 %) nebulizer solution Take 3 mLs (2.5 mg total) by nebulization every 6 (six) hours as needed. 120 each 11    albuterol (PROVENTIL/VENTOLIN HFA) 90 mcg/actuation inhaler Inhale 1-2 puffs into the lungs every 4 (four) hours as needed for Shortness of Breath (coughing). Rescue 18 g 11    albuterol-ipratropium (DUO-NEB) 2.5 mg-0.5 mg/3 mL nebulizer solution USE 1 AMPULE IN NEBULIZER EVERY 4 HOURS AS NEEDED FOR WHEEZING 180 mL 0    alfuzosin (UROXATRAL) 10 mg Tb24 TAKE 1 TABLET BY MOUTH ONCE DAILY AFTER BREAKFAST      aspirin (ECOTRIN) 81 MG EC tablet       azelastine (ASTELIN) 137 mcg (0.1 %) nasal spray 1 spray (137 mcg total) by Nasal route 2 (two) times daily. 30 mL 2    azithromycin (ZITHROMAX) 500 MG tablet One daily for yellow mucous, repeat if needed 3 tablet 3    dicyclomine (BENTYL) 10 MG capsule Take 1 capsule (10 mg total) by mouth 4 (four) times daily before meals and nightly. 40 capsule 1    ELIQUIS 5 mg Tab Take 1 tablet (5 mg total) by mouth 2 (two) times daily. 180 tablet 3    EScitalopram oxalate (LEXAPRO) 20 MG tablet Take by mouth.      esomeprazole (NEXIUM) 40 MG capsule Take 40 mg by mouth once daily.      ezetimibe  (ZETIA) 10 mg tablet Take 1 tablet (10 mg total) by mouth once daily. 90 tablet 3    finasteride (PROSCAR) 5 mg tablet Take 5 mg by mouth once daily.      fluticasone-umeclidin-vilanter (TRELEGY ELLIPTA) 200-62.5-25 mcg inhaler Inhale 1 puff into the lungs once daily. 60 each 11    guaiFENesin-codeine 100-10 mg/5 ml (TUSSI-ORGANIDIN NR)  mg/5 mL syrup Take 5 mLs by mouth every 4 (four) hours as needed for Cough. 273 mL 0    levoFLOXacin (LEVAQUIN) 500 MG tablet Take 1 tablet (500 mg total) by mouth once daily. 10 tablet 0    LIVALO 4 mg Tab Take 1 tablet by mouth once daily.      nitroGLYCERIN (NITROSTAT) 0.4 MG SL tablet Place under the tongue.      predniSONE (DELTASONE) 20 MG tablet 3 for 3 days then 2 for 3 days then one for 3 days and repeat for breathing problems 36 tablet 1    tiotropium-olodateroL (STIOLTO RESPIMAT) 2.5-2.5 mcg/actuation Mist Inhale 2 puffs into the lungs once daily. Controller 12 g 3    valACYclovir (VALTREX) 1000 MG tablet Take 1 g by mouth 2 (two) times daily.      zolpidem (AMBIEN) 5 MG Tab Take 1 tablet (5 mg total) by mouth every evening. 20 tablet 2       SCHEDULED MEDS:   albuterol-ipratropium  3 mL Nebulization Q4H    aspirin  81 mg Oral Daily    enoxparin  1 mg/kg Subcutaneous Q12H    famotidine (PF)  20 mg Intravenous Daily    fluticasone-umeclidin-vilanter  1 puff Inhalation Daily    methylPREDNISolone sodium succinate injection  125 mg Intravenous ED 1 Time    nitroGLYCERIN 2% TD oint  0.5 inch Topical (Top) Q6H       CONTINUOUS INFUSIONS:    PRN MEDS:acetaminophen, acetaminophen, albuterol-ipratropium, aluminum-magnesium hydroxide-simethicone, dextrose 10%, dextrose 10%, glucagon (human recombinant), glucose, glucose, magnesium oxide, magnesium oxide, melatonin, naloxone, nitroGLYCERIN, ondansetron, potassium bicarbonate, potassium bicarbonate, potassium bicarbonate, potassium, sodium phosphates, potassium, sodium phosphates, potassium, sodium phosphates, simethicone,  sodium chloride 0.9%    LABS AND DIAGNOSTICS     CBC LAST 3 DAYS  Recent Labs   Lab 06/09/23  0906 06/13/23  2250 06/14/23  0408   WBC 10.7 9.21 8.08   RBC 4.95 4.54* 4.53*   HGB 16.6 14.7 14.4   HCT 49.2 44.6 44.7   MCV 99.4 98 99*   MCH 33.5* 32.4* 31.8*   MCHC 33.7 33.0 32.2   RDW 13.3 13.2 13.2    226 239   MPV 8.9 8.4* 8.3*   GRAN  --  68.6  6.3 58.1  4.7   LYMPH 6.6  706* 12.4*  1.1 18.8  1.5   MONO 5.7  610 9.4  0.9 10.8  0.9   BASO 21 0.05 0.05   NRBC  --  0 0       COAGULATION LAST 3 DAYS  Recent Labs   Lab 06/09/23  0906   INR 0.9   APTT 30       CHEMISTRY LAST 3 DAYS  Recent Labs   Lab 06/09/23  0906 06/13/23 2250 06/14/23  0408    140 141   K 4.5 3.9 3.7    104 107   CO2 26 24 25   ANIONGAP  --  12 9   BUN 17 21 15   CREATININE 0.77 0.8 0.7   * 101 105   CALCIUM 9.6 8.6* 8.7   MG  --   --  1.8   ALBUMIN  --  3.2* 3.1*   PROT  --  6.3 6.1   ALKPHOS  --  71 68   ALT  --  25 24   AST  --  29 23   BILITOT  --  0.4 0.3       CARDIAC PROFILE LAST 3 DAYS  Recent Labs   Lab 06/13/23 2250 06/14/23  0136 06/14/23  0408 06/14/23  0716   BNP 16  --   --   --    TROPONINI 0.254* 0.830* 0.953* 0.781*       ENDOCRINE LAST 3 DAYS  No results for input(s): TSH, PROCAL in the last 168 hours.    LAST ARTERIAL BLOOD GAS  ABG  No results for input(s): PH, PO2, PCO2, HCO3, BE in the last 168 hours.    LAST 7 DAYS MICROBIOLOGY   Microbiology Results (last 7 days)       ** No results found for the last 168 hours. **            MOST RECENT IMAGING  X-Ray Chest PA And Lateral  Narrative: EXAMINATION:  XR CHEST PA AND LATERAL    CLINICAL HISTORY:  Chest Pain;    TECHNIQUE:  PA and lateral views of the chest were performed.    COMPARISON:  06/13/2023 taken at 10:08.    FINDINGS:  The lungs are hyperexpanded and clear.  There is flattening of the hemidiaphragms.  No focal opacities are seen. The pleural spaces are clear.    The cardiac silhouette is unremarkable.  There are calcifications of the  aortic arch.    The visualized osseous structures are intact.  Impression: No acute abnormality.    Electronically signed by: Ryan Gould  Date:    06/13/2023  Time:    23:05  X-Ray Chest PA And Lateral  EXAMINATION:  XR CHEST PA AND LATERAL    CLINICAL HISTORY:  Chronic obstructive pulmonary disease with (acute) exacerbation    TECHNIQUE:  PA and lateral views of the chest were performed.    COMPARISON:  05/17/2023    FINDINGS:  No airspace disease.  Normal size heart.  Atherosclerosis of the aortic arch.  No pleural effusion or pneumothorax.  No acute osseous findings.    Electronically signed by: Jax Garza  Date:    06/13/2023  Time:    10:41      ECHOCARDIOGRAM RESULTS (last 5)  Results for orders placed during the hospital encounter of 11/01/22    Transesophageal echo (VALERIE) with possible cardioversion    Interpretation Summary  · There is a patent foramen ovale present with left to right shunting indicated by color flow Doppler.  · Normal appearing left atrial appendage. No thrombus is present in the appendage. Abnormal appendage velocities.  · Mild-to-moderate mitral regurgitation.  · Mild right atrial enlargement.  · Mildly Positive bubble study for right-to-left shunt. Positive color Doppler for left-to-right shunt for PFO  · The left ventricle is normal in size with normal systolic function.  · Atrial fibrillation observed.  · Normal right ventricular size with normal right ventricular systolic function.  · Normal left ventricular diastolic function.  · The estimated ejection fraction is 60%.  · A 200 J synchronized cardioversion was successfully performed with restoration of normal sinus rhythm.      Results for orders placed in visit on 07/22/22    Stress Echo Which stress agent will be used? Pharmacological; Color Flow Doppler? No    Interpretation Summary  · The left ventricle is normal in size with normal systolic function.  · The estimated ejection fraction is 64%.  · The stress echo portion  of this study is negative for myocardial ischemia.  · There were no arrhythmias during stress.  · The ECG portion of this study is negative for myocardial ischemia.  · Baseline EKG atrial fibrillation low voltage left axis deviation      Results for orders placed during the hospital encounter of 05/23/22    Echo    Interpretation Summary  · The left ventricle is normal in size with concentric remodeling and  · Mild right ventricular enlargement with normal right ventricular systolic function.  · Normal central venous pressure (3 mmHg).  · The estimated ejection fraction is 70%.  · A diastolic pattern consistent with atrial fibrillation observed.      CURRENT/PREVIOUS VISIT EKG  Results for orders placed or performed during the hospital encounter of 05/17/23   EKG 12-lead    Collection Time: 05/17/23  2:58 PM    Narrative    Test Reason : R06.02,    Vent. Rate : 064 BPM     Atrial Rate : 064 BPM     P-R Int : 162 ms          QRS Dur : 106 ms      QT Int : 428 ms       P-R-T Axes : 080 -88 070 degrees     QTc Int : 441 ms    Normal sinus rhythm  Left axis deviation  Incomplete right bundle branch block  Cannot rule out Anterior infarct (cited on or before 01-NOV-2022)  Abnormal ECG  When compared with ECG of 01-FEB-2023 08:51,  Questionable change in initial forces of Anterior leads  Confirmed by Esteban VELASQUEZ, Mikey DAVID ([id number]) on 5/18/2023 12:08:54 PM    Referred By: [email or web address]   SELF           Confirmed By:Mikey Orellana MD           ASSESSMENT/PLAN:     Active Hospital Problems    Diagnosis    *NSTEMI (non-ST elevated myocardial infarction)    Atrial fibrillation    HTN (hypertension)    GERD (gastroesophageal reflux disease)       ASSESSMENT & PLAN:   Progressive shortness of breath  Abnormal troponin now trending down probably secondary to his pulmonary status and hypoxemia  Paroxysmal atrial fibrillation now in sinus rhythm  Arterial hypertension  History of GERD  Recent chemical  exposure      RECOMMENDATIONS:  Patient presented with acute shortness of breath and atypical chest pains associated with some hypoxemia now clinically improved, troponins are trending down.  Recommend the following   1. Optimize his pulmonary status  2. High dose intravenous steroids for about 3 doses and then maintenance thereafter  3. Antibiotic and Inhaler bronchodilator therapy per pulmonologist  4. Continue his home medications including Eliquis  5. Continue on lipid-lowering agents including Zetia and Livalo  6. Obtain Lexiscan Myoview as a 2 day protocol, rest dose today and stress dose tomorrow  7. Maintain on PPI agents , Protonix 40 mg daily    Thank you for the consultation        Jacky Pérez MD  Date of Service: 06/14/2023  9:46 AM  Department of Cardiology

## 2023-06-14 NOTE — ASSESSMENT & PLAN NOTE
Acute problem   NPO  Lovenox 1 mg/kg/subQ q.12   Trend troponin   Use cardiac monitor   Appreciate recommendations from Cardiology

## 2023-06-14 NOTE — ED NOTES
Pt placed on portable telemetry box TTX [id number], monitor room notified and verified visibility of patient on monitor.

## 2023-06-14 NOTE — PROGRESS NOTES
[facility] Medicine  Progress Note    Patient Name: Martell Corcoran  MRN: [id number]  Patient Class: IP- Inpatient   Admission Date: 6/13/2023  Length of Stay: 0 days  Attending Physician: Kayli Varela MD  Primary Care Provider: Zo Burrell MD        Subjective:     Principal Problem:NSTEMI (non-ST elevated myocardial infarction)        HPI:  Martell Corcoran is a 67-year-old male who presents emergency room for evaluation of shortness of breath and chest pain.  He reports chest pain onset approximately 8 hours prior to arrival.  He does report intermittent chest pain for the past several weeks after he was exposed to insecticides spray.  He describes chest pain as a heavy sensation in the left anterior chest region.  No reported radiation of the pain, nausea, vomiting, or diaphoresis.  He was given nitroglycerin in the emergency room with improvement in pain.  He denies fever or chills.  No known sick contacts or travel.  Previous medical history includes COPD, AFib, obstructive sleep apnea on CPAP, hypertension, GERD, and coronary artery disease.  Of note patient is scheduled for an AFib ablation at [facility] tomorrow morning.  ER workup:  CBC and CMP unremarkable.  Troponin elevated at 0.254.  EKG demonstrates sinus rhythm without ST or T-wave elevation.  Patient admitted to Hospital Medicine for treatment management.  ER physician spoke with  who is okay with patient staying here for care.      Overview/Hospital Course:  No notes on file    Interval History:  Patient seen and examined. Overnight notes reviewed. Patient reports great improvement in chest pain compared to last night. He reports fatigue, intermittent wheezing, and cough intermittently productive of sputum.  He denies palpitations, abdominal pain, nausea or vomiting. Discussed case with cardiologist with plan for stress test tomorrow. Cardiology following.     Review of Systems   Constitutional:  Negative  for fever.   Respiratory:  Positive for cough, shortness of breath (QUACH) and wheezing (intermittent).    Cardiovascular:  Positive for chest pain. Negative for palpitations.   Gastrointestinal:  Negative for abdominal pain, nausea and vomiting.   Objective:     Vital Signs (Most Recent):  Temp: 98.8 °F (37.1 °C) (06/13/23 2125)  Pulse: 77 (06/14/23 1226)  Resp: 18 (06/14/23 1226)  BP: 133/74 (06/14/23 1205)  SpO2: 95 % (06/14/23 1226) Vital Signs (24h Range):  Temp:  [98.8 °F (37.1 °C)] 98.8 °F (37.1 °C)  Pulse:  [69-99] 77  Resp:  [18-24] 18  SpO2:  [92 %-99 %] 95 %  BP: ()/(55-88) 133/74     Weight: 70.3 kg (155 lb)  Body mass index is 25.02 kg/m².  No intake or output data in the 24 hours ending 06/14/23 1411      Physical Exam  Vitals and nursing note reviewed.   Constitutional:       General: He is not in acute distress.     Appearance: Normal appearance. He is obese.   HENT:      Head: Normocephalic and atraumatic.      Mouth/Throat:      Mouth: Mucous membranes are moist.      Pharynx: Oropharynx is clear.   Eyes:      Extraocular Movements: Extraocular movements intact.      Pupils: Pupils are equal, round, and reactive to light.   Cardiovascular:      Rate and Rhythm: Normal rate and regular rhythm.      Pulses: Normal pulses.      Heart sounds: Normal heart sounds.   Pulmonary:      Effort: Pulmonary effort is normal.      Breath sounds: Decreased breath sounds present.   Abdominal:      General: Abdomen is flat. Bowel sounds are normal.      Palpations: Abdomen is soft.      Tenderness: There is no abdominal tenderness.   Musculoskeletal:         General: Normal range of motion.      Cervical back: Normal range of motion and neck supple.   Skin:     General: Skin is warm.      Capillary Refill: Capillary refill takes 2 to 3 seconds.   Neurological:      General: No focal deficit present.      Mental Status: He is alert and oriented to person, place, and time. Mental status is at baseline.    Psychiatric:         Mood and Affect: Mood normal.         Behavior: Behavior normal.           Significant Labs: All pertinent labs within the past 24 hours have been reviewed.  CBC:   Recent Labs   Lab 06/13/23 2250 06/14/23  0408   WBC 9.21 8.08   HGB 14.7 14.4   HCT 44.6 44.7    239     CMP:   Recent Labs   Lab 06/13/23  2250 06/14/23  0408    141   K 3.9 3.7    107   CO2 24 25    105   BUN 21 15   CREATININE 0.8 0.7   CALCIUM 8.6* 8.7   PROT 6.3 6.1   ALBUMIN 3.2* 3.1*   BILITOT 0.4 0.3   ALKPHOS 71 68   AST 29 23   ALT 25 24   ANIONGAP 12 9     Cardiac Markers:   Recent Labs   Lab 06/13/23  2250   BNP 16     Troponin:   Recent Labs   Lab 06/14/23  0408 06/14/23  0716 06/14/23  1052   TROPONINI 0.953* 0.781* 0.608*       Significant Imaging: I have reviewed all pertinent imaging results/findings within the past 24 hours.    CXR:  No acute abnormality.         Assessment/Plan:      * NSTEMI (non-ST elevated myocardial infarction)  Acute:  - cardiology consulted: appreciate recommendations  - Trending troponin   - on tele  - NPO midnight  - plan for stress test in am per cardiology           Atrial fibrillation  Patient with Paroxysmal (<7 days) atrial fibrillation which is controlled currently with Beta Blocker. Patient is currently in sinus rhythm.[id number] Score: 2. indicated. Anticoagulation done with Eliquis.        GERD (gastroesophageal reflux disease)  Chronic  PPI      HTN (hypertension)  Chronic, controlled.  Latest blood pressure and vitals reviewed-     Temp:  [98.8 °F (37.1 °C)]   Pulse:  [69-99]   Resp:  [18-24]   BP: ()/(55-88)   SpO2:  [92 %-99 %] .   Home meds for hypertension were reviewed and noted below.   Hypertension Medications             nitroGLYCERIN (NITROSTAT) 0.4 MG SL tablet Place under the tongue.          While in the hospital, will manage blood pressure as follows; Continue home antihypertensive regimen    Will utilize p.r.n. blood pressure  medication only if patient's blood pressure greater than 180/110 and he develops symptoms such as worsening chest pain or shortness of breath.      QUACH (dyspnea on exertion)  Acute on chronic:  - continue home Levaquin  - scheduled breathing treatments  - s/p IV steroids in ED  - prn supplemental oxygen   - no wheezing on my evaluation         VTE Risk Mitigation (From admission, onward)         Ordered     apixaban tablet 5 mg  2 times daily         06/14/23 1242                Discharge Planning   DIAN:      Code Status: Full Code   Is the patient medically ready for discharge?:     Reason for patient still in hospital (select all that apply): Patient trending condition, Laboratory test, Treatment, Imaging, Consult recommendations and Pending disposition  Discharge Plan A: Home with family                  Rachael Goldberg PA-C  Department of Hospital Medicine   [facility] - Emergency Dept

## 2023-06-14 NOTE — ED NOTES
Pt SOB when ambulating to/from restroom; pt pale.  Nitro paste removed.  Oxygen applied temporarily.  Pt recovered well quickly.

## 2023-06-14 NOTE — RESPIRATORY THERAPY
Pt does not have home O2, since his admission in the ER any movement pt makes he becomes very sob and increased RR and effort.

## 2023-06-14 NOTE — ASSESSMENT & PLAN NOTE
Chronic, controlled.  Latest blood pressure and vitals reviewed-     Temp:  [98.8 °F (37.1 °C)]   Pulse:  [69-99]   Resp:  [18-24]   BP: ()/(55-88)   SpO2:  [92 %-99 %] .   Home meds for hypertension were reviewed and noted below.   Hypertension Medications             nitroGLYCERIN (NITROSTAT) 0.4 MG SL tablet Place under the tongue.          While in the hospital, will manage blood pressure as follows; Continue home antihypertensive regimen    Will utilize p.r.n. blood pressure medication only if patient's blood pressure greater than 180/110 and he develops symptoms such as worsening chest pain or shortness of breath.

## 2023-06-14 NOTE — PROVIDER PROGRESS NOTES - EMERGENCY DEPT.
Encounter Date: 6/13/2023    ED Physician Progress Notes        Physician Note:   Patient Boarding  in ED.  EKG brought to me for evaluation.  Rate 76, , , no STEMI-interpreted by myself

## 2023-06-14 NOTE — PLAN OF CARE
[facility] - Emergency Dept  Initial Discharge Assessment       Primary Care Provider: Zo Burrell MD    Admission Diagnosis: NSTEMI (non-ST elevated myocardial infarction) [I21.4]    Admission Date: 6/13/2023  Expected Discharge Date:     Transition of Care Barriers: None    Payor: MEDICARE / Plan: MEDICARE PART A & B / Product Type: Government /     Extended Emergency Contact Information  Primary Emergency Contact: Ashlyn López  Address: [address] [place] [address] [facility]  Home Phone: [phone]  Mobile Phone: [phone]  Relation: Spouse  Secondary Emergency Contact: Pao Roberson  Address: [facility] [place] [place] [address]           [place] [address] [place]  Work Phone: [phone]  Mobile Phone: [phone]  Relation: Daughter    Discharge Plan A: Home with family  Discharge Plan B: Home Health      [facility] [address] [facility] [address] [facility].  [address] [place] [address]  Phone: [phone] Fax: [phone]    [facility] - [place], [place] - [address] [facility]  [address] [address]  Phone: [phone] Fax: [phone]    [facility] ) - [place], [place] - [address]  [address]  [address]  [facility] [address]  Phone: [phone] Fax: [phone]      Completed DC assessment with patient and fiance at bedside. Verified information on facesheet as correct. Lives at listed address with his fianceAshlyn. Denies POA. NOK 1 child- Pao. Verified PCP as Dr. Burrell- last seen in March. Cardiologist is Dr. Aguilar and Dr. Lemon. Pharmacy is [facility]. Denies hh/hd/outpt services. On eliquis prior to admit. Independent at baseline. Drives himself to apts. david to provide transportation home upon DC. Verified insurance on file. Reports that he takes medications as  prescribed and can afford them. Has pharmacy coverage with Medicare Part D. Denies recent inpt stay in last 30 days. DC plan is home.     Initial Assessment (most recent)       Adult Discharge Assessment - 06/14/23 1352          Discharge Assessment    Assessment Type Discharge Planning Assessment     Confirmed/corrected address, phone number and insurance Yes     Confirmed Demographics Correct on Facesheet     Source of Information patient;family     Communicated DIAN with patient/caregiver Yes     Reason For Admission NSTEMI     People in Home significant other     Facility Arrived From: ER     Do you expect to return to your current living situation? Yes     Do you have help at home or someone to help you manage your care at home? Yes     Prior to hospitilization cognitive status: Alert/Oriented     Current cognitive status: Alert/Oriented     Equipment Currently Used at Home CPAP;nebulizer;blood pressure machine;bedside commode     Readmission within 30 days? No     Patient currently being followed by outpatient case management? No     Do you currently have service(s) that help you manage your care at home? No     Do you take prescription medications? Yes     Do you have prescription coverage? Yes     Coverage part D     Do you have any problems affording any of your prescribed medications? No     Is the patient taking medications as prescribed? yes     Who is going to help you get home at discharge? SO     How do you get to doctors appointments? car, drives self     Are you on dialysis? No     Do you take coumadin? No     Discharge Plan A Home with family     Discharge Plan B Home Health     DME Needed Upon Discharge  none     Discharge Plan discussed with: Spouse/sig other;Patient     Transition of Care Barriers None

## 2023-06-14 NOTE — ASSESSMENT & PLAN NOTE
Patient with Paroxysmal (<7 days) atrial fibrillation which is controlled currently with Beta Blocker. Patient is currently in sinus rhythm.[id number] Score: 2. indicated. Anticoagulation done with Lovenox.

## 2023-06-14 NOTE — ASSESSMENT & PLAN NOTE
Patient with Paroxysmal (<7 days) atrial fibrillation which is controlled currently with Beta Blocker. Patient is currently in sinus rhythm.[id number] Score: 2. indicated. Anticoagulation done with Eliquis.

## 2023-06-14 NOTE — SUBJECTIVE & OBJECTIVE
Past Medical History:   Diagnosis Date    Benign enlargement of prostate     Had a biopsy in around 2015    Elevated PSA     GERD (gastroesophageal reflux disease)     Hypertension     Started with meds in 2012.    Kidney stone     Urinary tract infection        Past Surgical History:   Procedure Laterality Date    FISTULA REPAIR      LEFT HEART CATHETERIZATION N/A 10/23/2018    Procedure: Left heart cath;  Surgeon: Niharika Squires MD;  Location: [facility] CATH;  Service: Cardiology;  Laterality: N/A;       Review of patient's allergies indicates:   Allergen Reactions    Msg [glutamic acid] Nausea Only, Palpitations, Shortness Of Breath and Swelling    Oyster extract Swelling     PT swells in his throat after eating oysters on occasion       No current facility-administered medications on file prior to encounter.     Current Outpatient Medications on File Prior to Encounter   Medication Sig    albuterol (PROVENTIL) 2.5 mg /3 mL (0.083 %) nebulizer solution Take 3 mLs (2.5 mg total) by nebulization every 6 (six) hours as needed.    albuterol (PROVENTIL/VENTOLIN HFA) 90 mcg/actuation inhaler Inhale 1-2 puffs into the lungs every 4 (four) hours as needed for Shortness of Breath (coughing). Rescue    albuterol-ipratropium (DUO-NEB) 2.5 mg-0.5 mg/3 mL nebulizer solution USE 1 AMPULE IN NEBULIZER EVERY 4 HOURS AS NEEDED FOR WHEEZING    alfuzosin (UROXATRAL) 10 mg Tb24 TAKE 1 TABLET BY MOUTH ONCE DAILY AFTER BREAKFAST    aspirin (ECOTRIN) 81 MG EC tablet     azelastine (ASTELIN) 137 mcg (0.1 %) nasal spray 1 spray (137 mcg total) by Nasal route 2 (two) times daily.    azithromycin (ZITHROMAX) 500 MG tablet One daily for yellow mucous, repeat if needed    dicyclomine (BENTYL) 10 MG capsule Take 1 capsule (10 mg total) by mouth 4 (four) times daily before meals and nightly.    ELIQUIS 5 mg Tab Take 1 tablet (5 mg total) by mouth 2 (two) times daily.    EScitalopram oxalate (LEXAPRO) 20 MG tablet Take by mouth.    esomeprazole  (NEXIUM) 40 MG capsule Take 40 mg by mouth once daily.    ezetimibe (ZETIA) 10 mg tablet Take 1 tablet (10 mg total) by mouth once daily.    finasteride (PROSCAR) 5 mg tablet Take 5 mg by mouth once daily.    fluticasone-umeclidin-vilanter (TRELEGY ELLIPTA) 200-62.5-25 mcg inhaler Inhale 1 puff into the lungs once daily.    guaiFENesin-codeine 100-10 mg/5 ml (TUSSI-ORGANIDIN NR)  mg/5 mL syrup Take 5 mLs by mouth every 4 (four) hours as needed for Cough.    levoFLOXacin (LEVAQUIN) 500 MG tablet Take 1 tablet (500 mg total) by mouth once daily.    LIVALO 4 mg Tab Take 1 tablet by mouth once daily.    nitroGLYCERIN (NITROSTAT) 0.4 MG SL tablet Place under the tongue.    predniSONE (DELTASONE) 20 MG tablet 3 for 3 days then 2 for 3 days then one for 3 days and repeat for breathing problems    tiotropium-olodateroL (STIOLTO RESPIMAT) 2.5-2.5 mcg/actuation Mist Inhale 2 puffs into the lungs once daily. Controller    valACYclovir (VALTREX) 1000 MG tablet Take 1 g by mouth 2 (two) times daily.    zolpidem (AMBIEN) 5 MG Tab Take 1 tablet (5 mg total) by mouth every evening.     Family History       Problem Relation (Age of Onset)    Cancer Father, Brother    Drug abuse Brother    Heart attack Brother    Heart disease Brother, Brother    Heart failure Mother          Tobacco Use    Smoking status: Former     Packs/day: 1.00     Years: 55.00     Pack years: 55.00     Types: Cigarettes     Quit date: 2023     Years since quittin.4    Smokeless tobacco: Never   Substance and Sexual Activity    Alcohol use: Yes     Comment: Previous 12 beer a day for 20 years. Now occ.    Drug use: No    Sexual activity: Not on file     Review of Systems   Constitutional:  Negative for activity change, chills, diaphoresis and fever.   HENT:  Negative for congestion, nosebleeds and tinnitus.    Eyes:  Negative for photophobia and visual disturbance.   Respiratory:  Positive for chest tightness and shortness of breath. Negative for  cough and wheezing.    Cardiovascular:  Negative for chest pain, palpitations and leg swelling.   Gastrointestinal:  Negative for abdominal distention, abdominal pain, constipation, diarrhea, nausea and vomiting.   Endocrine: Negative for cold intolerance and heat intolerance.   Genitourinary:  Negative for difficulty urinating, dysuria, frequency, hematuria and urgency.   Musculoskeletal:  Negative for arthralgias, back pain and myalgias.   Skin:  Negative for pallor, rash and wound.   Allergic/Immunologic: Negative for immunocompromised state.   Neurological:  Negative for dizziness, tremors, facial asymmetry, speech difficulty and weakness.   Hematological:  Negative for adenopathy. Does not bruise/bleed easily.   Psychiatric/Behavioral:  Negative for confusion and sleep disturbance. The patient is not nervous/anxious.    Objective:     Vital Signs (Most Recent):  Temp: 98.8 °F (37.1 °C) (06/13/23 2125)  Pulse: 78 (06/13/23 2345)  Resp: (!) 22 (06/13/23 2302)  BP: 121/72 (06/13/23 2302)  SpO2: 95 % (06/13/23 2345) Vital Signs (24h Range):  Temp:  [98.8 °F (37.1 °C)] 98.8 °F (37.1 °C)  Pulse:  [78-99] 78  Resp:  [20-22] 22  SpO2:  [92 %-99 %] 95 %  BP: (105-132)/(59-88) 121/72     Weight: 70.3 kg (155 lb)  Body mass index is 25.02 kg/m².     Physical Exam  Vitals and nursing note reviewed.   Constitutional:       General: He is not in acute distress.     Appearance: He is well-developed. He is not diaphoretic.   HENT:      Head: Normocephalic.      Mouth/Throat:      Mouth: Mucous membranes are moist.      Pharynx: Oropharynx is clear.   Eyes:      General: No scleral icterus.     Conjunctiva/sclera: Conjunctivae normal.      Pupils: Pupils are equal, round, and reactive to light.   Neck:      Vascular: No JVD.   Cardiovascular:      Rate and Rhythm: Normal rate and regular rhythm.      Heart sounds: Normal heart sounds. No murmur heard.    No friction rub. No gallop.   Pulmonary:      Effort: Pulmonary effort is  normal. No respiratory distress.      Breath sounds: Wheezing present. No rales.   Abdominal:      General: Bowel sounds are normal. There is no distension.      Palpations: Abdomen is soft.      Tenderness: There is no abdominal tenderness. There is no guarding or rebound.   Musculoskeletal:         General: No tenderness. Normal range of motion.      Cervical back: Normal range of motion and neck supple.   Lymphadenopathy:      Cervical: No cervical adenopathy.   Skin:     General: Skin is warm and dry.      Capillary Refill: Capillary refill takes less than 2 seconds.      Coloration: Skin is not pale.      Findings: No erythema or rash.   Neurological:      Mental Status: He is alert and oriented to person, place, and time.      Cranial Nerves: No cranial nerve deficit.      Sensory: No sensory deficit.      Coordination: Coordination normal.      Deep Tendon Reflexes: Reflexes normal.   Psychiatric:         Behavior: Behavior normal.         Thought Content: Thought content normal.         Judgment: Judgment normal.            CRANIAL NERVES     CN III, IV, VI   Pupils are equal, round, and reactive to light.     Significant Labs: All pertinent labs within the past 24 hours have been reviewed.  CBC:   Recent Labs   Lab 06/13/23  2250   WBC 9.21   HGB 14.7   HCT 44.6        CMP:   Recent Labs   Lab 06/13/23  2250      K 3.9      CO2 24      BUN 21   CREATININE 0.8   CALCIUM 8.6*   PROT 6.3   ALBUMIN 3.2*   BILITOT 0.4   ALKPHOS 71   AST 29   ALT 25   ANIONGAP 12     Cardiac Markers:   Recent Labs   Lab 06/13/23  2250   BNP 16       Significant Imaging: I have reviewed all pertinent imaging results/findings within the past 24 hours.

## 2023-06-14 NOTE — NURSING
Pt awake, alert and oriented. PIV in place. VSS. States he is feeling better than earlier- pt went into coughing episode while speaking with this nurse; O2 sat remaining above 93% but pt with labored respirations following episode. NC 2L/min put into place to aid anxiety r/t pt's sensation of shortness of breath. Aware of impending admission- updated on room assignment.

## 2023-06-14 NOTE — ASSESSMENT & PLAN NOTE
Acute:  - cardiology consulted: appreciate recommendations  - Trending troponin   - on tele  - NPO midnight  - plan for stress test in am per cardiology

## 2023-06-15 ENCOUNTER — TELEPHONE (OUTPATIENT)
Dept: FAMILY MEDICINE | Facility: CLINIC | Age: 68
End: 2023-06-15

## 2023-06-15 PROBLEM — J68.0: Status: ACTIVE | Noted: 2023-06-15

## 2023-06-15 LAB
ALBUMIN SERPL BCP-MCNC: 3.3 G/DL (ref 3.5–5.2)
ALP SERPL-CCNC: 75 U/L (ref 55–135)
ALT SERPL W/O P-5'-P-CCNC: 21 U/L (ref 10–44)
ANION GAP SERPL CALC-SCNC: 12 MMOL/L (ref 8–16)
AST SERPL-CCNC: 19 U/L (ref 10–40)
BASOPHILS # BLD AUTO: 0.03 K/UL (ref 0–0.2)
BASOPHILS NFR BLD: 0.3 % (ref 0–1.9)
BILIRUB SERPL-MCNC: 0.3 MG/DL (ref 0.1–1)
BUN SERPL-MCNC: 17 MG/DL (ref 8–23)
CALCIUM SERPL-MCNC: 9.4 MG/DL (ref 8.7–10.5)
CHLORIDE SERPL-SCNC: 102 MMOL/L (ref 95–110)
CO2 SERPL-SCNC: 26 MMOL/L (ref 23–29)
CREAT SERPL-MCNC: 1.1 MG/DL (ref 0.5–1.4)
CV PHARM DOSE: 0.4 MG
CV STRESS BASE HR: 74 BPM
DIASTOLIC BLOOD PRESSURE: 84 MMHG
DIFFERENTIAL METHOD: ABNORMAL
EOSINOPHIL # BLD AUTO: 0.1 K/UL (ref 0–0.5)
EOSINOPHIL NFR BLD: 1.4 % (ref 0–8)
ERYTHROCYTE [DISTWIDTH] IN BLOOD BY AUTOMATED COUNT: 13 % (ref 11.5–14.5)
EST. GFR  (NO RACE VARIABLE): >60 ML/MIN/1.73 M^2
GLUCOSE SERPL-MCNC: 164 MG/DL (ref 70–110)
HCT VFR BLD AUTO: 48 % (ref 40–54)
HGB BLD-MCNC: 15.8 G/DL (ref 14–18)
IMM GRANULOCYTES # BLD AUTO: 0.04 K/UL (ref 0–0.04)
IMM GRANULOCYTES NFR BLD AUTO: 0.4 % (ref 0–0.5)
LYMPHOCYTES # BLD AUTO: 0.6 K/UL (ref 1–4.8)
LYMPHOCYTES NFR BLD: 6.2 % (ref 18–48)
MAGNESIUM SERPL-MCNC: 1.9 MG/DL (ref 1.6–2.6)
MCH RBC QN AUTO: 32.7 PG (ref 27–31)
MCHC RBC AUTO-ENTMCNC: 32.9 G/DL (ref 32–36)
MCV RBC AUTO: 99 FL (ref 82–98)
MONOCYTES # BLD AUTO: 0.1 K/UL (ref 0.3–1)
MONOCYTES NFR BLD: 1.4 % (ref 4–15)
NEUTROPHILS # BLD AUTO: 9 K/UL (ref 1.8–7.7)
NEUTROPHILS NFR BLD: 90.3 % (ref 38–73)
NRBC BLD-RTO: 0 /100 WBC
OHS CV CPX 85 PERCENT MAX PREDICTED HEART RATE MALE: 130
OHS CV CPX MAX PREDICTED HEART RATE: 153
OHS CV CPX PATIENT IS FEMALE: 0
OHS CV CPX PATIENT IS MALE: 1
OHS CV CPX PEAK DIASTOLIC BLOOD PRESSURE: 91 MMHG
OHS CV CPX PEAK HEAR RATE: 116 BPM
OHS CV CPX PEAK RATE PRESSURE PRODUCT: NORMAL
OHS CV CPX PEAK SYSTOLIC BLOOD PRESSURE: 158 MMHG
OHS CV CPX PERCENT MAX PREDICTED HEART RATE ACHIEVED: 76
OHS CV CPX RATE PRESSURE PRODUCT PRESENTING: NORMAL
PLATELET # BLD AUTO: 250 K/UL (ref 150–450)
PMV BLD AUTO: 8.4 FL (ref 9.2–12.9)
POTASSIUM SERPL-SCNC: 4.3 MMOL/L (ref 3.5–5.1)
PROCALCITONIN SERPL IA-MCNC: 0.02 NG/ML
PROT SERPL-MCNC: 6.5 G/DL (ref 6–8.4)
RBC # BLD AUTO: 4.83 M/UL (ref 4.6–6.2)
SODIUM SERPL-SCNC: 140 MMOL/L (ref 136–145)
SYSTOLIC BLOOD PRESSURE: 142 MMHG
WBC # BLD AUTO: 9.96 K/UL (ref 3.9–12.7)

## 2023-06-15 PROCEDURE — 12000002 HC ACUTE/MED SURGE SEMI-PRIVATE ROOM

## 2023-06-15 PROCEDURE — 99232 PR SUBSEQUENT HOSPITAL CARE,LEVL II: ICD-10-PCS | Mod: 25,,, | Performed by: INTERNAL MEDICINE

## 2023-06-15 PROCEDURE — 25000003 PHARM REV CODE 250

## 2023-06-15 PROCEDURE — 94640 AIRWAY INHALATION TREATMENT: CPT

## 2023-06-15 PROCEDURE — 83735 ASSAY OF MAGNESIUM: CPT | Performed by: NURSE PRACTITIONER

## 2023-06-15 PROCEDURE — 87205 SMEAR GRAM STAIN: CPT | Mod: 59 | Performed by: HOSPITALIST

## 2023-06-15 PROCEDURE — 99223 PR INITIAL HOSPITAL CARE,LEVL III: ICD-10-PCS | Mod: ,,, | Performed by: INTERNAL MEDICINE

## 2023-06-15 PROCEDURE — 87070 CULTURE OTHR SPECIMN AEROBIC: CPT | Performed by: HOSPITALIST

## 2023-06-15 PROCEDURE — 85025 COMPLETE CBC W/AUTO DIFF WBC: CPT | Performed by: NURSE PRACTITIONER

## 2023-06-15 PROCEDURE — 25000242 PHARM REV CODE 250 ALT 637 W/ HCPCS: Performed by: NURSE PRACTITIONER

## 2023-06-15 PROCEDURE — 63600175 PHARM REV CODE 636 W HCPCS: Performed by: NURSE PRACTITIONER

## 2023-06-15 PROCEDURE — 84145 PROCALCITONIN (PCT): CPT

## 2023-06-15 PROCEDURE — 36415 COLL VENOUS BLD VENIPUNCTURE: CPT

## 2023-06-15 PROCEDURE — 80053 COMPREHEN METABOLIC PANEL: CPT | Performed by: NURSE PRACTITIONER

## 2023-06-15 PROCEDURE — 63600175 PHARM REV CODE 636 W HCPCS: Performed by: INTERNAL MEDICINE

## 2023-06-15 PROCEDURE — 25000003 PHARM REV CODE 250: Performed by: NURSE PRACTITIONER

## 2023-06-15 PROCEDURE — 99232 SBSQ HOSP IP/OBS MODERATE 35: CPT | Mod: 25,,, | Performed by: INTERNAL MEDICINE

## 2023-06-15 PROCEDURE — 36415 COLL VENOUS BLD VENIPUNCTURE: CPT | Performed by: NURSE PRACTITIONER

## 2023-06-15 PROCEDURE — 99900035 HC TECH TIME PER 15 MIN (STAT)

## 2023-06-15 PROCEDURE — 94761 N-INVAS EAR/PLS OXIMETRY MLT: CPT

## 2023-06-15 PROCEDURE — 99223 1ST HOSP IP/OBS HIGH 75: CPT | Mod: ,,, | Performed by: INTERNAL MEDICINE

## 2023-06-15 PROCEDURE — 27000221 HC OXYGEN, UP TO 24 HOURS

## 2023-06-15 RX ORDER — PREDNISONE 20 MG/1
20 TABLET ORAL DAILY
Status: DISCONTINUED | OUTPATIENT
Start: 2023-06-16 | End: 2023-06-16

## 2023-06-15 RX ORDER — BENZONATATE 100 MG/1
100 CAPSULE ORAL 3 TIMES DAILY PRN
Status: DISCONTINUED | OUTPATIENT
Start: 2023-06-15 | End: 2023-06-17 | Stop reason: HOSPADM

## 2023-06-15 RX ORDER — GUAIFENESIN 100 MG/5ML
200 SOLUTION ORAL EVERY 6 HOURS PRN
Status: DISCONTINUED | OUTPATIENT
Start: 2023-06-15 | End: 2023-06-17 | Stop reason: HOSPADM

## 2023-06-15 RX ORDER — ISOSORBIDE MONONITRATE 30 MG/1
30 TABLET, EXTENDED RELEASE ORAL DAILY
Status: DISCONTINUED | OUTPATIENT
Start: 2023-06-16 | End: 2023-06-17 | Stop reason: HOSPADM

## 2023-06-15 RX ADMIN — IPRATROPIUM BROMIDE AND ALBUTEROL SULFATE 3 ML: .5; 3 SOLUTION RESPIRATORY (INHALATION) at 12:06

## 2023-06-15 RX ADMIN — METHYLPREDNISOLONE SODIUM SUCCINATE 40 MG: 40 INJECTION, POWDER, FOR SOLUTION INTRAMUSCULAR; INTRAVENOUS at 05:06

## 2023-06-15 RX ADMIN — REGADENOSON 0.4 MG: 0.08 INJECTION, SOLUTION INTRAVENOUS at 09:06

## 2023-06-15 RX ADMIN — IPRATROPIUM BROMIDE AND ALBUTEROL SULFATE 3 ML: .5; 3 SOLUTION RESPIRATORY (INHALATION) at 07:06

## 2023-06-15 RX ADMIN — IPRATROPIUM BROMIDE AND ALBUTEROL SULFATE 3 ML: .5; 3 SOLUTION RESPIRATORY (INHALATION) at 03:06

## 2023-06-15 RX ADMIN — APIXABAN 5 MG: 2.5 TABLET, FILM COATED ORAL at 08:06

## 2023-06-15 RX ADMIN — ASPIRIN 81 MG: 81 TABLET, COATED ORAL at 08:06

## 2023-06-15 RX ADMIN — LEVOFLOXACIN 500 MG: 500 TABLET, FILM COATED ORAL at 08:06

## 2023-06-15 RX ADMIN — MELATONIN TAB 3 MG 9 MG: 3 TAB at 11:06

## 2023-06-15 RX ADMIN — PANTOPRAZOLE SODIUM 40 MG: 40 TABLET, DELAYED RELEASE ORAL at 08:06

## 2023-06-15 RX ADMIN — BENZONATATE 100 MG: 100 CAPSULE ORAL at 08:06

## 2023-06-15 RX ADMIN — ESCITALOPRAM OXALATE 10 MG: 10 TABLET, FILM COATED ORAL at 08:06

## 2023-06-15 RX ADMIN — PITAVASTATIN CALCIUM 4 MG: 4.18 TABLET, FILM COATED ORAL at 10:06

## 2023-06-15 RX ADMIN — GUAIFENESIN 200 MG: 200 SOLUTION ORAL at 08:06

## 2023-06-15 RX ADMIN — FLUTICASONE FUROATE, UMECLIDINIUM BROMIDE AND VILANTEROL TRIFENATATE 1 PUFF: 200; 62.5; 25 POWDER RESPIRATORY (INHALATION) at 07:06

## 2023-06-15 RX ADMIN — IPRATROPIUM BROMIDE AND ALBUTEROL SULFATE 3 ML: .5; 3 SOLUTION RESPIRATORY (INHALATION) at 04:06

## 2023-06-15 RX ADMIN — TAMSULOSIN HYDROCHLORIDE 0.4 MG: 0.4 CAPSULE ORAL at 08:06

## 2023-06-15 RX ADMIN — EZETIMIBE 10 MG: 10 TABLET ORAL at 08:06

## 2023-06-15 RX ADMIN — FINASTERIDE 5 MG: 5 TABLET, FILM COATED ORAL at 08:06

## 2023-06-15 NOTE — PROGRESS NOTES
I have reviewed the nuclear images including the images.  Patient is noted to have increased gut activity   There is small decreased defect reported with suspected baldomero-infarct ischemia this appears to be smaller this point.      Recommend to optimize his medical therapy, when his pulmonary status improves we can initiate cardiac workup.    Patient is on Eliquis not a candidate for invasive workup as he is not on any antianginal therapy.  Her to follow-up in the office in 1 week's time .  Meanwhile he is on Eliquis at 5 mg p.o. b.i.d. continue the same    Add isosorbide mononitrate 30 mg daily to his regimen.  May discontinue topical nitrate  May consider Cardizem as an outpatient and avoid beta-blockers because of his pulmonary status.    Continue on pitavastatin at 4 mg daily and Zetia 10 mg daily'   should he have any recurrence of symptoms he is advised to seek evaluation [facility]

## 2023-06-15 NOTE — TELEPHONE ENCOUNTER
Call patient - needs post-hospital phone call within 2 business days and hospital follow up visit scheduled within 7-14 days.    Expected discharge- 6/15/23

## 2023-06-15 NOTE — PROGRESS NOTES
[facility] Emergency Dept  Department of Cardiology  Consult Note      PATIENT NAME: Martell Corcoran    MRN: [id number]  TODAY'S DATE: 06/15/2023  ADMIT DATE: 6/13/2023                          CONSULT REQUESTED BY: Valeria Braun MD    SUBJECTIVE     PRINCIPAL PROBLEM: NSTEMI (non-ST elevated myocardial infarction)      REASON FOR CONSULT:  Abnormal troponin    INTERVAL HISTORY:  06/15/2023:  Patient is feeling better his less short of breath.  Apparently did not get his steroids until 10:00 p.m. last night as per patient.  No further episodes of chest pain is noted no arm neck or jaw pain.  Cough is still persisting and currently on oxygen supplements        HPI:  Patient seen and evaluated in the emergency room patient's wife at the bedside giving supplemental history as well  Patient is a 67-year-old gentleman with history of COPD paroxysmal atrial fibrillation reportedly exposed to insecticide in early May.  He has been treated with steroids and inhaler therapy with some improvement he continued to have some persistent cough initiated some antibiotics and more recently on Monday his antibiotic was changed by his pulmonologist.  He would some chest discomfort in the left pectoral area became worse increasing shortness of breath was noted he presented to the emergency room for evaluation.  No radiation of pain to arm neck or jaw noted.    He has history of paroxysmal atrial fibrillation but he is intolerant to several drugs and he was scheduled for elective ablation therapy with Dr. Aguilar at the [facility] because of progressive pulmonary condition with shortness of breath he had postponed this.  Currently he remains in sinus rhythm.  No significant breakthrough arrhythmias identified  At the time of my evaluation patient denies having chest discomfort no arm neck or jaw pain noted.  Appears comfortable does admit to having some shortness of breath however          Patient presents with    Shortness of  Breath       Pt comes in via ems with c/o SOB starting today worsening after using OTC medications. Pt found with a room air of 90%. Pt given one duo neb.          HPI: Martell Corcoran is a 67-year-old male who presents emergency room for evaluation of shortness of breath and chest pain.  He reports chest pain onset approximately 8 hours prior to arrival.  He does report intermittent chest pain for the past several weeks after he was exposed to insecticides spray.  He describes chest pain as a heavy sensation in the left anterior chest region.  No reported radiation of the pain, nausea, vomiting, or diaphoresis.  He was given nitroglycerin in the emergency room with improvement in pain.  He denies fever or chills.  No known sick contacts or travel.  Previous medical history includes COPD, AFib, obstructive sleep apnea on CPAP, hypertension, GERD, and coronary artery disease.  Of note patient is scheduled for an AFib ablation at [facility] tomorrow morning.  ER workup:  CBC and CMP unremarkable.  Troponin elevated at 0.254.  EKG demonstrates sinus rhythm without ST or T-wave elevation.  Patient admitted to Hospital Medicine for treatment management.  ER physician spoke with  who is okay with patient staying here for care.      Review of patient's allergies indicates:   Allergen Reactions    Msg [glutamic acid] Nausea Only, Palpitations, Shortness Of Breath and Swelling    Oyster extract Swelling     PT swells in his throat after eating oysters on occasion       Past Medical History:   Diagnosis Date    Benign enlargement of prostate     Had a biopsy in around 2015    Elevated PSA     GERD (gastroesophageal reflux disease)     Hypertension     Started with meds in 2012.    Kidney stone     Urinary tract infection      Past Surgical History:   Procedure Laterality Date    FISTULA REPAIR      LEFT HEART CATHETERIZATION N/A 10/23/2018    Procedure: Left heart cath;  Surgeon: Niharika Squires MD;  Location:  [facility] CATH;  Service: Cardiology;  Laterality: N/A;     Social History     Tobacco Use    Smoking status: Former     Packs/day: 1.00     Years: 55.00     Pack years: 55.00     Types: Cigarettes     Quit date: 2023     Years since quittin.4    Smokeless tobacco: Never   Substance Use Topics    Alcohol use: Yes     Comment: Previous 12 beer a day for 20 years. Now occ.    Drug use: No            Review of Systems     Constitutional: Negative for chills, fatigue and fever.   Eyes: No double vision, No blurred vision  Neuro: No headaches, No dizziness  Respiratory:  Persistent cough, no significant sputum production, shortness of breath has improved but still present Cardiovascular: Negative for chest pain. Negative for palpitations and leg swelling.   Gastrointestinal: Negative for abdominal pain, No melena, diarrhea, nausea and vomiting.   Genitourinary: Negative for dysuria and frequency, Negative for hematuria  Skin: Negative for bruising, Negative for edema or discoloration noted.       OBJECTIVE     VITAL SIGNS (Most Recent)  Temp: 98.7 °F (37.1 °C) (06/15/23 0700)  Pulse: 74 (06/15/23 0925)  Resp: 18 (06/15/23 0733)  BP: (!) 142/84 (06/15/23 0925)  SpO2: 97 % (06/15/23 0733)    VENTILATION STATUS  Resp: 18 (06/15/23 0733)  SpO2: 97 % (06/15/23 0733)  Oxygen Concentration (%):  [28] 28        I & O (Last 24H):  Intake/Output Summary (Last 24 hours) at 6/15/2023 0925  Last data filed at 6/15/2023 0000  Gross per 24 hour   Intake 540 ml   Output --   Net 540 ml       WEIGHTS  Wt Readings from Last 1 Encounters:   23 1442 70.3 kg (154 lb 15.7 oz)   23 0900 70.3 kg (155 lb)   23 70.3 kg (155 lb)       Physical examination:      Constitutional:  Well-built well-nourished in no apparent distress, alert and oriented    Neck: no carotid bruit, no JVD, no masses    Lungs: diminished breath sounds bibasilar, rhonchi bilaterally expiratory wheezes are improved  Chest Wall: no  tenderness  CARDIAC:  regular rate and rhythm, S1, S2 normal, no murmur, click, rub or gallop  Abdomen: soft, non-tender; bowel sounds normal; no masses,  no organomegaly, no guarding or rebound noted  Extremities: Extremities normal, atraumatic, no cyanosis, clubbing, or edema  Skin: Skin color, texture, turgor normal. No rashes or lesions  Neuro: Alert and responsive.  Moving all extremities      HOME MEDICATIONS:  No current facility-administered medications on file prior to encounter.     Current Outpatient Medications on File Prior to Encounter   Medication Sig Dispense Refill    albuterol (PROVENTIL) 2.5 mg /3 mL (0.083 %) nebulizer solution Take 3 mLs (2.5 mg total) by nebulization every 6 (six) hours as needed. 120 each 11    albuterol (PROVENTIL/VENTOLIN HFA) 90 mcg/actuation inhaler Inhale 1-2 puffs into the lungs every 4 (four) hours as needed for Shortness of Breath (coughing). Rescue 18 g 11    albuterol-ipratropium (DUO-NEB) 2.5 mg-0.5 mg/3 mL nebulizer solution USE 1 AMPULE IN NEBULIZER EVERY 4 HOURS AS NEEDED FOR WHEEZING 180 mL 0    alfuzosin (UROXATRAL) 10 mg Tb24 TAKE 1 TABLET BY MOUTH ONCE DAILY AFTER BREAKFAST      aspirin (ECOTRIN) 81 MG EC tablet       azelastine (ASTELIN) 137 mcg (0.1 %) nasal spray 1 spray (137 mcg total) by Nasal route 2 (two) times daily. 30 mL 2    azithromycin (ZITHROMAX) 500 MG tablet One daily for yellow mucous, repeat if needed 3 tablet 3    dicyclomine (BENTYL) 10 MG capsule Take 1 capsule (10 mg total) by mouth 4 (four) times daily before meals and nightly. 40 capsule 1    ELIQUIS 5 mg Tab Take 1 tablet (5 mg total) by mouth 2 (two) times daily. 180 tablet 3    EScitalopram oxalate (LEXAPRO) 20 MG tablet Take by mouth.      esomeprazole (NEXIUM) 40 MG capsule Take 40 mg by mouth once daily.      ezetimibe (ZETIA) 10 mg tablet Take 1 tablet (10 mg total) by mouth once daily. 90 tablet 3    finasteride (PROSCAR) 5 mg tablet Take 5 mg by mouth once daily.       fluticasone-umeclidin-vilanter (TRELEGY ELLIPTA) 200-62.5-25 mcg inhaler Inhale 1 puff into the lungs once daily. 60 each 11    guaiFENesin-codeine 100-10 mg/5 ml (TUSSI-ORGANIDIN NR)  mg/5 mL syrup Take 5 mLs by mouth every 4 (four) hours as needed for Cough. 273 mL 0    levoFLOXacin (LEVAQUIN) 500 MG tablet Take 1 tablet (500 mg total) by mouth once daily. 10 tablet 0    LIVALO 4 mg Tab Take 1 tablet by mouth once daily.      nitroGLYCERIN (NITROSTAT) 0.4 MG SL tablet Place under the tongue.      predniSONE (DELTASONE) 20 MG tablet 3 for 3 days then 2 for 3 days then one for 3 days and repeat for breathing problems 36 tablet 1    tiotropium-olodateroL (STIOLTO RESPIMAT) 2.5-2.5 mcg/actuation Mist Inhale 2 puffs into the lungs once daily. Controller 12 g 3    valACYclovir (VALTREX) 1000 MG tablet Take 1 g by mouth 2 (two) times daily.      zolpidem (AMBIEN) 5 MG Tab Take 1 tablet (5 mg total) by mouth every evening. 20 tablet 2       SCHEDULED MEDS:   albuterol-ipratropium  3 mL Nebulization Q4H    apixaban  5 mg Oral BID    aspirin  81 mg Oral Daily    EScitalopram oxalate  10 mg Oral Daily    ezetimibe  10 mg Oral Daily    finasteride  5 mg Oral Daily    fluticasone-umeclidin-vilanter  1 puff Inhalation Daily    levoFLOXacin  500 mg Oral Daily    nitroGLYCERIN 2% TD oint  0.5 inch Topical (Top) Q6H    NON FORMULARY MEDICATION 4 mg  4 mg Oral Daily    pantoprazole  40 mg Oral Daily    [START ON 6/16/2023] predniSONE  20 mg Oral Daily    regadenoson  0.4 mg Intravenous Once    tamsulosin  0.4 mg Oral Daily       CONTINUOUS INFUSIONS:    PRN MEDS:acetaminophen, acetaminophen, albuterol-ipratropium, aluminum-magnesium hydroxide-simethicone, dextrose 10%, dextrose 10%, glucagon (human recombinant), glucose, glucose, magnesium oxide, magnesium oxide, melatonin, naloxone, nitroGLYCERIN, ondansetron, pneumoc 20-carlton conj-dip cr(PF), potassium bicarbonate, potassium bicarbonate, potassium bicarbonate, potassium,  sodium phosphates, potassium, sodium phosphates, potassium, sodium phosphates, simethicone, sodium chloride 0.9%    LABS AND DIAGNOSTICS     CBC LAST 3 DAYS  Recent Labs   Lab 06/13/23  2250 06/14/23  0408 06/15/23  0400   WBC 9.21 8.08 9.96   RBC 4.54* 4.53* 4.83   HGB 14.7 14.4 15.8   HCT 44.6 44.7 48.0   MCV 98 99* 99*   MCH 32.4* 31.8* 32.7*   MCHC 33.0 32.2 32.9   RDW 13.2 13.2 13.0    239 250   MPV 8.4* 8.3* 8.4*   GRAN 68.6  6.3 58.1  4.7 90.3*  9.0*   LYMPH 12.4*  1.1 18.8  1.5 6.2*  0.6*   MONO 9.4  0.9 10.8  0.9 1.4*  0.1*   BASO 0.05 0.05 0.03   NRBC 0 0 0       COAGULATION LAST 3 DAYS  Recent Labs   Lab 06/09/23  0906   INR 0.9   APTT 30       CHEMISTRY LAST 3 DAYS  Recent Labs   Lab 06/13/23 2250 06/14/23  0408 06/15/23  0400    141 140   K 3.9 3.7 4.3    107 102   CO2 24 25 26   ANIONGAP 12 9 12   BUN 21 15 17   CREATININE 0.8 0.7 1.1    105 164*   CALCIUM 8.6* 8.7 9.4   MG  --  1.8 1.9   ALBUMIN 3.2* 3.1* 3.3*   PROT 6.3 6.1 6.5   ALKPHOS 71 68 75   ALT 25 24 21   AST 29 23 19   BILITOT 0.4 0.3 0.3       CARDIAC PROFILE LAST 3 DAYS  Recent Labs   Lab 06/13/23  2250 06/14/23  0136 06/14/23  1052 06/14/23  1514 06/14/23  1914   BNP 16  --   --   --   --    TROPONINI 0.254*   < > 0.608* 0.428* 0.365*    < > = values in this interval not displayed.       ENDOCRINE LAST 3 DAYS  No results for input(s): TSH, PROCAL in the last 168 hours.    LAST ARTERIAL BLOOD GAS  ABG  No results for input(s): PH, PO2, PCO2, HCO3, BE in the last 168 hours.    LAST 7 DAYS MICROBIOLOGY   Microbiology Results (last 7 days)       ** No results found for the last 168 hours. **            MOST RECENT IMAGING  Echo Saline Bubble? Yes  · The left ventricle is normal in size with low normal systolic function.  · The estimated ejection fraction is 50%.  · Normal left ventricular diastolic function.  · There is no evidence of intracardiac shunting.  · Normal right ventricular size with normal right  ventricular systolic   function.  · Normal central venous pressure (3 mmHg).         ECHOCARDIOGRAM RESULTS (last 5)  Results for orders placed during the hospital encounter of 11/01/22    Transesophageal echo (VALERIE) with possible cardioversion    Interpretation Summary  · There is a patent foramen ovale present with left to right shunting indicated by color flow Doppler.  · Normal appearing left atrial appendage. No thrombus is present in the appendage. Abnormal appendage velocities.  · Mild-to-moderate mitral regurgitation.  · Mild right atrial enlargement.  · Mildly Positive bubble study for right-to-left shunt. Positive color Doppler for left-to-right shunt for PFO  · The left ventricle is normal in size with normal systolic function.  · Atrial fibrillation observed.  · Normal right ventricular size with normal right ventricular systolic function.  · Normal left ventricular diastolic function.  · The estimated ejection fraction is 60%.  · A 200 J synchronized cardioversion was successfully performed with restoration of normal sinus rhythm.      Results for orders placed in visit on 07/22/22    Stress Echo Which stress agent will be used? Pharmacological; Color Flow Doppler? No    Interpretation Summary  · The left ventricle is normal in size with normal systolic function.  · The estimated ejection fraction is 64%.  · The stress echo portion of this study is negative for myocardial ischemia.  · There were no arrhythmias during stress.  · The ECG portion of this study is negative for myocardial ischemia.  · Baseline EKG atrial fibrillation low voltage left axis deviation      Results for orders placed during the hospital encounter of 05/23/22    Echo    Interpretation Summary  · The left ventricle is normal in size with concentric remodeling and  · Mild right ventricular enlargement with normal right ventricular systolic function.  · Normal central venous pressure (3 mmHg).  · The estimated ejection fraction is  70%.  · A diastolic pattern consistent with atrial fibrillation observed.      CURRENT/PREVIOUS VISIT EKG  Results for orders placed or performed during the hospital encounter of 06/13/23   EKG 12-lead    Collection Time: 06/14/23  7:35 AM    Narrative    Test Reason : I49.9    Vent. Rate : 076 BPM     Atrial Rate : 076 BPM     P-R Int : 162 ms          QRS Dur : 104 ms      QT Int : 394 ms       P-R-T Axes : 080 268 076 degrees     QTc Int : 443 ms    Normal sinus rhythm  Right superior axis deviation  Incomplete right bundle branch block  Right ventricular hypertrophy  Anterior infarct (cited on or before 01-NOV-2022)  Abnormal ECG  When compared with ECG of 13-JUN-2023 23:29,  No significant change was found    Referred By: [email or web address]   SELF           Confirmed By:            ASSESSMENT/PLAN:     Active Hospital Problems    Diagnosis    *NSTEMI (non-ST elevated myocardial infarction)    Atrial fibrillation    HTN (hypertension)    GERD (gastroesophageal reflux disease)    QUACH (dyspnea on exertion)       ASSESSMENT & PLAN:   Progressive shortness of breath  Abnormal troponin now trending down probably secondary to his pulmonary status and hypoxemia  Paroxysmal atrial fibrillation now in sinus rhythm  Arterial hypertension  History of GERD  Recent chemical exposure      RECOMMENDATIONS:  06/15/2023:   1. Clinically patient is feeling much better  2. Nonspecific changes and troponins probably secondary to pulmonary condition now clinically improving   3. Continue on Eliquis  4. Continue on aggressive risk factor modification as mentioned below  5. Lexiscan Myoview today, if perfusion imaging has no significant abnormalities, from a cardiovascular standpoint he can be discharged with outpatient management and follow-up with his primary cardiologist's  6. Encouraged to follow-up with Dr. Aguilar planned procedure.         06/14/2023  Patient presented with acute shortness of breath and atypical chest pains associated  with some hypoxemia now clinically improved, troponins are trending down.  Recommend the following   1. Optimize his pulmonary status  2. High dose intravenous steroids for about 3 doses and then maintenance thereafter  3. Antibiotic and Inhaler bronchodilator therapy per pulmonologist  4. Continue his home medications including Eliquis  5. Continue on lipid-lowering agents including Zetia and Livalo  6. Obtain Lexiscan Myoview as a 2 day protocol, rest dose today and stress dose tomorrow  7. Maintain on PPI agents , Protonix 40 mg daily    Thank you for the consultation        Jacky Pérez MD  Date of Service: 06/15/2023  9:46 AM  Department of Cardiology

## 2023-06-15 NOTE — CONSULTS
06/15/2023      Admit Date: 6/13/2023  Martell Jensen Nilo  New Patient Consult    Chief Complaint   Patient presents with    Shortness of Breath     Pt comes in via ems with c/o SOB starting today worsening after using OTC medications. Pt found with a room air of 90%. Pt given one duo neb.        History of Present Illness:  Pt is a 66 yo male with COPD, atrial fib on eliquis, CAD, GERD, HTN, nephrolithiasis, bladder cancer, BPH, GLENNA on CPAP who presented to ED on 6/14 with SOB and chest pain, found to have elevated troponin and wheezing. He was recently exposed to insecticide spray last month and has been having intermittent chest pains for weeks. Pulmonary is consulted for further evaluation of wheezing. Pt had seen Dr. Carolina in clinic 3 wk ago and treated for COPD infection post insecticide exposure- had been on prednisone and levaquin.  Pt states would get severely SOB with activity then start to have chest pain, had one episode prior to admission which was very severe. Now he still gets chest soreness but feels improved. Pain is pressure like, better with rest and better w/ nitroglycerin. CP comes and goes, depending on activity. He is planned to have nuclear stress ender today. Was told in past he had evidence of prior MI but has no stents. He was having yellow phlegm prior to hospitalization but now resolved.    PFSH:  Past Medical History:   Diagnosis Date    Benign enlargement of prostate     Had a biopsy in around 2015    Elevated PSA     GERD (gastroesophageal reflux disease)     Hypertension     Started with meds in 2012.    Kidney stone     Urinary tract infection      Past Surgical History:   Procedure Laterality Date    FISTULA REPAIR      LEFT HEART CATHETERIZATION N/A 10/23/2018    Procedure: Left heart cath;  Surgeon: Niharika Squires MD;  Location: [facility] CATH;  Service: Cardiology;  Laterality: N/A;     Social History     Tobacco Use    Smoking status: Former     Packs/day: 1.00     Years: 55.00  "    Pack years: 55.00     Types: Cigarettes     Quit date: 2023     Years since quittin.4    Smokeless tobacco: Never   Substance Use Topics    Alcohol use: Yes     Comment: Previous 12 beer a day for 20 years. Now occ.    Drug use: No     Family History   Problem Relation Age of Onset    Heart failure Mother     Cancer Father     Heart disease Brother     Heart attack Brother     Cancer Brother     Heart disease Brother     Drug abuse Brother      Review of patient's allergies indicates:   Allergen Reactions    Msg [glutamic acid] Nausea Only, Palpitations, Shortness Of Breath and Swelling    Oyster extract Swelling     PT swells in his throat after eating oysters on occasion       Performance Status:Performance Status:The patient's activity level is no limits with regular activity. 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 BP (!) 113/55   Pulse 78   Temp 98.7 °F (37.1 °C)   Resp 18   Ht 5' 6" (1.676 m)   Wt 70.3 kg (154 lb 15.7 oz)   SpO2 97%   BMI 25.01 kg/m²   Exam included Vitals as listed, and patient's appearance and affect and alertness and mood, oral exam for yeast and hygiene and pharynx lesions and Mallapatti (M) score, neck with inspection for jvd and masses and thyroid abnormalities and lymph nodes (supraclavicular and infraclavicular nodes also examined and noted if abn), chest exam included symmetry and effort and fremitus and percussion and auscultation, cardiac exam included rhythm and gallops and murmur and rubs and jvd and edema, abdominal exam for mass and hepatosplenomegaly and tenderness and hernias and bowel " sounds, Musculoskeletal exam with muscle tone and posture and mobility/gait and  strenght, and skin for rashes and cyanosis and pallor and turgor, extremity for clubbing.  Findings were normal except as listed below:  Oropharynx clear, M4  HR regular  Breath sounds mostly clear with scant wheeze RLL   Abd soft nontender  No edema/clubbing    Radiographs reviewed: view by direct vision   CXR 6/13- clear lung fields      Labs     Recent Labs   Lab 06/15/23  0400   WBC 9.96   HGB 15.8   HCT 48.0        Recent Labs   Lab 06/14/23  1914 06/15/23  0400   NA  --  140   K  --  4.3   CL  --  102   CO2  --  26   BUN  --  17   CREATININE  --  1.1   GLU  --  164*   CALCIUM  --  9.4   MG  --  1.9   AST  --  19   ALT  --  21   ALKPHOS  --  75   BILITOT  --  0.3   PROT  --  6.5   ALBUMIN  --  3.3*   TROPONINI 0.365*  --    No results for input(s): PH, PCO2, PO2, HCO3 in the last 24 hours.  Microbiology Results (last 7 days)       ** No results found for the last 168 hours. **            Impression:  Active Hospital Problems    Diagnosis  POA    *NSTEMI (non-ST elevated myocardial infarction) [I21.4]  Yes    Atrial fibrillation [I48.91]  Yes    HTN (hypertension) [I10]  Yes    GERD (gastroesophageal reflux disease) [K21.9]  Yes    QUCAH (dyspnea on exertion) [R06.09]  Yes      Resolved Hospital Problems   No resolved problems to display.               Plan:   Demand ischemia vs NSTEMI  Atrial fib  COPD with acute exacerbation- suspect triggered by chemical bronchitis    - check procalcitonin and deescalate levaquin if negative  - continue inhaled bronchodilators  - continue prednisone  - supplemental O2 to keep sats 89-92%  - home O2 eval prior to dc  - cardiac workup ongoing    Zayra Marcelo MD  Pulmonary & Critical Care Medicine

## 2023-06-15 NOTE — RESPIRATORY THERAPY
06/15/23 0733   Patient Assessment/Suction   Level of Consciousness (AVPU) alert   Respiratory Effort Normal;Unlabored  (pt stated gets short of breath occasionally, will leave O2 on at this time)   Expansion/Accessory Muscles/Retractions expansion symmetric;no retractions;no use of accessory muscles   All Lung Fields Breath Sounds Anterior:;Lateral:;diminished;equal bilaterally   Rhythm/Pattern, Respiratory no shortness of breath reported;depth regular;pattern regular;unlabored   Cough Frequency infrequent   Cough Type good;loose;nonproductive   PRE-TX-O2   Device (Oxygen Therapy) nasal cannula   $ Is the patient on Low Flow Oxygen? Yes   Flow (L/min) 2   Oxygen Concentration (%) 28   SpO2 97 %   Pulse Oximetry Type Intermittent   $ Pulse Oximetry - Multiple Charge Pulse Oximetry - Multiple   Pulse 78   Resp 18   Positioning HOB elevated 30 degrees   Aerosol Therapy   $ Aerosol Therapy Charges Aerosol Treatment   Respiratory Treatment Status (SVN) given   Treatment Route (SVN) air;mask   Patient Position (SVN) HOB elevated;semi-Sorto's   Post Treatment Assessment (SVN) breath sounds unchanged   Signs of Intolerance (SVN) none   Inhaler   $ Inhaler Charges MDI (Metered Dose Inahler) Treatment   Respiratory Treatment Status (Inhaler) given;mouth rinsed post treatment   Treatment Route (Inhaler) mouthpiece   Patient Position (Inhaler) semi-Sorto's;HOB elevated   Post Treatment Assessment (Inhaler) breath sounds unchanged   Signs of Intolerance (Inhaler) none   Breath Sounds Post-Respiratory Treatment   Post-treatment Heart Rate (beats/min) 78   Post-treatment Resp Rate (breaths/min) 18   Ready to Wean/Extubation Screen   FIO2<=50 (chart decimal) 0.28   Home Oxygen   Has Home Oxygen? No

## 2023-06-15 NOTE — ASSESSMENT & PLAN NOTE
Chronic, controlled.  Latest blood pressure and vitals reviewed-     Temp:  [96.1 °F (35.6 °C)-98.7 °F (37.1 °C)]   Pulse:  []   Resp:  [16-22]   BP: ()/(55-84)   SpO2:  [93 %-100 %] .   Home meds for hypertension were reviewed and noted below.   Hypertension Medications             nitroGLYCERIN (NITROSTAT) 0.4 MG SL tablet Place under the tongue.          While in the hospital, will manage blood pressure as follows; Continue home antihypertensive regimen    Will utilize p.r.n. blood pressure medication only if patient's blood pressure greater than 180/110 and he develops symptoms such as worsening chest pain or shortness of breath.

## 2023-06-15 NOTE — ASSESSMENT & PLAN NOTE
Patient with Paroxysmal (<7 days) atrial fibrillation which is controlled currently with Beta Blocker. Patient is currently in sinus rhythm.[id number] Score: 2. indicated. Anticoagulation done with Eliquis.

## 2023-06-15 NOTE — PLAN OF CARE
POC discussed with pt, pt verbalized understanding. Oriented x4. PIV cdi. NSR on tele. NPO at midnight for stress test today. Steroids administered. 2 liters 02, sats above 90%, reports SOB with ambulation. Productive cough with yellow, thick sputum. Neuro checks done, no changes. Troponins trending down. Reports 2/10 pain in chest, worse with coughing, denies needing anything for pain. Ambulates independently. Call light in reach, bed alarm set, safety maintained. No complaints or requests at this time, will continue to monitor.

## 2023-06-15 NOTE — PLAN OF CARE
In basket message sent to Dr Lamb office for scheduling  Email sent to [facility] pcp group requesting hospital follow up

## 2023-06-15 NOTE — ASSESSMENT & PLAN NOTE
Acute on chronic:  - continue home Levaquin  - scheduled breathing treatments  - s/p IV steroids x3  - prn supplemental oxygen   - no wheezing on my evaluation   - pulm consulted: appreciate recommendations   - transition to PO steroids  - procal ordered

## 2023-06-15 NOTE — ASSESSMENT & PLAN NOTE
Acute:  - cardiology consulted: appreciate recommendations  - Trending troponin   - on tele  - NTG q6  - NPO   - plan for stress test today  - 6/15: stress test per cards; troponin trending down; on tele; NTG q6;

## 2023-06-15 NOTE — SUBJECTIVE & OBJECTIVE
Interval History:  Patient seen examined.  Overnight notes reviewed.  Troponin trending downward.  Patient reports continued shortness of breath, chest pain, and cough.  Cardiology and pulmonology.  Plan for stress test today.        Review of Systems   Respiratory:  Positive for cough and shortness of breath.    Cardiovascular:  Positive for chest pain. Negative for palpitations.   Gastrointestinal:  Negative for abdominal pain, diarrhea and vomiting.   Objective:     Vital Signs (Most Recent):  Temp: 98.7 °F (37.1 °C) (06/15/23 0700)  Pulse: 74 (06/15/23 0925)  Resp: 18 (06/15/23 0733)  BP: (!) 142/84 (06/15/23 0925)  SpO2: 97 % (06/15/23 0733) Vital Signs (24h Range):  Temp:  [96.1 °F (35.6 °C)-98.7 °F (37.1 °C)] 98.7 °F (37.1 °C)  Pulse:  [] 74  Resp:  [14-22] 18  SpO2:  [93 %-100 %] 97 %  BP: ()/(55-84) 142/84     Weight: 70.3 kg (154 lb 15.7 oz)  Body mass index is 25.01 kg/m².    Intake/Output Summary (Last 24 hours) at 6/15/2023 1010  Last data filed at 6/15/2023 0000  Gross per 24 hour   Intake 540 ml   Output --   Net 540 ml         Physical Exam  Vitals and nursing note reviewed.   Constitutional:       General: He is not in acute distress.     Appearance: Normal appearance. He is obese.   HENT:      Head: Normocephalic and atraumatic.   Eyes:      Extraocular Movements: Extraocular movements intact.      Pupils: Pupils are equal, round, and reactive to light.   Cardiovascular:      Rate and Rhythm: Normal rate and regular rhythm.      Pulses: Normal pulses.      Heart sounds: Normal heart sounds.   Pulmonary:      Effort: Pulmonary effort is normal.      Breath sounds: Decreased breath sounds present.   Chest:          Comments: Left anterior chest wall TTP.   Abdominal:      General: Abdomen is flat. Bowel sounds are normal.      Palpations: Abdomen is soft.      Tenderness: There is no abdominal tenderness.   Skin:     Capillary Refill: Capillary refill takes 2 to 3 seconds.   Neurological:       General: No focal deficit present.      Mental Status: He is alert and oriented to person, place, and time. Mental status is at baseline.   Psychiatric:         Mood and Affect: Mood normal.         Behavior: Behavior normal.           Significant Labs: All pertinent labs within the past 24 hours have been reviewed.  CBC:   Recent Labs   Lab 06/13/23  2250 06/14/23  0408 06/15/23  0400   WBC 9.21 8.08 9.96   HGB 14.7 14.4 15.8   HCT 44.6 44.7 48.0    239 250     CMP:   Recent Labs   Lab 06/13/23  2250 06/14/23  0408 06/15/23  0400    141 140   K 3.9 3.7 4.3    107 102   CO2 24 25 26    105 164*   BUN 21 15 17   CREATININE 0.8 0.7 1.1   CALCIUM 8.6* 8.7 9.4   PROT 6.3 6.1 6.5   ALBUMIN 3.2* 3.1* 3.3*   BILITOT 0.4 0.3 0.3   ALKPHOS 71 68 75   AST 29 23 19   ALT 25 24 21   ANIONGAP 12 9 12     Troponin:   Recent Labs   Lab 06/14/23  1052 06/14/23  1514 06/14/23  1914   TROPONINI 0.608* 0.428* 0.365*       Significant Imaging: I have reviewed all pertinent imaging results/findings within the past 24 hours.

## 2023-06-15 NOTE — PROGRESS NOTES
[facility] Hospital Medicine  Progress Note    Patient Name: Martell Corcoran  MRN: [id number]  Patient Class: IP- Inpatient   Admission Date: 6/13/2023  Length of Stay: 1 days  Attending Physician: Valeria Braun MD  Primary Care Provider: Zo Burrell MD        Subjective:     Principal Problem:NSTEMI (non-ST elevated myocardial infarction)        HPI:  Martell Corcoran is a 67-year-old male who presents emergency room for evaluation of shortness of breath and chest pain.  He reports chest pain onset approximately 8 hours prior to arrival.  He does report intermittent chest pain for the past several weeks after he was exposed to insecticides spray.  He describes chest pain as a heavy sensation in the left anterior chest region.  No reported radiation of the pain, nausea, vomiting, or diaphoresis.  He was given nitroglycerin in the emergency room with improvement in pain.  He denies fever or chills.  No known sick contacts or travel.  Previous medical history includes COPD, AFib, obstructive sleep apnea on CPAP, hypertension, GERD, and coronary artery disease.  Of note patient is scheduled for an AFib ablation at [facility] tomorrow morning.  ER workup:  CBC and CMP unremarkable.  Troponin elevated at 0.254.  EKG demonstrates sinus rhythm without ST or T-wave elevation.  Patient admitted to Hospital Medicine for treatment management.  ER physician spoke with  who is okay with patient staying here for care.      Overview/Hospital Course:  No notes on file    Interval History:  Patient seen examined.  Overnight notes reviewed.  Troponin trending downward.  Patient reports continued shortness of breath, chest pain, and cough.  Cardiology and pulmonology following.  Plan for stress test today.        Review of Systems   Respiratory:  Positive for cough and shortness of breath.    Cardiovascular:  Positive for chest pain. Negative for palpitations.   Gastrointestinal:  Negative for  abdominal pain, diarrhea and vomiting.   Objective:     Vital Signs (Most Recent):  Temp: 98.7 °F (37.1 °C) (06/15/23 0700)  Pulse: 74 (06/15/23 0925)  Resp: 18 (06/15/23 0733)  BP: (!) 142/84 (06/15/23 0925)  SpO2: 97 % (06/15/23 0733) Vital Signs (24h Range):  Temp:  [96.1 °F (35.6 °C)-98.7 °F (37.1 °C)] 98.7 °F (37.1 °C)  Pulse:  [] 74  Resp:  [14-22] 18  SpO2:  [93 %-100 %] 97 %  BP: ()/(55-84) 142/84     Weight: 70.3 kg (154 lb 15.7 oz)  Body mass index is 25.01 kg/m².    Intake/Output Summary (Last 24 hours) at 6/15/2023 1010  Last data filed at 6/15/2023 0000  Gross per 24 hour   Intake 540 ml   Output --   Net 540 ml         Physical Exam  Vitals and nursing note reviewed.   Constitutional:       General: He is not in acute distress.     Appearance: Normal appearance. He is obese.   HENT:      Head: Normocephalic and atraumatic.   Eyes:      Extraocular Movements: Extraocular movements intact.      Pupils: Pupils are equal, round, and reactive to light.   Cardiovascular:      Rate and Rhythm: Normal rate and regular rhythm.      Pulses: Normal pulses.      Heart sounds: Normal heart sounds.   Pulmonary:      Effort: Pulmonary effort is normal.      Breath sounds: Decreased breath sounds present.   Chest:          Comments: Left anterior chest wall TTP.   Abdominal:      General: Abdomen is flat. Bowel sounds are normal.      Palpations: Abdomen is soft.      Tenderness: There is no abdominal tenderness.   Skin:     Capillary Refill: Capillary refill takes 2 to 3 seconds.   Neurological:      General: No focal deficit present.      Mental Status: He is alert and oriented to person, place, and time. Mental status is at baseline.   Psychiatric:         Mood and Affect: Mood normal.         Behavior: Behavior normal.           Significant Labs: All pertinent labs within the past 24 hours have been reviewed.  CBC:   Recent Labs   Lab 06/13/23  2250 06/14/23  0408 06/15/23  0400   WBC 9.21 8.08 9.96    HGB 14.7 14.4 15.8   HCT 44.6 44.7 48.0    239 250     CMP:   Recent Labs   Lab 06/13/23  2250 06/14/23  0408 06/15/23  0400    141 140   K 3.9 3.7 4.3    107 102   CO2 24 25 26    105 164*   BUN 21 15 17   CREATININE 0.8 0.7 1.1   CALCIUM 8.6* 8.7 9.4   PROT 6.3 6.1 6.5   ALBUMIN 3.2* 3.1* 3.3*   BILITOT 0.4 0.3 0.3   ALKPHOS 71 68 75   AST 29 23 19   ALT 25 24 21   ANIONGAP 12 9 12     Troponin:   Recent Labs   Lab 06/14/23  1052 06/14/23  1514 06/14/23  1914   TROPONINI 0.608* 0.428* 0.365*       Significant Imaging: I have reviewed all pertinent imaging results/findings within the past 24 hours.      Assessment/Plan:      * NSTEMI (non-ST elevated myocardial infarction)  Acute:  - cardiology consulted: appreciate recommendations  - Trending troponin   - on tele  - NTG q6  - NPO   - plan for stress test today  - 6/15: stress test per cards; troponin trending down; on tele; NTG q6;           Acute chemical bronchitis  Acute on chronic:  - continue home Levaquin  - scheduled breathing treatments  - s/p IV steroids x3  - prn supplemental oxygen   - no wheezing on my evaluation   - pulm consulted: appreciate recommendations   - transition to PO steroids  - procal ordered        Atrial fibrillation  Patient with Paroxysmal (<7 days) atrial fibrillation which is controlled currently with Beta Blocker. Patient is currently in sinus rhythm.[id number] Score: 2. indicated. Anticoagulation done with Eliquis.        GERD (gastroesophageal reflux disease)  Chronic  PPI      HTN (hypertension)  Chronic, controlled.  Latest blood pressure and vitals reviewed-     Temp:  [96.1 °F (35.6 °C)-98.7 °F (37.1 °C)]   Pulse:  []   Resp:  [16-22]   BP: ()/(55-84)   SpO2:  [93 %-100 %] .   Home meds for hypertension were reviewed and noted below.   Hypertension Medications             nitroGLYCERIN (NITROSTAT) 0.4 MG SL tablet Place under the tongue.          While in the hospital, will manage blood  pressure as follows; Continue home antihypertensive regimen    Will utilize p.r.n. blood pressure medication only if patient's blood pressure greater than 180/110 and he develops symptoms such as worsening chest pain or shortness of breath.      QUACH (dyspnea on exertion)          VTE Risk Mitigation (From admission, onward)         Ordered     apixaban tablet 5 mg  2 times daily         06/14/23 1242                Discharge Planning   DIAN: 6/16/2023     Code Status: Full Code   Is the patient medically ready for discharge?:     Reason for patient still in hospital (select all that apply): Patient trending condition, Laboratory test, Treatment, Imaging, Consult recommendations and Pending disposition  Discharge Plan A: Home with family                  Rachael Goldberg PA-C  Department of Hospital Medicine   [facility]

## 2023-06-16 ENCOUNTER — TELEPHONE (OUTPATIENT)
Dept: PULMONOLOGY | Facility: CLINIC | Age: 68
End: 2023-06-16
Payer: MEDICARE

## 2023-06-16 LAB
ALBUMIN SERPL BCP-MCNC: 3.1 G/DL (ref 3.5–5.2)
ALP SERPL-CCNC: 67 U/L (ref 55–135)
ALT SERPL W/O P-5'-P-CCNC: 19 U/L (ref 10–44)
ANION GAP SERPL CALC-SCNC: 10 MMOL/L (ref 8–16)
AST SERPL-CCNC: 13 U/L (ref 10–40)
BASOPHILS # BLD AUTO: 0.01 K/UL (ref 0–0.2)
BASOPHILS NFR BLD: 0.1 % (ref 0–1.9)
BILIRUB SERPL-MCNC: 0.2 MG/DL (ref 0.1–1)
BUN SERPL-MCNC: 18 MG/DL (ref 8–23)
CALCIUM SERPL-MCNC: 9.2 MG/DL (ref 8.7–10.5)
CHLORIDE SERPL-SCNC: 104 MMOL/L (ref 95–110)
CO2 SERPL-SCNC: 25 MMOL/L (ref 23–29)
CREAT SERPL-MCNC: 0.9 MG/DL (ref 0.5–1.4)
DIFFERENTIAL METHOD: ABNORMAL
EOSINOPHIL # BLD AUTO: 0 K/UL (ref 0–0.5)
EOSINOPHIL NFR BLD: 0.3 % (ref 0–8)
ERYTHROCYTE [DISTWIDTH] IN BLOOD BY AUTOMATED COUNT: 13.1 % (ref 11.5–14.5)
EST. GFR  (NO RACE VARIABLE): >60 ML/MIN/1.73 M^2
GLUCOSE SERPL-MCNC: 146 MG/DL (ref 70–110)
HCT VFR BLD AUTO: 42.8 % (ref 40–54)
HGB BLD-MCNC: 14.1 G/DL (ref 14–18)
IMM GRANULOCYTES # BLD AUTO: 0.05 K/UL (ref 0–0.04)
IMM GRANULOCYTES NFR BLD AUTO: 0.4 % (ref 0–0.5)
LYMPHOCYTES # BLD AUTO: 1.2 K/UL (ref 1–4.8)
LYMPHOCYTES NFR BLD: 10 % (ref 18–48)
MAGNESIUM SERPL-MCNC: 2 MG/DL (ref 1.6–2.6)
MCH RBC QN AUTO: 32.2 PG (ref 27–31)
MCHC RBC AUTO-ENTMCNC: 32.9 G/DL (ref 32–36)
MCV RBC AUTO: 98 FL (ref 82–98)
MONOCYTES # BLD AUTO: 1 K/UL (ref 0.3–1)
MONOCYTES NFR BLD: 8.8 % (ref 4–15)
NEUTROPHILS # BLD AUTO: 9.4 K/UL (ref 1.8–7.7)
NEUTROPHILS NFR BLD: 80.4 % (ref 38–73)
NRBC BLD-RTO: 0 /100 WBC
PLATELET # BLD AUTO: 263 K/UL (ref 150–450)
PMV BLD AUTO: 8.5 FL (ref 9.2–12.9)
POTASSIUM SERPL-SCNC: 4.3 MMOL/L (ref 3.5–5.1)
PROT SERPL-MCNC: 5.9 G/DL (ref 6–8.4)
RBC # BLD AUTO: 4.38 M/UL (ref 4.6–6.2)
SODIUM SERPL-SCNC: 139 MMOL/L (ref 136–145)
WBC # BLD AUTO: 11.66 K/UL (ref 3.9–12.7)

## 2023-06-16 PROCEDURE — 25000003 PHARM REV CODE 250

## 2023-06-16 PROCEDURE — 83735 ASSAY OF MAGNESIUM: CPT | Performed by: NURSE PRACTITIONER

## 2023-06-16 PROCEDURE — 12000002 HC ACUTE/MED SURGE SEMI-PRIVATE ROOM

## 2023-06-16 PROCEDURE — 99232 PR SUBSEQUENT HOSPITAL CARE,LEVL II: ICD-10-PCS | Mod: ,,, | Performed by: INTERNAL MEDICINE

## 2023-06-16 PROCEDURE — 85025 COMPLETE CBC W/AUTO DIFF WBC: CPT | Performed by: NURSE PRACTITIONER

## 2023-06-16 PROCEDURE — 94618 PULMONARY STRESS TESTING: CPT

## 2023-06-16 PROCEDURE — 97165 OT EVAL LOW COMPLEX 30 MIN: CPT

## 2023-06-16 PROCEDURE — 80053 COMPREHEN METABOLIC PANEL: CPT | Performed by: NURSE PRACTITIONER

## 2023-06-16 PROCEDURE — 94761 N-INVAS EAR/PLS OXIMETRY MLT: CPT

## 2023-06-16 PROCEDURE — 99900035 HC TECH TIME PER 15 MIN (STAT)

## 2023-06-16 PROCEDURE — 63600175 PHARM REV CODE 636 W HCPCS: Performed by: INTERNAL MEDICINE

## 2023-06-16 PROCEDURE — 36415 COLL VENOUS BLD VENIPUNCTURE: CPT | Performed by: NURSE PRACTITIONER

## 2023-06-16 PROCEDURE — 25000242 PHARM REV CODE 250 ALT 637 W/ HCPCS: Performed by: NURSE PRACTITIONER

## 2023-06-16 PROCEDURE — 97161 PT EVAL LOW COMPLEX 20 MIN: CPT

## 2023-06-16 PROCEDURE — 94640 AIRWAY INHALATION TREATMENT: CPT

## 2023-06-16 PROCEDURE — 63600175 PHARM REV CODE 636 W HCPCS

## 2023-06-16 PROCEDURE — 97530 THERAPEUTIC ACTIVITIES: CPT

## 2023-06-16 PROCEDURE — 94799 UNLISTED PULMONARY SVC/PX: CPT

## 2023-06-16 PROCEDURE — 99232 SBSQ HOSP IP/OBS MODERATE 35: CPT | Mod: ,,, | Performed by: INTERNAL MEDICINE

## 2023-06-16 PROCEDURE — 25000003 PHARM REV CODE 250: Performed by: NURSE PRACTITIONER

## 2023-06-16 PROCEDURE — 27000221 HC OXYGEN, UP TO 24 HOURS

## 2023-06-16 RX ORDER — ISOSORBIDE MONONITRATE 30 MG/1
30 TABLET, EXTENDED RELEASE ORAL DAILY
Qty: 30 TABLET | Refills: 0 | Status: SHIPPED | OUTPATIENT
Start: 2023-06-16 | End: 2023-07-06

## 2023-06-16 RX ORDER — PREDNISONE 10 MG/1
TABLET ORAL
Qty: 15 TABLET | Refills: 0 | Status: SHIPPED | OUTPATIENT
Start: 2023-06-16 | End: 2023-06-16 | Stop reason: HOSPADM

## 2023-06-16 RX ORDER — ALPRAZOLAM 0.25 MG/1
0.25 TABLET ORAL DAILY PRN
Status: DISCONTINUED | OUTPATIENT
Start: 2023-06-16 | End: 2023-06-17 | Stop reason: HOSPADM

## 2023-06-16 RX ADMIN — TAMSULOSIN HYDROCHLORIDE 0.4 MG: 0.4 CAPSULE ORAL at 08:06

## 2023-06-16 RX ADMIN — PANTOPRAZOLE SODIUM 40 MG: 40 TABLET, DELAYED RELEASE ORAL at 08:06

## 2023-06-16 RX ADMIN — ISOSORBIDE MONONITRATE 30 MG: 30 TABLET, EXTENDED RELEASE ORAL at 08:06

## 2023-06-16 RX ADMIN — FLUTICASONE FUROATE, UMECLIDINIUM BROMIDE AND VILANTEROL TRIFENATATE 1 PUFF: 200; 62.5; 25 POWDER RESPIRATORY (INHALATION) at 07:06

## 2023-06-16 RX ADMIN — ALPRAZOLAM 0.25 MG: 0.25 TABLET ORAL at 04:06

## 2023-06-16 RX ADMIN — IPRATROPIUM BROMIDE AND ALBUTEROL SULFATE 3 ML: .5; 3 SOLUTION RESPIRATORY (INHALATION) at 11:06

## 2023-06-16 RX ADMIN — APIXABAN 5 MG: 2.5 TABLET, FILM COATED ORAL at 08:06

## 2023-06-16 RX ADMIN — IPRATROPIUM BROMIDE AND ALBUTEROL SULFATE 3 ML: .5; 3 SOLUTION RESPIRATORY (INHALATION) at 03:06

## 2023-06-16 RX ADMIN — METHYLPREDNISOLONE SODIUM SUCCINATE 80 MG: 40 INJECTION, POWDER, FOR SOLUTION INTRAMUSCULAR; INTRAVENOUS at 10:06

## 2023-06-16 RX ADMIN — ESCITALOPRAM OXALATE 10 MG: 10 TABLET, FILM COATED ORAL at 08:06

## 2023-06-16 RX ADMIN — PREDNISONE 20 MG: 20 TABLET ORAL at 08:06

## 2023-06-16 RX ADMIN — APIXABAN 5 MG: 2.5 TABLET, FILM COATED ORAL at 09:06

## 2023-06-16 RX ADMIN — IPRATROPIUM BROMIDE AND ALBUTEROL SULFATE 3 ML: .5; 3 SOLUTION RESPIRATORY (INHALATION) at 08:06

## 2023-06-16 RX ADMIN — IPRATROPIUM BROMIDE AND ALBUTEROL SULFATE 3 ML: .5; 3 SOLUTION RESPIRATORY (INHALATION) at 04:06

## 2023-06-16 RX ADMIN — IPRATROPIUM BROMIDE AND ALBUTEROL SULFATE 3 ML: .5; 3 SOLUTION RESPIRATORY (INHALATION) at 12:06

## 2023-06-16 RX ADMIN — PITAVASTATIN CALCIUM 4 MG: 4.18 TABLET, FILM COATED ORAL at 08:06

## 2023-06-16 RX ADMIN — FINASTERIDE 5 MG: 5 TABLET, FILM COATED ORAL at 08:06

## 2023-06-16 RX ADMIN — IPRATROPIUM BROMIDE AND ALBUTEROL SULFATE 3 ML: .5; 3 SOLUTION RESPIRATORY (INHALATION) at 07:06

## 2023-06-16 RX ADMIN — GUAIFENESIN 200 MG: 200 SOLUTION ORAL at 10:06

## 2023-06-16 RX ADMIN — ASPIRIN 81 MG: 81 TABLET, COATED ORAL at 08:06

## 2023-06-16 RX ADMIN — GUAIFENESIN 200 MG: 200 SOLUTION ORAL at 06:06

## 2023-06-16 NOTE — PLAN OF CARE
Goals to be met by: 6/30/2023     Patient will increase functional independence with ADLs by performing:    UE Dressing with [place].  LE Dressing with Modified [place].  Grooming while standing with Modified [place].  Toileting from toilet with Modified [place] for hygiene and clothing management.   Toilet transfer to toilet with Modified [place].    OT POC initiated and established.

## 2023-06-16 NOTE — PT/OT/SLP EVAL
Occupational Therapy Evaluation and Treatment    Name: Martell Corcoran  MRN: [id number]  Admitting Diagnosis: NSTEMI (non-ST elevated myocardial infarction)  Recent Surgery: * No surgery found *      Recommendations:     Discharge Recommendations: home, outpatient OT  Level of Assistance Recommended: Intermittent supervision  Discharge Equipment Recommendations: none  Barriers to discharge: None    Assessment:     Martell Corcoran is a 67 y.o. male with a medical diagnosis of NSTEMI (non-ST elevated myocardial infarction). He presents with performance deficits affecting function including weakness, impaired endurance, impaired self care skills, impaired functional mobility, gait instability, decreased safety awareness, impaired cardiopulmonary response to activity.     Patient agreeable to participate in OT evaluation/treatment. Supervision supine<>sit, SBA sit<>stand, and Set-up/Independent for feeding. Patient with significant coughing spell upon supine > sit and reporting increased abdominal/rib pain with coughing.     Recommend home with SO and may benefit from outpatient therapy.    Rehab Prognosis: Good; patient would benefit from acute OT services to address these deficits and reach maximum level of function.    Plan:     Patient to be seen 3 x/week to address the above listed problems via self-care/home management, therapeutic activities, therapeutic exercises  Plan of Care Expires: 06/30/23  Plan of Care Reviewed with: patient, significant other    Subjective     Chief Complaint: Expressing frustration regarding his recent visit to ED resulting in discharge and now in hospital for father's day  Patient Comments/Goals: To get his current issues resolved and be back at baseline  Pain/Comfort:  Pain Rating 1: other (see comments) (not rated)  Location - Side 1: Bilateral  Location - Orientation 1: generalized  Location 1:  (Abdominal pain upon coughing)  Pain Addressed 1: Reposition, Distraction,  Cessation of Activity, Nurse notified  Pain Rating Post-Intervention 1:  (No pain reported at end of session)    Patients cultural, spiritual, Sikhism conflicts given the current situation: no    Social History:  Living Environment: Patient  lives with significant other in single story home with 0 steps to enter and tub/shower combo    Prior Level of Function: Prior to admission, patient was independent  Roles and Routines: Patient was driving prior to admission.  Equipment Used at Home: other (see comments) (Owns but does not use single point cane and transfer tub bench)  DME owned (not currently used): single point cane and bath bench  Assistance Upon Discharge:  significant other and may benefit from outpatient therapy services    Objective:     Communicated with Nurse, Carmen, prior to session. Patient found  left side-lying with HOB elevated  with telemetry (SO bedside) upon OT entry to room.    General Precautions: Standard, fall   Orthopedic Precautions: N/A   Braces: N/A    Respiratory Status: Room air    Occupational Performance    Bed Mobility:   Supine to sit from left side of bed with supervision    Functional Mobility/Transfers:  Sit <> Stand Transfer with stand by assistance with no AD  Functional Mobility: Anterior/posterior scooting edge of bed with Supervision    Activities of Daily Living:  Feeding: Set-up and otherwise Independent   Grooming: Set-up from sitting edge of bed  Upper Body Dressing: SBA  Lower Body Dressing: SBA  Toileting: SBA/CGA    Cognitive/Visual Perceptual:  Oriented x 4; cooperative; follows multi-step commands; patient expressing frustration regarding his recent ED visit resulting in discharge then back to ED; patient frustrated he will likely be in the hospital for Father's Day and that he is doing worse than he was before the first ED visit    Physical Exam:  Postural examination/scapula alignment:    -       No postural abnormalities identified  Dominant hand: Right  Upper  Extremity Range of Motion:     -       Right Upper Extremity: WFL  -       Left Upper Extremity: WFL  Upper Extremity Strength:    -       Right Upper Extremity: WFL  -       Left Upper Extremity: WFL   Strength:    -       Right Upper Extremity: WFL  -       Left Upper Extremity: WFL    AMPAC 6 Click ADL:  AMPAC Total Score: 19    Treatment & Education:  Therapist provided facilitation and instruction of proper body mechanics, energy conservation, and fall prevention strategies during tasks listed above  Patient educated on role of OT, POC, and goals for therapy  OTR reinforcing education/instruction regarding safety awareness/fall prevention including use of call button for staff assistance with all mobility - patient verbalizes understanding and in agreement  OTR initiating education/instruction regarding energy conservation   OTR initiating discharge planning     Patient left  sitting edge of bed  with all lines intact, call button in reach, RN notified, and significant other present.    GOALS:   Multidisciplinary Problems       Occupational Therapy Goals          Problem: Occupational Therapy    Goal Priority Disciplines Outcome Interventions   Occupational Therapy Goal     OT, PT/OT     Description: Goals to be met by: 6/30/2023     Patient will increase functional independence with ADLs by performing:    UE Dressing with [place].  LE Dressing with Modified [place].  Grooming while standing with Modified [place].  Toileting from toilet with Modified [place] for hygiene and clothing management.   Toilet transfer to toilet with Modified [place].                         History:     Past Medical History:   Diagnosis Date    Benign enlargement of prostate     Had a biopsy in around 2015    Elevated PSA     GERD (gastroesophageal reflux disease)     Hypertension     Started with meds in 2012.    Kidney stone     Urinary tract infection          Past Surgical History:   Procedure  Laterality Date    FISTULA REPAIR      LEFT HEART CATHETERIZATION N/A 10/23/2018    Procedure: Left heart cath;  Surgeon: Niharika Squires MD;  [facility];  Service: Cardiology;  Laterality: N/A;       Time Tracking:     OT Date of Treatment: 06/16/23  OT Start Time: 1340  OT Stop Time: 1400  OT Total Time (min): 20 min    Billable Minutes: Evaluation 10 and Therapeutic Activity 10    6/16/2023

## 2023-06-16 NOTE — PT/OT/SLP EVAL
Physical Therapy Evaluation    Patient Name:  Martell Corcoran   MRN:  [id number]    Recommendations:     Discharge Recommendations: home, outpatient PT   Discharge Equipment Recommendations: none   Barriers to discharge: None    Assessment:     Martell Corcoran is a 67 y.o. male admitted with a medical diagnosis of NSTEMI (non-ST elevated myocardial infarction).  He presents with the following impairments/functional limitations: weakness, impaired endurance, impaired cardiopulmonary response to activity, impaired balance, gait instability, impaired functional mobility, decreased lower extremity function  .    Rehab Prognosis: Good; patient would benefit from acute skilled PT services to address these deficits and reach maximum level of function.    Recent Surgery: * No surgery found *      Plan:     During this hospitalization, patient to be seen 6 x/week to address the identified rehab impairments via gait training, therapeutic activities, therapeutic exercises and progress toward the following goals:    Plan of Care Expires:  06/30/23    Subjective     Patient/Family Comments/goals: agrees to work with PT, wants to walk in bales, reports recent (~ 1 month) decline in strength     Living Environment:  House with fiance, 0 steps to enter, able to live on 1st floor  Prior to admission, patients level of function was fully independent.  Equipment used at home: none.  DME owned (not currently used): single point cane.  Upon discharge, patient will have assistance from SO.    Objective:     Communicated with nurse (Carmen) prior to session.  Patient found left sidelying with telemetry  upon PT entry to room.    General Precautions: Standard, fall  Orthopedic Precautions:N/A   Braces: N/A  Respiratory Status: Room air    Functional Mobility:  Bed Mobility:     Rolling Left:  stand by assistance  Supine to Sit: stand by assistance  Transfers:     Sit to Stand:  stand by assistance with no AD  Gait: 250' with SBA  (with some initial use of side rail)      AM-PAC 6 CLICK MOBILITY  Total Score:18       Treatment & Education:  Mobility training as above with cues for technique  SEATED: LAQ, ankle DF/PF, x 10 reps each  STANDING: (with LUE support): ankle DF/PF, x 8 reps (rec'd to perform above pain free exercises)    Patient left sitting edge of bed with call button in reach and SO present.    GOALS:   Multidisciplinary Problems       Physical Therapy Goals          Problem: Physical Therapy    Goal Priority Disciplines Outcome Goal Variances Interventions   Physical Therapy Goal     PT, PT/OT Ongoing, Progressing     Description: Goals to be met by: 2023     Patient will increase functional independence with mobility by performin). Supine to sit with [place]  2). Sit to supine with [place]  3). Sit to stand transfer with [place]  4). Gait  x > 200 feet with [place]                          History:     Past Medical History:   Diagnosis Date    Benign enlargement of prostate     Had a biopsy in around     Elevated PSA     GERD (gastroesophageal reflux disease)     Hypertension     Started with meds in .    Kidney stone     Urinary tract infection        Past Surgical History:   Procedure Laterality Date    FISTULA REPAIR      LEFT HEART CATHETERIZATION N/A 10/23/2018    Procedure: Left heart cath;  Surgeon: Niharika Squires MD;  Location: [facility];  Service: Cardiology;  Laterality: N/A;       Time Tracking:     PT Received On: 23  PT Start Time: 1115     PT Stop Time: 1130  PT Total Time (min): 15 min     Billable Minutes: Evaluation 15      2023

## 2023-06-16 NOTE — ASSESSMENT & PLAN NOTE
Acute on chronic:  - continue home Levaquin  - scheduled breathing treatments  - s/p IV steroids x3  - prn supplemental oxygen   - no wheezing on my evaluation   - pulm consulted: appreciate recommendations   - transition to PO steroids  - procal ordered:negative  - 6/16: reports worse SOB and cough; pulm following; IV steroids; scheduled bronchodilators; prn supplemental oxygen

## 2023-06-16 NOTE — SUBJECTIVE & OBJECTIVE
Interval History:  Patient seen examined. Overnight notes reviewed. Patient reports feeling worse today. He reports chest tightness from coughing, productive cough, and SOB. He denies CP. Pulmonology following. Patient placed on IV steroids.       Review of Systems   Respiratory:  Positive for cough, chest tightness and shortness of breath.    Cardiovascular:  Negative for chest pain and palpitations.   Gastrointestinal:  Negative for abdominal pain, diarrhea and vomiting.   Objective:     Vital Signs (Most Recent):  Temp: 97.6 °F (36.4 °C) (06/16/23 0327)  Pulse: 78 (06/16/23 1103)  Resp: 20 (06/16/23 1103)  BP: 134/63 (06/16/23 0327)  SpO2: (!) 93 % (06/16/23 1103) Vital Signs (24h Range):  Temp:  [96.7 °F (35.9 °C)-98.4 °F (36.9 °C)] 97.6 °F (36.4 °C)  Pulse:  [62-93] 78  Resp:  [17-22] 20  SpO2:  [93 %-98 %] 93 %  BP: (134-144)/(63-74) 134/63     Weight: 70.3 kg (154 lb 15.7 oz)  Body mass index is 25.01 kg/m².    Intake/Output Summary (Last 24 hours) at 6/16/2023 1149  Last data filed at 6/16/2023 0603  Gross per 24 hour   Intake 720 ml   Output --   Net 720 ml           Physical Exam  Vitals and nursing note reviewed.   Constitutional:       General: He is not in acute distress.     Appearance: Normal appearance. He is obese.   HENT:      Head: Normocephalic and atraumatic.   Eyes:      Extraocular Movements: Extraocular movements intact.      Pupils: Pupils are equal, round, and reactive to light.   Cardiovascular:      Rate and Rhythm: Normal rate and regular rhythm.      Pulses: Normal pulses.      Heart sounds: Normal heart sounds.   Pulmonary:      Effort: Pulmonary effort is normal.      Breath sounds: Decreased breath sounds present.   Chest:          Comments: Left anterior chest wall TTP.   Abdominal:      General: Abdomen is flat. Bowel sounds are normal.      Palpations: Abdomen is soft.      Tenderness: There is no abdominal tenderness.   Skin:     Capillary Refill: Capillary refill takes 2 to 3  seconds.   Neurological:      General: No focal deficit present.      Mental Status: He is alert and oriented to person, place, and time. Mental status is at baseline.   Psychiatric:         Mood and Affect: Mood normal.         Behavior: Behavior normal.           Significant Labs: All pertinent labs within the past 24 hours have been reviewed.  CBC:   Recent Labs   Lab 06/15/23  0400 06/16/23  0252   WBC 9.96 11.66   HGB 15.8 14.1   HCT 48.0 42.8    263       CMP:   Recent Labs   Lab 06/15/23  0400 06/16/23  0252    139   K 4.3 4.3    104   CO2 26 25   * 146*   BUN 17 18   CREATININE 1.1 0.9   CALCIUM 9.4 9.2   PROT 6.5 5.9*   ALBUMIN 3.3* 3.1*   BILITOT 0.3 0.2   ALKPHOS 75 67   AST 19 13   ALT 21 19   ANIONGAP 12 10       Troponin:   Recent Labs   Lab 06/14/23  1514 06/14/23  1914   TROPONINI 0.428* 0.365*         Significant Imaging: I have reviewed all pertinent imaging results/findings within the past 24 hours.

## 2023-06-16 NOTE — ASSESSMENT & PLAN NOTE
Acute:  - cardiology consulted: appreciate recommendations  - Trending troponin   - on tele  - NTG q6  - NPO   - plan for stress test today  - 6/15: stress test per cards; troponin trending down; on tele; NTG q6;   6/16: s/p stress test; CP resolved; cards recommended isosorbide mononitrate on d/c

## 2023-06-16 NOTE — PROGRESS NOTES
Progress Note  PULMONARY    Admit Date: 6/13/2023 06/16/2023  History of Present Illness:  Pt is a 66 yo male with COPD, atrial fib on eliquis, CAD, GERD, HTN, nephrolithiasis, bladder cancer, BPH, GLENNA on CPAP who presented to ED on 6/14 with SOB and chest pain, found to have elevated troponin and wheezing. He was recently exposed to insecticide spray last month and has been having intermittent chest pains for weeks. Pulmonary is consulted for further evaluation of wheezing. Pt had seen Dr. Carolina in clinic 3 wk ago and treated for COPD infection post insecticide exposure- had been on prednisone and levaquin.  Pt states would get severely SOB with activity then start to have chest pain, had one episode prior to admission which was very severe. Now he still gets chest soreness but feels improved. Pain is pressure like, better with rest and better w/ nitroglycerin. CP comes and goes, depending on activity. He is planned to have nuclear stress ender today. Was told in past he had evidence of prior MI but has no stents. He was having yellow phlegm prior to hospitalization but now resolved. He had used CPAP at home in past, still has old machine at home but not currently using, states sleeps well and didn't feel it was needed. He denies home O2 use.    SUBJECTIVE:     6/16- he started feeling worse overnight w/ tightness in chest, wheezing, coughing up yellow, extreme QUACH ambulating      OBJECTIVE:     Vitals (Most recent):  Vitals:    06/16/23 0734   BP:    Pulse: 79   Resp: (!) 22   Temp:        Vitals (24 hour range):  Temp:  [96.7 °F (35.9 °C)-98.4 °F (36.9 °C)]   Pulse:  [62-93]   Resp:  [17-22]   BP: (134-144)/(63-84)   SpO2:  [94 %-98 %]       Intake/Output Summary (Last 24 hours) at 6/16/2023 0818  Last data filed at 6/16/2023 0603  Gross per 24 hour   Intake 720 ml   Output --   Net 720 ml          Physical Exam:  The patient's neuro status (alertness,orientation,cognitive function,motor skills,), pharyngeal exam  (oral lesions, hygiene, abn dentition,), Neck (jvd,mass,thyroid,nodes in neck and above/below clavicle),RESPIRATORY(symmetry,effort,fremitus,percussion,auscultation),  Cor(rhythm,heart tones including gallops,perfusion,edema)ABD(distention,hepatic&splenomegaly,tenderness,masses), Skin(rash,cyanosis),Psyc(affect,judgement,).  Exam negative except for these pertinent findings:    Oropharynx clear, M4  HR regular  Breath sounds diminished bilaterally, exam limited due to coughing spells w/ inspiration  Abd soft nontender  No edema/clubbing     Radiographs reviewed: view by direct vision   CXR 6/13- clear lung fields    Labs     Recent Labs   Lab 06/16/23  0252   WBC 11.66   HGB 14.1   HCT 42.8        Recent Labs   Lab 06/15/23  1034 06/16/23  0252   NA  --  139   K  --  4.3   CL  --  104   CO2  --  25   BUN  --  18   CREATININE  --  0.9   GLU  --  146*   CALCIUM  --  9.2   MG  --  2.0   AST  --  13   ALT  --  19   ALKPHOS  --  67   BILITOT  --  0.2   PROT  --  5.9*   ALBUMIN  --  3.1*   PROCAL 0.02  --    No results for input(s): PH, PCO2, PO2, HCO3 in the last 24 hours.  Microbiology Results (last 7 days)       Procedure Component Value Units Date/Time    Culture, Respiratory with Gram Stain [[id number]] Collected: 06/15/23 1340    Order Status: Completed Specimen: Respiratory from Sputum Updated: 06/15/23 2313     Gram Stain (Respiratory) <10 epithelial cells per low power field.     Gram Stain (Respiratory) Moderate WBC's     Gram Stain (Respiratory) Moderate Gram positive cocci     Gram Stain (Respiratory) Few Gram negative rods    Culture, Respiratory with Gram Stain [[id number]] Collected: 06/15/23 2025    Order Status: Sent Specimen: Sputum, Expectorated Updated: 06/15/23 2102            Impression:  Active Hospital Problems    Diagnosis  POA    *NSTEMI (non-ST elevated myocardial infarction) [I21.4]  Yes    Acute chemical bronchitis [J68.0]  Yes    Atrial fibrillation [I48.91]  Yes    HTN (hypertension)  [I10]  Yes    GERD (gastroesophageal reflux disease) [K21.9]  Yes      Resolved Hospital Problems   No resolved problems to display.               Plan:     Demand ischemia vs NSTEMI  Atrial fib  COPD with acute exacerbation- suspect triggered by chemical bronchitis     - procal negative and antibiotic has been deescalated  - continue inhaled bronchodilators, trelegy inhaler  - escalate steroid to solumedrol 80mg daily- if this helps ok to dc him with prednisone 60mg daily until f/u in clinic  - supplemental O2 to keep sats 89-92%  - needs home O2 eval prior to dc  - consider dc home tomorrow if improved  - f/u in pulmonary clinic in 1-2 weeks with me or Dr. Carolina  - over the weekend please call Dr. Minaya if further questions    Zayra Marcelo MD  Pulmonary & Critical Care Medicine

## 2023-06-16 NOTE — HOSPITAL COURSE
Patient was admitted to the hospital with elevated troponin and concerns for NSTEMI.  The patient was monitored closely throughout his hospital stay.  Cardiology was consulted on admission.  Troponins were trended and peaked and trended downward. Patient reported continued dyspnea, started on IV steroids, and pulmonology was consulted.  Patient underwent stress test on 6/15.  Steroids were transitioned to oral.  Patient reported worsening shortness a breath and patient was placed back on IV steroids per pulmonology. Patient's breathing improved following steroids and satting well on RA. Underwent desaturation screening and did not require Home O2. Patient to be discharged with close pulmonology and cardiology follow up. Patient discharged with oral steroids per pulmonology recommendations. Patient and family verbalized understanding of discharge instructions and return precautions.

## 2023-06-16 NOTE — PROGRESS NOTES
[facility] Hospital Medicine  Progress Note    Patient Name: Mratell Corcoran  MRN: [id number]  Patient Class: IP- Inpatient   Admission Date: 6/13/2023  Length of Stay: 2 days  Attending Physician: Valeria Braun MD  Primary Care Provider: Zo Burrell MD        Subjective:     Principal Problem:NSTEMI (non-ST elevated myocardial infarction)        HPI:  Martell Corcoran is a 67-year-old male who presents emergency room for evaluation of shortness of breath and chest pain.  He reports chest pain onset approximately 8 hours prior to arrival.  He does report intermittent chest pain for the past several weeks after he was exposed to insecticides spray.  He describes chest pain as a heavy sensation in the left anterior chest region.  No reported radiation of the pain, nausea, vomiting, or diaphoresis.  He was given nitroglycerin in the emergency room with improvement in pain.  He denies fever or chills.  No known sick contacts or travel.  Previous medical history includes COPD, AFib, obstructive sleep apnea on CPAP, hypertension, GERD, and coronary artery disease.  Of note patient is scheduled for an AFib ablation at [facility] tomorrow morning.  ER workup:  CBC and CMP unremarkable.  Troponin elevated at 0.254.  EKG demonstrates sinus rhythm without ST or T-wave elevation.  Patient admitted to Hospital Medicine for treatment management.  ER physician spoke with  who is okay with patient staying here for care.      Overview/Hospital Course:  No notes on file    Interval History:  Patient seen examined. Overnight notes reviewed. Patient reports feeling worse today. He reports chest tightness from coughing, productive cough, and SOB. He denies CP. Pulmonology following. Patient placed on IV steroids.       Review of Systems   Respiratory:  Positive for cough, chest tightness and shortness of breath.    Cardiovascular:  Negative for chest pain and palpitations.   Gastrointestinal:   Negative for abdominal pain, diarrhea and vomiting.   Objective:     Vital Signs (Most Recent):  Temp: 97.6 °F (36.4 °C) (06/16/23 0327)  Pulse: 78 (06/16/23 1103)  Resp: 20 (06/16/23 1103)  BP: 134/63 (06/16/23 0327)  SpO2: (!) 93 % (06/16/23 1103) Vital Signs (24h Range):  Temp:  [96.7 °F (35.9 °C)-98.4 °F (36.9 °C)] 97.6 °F (36.4 °C)  Pulse:  [62-93] 78  Resp:  [17-22] 20  SpO2:  [93 %-98 %] 93 %  BP: (134-144)/(63-74) 134/63     Weight: 70.3 kg (154 lb 15.7 oz)  Body mass index is 25.01 kg/m².    Intake/Output Summary (Last 24 hours) at 6/16/2023 1149  Last data filed at 6/16/2023 0603  Gross per 24 hour   Intake 720 ml   Output --   Net 720 ml           Physical Exam  Vitals and nursing note reviewed.   Constitutional:       General: He is not in acute distress.     Appearance: Normal appearance. He is obese.   HENT:      Head: Normocephalic and atraumatic.   Eyes:      Extraocular Movements: Extraocular movements intact.      Pupils: Pupils are equal, round, and reactive to light.   Cardiovascular:      Rate and Rhythm: Normal rate and regular rhythm.      Pulses: Normal pulses.      Heart sounds: Normal heart sounds.   Pulmonary:      Effort: Pulmonary effort is normal.      Breath sounds: Decreased breath sounds present.   Chest:          Comments: Left anterior chest wall TTP.   Abdominal:      General: Abdomen is flat. Bowel sounds are normal.      Palpations: Abdomen is soft.      Tenderness: There is no abdominal tenderness.   Skin:     Capillary Refill: Capillary refill takes 2 to 3 seconds.   Neurological:      General: No focal deficit present.      Mental Status: He is alert and oriented to person, place, and time. Mental status is at baseline.   Psychiatric:         Mood and Affect: Mood normal.         Behavior: Behavior normal.           Significant Labs: All pertinent labs within the past 24 hours have been reviewed.  CBC:   Recent Labs   Lab 06/15/23  0400 06/16/23  0252   WBC 9.96 11.66   HGB  15.8 14.1   HCT 48.0 42.8    263       CMP:   Recent Labs   Lab 06/15/23  0400 06/16/23  0252    139   K 4.3 4.3    104   CO2 26 25   * 146*   BUN 17 18   CREATININE 1.1 0.9   CALCIUM 9.4 9.2   PROT 6.5 5.9*   ALBUMIN 3.3* 3.1*   BILITOT 0.3 0.2   ALKPHOS 75 67   AST 19 13   ALT 21 19   ANIONGAP 12 10       Troponin:   Recent Labs   Lab 06/14/23  1514 06/14/23  1914   TROPONINI 0.428* 0.365*         Significant Imaging: I have reviewed all pertinent imaging results/findings within the past 24 hours.      Assessment/Plan:      * NSTEMI (non-ST elevated myocardial infarction)  Acute:  - cardiology consulted: appreciate recommendations  - Trending troponin   - on tele  - NTG q6  - NPO   - plan for stress test today  - 6/15: stress test per cards; troponin trending down; on tele; NTG q6;   6/16: s/p stress test; CP resolved; cards recommended isosorbide mononitrate on d/c          Acute chemical bronchitis  Acute on chronic:  - continue home Levaquin  - scheduled breathing treatments  - s/p IV steroids x3  - prn supplemental oxygen   - no wheezing on my evaluation   - pulm consulted: appreciate recommendations   - transition to PO steroids  - procal ordered:negative  - 6/16: reports worse SOB and cough; pulm following; IV steroids; scheduled bronchodilators; prn supplemental oxygen      Atrial fibrillation  Patient with Paroxysmal (<7 days) atrial fibrillation which is controlled currently with Beta Blocker. Patient is currently in sinus rhythm.[id number] Score: 2. indicated. Anticoagulation done with Eliquis.        GERD (gastroesophageal reflux disease)  Chronic  PPI      HTN (hypertension)  Chronic, controlled.  Latest blood pressure and vitals reviewed-     Temp:  [96.1 °F (35.6 °C)-98.7 °F (37.1 °C)]   Pulse:  []   Resp:  [16-22]   BP: ()/(55-84)   SpO2:  [93 %-100 %] .   Home meds for hypertension were reviewed and noted below.   Hypertension Medications              nitroGLYCERIN (NITROSTAT) 0.4 MG SL tablet Place under the tongue.          While in the hospital, will manage blood pressure as follows; Continue home antihypertensive regimen    Will utilize p.r.n. blood pressure medication only if patient's blood pressure greater than 180/110 and he develops symptoms such as worsening chest pain or shortness of breath.      QUACH (dyspnea on exertion)          VTE Risk Mitigation (From admission, onward)         Ordered     apixaban tablet 5 mg  2 times daily         06/14/23 1242                Discharge Planning   DIAN: 6/16/2023     Code Status: Full Code   Is the patient medically ready for discharge?:     Reason for patient still in hospital (select all that apply): Patient trending condition, Laboratory test, Treatment, Consult recommendations, PT / OT recommendations and Pending disposition  Discharge Plan A: Home with family                  Rachael Goldberg PA-C  Department of Hospital Medicine   [facility]

## 2023-06-16 NOTE — PLAN OF CARE
Cardiology appt scheduled  In basket message sent requesting pulm appt  Per Gianna @ Dr Burrell's office- they will contact the pt for scheduling

## 2023-06-16 NOTE — TELEPHONE ENCOUNTER
Appointment made.     ----- Message from Jose Godoy LPN sent at 6/16/2023  9:42 AM CDT -----  Regarding: hospital f/u  Good morning. Pt is discharging today from [facility]. Dx COPD. Please schedule. Thank you

## 2023-06-16 NOTE — PLAN OF CARE
Problem: Adult Inpatient Plan of Care  Goal: Plan of Care Review  Outcome: Ongoing, Progressing  Goal: Patient-Specific Goal (Individualized)  Outcome: Ongoing, Progressing  Goal: Absence of Hospital-Acquired Illness or Injury  Outcome: Ongoing, Progressing     Problem: Infection  Goal: Absence of Infection Signs and Symptoms  Outcome: Ongoing, Progressing   Patient alert and oriented resting in bed. NAD. Denies pain or SOB. VSS. Urinal at bedside. O2@2LNC PRN. Normal Sr. Wife at bedside throughout shift. Bed alarm active. Plan of care reviewed with patient. Verbalizes understanding.Call light in reach. Pt free from fall or injury. Will monitor.

## 2023-06-16 NOTE — CARE UPDATE
06/16/23 0734 06/16/23 0746   Patient Assessment/Suction   Level of Consciousness (AVPU) alert  --    Respiratory Effort Short of breath  --    Expansion/Accessory Muscles/Retractions no use of accessory muscles  --    All Lung Fields Breath Sounds diminished  --    Rhythm/Pattern, Respiratory shortness of breath  --    Cough Frequency  --  infrequent   Cough Type  --  productive   PRE-TX-O2   Device (Oxygen Therapy) nasal cannula;nasal cannula with humidification  --    $ Is the patient on Low Flow Oxygen? Yes  --    Flow (L/min) 2  --    SpO2 95 %  --    Pulse Oximetry Type Intermittent  --    $ Pulse Oximetry - Multiple Charge Pulse Oximetry - Multiple  --    Pulse 79  --    Resp (!) 22  --    Aerosol Therapy   $ Aerosol Therapy Charges Aerosol Treatment  --    Respiratory Treatment Status (SVN) given  --    Treatment Route (SVN) mask  --    Patient Position (SVN) Sorto's  --    Signs of Intolerance (SVN) none  --    Inhaler   $ Inhaler Charges  --  MDI (Metered Dose Inahler) Treatment;Mouth rinsed post treatment   Respiratory Treatment Status (Inhaler)  --  given;mouth rinsed post treatment   Treatment Route (Inhaler)  --  mask   Patient Position (Inhaler)  --  Sorto's   Signs of Intolerance (Inhaler)  --  none   Breath Sounds Post-Respiratory Treatment   Throughout All Fields Post-Treatment  --  All Fields   Throughout All Fields Post-Treatment  --  aeration increased   Post-treatment Heart Rate (beats/min)  --  77   Post-treatment Resp Rate (breaths/min)  --  24

## 2023-06-16 NOTE — PLAN OF CARE
Problem: Physical Therapy  Goal: Physical Therapy Goal  Description: Goals to be met by: 2023     Patient will increase functional independence with mobility by performin). Supine to sit with Ray  2). Sit to supine with Ray  3). Sit to stand transfer with Ray  4). Gait  x > 200 feet with Ray     Outcome: Ongoing, Progressing

## 2023-06-16 NOTE — CARE UPDATE
06/16/23 0813   Patient Assessment/Suction   Level of Consciousness (AVPU) alert   PRE-TX-O2   Pulse Oximetry Type Intermittent   $ Pulse Oximetry - Multiple Charge Pulse Oximetry - Multiple   Home Oxygen Qualification   $ Home O2 Qualification Pulmonary Stress Test/6 min walk;Tech time 15 minutes   Room Air SpO2 At Rest 96 %   Room Air SpO2 During Ambulation 92 %   SpO2 Post Ambulation 93 %   Post Ambulation Heart Rate 83 bpm

## 2023-06-17 VITALS
DIASTOLIC BLOOD PRESSURE: 65 MMHG | HEIGHT: 66 IN | SYSTOLIC BLOOD PRESSURE: 136 MMHG | RESPIRATION RATE: 16 BRPM | WEIGHT: 155 LBS | BODY MASS INDEX: 24.91 KG/M2 | OXYGEN SATURATION: 94 % | TEMPERATURE: 97 F | HEART RATE: 74 BPM

## 2023-06-17 PROCEDURE — 94640 AIRWAY INHALATION TREATMENT: CPT

## 2023-06-17 PROCEDURE — 27000221 HC OXYGEN, UP TO 24 HOURS

## 2023-06-17 PROCEDURE — 63600175 PHARM REV CODE 636 W HCPCS: Performed by: INTERNAL MEDICINE

## 2023-06-17 PROCEDURE — 94761 N-INVAS EAR/PLS OXIMETRY MLT: CPT

## 2023-06-17 PROCEDURE — 94618 PULMONARY STRESS TESTING: CPT

## 2023-06-17 PROCEDURE — 25000242 PHARM REV CODE 250 ALT 637 W/ HCPCS: Performed by: NURSE PRACTITIONER

## 2023-06-17 PROCEDURE — 25000003 PHARM REV CODE 250

## 2023-06-17 PROCEDURE — 25000003 PHARM REV CODE 250: Performed by: NURSE PRACTITIONER

## 2023-06-17 RX ORDER — PREDNISONE 20 MG/1
60 TABLET ORAL DAILY
Qty: 42 TABLET | Refills: 0 | Status: SHIPPED | OUTPATIENT
Start: 2023-06-17 | End: 2023-06-29 | Stop reason: SDUPTHER

## 2023-06-17 RX ORDER — GUAIFENESIN 100 MG/5ML
200 SOLUTION ORAL EVERY 6 HOURS PRN
Qty: 118 ML | Refills: 0 | Status: SHIPPED | OUTPATIENT
Start: 2023-06-17 | End: 2023-07-01

## 2023-06-17 RX ORDER — BENZONATATE 100 MG/1
100 CAPSULE ORAL 3 TIMES DAILY PRN
Qty: 42 CAPSULE | Refills: 0 | Status: SHIPPED | OUTPATIENT
Start: 2023-06-17 | End: 2023-07-01

## 2023-06-17 RX ADMIN — IPRATROPIUM BROMIDE AND ALBUTEROL SULFATE 3 ML: .5; 3 SOLUTION RESPIRATORY (INHALATION) at 12:06

## 2023-06-17 RX ADMIN — MELATONIN TAB 3 MG 9 MG: 3 TAB at 01:06

## 2023-06-17 RX ADMIN — TAMSULOSIN HYDROCHLORIDE 0.4 MG: 0.4 CAPSULE ORAL at 08:06

## 2023-06-17 RX ADMIN — EZETIMIBE 10 MG: 10 TABLET ORAL at 08:06

## 2023-06-17 RX ADMIN — PITAVASTATIN CALCIUM 4 MG: 4.18 TABLET, FILM COATED ORAL at 08:06

## 2023-06-17 RX ADMIN — FINASTERIDE 5 MG: 5 TABLET, FILM COATED ORAL at 08:06

## 2023-06-17 RX ADMIN — IPRATROPIUM BROMIDE AND ALBUTEROL SULFATE 3 ML: .5; 3 SOLUTION RESPIRATORY (INHALATION) at 11:06

## 2023-06-17 RX ADMIN — APIXABAN 5 MG: 2.5 TABLET, FILM COATED ORAL at 08:06

## 2023-06-17 RX ADMIN — IPRATROPIUM BROMIDE AND ALBUTEROL SULFATE 3 ML: .5; 3 SOLUTION RESPIRATORY (INHALATION) at 03:06

## 2023-06-17 RX ADMIN — ESCITALOPRAM OXALATE 10 MG: 10 TABLET, FILM COATED ORAL at 08:06

## 2023-06-17 RX ADMIN — GUAIFENESIN 200 MG: 200 SOLUTION ORAL at 01:06

## 2023-06-17 RX ADMIN — PANTOPRAZOLE SODIUM 40 MG: 40 TABLET, DELAYED RELEASE ORAL at 08:06

## 2023-06-17 RX ADMIN — ISOSORBIDE MONONITRATE 30 MG: 30 TABLET, EXTENDED RELEASE ORAL at 08:06

## 2023-06-17 RX ADMIN — METHYLPREDNISOLONE SODIUM SUCCINATE 80 MG: 40 INJECTION, POWDER, FOR SOLUTION INTRAMUSCULAR; INTRAVENOUS at 08:06

## 2023-06-17 RX ADMIN — FLUTICASONE FUROATE, UMECLIDINIUM BROMIDE AND VILANTEROL TRIFENATATE 1 PUFF: 200; 62.5; 25 POWDER RESPIRATORY (INHALATION) at 07:06

## 2023-06-17 RX ADMIN — BENZONATATE 100 MG: 100 CAPSULE ORAL at 08:06

## 2023-06-17 RX ADMIN — ASPIRIN 81 MG: 81 TABLET, COATED ORAL at 08:06

## 2023-06-17 RX ADMIN — BENZONATATE 100 MG: 100 CAPSULE ORAL at 01:06

## 2023-06-17 RX ADMIN — IPRATROPIUM BROMIDE AND ALBUTEROL SULFATE 3 ML: .5; 3 SOLUTION RESPIRATORY (INHALATION) at 07:06

## 2023-06-17 RX ADMIN — GUAIFENESIN 200 MG: 200 SOLUTION ORAL at 08:06

## 2023-06-17 RX ADMIN — ALPRAZOLAM 0.25 MG: 0.25 TABLET ORAL at 01:06

## 2023-06-17 NOTE — PLAN OF CARE
Pt clear for DC from case management standpoint. Discharging to home       06/17/23 0950   Final Note   Assessment Type Final Discharge Note   Anticipated Discharge Disposition Home   Hospital Resources/Appts/Education Provided Appointments scheduled and added to AVS

## 2023-06-17 NOTE — PLAN OF CARE
Problem: Adult Inpatient Plan of Care  Goal: Plan of Care Review  Outcome: Ongoing, Progressing  Goal: Patient-Specific Goal (Individualized)  Outcome: Ongoing, Progressing  Goal: Absence of Hospital-Acquired Illness or Injury  Outcome: Ongoing, Progressing  Goal: Optimal Comfort and Wellbeing  Outcome: Ongoing, Progressing  Goal: Readiness for Transition of Care  Outcome: Ongoing, Progressing   Plan of care reviewed with patient,verbalized understanding.   SR on telemetry. O2-@L per NC in use, pulse ox 95-96%.Remains free from falls, injury. Instructed to call for assistance as needed ,verbalized understanding. Bed in low & locked position. Call light in reach, bed alarm on .

## 2023-06-17 NOTE — CARE UPDATE
06/17/23 1030   Home Oxygen Qualification   $ Home O2 Qualification Pulmonary Stress Test/6 min walk   Room Air SpO2 At Rest 95 %   Room Air SpO2 During Ambulation 93 %   SpO2 Post Ambulation 93 %   Post Ambulation Heart Rate 88 bpm

## 2023-06-17 NOTE — DISCHARGE INSTRUCTIONS
Discharge Instructions, [facility]    Continue taking predisone 60mg daily until seen in pulmonologist clinic. Follow up with Dr. Carolina in 2 weeks.     Thank you for choosing [facility] for your medical care.     You were admitted to the hospital with NSTEMI (non-ST elevated myocardial infarction).     Please note your discharge instructions, including diet/activity restrictions, follow-up appointments, and medication changes.  If you have any questions about your medical issues, prescriptions, or any other questions, please feel free to contact the [facility] Medicine Dept at [phone] and we will help.    If you are previously with Home health, outpatient PT/OT or under a therapy program, you are cleared to return to those programs.    Please direct all long term medication refills and follow up to your primary care provider, Zo Burrell MD. Thank you again for letting us take care of your health care needs.    Please note the following discharge instructions per your discharging physician-  Jake Leo PA-C

## 2023-06-17 NOTE — NURSING
Discharge instructions given, both patient and wife verbalized understanding. PIV removed with catheter intact. Telemetry removed and returned to monitor room.

## 2023-06-17 NOTE — CARE UPDATE
06/17/23 0706   Patient Assessment/Suction   Level of Consciousness (AVPU) alert   Respiratory Effort Normal;Unlabored   Expansion/Accessory Muscles/Retractions no use of accessory muscles;no retractions;expansion symmetric   All Lung Fields Breath Sounds diminished   RML Breath Sounds wheezes, expiratory   Cough Frequency frequent   Cough Type nonproductive   PRE-TX-O2   Device (Oxygen Therapy) room air   $ Is the patient on Low Flow Oxygen? Yes   Flow (L/min) 1   SpO2 97 %   Pulse Oximetry Type Intermittent   $ Pulse Oximetry - Multiple Charge Pulse Oximetry - Multiple   Pulse 68   Resp 20   Positioning HOB elevated 45 degrees   Aerosol Therapy   $ Aerosol Therapy Charges Aerosol Treatment   Respiratory Treatment Status (SVN) given   Treatment Route (SVN) air;mask   Patient Position (SVN) HOB elevated   Post Treatment Assessment (SVN) increased aeration   Signs of Intolerance (SVN) none   Breath Sounds Post-Respiratory Treatment   Throughout All Fields Post-Treatment All Fields   Throughout All Fields Post-Treatment aeration increased   Post-treatment Heart Rate (beats/min) 69   Post-treatment Resp Rate (breaths/min) 20

## 2023-06-18 NOTE — DISCHARGE SUMMARY
[facility] Medicine  Discharge Summary      Patient Name: Martell Corcoran  MRN: [id number]  NAPOLEON: [id number]  Patient Class: IP- Inpatient  Admission Date: 6/13/2023  Hospital Length of Stay: 3 days  Discharge Date and Time: 6/17/2023 12:19 PM  Attending Physician: No att. providers found   Discharging Provider: Jake Leo PA-C  Primary Care Provider: Zo uBrrell MD    Primary Care Team: Networked reference to record PCT     HPI:   Martell Corcoran is a 67-year-old male who presents emergency room for evaluation of shortness of breath and chest pain.  He reports chest pain onset approximately 8 hours prior to arrival.  He does report intermittent chest pain for the past several weeks after he was exposed to insecticides spray.  He describes chest pain as a heavy sensation in the left anterior chest region.  No reported radiation of the pain, nausea, vomiting, or diaphoresis.  He was given nitroglycerin in the emergency room with improvement in pain.  He denies fever or chills.  No known sick contacts or travel.  Previous medical history includes COPD, AFib, obstructive sleep apnea on CPAP, hypertension, GERD, and coronary artery disease.  Of note patient is scheduled for an AFib ablation at [facility] tomorrow morning.  ER workup:  CBC and CMP unremarkable.  Troponin elevated at 0.254.  EKG demonstrates sinus rhythm without ST or T-wave elevation.  Patient admitted to Hospital Medicine for treatment management.  ER physician spoke with  who is okay with patient staying here for care.      * No surgery found *      Hospital Course:   Patient was admitted to the hospital with elevated troponin and concerns for NSTEMI.  The patient was monitored closely throughout his hospital stay.  Cardiology was consulted on admission.  Troponins were trended and peaked and trended downward. Patient reported continued dyspnea, started on IV steroids, and pulmonology was consulted.   Patient underwent stress test on 6/15.  Steroids were transitioned to oral.  Patient reported worsening shortness a breath and patient was placed back on IV steroids per pulmonology. Patient's breathing improved following steroids and satting well on RA. Underwent desaturation screening and did not require Home O2. Patient to be discharged with close pulmonology and cardiology follow up. Patient discharged with oral steroids per pulmonology recommendations. Patient and family verbalized understanding of discharge instructions and return precautions.         Goals of Care Treatment Preferences:  Code Status: Full Code      Consults:   Consults (From admission, onward)        Status Ordering Provider     Inpatient consult to Pulmonology  Once        Provider:  Zayra Marcelo MD    Completed ANTELMO ESPINOSA     Inpatient consult to Cardiology  Once        Provider:  Jacky Pérez MD    Completed DANIEL GARCIA     IP consult to dietary  Once        Provider:  (Not yet assigned)    Completed DANIEL GARCIA          No new Assessment & Plan notes have been filed under this hospital service since the last note was generated.  Service: Hospital Medicine    Final Active Diagnoses:    Diagnosis Date Noted POA    PRINCIPAL PROBLEM:  NSTEMI (non-ST elevated myocardial infarction) [I21.4] 06/14/2023 Yes    Acute chemical bronchitis [J68.0] 06/15/2023 Yes    Atrial fibrillation [I48.91] 03/10/2022 Yes    HTN (hypertension) [I10] 07/08/2020 Yes    GERD (gastroesophageal reflux disease) [K21.9] 07/08/2020 Yes      Problems Resolved During this Admission:       Discharged Condition: good    Disposition: Home or Self Care    Follow Up:   Follow-up Information     Osman Lemon MD Follow up.    Specialties: Cardiovascular Disease, Cardiology  Why: 7/6 @ 10:20 am with NP  Contact information:  [address] [place] [address]  [address] [place] [address]  [phone]             Zo Burrell MD Follow up.    Specialty:  Family Medicine  Why: Gianna @ the clinic will contact pt for scheduling  Contact information:  [address]  [address]  [facility]  [place] [address]  [phone]             Lexx Carolina MD Follow up.    Specialty: Pulmonary Disease  Why: 6/29 @ 2pm  Contact information:  [address]  [address]  [place] [address]  [phone]             Juma Aguilar MD Follow up.    Specialties: Electrophysiology, Cardiovascular Disease, Cardiology  Why: 7/13 @ 8 am  Contact information:  [address]  [facility] [address]  [phone]                       Patient Instructions:      Ambulatory referral/consult to Outpatient Case Management   Referral Priority: Routine Referral Type: Consultation   Referral Reason: Specialty Services Required   Number of Visits Requested: 1     Diet Cardiac     Notify your health care provider if you experience any of the following:  temperature >100.4     Notify your health care provider if you experience any of the following:  persistent nausea and vomiting or diarrhea     Notify your health care provider if you experience any of the following:  severe uncontrolled pain     Notify your health care provider if you experience any of the following:  redness, tenderness, or signs of infection (pain, swelling, redness, odor or green/yellow discharge around incision site)     Notify your health care provider if you experience any of the following:  difficulty breathing or increased cough     Notify your health care provider if you experience any of the following:  increased confusion or weakness     Activity as tolerated       Significant Diagnostic Studies: Labs: All labs within the past 24 hours have been reviewed    Pending Diagnostic Studies:     None         Medications:  Reconciled Home Medications:      Medication List      START taking these medications    benzonatate 100 MG capsule  Commonly known as: TESSALON  Take 1 capsule (100 mg total) by mouth 3 (three)  times daily as needed for Cough.     guaiFENesin 100 mg/5 ml 100 mg/5 mL syrup  Commonly known as: ROBITUSSIN  Take 10 mLs (200 mg total) by mouth every 6 (six) hours as needed for Cough or Congestion.     isosorbide mononitrate 30 MG 24 hr tablet  Commonly known as: IMDUR  Take 1 tablet (30 mg total) by mouth once daily.        CHANGE how you take these medications    predniSONE 20 MG tablet  Commonly known as: DELTASONE  Take 3 tablets (60 mg total) by mouth once daily. for 14 days  What changed:   · how much to take  · how to take this  · when to take this  · additional instructions        CONTINUE taking these medications    * albuterol 90 mcg/actuation inhaler  Commonly known as: PROVENTIL/VENTOLIN HFA  Inhale 1-2 puffs into the lungs every 4 (four) hours as needed for Shortness of Breath (coughing). Rescue     * albuterol 2.5 mg /3 mL (0.083 %) nebulizer solution  Commonly known as: PROVENTIL  Take 3 mLs (2.5 mg total) by nebulization every 6 (six) hours as needed.     albuterol-ipratropium 2.5 mg-0.5 mg/3 mL nebulizer solution  Commonly known as: DUO-NEB  USE 1 AMPULE IN NEBULIZER EVERY 4 HOURS AS NEEDED FOR WHEEZING     alfuzosin 10 mg Tb24  Commonly known as: UROXATRAL  TAKE 1 TABLET BY MOUTH ONCE DAILY AFTER BREAKFAST     aspirin 81 MG EC tablet  Commonly known as: ECOTRIN     azelastine 137 mcg (0.1 %) nasal spray  Commonly known as: ASTELIN  1 spray (137 mcg total) by Nasal route 2 (two) times daily.     azithromycin 500 MG tablet  Commonly known as: ZITHROMAX  One daily for yellow mucous, repeat if needed     dicyclomine 10 MG capsule  Commonly known as: BENTYL  Take 1 capsule (10 mg total) by mouth 4 (four) times daily before meals and nightly.     ELIQUIS 5 mg Tab  Generic drug: apixaban  Take 1 tablet (5 mg total) by mouth 2 (two) times daily.     EScitalopram oxalate 20 MG tablet  Commonly known as: LEXAPRO  Take by mouth.     esomeprazole 40 MG capsule  Commonly known as: NEXIUM  Take 40 mg by  mouth once daily.     ezetimibe 10 mg tablet  Commonly known as: ZETIA  Take 1 tablet (10 mg total) by mouth once daily.     finasteride 5 mg tablet  Commonly known as: PROSCAR  Take 5 mg by mouth once daily.     levoFLOXacin 500 MG tablet  Commonly known as: LEVAQUIN  Take 1 tablet (500 mg total) by mouth once daily.     LIVALO 4 mg Tab  Generic drug: pitavastatin calcium  Take 1 tablet by mouth once daily.     nitroGLYCERIN 0.4 MG SL tablet  Commonly known as: NITROSTAT  Place under the tongue.     STIOLTO RESPIMAT 2.5-2.5 mcg/actuation Mist  Generic drug: tiotropium-olodateroL  Inhale 2 puffs into the lungs once daily. Controller     TRELEGY ELLIPTA 200-62.5-25 mcg inhaler  Generic drug: fluticasone-umeclidin-vilanter  Inhale 1 puff into the lungs once daily.     valACYclovir 1000 MG tablet  Commonly known as: VALTREX  Take 1 g by mouth 2 (two) times daily.     zolpidem 5 MG Tab  Commonly known as: AMBIEN  Take 1 tablet (5 mg total) by mouth every evening.         * This list has 2 medication(s) that are the same as other medications prescribed for you. Read the directions carefully, and ask your doctor or other care provider to review them with you.            STOP taking these medications    guaiFENesin-codeine 100-10 mg/5 ml  mg/5 mL syrup  Commonly known as: TUSSI-ORGANIDIN NR            Indwelling Lines/Drains at time of discharge:   Lines/Drains/Airways     None                 Time spent on the discharge of patient: 33 minutes         Jake Leo PA-C  Department of Hospital Medicine  [facility]

## 2023-06-19 ENCOUNTER — PATIENT MESSAGE (OUTPATIENT)
Dept: ADMINISTRATIVE | Facility: CLINIC | Age: 68
End: 2023-06-19
Payer: MEDICARE

## 2023-06-19 ENCOUNTER — TELEPHONE (OUTPATIENT)
Dept: ADMINISTRATIVE | Facility: CLINIC | Age: 68
End: 2023-06-19
Payer: MEDICARE

## 2023-06-19 ENCOUNTER — PATIENT OUTREACH (OUTPATIENT)
Dept: FAMILY MEDICINE | Facility: CLINIC | Age: 68
End: 2023-06-19

## 2023-06-19 ENCOUNTER — PATIENT OUTREACH (OUTPATIENT)
Dept: ADMINISTRATIVE | Facility: CLINIC | Age: 68
End: 2023-06-19
Payer: MEDICARE

## 2023-06-19 LAB
BACTERIA SPEC AEROBE CULT: NORMAL
GRAM STN SPEC: NORMAL

## 2023-06-19 NOTE — PROGRESS NOTES
C3 nurse attempted to contact Martell Corcoran  for a TCC post hospital discharge follow up call. No answer. Left voicemail with callback information. The patient has a scheduled HOSFU appointment with Zo Burrell MD  on 06/26/2023 @ 1040.

## 2023-06-19 NOTE — PROGRESS NOTES
C3 nurse attempted to contact Martell Corcoran  for a TCC post hospital discharge follow up call. No answer. Left voicemail with callback information. The patient has a scheduled HOSFU appointment with oZ Burrell MD  on 06/26/2023 @ 8175.

## 2023-06-19 NOTE — PROGRESS NOTES
"Phoned patient in response to reply of "2" to post-discharge texting tracker. Called twice but went directly to voice mail. Left message, "Hi. This is Dianne, a pharmacist with [facility] post-discharge texting tracker. We received your reply of "2" to our text indicating that there might be medication questions or other concerns needing to be addressed. If your reply was due to symptoms, we do have a nurse on call 24 hours a day, 7 days a week. Please call [phone]. Otherwise, I will make a second attempt to reach you this afternoon. Thank you and have a great day."    Follow-up call went directly to voice mail. Left message as above with reminder to reply to texting tracker if a call back is needed or to call our nurse line for symptoms.  "

## 2023-06-19 NOTE — PROGRESS NOTES
C3 nurse attempted to contact Martell Corcoran  for a TCC post hospital discharge follow up call. No answer. Left voicemail with callback information. The patient has a scheduled HOSFU appointment with Zo Burrell MD  on 06/26/2023 @ 7882.

## 2023-06-19 NOTE — PROGRESS NOTES
ALSO TAKING VIT C 1000 MG DAILY    C3 nurse spoke with Martell Corcoran  for a TCC post hospital discharge follow up call. The patient has a scheduled [facility] appointment with Zo Burrell MD on 06/26/2023 @ 4762.

## 2023-06-19 NOTE — PROGRESS NOTES
Discharge Information     Discharge Date:   6/17/2023    Primary Discharge Diagnosis:  NSTEMI      Discharge Summary:  Reviewed      Medication & Order Review     Were medication changes made or new medications added?   Yes    If so, has the patient filled the prescriptions?  Yes     Was Home Health ordered? No    If so, has Home Health contacted patient and/or initiated services?  N/A    Name of Home Health Agency? N/A    Durable Medical Equipment ordered?  No     If so, has the DME provider contacted patient and delivered equipment?  N/A    Follow Up               Any problems since discharge? No    How is the patient feeling since returning home?  Patient states he is doing okay.    Have you set up recommended follow up appointments?  Appt scheduled with PCP and cardiology.    Schedule Hospital Follow-up appointment within 7-14 days (preferably 7).      Notes:              Bryanna Morgan

## 2023-06-26 ENCOUNTER — OFFICE VISIT (OUTPATIENT)
Dept: FAMILY MEDICINE | Facility: CLINIC | Age: 68
End: 2023-06-26
Payer: MEDICARE

## 2023-06-26 VITALS
SYSTOLIC BLOOD PRESSURE: 138 MMHG | HEART RATE: 89 BPM | DIASTOLIC BLOOD PRESSURE: 70 MMHG | WEIGHT: 158 LBS | BODY MASS INDEX: 25.39 KG/M2 | HEIGHT: 66 IN | OXYGEN SATURATION: 96 %

## 2023-06-26 DIAGNOSIS — Z09 HOSPITAL DISCHARGE FOLLOW-UP: Primary | ICD-10-CM

## 2023-06-26 DIAGNOSIS — I21.9 MYOCARDIAL INFARCTION, UNSPECIFIED MI TYPE, UNSPECIFIED ARTERY: ICD-10-CM

## 2023-06-26 DIAGNOSIS — I10 ESSENTIAL HYPERTENSION: ICD-10-CM

## 2023-06-26 DIAGNOSIS — F41.1 GENERALIZED ANXIETY DISORDER: ICD-10-CM

## 2023-06-26 DIAGNOSIS — I48.91 ATRIAL FIBRILLATION, UNSPECIFIED TYPE: ICD-10-CM

## 2023-06-26 PROCEDURE — 99495 TRANSJ CARE MGMT MOD F2F 14D: CPT | Mod: S$GLB,,, | Performed by: FAMILY MEDICINE

## 2023-06-26 PROCEDURE — 99495 TCM SERVICES (MODERATE COMPLEXITY): ICD-10-PCS | Mod: S$GLB,,, | Performed by: FAMILY MEDICINE

## 2023-06-26 RX ORDER — ALPRAZOLAM 0.5 MG/1
0.5 TABLET ORAL 3 TIMES DAILY PRN
Qty: 180 TABLET | Refills: 1 | Status: SHIPPED | OUTPATIENT
Start: 2023-06-26 | End: 2024-02-23 | Stop reason: SDUPTHER

## 2023-06-26 NOTE — PROGRESS NOTES
SUBJECTIVE:    Patient ID: Martell Corcoran is a 68 y.o. male.    Chief Complaint: HFU (HFU for acute bronchitis and myocardial infarction in June/discuss alprazolam//mp)    HPI    Admit Date: 6/13/23   Discharge Date: 6/17/23  [facility] [place]   Martell Corcoran is a 67-year-old male who presents emergency room for evaluation of shortness of breath and chest pain.  He reports chest pain onset approximately 8 hours prior to arrival.  He does report intermittent chest pain for the past several weeks after he was exposed to insecticides spray.  He describes chest pain as a heavy sensation in the left anterior chest region.  No reported radiation of the pain, nausea, vomiting, or diaphoresis.  He was given nitroglycerin in the emergency room with improvement in pain.  He denies fever or chills.  No known sick contacts or travel.  Previous medical history includes COPD, AFib, obstructive sleep apnea on CPAP, hypertension, GERD, and coronary artery disease.  Of note patient is scheduled for an AFib ablation at [facility] tomorrow morning.  ER workup:  CBC and CMP unremarkable.  Troponin elevated at 0.254.  EKG demonstrates sinus rhythm without ST or T-wave elevation. Patient admitted to Hospital Medicine for treatment management.  ER physician spoke with  who is okay with patient staying here for care.  Hospital Course:   Patient was admitted to the hospital with elevated troponin and concerns for NSTEMI.  The patient was monitored closely throughout his hospital stay.  Cardiology was consulted on admission.  Troponins were trended and peaked and trended downward. Patient reported continued dyspnea, started on IV steroids, and pulmonology was consulted.  Patient underwent stress test on 6/15.  Steroids were transitioned to oral.  Patient reported worsening shortness a breath and patient was placed back on IV steroids per pulmonology. Patient's breathing improved following steroids  and satting well on RA. Underwent desaturation screening and did not require Home O2. Patient to be discharged with close pulmonology and cardiology follow up. Patient discharged with oral steroids per pulmonology recommendations. Patient and family verbalized understanding of discharge instructions and return precautions.  Medication Reconciliation:  Medications changed/added/deleted. Started on benzonatate 100mg po TID as needed for cough, robitussin 100mg/5ml 10ml po q6h prn cough, imdur 30mg po daily; changed prednisone 20mg to 60mg po daily for 14 days; stopped tussi-organdidin NR  New Prescriptions filled after discharge: yes  Discharge summary reviewed:  yes  Pending test results at discharge reviewed:   no  Follow up appointments scheduled:  yes              with Cardiology  Dr. Lemon; EP, Dr. Aguilar; Pulmonology, Dr. Carolina  Follow up labs/tests ordered:   no  Home Health ordered on discharge:   no  Home Health company name:  n/a  DME ordered at discharge:   no  How patient is feeling since discharge from the hospital?  Pt is doing better.    has an ablation for his afib in July w/ Dr. Aguilar.   Patient follow up phone call documented on separate encounter.      No results displayed because visit has over 200 results.      Admission on 05/17/2023, Discharged on 05/17/2023   Component Date Value Ref Range Status    WBC 05/17/2023 7.02  3.90 - 12.70 K/uL Final    RBC 05/17/2023 4.57 (L)  4.60 - 6.20 M/uL Final    Hemoglobin 05/17/2023 15.1  14.0 - 18.0 g/dL Final    Hematocrit 05/17/2023 45.0  40.0 - 54.0 % Final    MCV 05/17/2023 99 (H)  82 - 98 fL Final    MCH 05/17/2023 33.0 (H)  27.0 - 31.0 pg Final    MCHC 05/17/2023 33.6  32.0 - 36.0 g/dL Final    RDW 05/17/2023 14.3  11.5 - 14.5 % Final    Platelets 05/17/2023 262  150 - 450 K/uL Final    MPV 05/17/2023 8.6 (L)  9.2 - 12.9 fL Final    Immature Granulocytes 05/17/2023 0.3  0.0 - 0.5 % Final    Gran # (ANC) 05/17/2023 4.4  1.8 - 7.7 K/uL Final    Immature  Grans (Abs) 05/17/2023 0.02  0.00 - 0.04 K/uL Final    Lymph # 05/17/2023 1.3  1.0 - 4.8 K/uL Final    Mono # 05/17/2023 0.9  0.3 - 1.0 K/uL Final    Eos # 05/17/2023 0.4  0.0 - 0.5 K/uL Final    Baso # 05/17/2023 0.04  0.00 - 0.20 K/uL Final    nRBC 05/17/2023 0  0 /100 WBC Final    Gran % 05/17/2023 63.0  38.0 - 73.0 % Final    Lymph % 05/17/2023 17.9 (L)  18.0 - 48.0 % Final    Mono % 05/17/2023 12.5  4.0 - 15.0 % Final    Eosinophil % 05/17/2023 5.7  0.0 - 8.0 % Final    Basophil % 05/17/2023 0.6  0.0 - 1.9 % Final    Differential Method 05/17/2023 Automated   Final    Sodium 05/17/2023 143  136 - 145 mmol/L Final    Potassium 05/17/2023 3.9  3.5 - 5.1 mmol/L Final    Chloride 05/17/2023 105  95 - 110 mmol/L Final    CO2 05/17/2023 28  23 - 29 mmol/L Final    Glucose 05/17/2023 94  70 - 110 mg/dL Final    BUN 05/17/2023 12  8 - 23 mg/dL Final    Creatinine 05/17/2023 0.8  0.5 - 1.4 mg/dL Final    Calcium 05/17/2023 9.4  8.7 - 10.5 mg/dL Final    Anion Gap 05/17/2023 10  8 - 16 mmol/L Final    eGFR 05/17/2023 >60  >60 mL/min/1.73 m^2 Final    Troponin I 05/17/2023 <0.006  0.000 - 0.026 ng/mL Final   Orders Only on 05/15/2023   Component Date Value Ref Range Status    aPTT 06/09/2023 30  23 - 32 sec Final    Glucose 06/09/2023 157 (H)  65 - 99 mg/dL Final    BUN 06/09/2023 17  7 - 25 mg/dL Final    Creatinine 06/09/2023 0.77  0.70 - 1.35 mg/dL Final    eGFR 06/09/2023 98  > OR = 60 mL/min/1.73m2 Final    BUN/Creatinine Ratio 06/09/2023 NOT APPLICABLE  6 - 22 (calc) Final    Sodium 06/09/2023 139  135 - 146 mmol/L Final    Potassium 06/09/2023 4.5  3.5 - 5.3 mmol/L Final    Chloride 06/09/2023 103  98 - 110 mmol/L Final    CO2 06/09/2023 26  20 - 32 mmol/L Final    Calcium 06/09/2023 9.6  8.6 - 10.3 mg/dL Final    WBC 06/09/2023 10.7  3.8 - 10.8 Thousand/uL Final    RBC 06/09/2023 4.95  4.20 - 5.80 Million/uL Final    Hemoglobin 06/09/2023 16.6  13.2 - 17.1 g/dL Final    Hematocrit 06/09/2023 49.2  38.5 - 50.0 %  Final    MCV 06/09/2023 99.4  80.0 - 100.0 fL Final    MCH 06/09/2023 33.5 (H)  27.0 - 33.0 pg Final    MCHC 06/09/2023 33.7  32.0 - 36.0 g/dL Final    RDW 06/09/2023 13.3  11.0 - 15.0 % Final    Platelets 06/09/2023 243  140 - 400 Thousand/uL Final    MPV 06/09/2023 8.9  7.5 - 12.5 fL Final    Neutrophils, Abs 06/09/2023 9,330 (H)  1,500 - 7,800 cells/uL Final    Lymph # 06/09/2023 706 (L)  850 - 3,900 cells/uL Final    Mono # 06/09/2023 610  200 - 950 cells/uL Final    Eos # 06/09/2023 32  15 - 500 cells/uL Final    Baso # 06/09/2023 21  0 - 200 cells/uL Final    Neutrophils Relative 06/09/2023 87.2  % Final    Lymph % 06/09/2023 6.6  % Final    Mono % 06/09/2023 5.7  % Final    Eosinophil % 06/09/2023 0.3  % Final    Basophil % 06/09/2023 0.2  % Final    INR 06/09/2023 0.9   Final    Prothrombin Time 06/09/2023 9.8  9.0 - 11.5 sec Final   Hospital Outpatient Visit on 05/04/2023   Component Date Value Ref Range Status    POC Creatinine 05/04/2023 0.8  0.5 - 1.4 mg/dL Final    Sample 05/04/2023 VENOUS   Final   Office Visit on 03/16/2023   Component Date Value Ref Range Status    Glucose 03/16/2023 92  65 - 99 mg/dL Final    BUN 03/16/2023 19  7 - 25 mg/dL Final    Creatinine 03/16/2023 0.84  0.70 - 1.35 mg/dL Final    eGFR 03/16/2023 96  > OR = 60 mL/min/1.73m2 Final    BUN/Creatinine Ratio 03/16/2023 NOT APPLICABLE  6 - 22 (calc) Final    Sodium 03/16/2023 141  135 - 146 mmol/L Final    Potassium 03/16/2023 4.2  3.5 - 5.3 mmol/L Final    Chloride 03/16/2023 102  98 - 110 mmol/L Final    CO2 03/16/2023 28  20 - 32 mmol/L Final    Calcium 03/16/2023 9.9  8.6 - 10.3 mg/dL Final    Total Protein 03/16/2023 7.0  6.1 - 8.1 g/dL Final    Albumin 03/16/2023 4.4  3.6 - 5.1 g/dL Final    Globulin, Total 03/16/2023 2.6  1.9 - 3.7 g/dL (calc) Final    Albumin/Globulin Ratio 03/16/2023 1.7  1.0 - 2.5 (calc) Final    Total Bilirubin 03/16/2023 0.5  0.2 - 1.2 mg/dL Final    Alkaline Phosphatase 03/16/2023 72  35 - 144 U/L Final     AST 03/16/2023 17  10 - 35 U/L Final    ALT 03/16/2023 32  9 - 46 U/L Final    Lipase 03/16/2023 54  7 - 60 U/L Final    Amylase 03/16/2023 48  21 - 101 U/L Final    Ova and Parasites, Stool Conc and * 03/21/2023    Final    H pylori Antigen Stool 03/21/2023    Final    FECAL LEUKOCYTE STAIN 03/21/2023    Final    Rotavirus Antigen Detection 03/21/2023    Final    Giardia Antigen - EIA 03/21/2023    Final    CAMPYLOBACTER SP. AG, EIA 03/21/2023    Final    Shiga Toxins, EIA W/Rfl To E.Coli * 03/21/2023    Final    Culture, Salmonella and Shigella 03/21/2023    Final    C DIFF TOXIN/GDH W/REFLEX TO PCR 03/21/2023    Final       Past Medical History:   Diagnosis Date    Benign enlargement of prostate     Had a biopsy in around 2015    Elevated PSA     GERD (gastroesophageal reflux disease)     Hypertension     Started with meds in 2012.    Kidney stone     Urinary tract infection      Past Surgical History:   Procedure Laterality Date    FISTULA REPAIR      LEFT HEART CATHETERIZATION N/A 10/23/2018    Procedure: Left heart cath;  Surgeon: Niharika Squires MD;  Location: [facility] CATH;  Service: Cardiology;  Laterality: N/A;     Family History   Problem Relation Age of Onset    Heart failure Mother     Cancer Father     Heart disease Brother     Heart attack Brother     Cancer Brother     Heart disease Brother     Drug abuse Brother        Marital Status: Significant Other  Alcohol History:  reports current alcohol use.  Tobacco History:  reports that he quit smoking about 5 months ago. His smoking use included cigarettes. He has a 55.00 pack-year smoking history. He has never used smokeless tobacco.  Drug History:  reports no history of drug use.    Review of patient's allergies indicates:   Allergen Reactions    Msg [glutamic acid] Nausea Only, Palpitations, Shortness Of Breath and Swelling    Oyster extract Swelling     PT swells in his throat after eating oysters on occasion       Current Outpatient Medications:      albuterol (PROVENTIL) 2.5 mg /3 mL (0.083 %) nebulizer solution, Take 3 mLs (2.5 mg total) by nebulization every 6 (six) hours as needed., Disp: 120 each, Rfl: 11    albuterol (PROVENTIL/VENTOLIN HFA) 90 mcg/actuation inhaler, Inhale 1-2 puffs into the lungs every 4 (four) hours as needed for Shortness of Breath (coughing). Rescue, Disp: 18 g, Rfl: 11    albuterol-ipratropium (DUO-NEB) 2.5 mg-0.5 mg/3 mL nebulizer solution, USE 1 AMPULE IN NEBULIZER EVERY 4 HOURS AS NEEDED FOR WHEEZING, Disp: 180 mL, Rfl: 0    alfuzosin (UROXATRAL) 10 mg Tb24, TAKE 1 TABLET BY MOUTH ONCE DAILY AFTER BREAKFAST, Disp: , Rfl:     aspirin (ECOTRIN) 81 MG EC tablet, , Disp: , Rfl:     azelastine (ASTELIN) 137 mcg (0.1 %) nasal spray, 1 spray (137 mcg total) by Nasal route 2 (two) times daily., Disp: 30 mL, Rfl: 2    benzonatate (TESSALON) 100 MG capsule, Take 1 capsule (100 mg total) by mouth 3 (three) times daily as needed for Cough., Disp: 42 capsule, Rfl: 0    ELIQUIS 5 mg Tab, Take 1 tablet (5 mg total) by mouth 2 (two) times daily., Disp: 180 tablet, Rfl: 3    EScitalopram oxalate (LEXAPRO) 20 MG tablet, Take by mouth. Taking 1/2 tablet daily, Disp: , Rfl:     esomeprazole (NEXIUM) 40 MG capsule, Take 40 mg by mouth once daily., Disp: , Rfl:     ezetimibe (ZETIA) 10 mg tablet, Take 1 tablet (10 mg total) by mouth once daily., Disp: 90 tablet, Rfl: 3    finasteride (PROSCAR) 5 mg tablet, Take 5 mg by mouth once daily., Disp: , Rfl:     fluticasone-umeclidin-vilanter (TRELEGY ELLIPTA) 200-62.5-25 mcg inhaler, Inhale 1 puff into the lungs once daily., Disp: 60 each, Rfl: 11    guaiFENesin 100 mg/5 ml (ROBITUSSIN) 100 mg/5 mL syrup, Take 10 mLs (200 mg total) by mouth every 6 (six) hours as needed for Cough or Congestion., Disp: 118 mL, Rfl: 0    isosorbide mononitrate (IMDUR) 30 MG 24 hr tablet, Take 1 tablet (30 mg total) by mouth once daily., Disp: 30 tablet, Rfl: 0    LIVALO 4 mg Tab, Take 1 tablet by mouth once daily., Disp: , Rfl:  "    nitroGLYCERIN (NITROSTAT) 0.4 MG SL tablet, Place under the tongue., Disp: , Rfl:     predniSONE (DELTASONE) 20 MG tablet, Take 3 tablets (60 mg total) by mouth once daily. for 14 days, Disp: 42 tablet, Rfl: 0    tiotropium-olodateroL (STIOLTO RESPIMAT) 2.5-2.5 mcg/actuation Mist, Inhale 2 puffs into the lungs once daily. Controller, Disp: 12 g, Rfl: 3    valACYclovir (VALTREX) 1000 MG tablet, Take 1 g by mouth 2 (two) times daily., Disp: , Rfl:     zolpidem (AMBIEN) 5 MG Tab, Take 1 tablet (5 mg total) by mouth every evening., Disp: 20 tablet, Rfl: 2    ALPRAZolam (XANAX) 0.5 MG tablet, Take 1 tablet (0.5 mg total) by mouth 3 (three) times daily as needed for Anxiety., Disp: 180 tablet, Rfl: 1    Review of Systems   Constitutional:  Negative for appetite change, fatigue, fever and unexpected weight change.   Respiratory:  Negative for cough, chest tightness, shortness of breath and wheezing.    Cardiovascular:  Negative for chest pain and leg swelling.   Gastrointestinal:  Negative for abdominal pain, constipation, nausea and vomiting.        -heartburn   Genitourinary:  Negative for decreased urine volume, difficulty urinating, dysuria and frequency.   Musculoskeletal:  Positive for back pain. Negative for arthralgias, myalgias and neck pain.   Skin:  Negative for rash.   Neurological:  Negative for dizziness, weakness, numbness and headaches.   Hematological:  Does not bruise/bleed easily.   Psychiatric/Behavioral:  Negative for dysphoric mood, sleep disturbance and suicidal ideas. The patient is nervous/anxious.    All other systems reviewed and are negative.     Objective:      Vitals:    06/26/23 1334   BP: 138/70   Pulse: 89   SpO2: 96%   Weight: 71.7 kg (158 lb)   Height: 5' 6" (1.676 m)     Physical Exam  Vitals reviewed.   Constitutional:       General: He is not in acute distress.     Appearance: Normal appearance. He is well-developed and overweight.   HENT:      Head: Normocephalic and atraumatic. "   Neck:      Thyroid: No thyromegaly.   Cardiovascular:      Rate and Rhythm: Normal rate and regular rhythm.      Heart sounds: Normal heart sounds. No murmur heard.    No friction rub.   Pulmonary:      Effort: Pulmonary effort is normal.      Breath sounds: Normal breath sounds. No wheezing or rales.   Abdominal:      General: Bowel sounds are normal. There is no distension.      Palpations: Abdomen is soft.      Tenderness: There is no abdominal tenderness.   Musculoskeletal:      Cervical back: Neck supple.   Lymphadenopathy:      Cervical: No cervical adenopathy.   Skin:     General: Skin is warm and dry.      Findings: No rash.   Neurological:      Mental Status: He is alert and oriented to person, place, and time.   Psychiatric:         Attention and Perception: He is attentive.         Speech: Speech normal.         Behavior: Behavior normal.         Thought Content: Thought content normal.         Judgment: Judgment normal.       Assessment:       1. Hospital discharge follow-up    2. Essential hypertension    3. Atrial fibrillation, unspecified type    4. Myocardial infarction, unspecified MI type, unspecified artery    5. Generalized anxiety disorder         Plan:       Hospital discharge follow-up    Essential hypertension  Comments:  Controlled. Will continue to monitor BP on current medication regimen    Atrial fibrillation, unspecified type  Comments:  Rate controlled. On Eliquis. To f/u w/ Cards    Myocardial infarction, unspecified MI type, unspecified artery  Comments:  Recovered. To continue to follow with Cards    Generalized anxiety disorder  Comments:  Controlled. Will continue to monitor symptoms  Orders:  -     ALPRAZolam (XANAX) 0.5 MG tablet; Take 1 tablet (0.5 mg total) by mouth 3 (three) times daily as needed for Anxiety.  Dispense: 180 tablet; Refill: 1      Follow up in about 3 months (around 9/26/2023) for HTN, Afib,YECENIA.

## 2023-06-29 ENCOUNTER — OFFICE VISIT (OUTPATIENT)
Dept: PULMONOLOGY | Facility: CLINIC | Age: 68
End: 2023-06-29
Payer: MEDICARE

## 2023-06-29 VITALS
HEART RATE: 75 BPM | WEIGHT: 156.06 LBS | BODY MASS INDEX: 25.08 KG/M2 | DIASTOLIC BLOOD PRESSURE: 89 MMHG | OXYGEN SATURATION: 96 % | HEIGHT: 66 IN | SYSTOLIC BLOOD PRESSURE: 148 MMHG

## 2023-06-29 DIAGNOSIS — J82.83 EOSINOPHILIC ASTHMA: ICD-10-CM

## 2023-06-29 DIAGNOSIS — E09.9 STEROID-INDUCED DIABETES MELLITUS, INITIAL ENCOUNTER: Primary | ICD-10-CM

## 2023-06-29 DIAGNOSIS — J44.1 COPD WITH ACUTE EXACERBATION: ICD-10-CM

## 2023-06-29 DIAGNOSIS — J44.89 ASTHMA-COPD OVERLAP SYNDROME: ICD-10-CM

## 2023-06-29 DIAGNOSIS — T38.0X5A STEROID-INDUCED DIABETES MELLITUS, INITIAL ENCOUNTER: Primary | ICD-10-CM

## 2023-06-29 DIAGNOSIS — J44.9 CHRONIC OBSTRUCTIVE PULMONARY DISEASE, UNSPECIFIED COPD TYPE: ICD-10-CM

## 2023-06-29 PROCEDURE — 99999 PR PBB SHADOW E&M-EST. PATIENT-LVL IV: CPT | Mod: PBBFAC,,, | Performed by: INTERNAL MEDICINE

## 2023-06-29 PROCEDURE — 99214 OFFICE O/P EST MOD 30 MIN: CPT | Mod: S$PBB,,, | Performed by: INTERNAL MEDICINE

## 2023-06-29 PROCEDURE — 99214 OFFICE O/P EST MOD 30 MIN: CPT | Mod: PBBFAC,PO | Performed by: INTERNAL MEDICINE

## 2023-06-29 PROCEDURE — 99214 PR OFFICE/OUTPT VISIT, EST, LEVL IV, 30-39 MIN: ICD-10-PCS | Mod: S$PBB,,, | Performed by: INTERNAL MEDICINE

## 2023-06-29 PROCEDURE — 99999 PR PBB SHADOW E&M-EST. PATIENT-LVL IV: ICD-10-PCS | Mod: PBBFAC,,, | Performed by: INTERNAL MEDICINE

## 2023-06-29 RX ORDER — FLUTICASONE FUROATE, UMECLIDINIUM BROMIDE AND VILANTEROL TRIFENATATE 200; 62.5; 25 UG/1; UG/1; UG/1
1 POWDER RESPIRATORY (INHALATION) DAILY
Qty: 180 EACH | Refills: 3 | Status: SHIPPED | OUTPATIENT
Start: 2023-06-29 | End: 2024-02-23 | Stop reason: SDUPTHER

## 2023-06-29 RX ORDER — PREDNISONE 20 MG/1
60 TABLET ORAL DAILY
Qty: 42 TABLET | Refills: 3 | Status: SHIPPED | OUTPATIENT
Start: 2023-06-29 | End: 2023-09-06 | Stop reason: ALTCHOICE

## 2023-06-29 RX ORDER — PREDNISONE 20 MG/1
60 TABLET ORAL DAILY
Qty: 42 TABLET | Refills: 3 | Status: SHIPPED | OUTPATIENT
Start: 2023-06-29 | End: 2023-06-29 | Stop reason: SDUPTHER

## 2023-06-29 RX ORDER — BENZONATATE 200 MG/1
200 CAPSULE ORAL 3 TIMES DAILY PRN
Qty: 90 CAPSULE | Refills: 3 | Status: SHIPPED | OUTPATIENT
Start: 2023-06-29 | End: 2023-09-06 | Stop reason: ALTCHOICE

## 2023-06-29 NOTE — PATIENT INSTRUCTIONS
Cxr June 13th viewed  - no pneumonia      You had high blood sugars in hospital -- 160's.  You may have problems with using high dose steroids.     Stop prednisone when out -- drop to 2 daily for 2 days then one daily (if able)- 14 days.      Have prednisone on hand-- start 3 20 mg daily and call if needs again    You have been on trelegy -- continue.     You had unusually high eosinophils when flared lungs with recent heart attack    You have asthma and copd.    You may be steroid dependant if need to repeat-- consider special shots.         No bad germs in lung mucous on June 15th      May need diabetes medication-- would check sugars in future if on steroids......

## 2023-06-29 NOTE — PROGRESS NOTES
6/29/2023    Martell Corcoran  Follow up    Chief Complaint   Patient presents with    Follow-up     Hospital f/u        HPI:     6/29/2023 pt presented nsr er June 13th -- had bad coughing spell with sob and chest pains -- pt went home on 18th on prednisone 60/d with few days left today.  Pt still has wheezes in early am.   Codeine did not work -- may have worsened.    Still on trelegy.      Eos were elevated to 800 at presentation --     5/23/2023 pt was doing cleaning for elderly cat lady- 5/12/2023- had to spray acontainer for roaches- loaded with roaches- he was given fogger,  and was trying to fog container, sprayed and then closed container,  exposed to [place] fogger off and on for roughly 1/2 hr to 40 min. No burning. No sob nor cough til roughly 4-6 hrs later.  Similar exposures to cleaning was no problem in past. Pt was advised to go to er after 4 -5 days -- pt had to use neb q 4-6 hr post exposure whereas was using neb rx every 6 months prior.  Pt seen in er-- prednisone 20/d x 4 done.  Sat 96 and wheezes noted.      Pt did use zpak but mucous still yellow.   Patient Instructions   Chest xray 5/17/2023 viewed with no problems.    Breathing test 2020 with copd 56% lung capacity      Use trelegy or stiolto-- trelegy has steroids which are more effective to prevent wheezes    Increase steroids-- may need into future?? For now use prednisone 20mg 3 x 3 and taper,,,,,  may repeat.  Would be best to inhale steroids to prevent relapse -- not sure long term need.    Codiene not covered but not $$      Use azithromycin, may need special antibiotic??  Need to have mucous clear.      Call if not back to usual in 2-3 wks???    3/7/2023 quit smokes, feels fatigued chronically. Had naila and cardioversion November.  Back to rhythm but could not use meds to continue.   Uses anoro and qvar--- not using reg as before.    Pt missed lexapro somehow-- had increased anxiety and stomach issues.  Uses occ xanax.   Uses  astelin      Has occ blurred vision.  Patient Instructions   Pet scan viewed.  Deviated septum noted.     Would use stiolto 2 daily -- could skip -- anoro not good for regular use.    Novmeber screening ct chest     Nerves may cause fatigue    Sleep apnea may cause excess sleepiness-- would re try cpap once nerves stable    Would re try cholesterol  medications.     Try cpap -- may use ambein to sleep--dedicate 8 hrs sleep to use ambein.      Sept 13, 2022  in past, had own company.  Lung dz developed 4 + yrs ago.  Bladder cancer removed.  Had bcg infusions 2020 . Some garcia. Problem nasal stuffy tolerating cpap.    Patient Instructions   Nasal stuffy -- afrin or generic afrin --- use one to 2 sprays up to once daily -- alternate nares ?  If regular use may get tolerant and afrin ineffective.  Could see ent to improve passages.      Flonase should work better (or astelin) if passages open.     Sleep study was only 8 episodes an hour-- less than 5 is normal.     Cpap titration needed 8-9 cm pressure    Mediastinal abnormality varies-- would send to cardiothoracic surgery.    Copd is moderate at 55% or so.  Anoro and qvar effective -- as needed albuterol.  Would stop smokes.     CT films are reviewed directly with the patient.  Extensive discussion is made.  Patient's evaluation took 56 minutes today.          Below from RJ Salgado  6/1/2022- SOB persistent complaint, pain with deep inspiration on left chest. Followed by cardiology for A fib and vascular blockages pacemaker procedure postponed.    Wearing CPAP with nose mask, moves across face when sleeping. Does not feel better after wearing.   Cough- improved, less often and severe, most days, worse when weather changes, productive clear mucous  Currently smoking 3-4 cigarettes daily, trying to cut down.   Patient Instructions   CT of chest shows clear lungs, no change to previously seen lung nodules.     A cyst is seen in muscle could be cause of  chest pain    Recommend smoking cessation    Continue COPD medication regiment    Recommend trying CPAP therapy again.     2/4/2022-  States breathing is labored. States usually worse after infusion for bladder cancer. States does have benefit with qvar and Anoro, using albuterol as needed 2x daily.     Daytime fatigue persistent.  purchased CPAP from third party. Has not started to use.     Scheduled for pacemaker to treat A-fib. Followed by Dr. Miranda.     Cough- decreased, 2x daily, productive clear mucous, cut back on smoking to 6 cigarettes daily    1/14/2022- not able to get CPAP due to high co-pay.   States breathing improved after decreasing smoking to 3-6 cigarettes daily.   Shortness of breath- daily complaint, slightly improved, worse with exertion, improves with rest. Daytime fatigue unchanged.   Complaint of cough- varies in severity, currently mild. Productive small amount clear mucous, did notice worsening after first starting to cut back on smoking. Has returned to normal   Followed by Urologist Dr. Pham for bladder cancer. Undergoing infusion therapy.     10/4/2021- concerned he had COVID 19 late July early August, lost sense of taste and smell. Complaint of fatigue, body aches, shortness of breath when walking,   No fever. Gradually improved over 4 weeks. Taste and smell has not returned. Experiencing short term memory loss and metal grogginess. Has daytime drowsiness onset years worsened in past two months  Persistent cough- productive clear mucous, associated with wheeze. Improves with albuterol inhaler.   Currently on Anoro, QVAR, using albuterol when needed 1-3 x weekly.     5/24/2021- Admitted To hospital in [place] while on vacation April 27, 2021 for COPD exacerbation. Seen at [facility] December 16, 2020 for COPD exacerbation.   Complaint of SOB- daily, worse with exertion, improves with rest. Treated with antibiotics and steroid therapy April.   Cough- recurrent complaint,  improved since  hospital discharge, worsened in last 2 weeks after spending time with grandson who had bronchitis that has improved with nebulizer treatments every 4 hours. Associated with chest tightness and wheeze.       2020- he had bladder biopsy at [facility] with Dr. Pham on 11/3- per outside records path with High grade papillary urothelial cell carcinoma. He has cough w/ postnasal drip and allergies but breathing is much better. He has been dealing with a lot for the family- 2 brothers just . Hematuria is much better. Not getting bladder infections any more. He continues to smoke 1/2 ppd. Wants to quit but too much stress recently- not ready.    2020-   Start taking prednisone again- long taper over 3 weeks then try to stop. If lungs flaring while decreasing prednisone go up to the last dose that helped you and call our office.  Continue trelegy  Refilled duo nebs to use as needed  Use albuterol as needed  Quit smoking- go to program  Follow up with urologist  Follow up with PCP for kidney testing and possible urinary infection  pt was recently hospitalized for COPD exacerbation, seen by me while admitted 20 and also having worsening hematuria at that time. He was sent home with a course of levaquin and steroid taper. He did not qualify for home O2. He is being worked up for a bladder mass per urology and pending transurethral resection.  Pt c/o pain around L kidney after doing yard work and urine turned darker. He was coughing last night and woke up with face feeling puffy. Switched urologists to a Dr. hPam at [facility] and has appt at end of September.  He finished prednisone taper. Still taking levaquin. Still feels some congestion in chest but it is getting better. Also has sinus problems w/ nose blocked. He denies frequent exacerbations but this one has been very bad. Feels like worsening since stopped prednisone. Still smokes but trying to cut back. Has smoking cessation appt later this  month.  Inhalers: nebs 4-5x/day, trelegy daily, albuterol rescue 1-2x/day    7/15/20- Pt is a 64 yo male with HTN, GERD and BPH presents for new evaluation of breathing issues. He complains of SOB especially if he carries anything like taking out trash to the dumpster. This has been progressive over about 3.5 yrs. He wheezes and coughs up clear mucous, throughout the day.  Pt smokes 1 PPD x 55 yrs.  At age 5 pt had pna and a collapsed lung requiring chest tube on the left side and hospitalization. Didn't have any other problems w/ breathing growing up. He took up playing [employer] to help lung function.  Pt had abd/p scan for UTI recently which showed some emphysematous changes.    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 One brother  at 65 from leukemia.    Grew up in [place]    The chief compliant  problem varies with instablilty at time      PFSH:  Past Medical History:   Diagnosis Date    Benign enlargement of prostate     Had a biopsy in around     Elevated PSA     GERD (gastroesophageal reflux disease)     Hypertension     Started with meds in .    Kidney stone     Urinary tract infection          Past Surgical History:   Procedure Laterality Date    FISTULA REPAIR      LEFT HEART CATHETERIZATION N/A 10/23/2018    Procedure: Left heart cath;  Surgeon: Niharika Squires MD;  Location[facility] CATH;  Service: Cardiology;  Laterality: N/A;     Social History     Tobacco Use    Smoking status: Former     Packs/day: 1.00     Years: 55.00     Pack years: 55.00     Types: Cigarettes     Quit date: 2023     Years since quittin.4    Smokeless tobacco: Never   Substance Use Topics    Alcohol use: Yes     Comment: Previous 12 beer a day for 20 years. Now occ.    Drug use: No     Family History   Problem Relation Age of Onset    Heart failure Mother     Cancer Father     Heart disease Brother     Heart  "attack Brother     Cancer Brother     Heart disease Brother     Drug abuse Brother      Review of patient's allergies indicates:   Allergen Reactions    Msg [glutamic acid] Nausea Only, Palpitations, Shortness Of Breath and Swelling    Oyster extract Swelling     PT swells in his throat after eating oysters on occasion       Performance Status:The patient's activity level is functions out of house. QUACH improved and does not limit him. Back pain limits activity.    Review of Systems:  a review of eleven systems covering constitutional, Eye, HEENT, Psych, Respiratory, Cardiac, GI, , Musculoskeletal, Endocrine, Dermatologic was negative except for pertinent findings as listed ABOVE and below:   wheeze, shortness of breath, fatigue      Exam:Comprehensive exam done. BP (!) 148/89 (BP Location: Left arm, Patient Position: Sitting, BP Method: Medium (Automatic))   Pulse 75   Ht 5' 6" (1.676 m)   Wt 70.8 kg (156 lb 1.4 oz)   SpO2 96% Comment: on room air at rest  BMI 25.19 kg/m²   Exam included Vitals as listed, and patient's appearance and affect and alertness and mood, oral exam for yeast and hygiene and pharynx lesions and Mallapatti (M) score, neck with inspection for jvd and masses and thyroid abnormalities and lymph nodes (supraclavicular and infraclavicular nodes and axillary also examined and noted if abn), chest exam included symmetry and effort and fremitus and percussion and auscultation, cardiac exam included rhythm and gallops and murmur and rubs and jvd and edema, abdominal exam for mass and hepatosplenomegaly and tenderness and hernias and bowel sounds, Musculoskeletal exam with muscle tone and posture and mobility/gait and  strength, and skin for rashes and cyanosis and pallor and turgor, extremity for clubbing.  Findings were normal except for pertinent findings listed below:  M1  Bilateral clear lungs w/ faint rhonchi bilateral lower lung zones  No clubbing or edema    Radiographs (ct chest and " cxr) reviewed: view by direct vision   CT scan of the chest September 7, 2022 viewed by direct vision.  Fairly small mediastinal cystic structure looks to be a little smaller than March.  There was very very difficult to see in 2021 however.    CT Chest Without Contrast 03/14/22 Emphysema and unchanged lung nodules  Indeterminate smoothly marginated structure along the anterior superior mediastinum, measuring just above simple fluid attenuation suggestive of a minimally complex/complicated cyst, possibly a lymphovascular malformation, synovial cyst (extending from the sternoclavicular joint), foregut duplication cyst, thymic epithelial lesion/cyst, and much less likely malignancy, however this has slightly increased in size from the previous exam and may warrant interval attention on follow-up imaging.       CT Chest Without Contrast  05/20/2021   In the left upper lobe is a confluence of vessels and a possible 4 mm nodule decreased in size and conspicuousness since the prior study of July 16, 2020.  No new or enlarging pulmonary nodules are identified.  Emphysema.     CT chest/abd/p 11/20/20- mild emphysema, lungs clear aside from small 6mm nodule DANA which is unchanged from prior exam  1. No metastatic disease.  2. Nodular thickening of the left posterior aspect of the bladder.  3. Enlarged prostate.  3. Mild pulmonary emphysema.   CXR 8/14/20- possible vague infiltrate/opacifications bilateral bases  CT chest 7/16/20- DANA 6mm nodule  CT abd/p 6/26/20- bases of lungs w/ scant emphysematous changes, some strands of atelectasis    Labs reviewed    Lab Results   Component Value Date    WBC 11.66 06/16/2023    RBC 4.38 (L) 06/16/2023    HGB 14.1 06/16/2023    HCT 42.8 06/16/2023    MCV 98 06/16/2023    MCH 32.2 (H) 06/16/2023    MCHC 32.9 06/16/2023    RDW 13.1 06/16/2023     06/16/2023    MPV 8.5 (L) 06/16/2023    GRAN 9.4 (H) 06/16/2023    GRAN 80.4 (H) 06/16/2023    LYMPH 1.2 06/16/2023    LYMPH 10.0 (L)  06/16/2023    MONO 1.0 06/16/2023    MONO 8.8 06/16/2023    EOS 0.0 06/16/2023    BASO 0.01 06/16/2023    EOSINOPHIL 0.3 06/16/2023    BASOPHIL 0.1 06/16/2023     Results for AMBROCIO, MARTELL RAMEY (MRN [id number]) as of 5/24/2021 15:33   Ref. Range 8/13/2020 02:30 8/13/2020 06:01 8/14/2020 05:03 12/16/2020 09:20 3/22/2021 15:14   Eos # Latest Ref Range: 15 - 500 cells/uL 1.4 (H) 0.3 0.0 0.5 122       PFT reviewed  7/16/20- moderately severe obstruction, reduced DLCO, air trapping    EPWORTH SLEEPINESS SCALE SCORE 13 on 10/4/2021  Sleep study 10/7/2021 AHI Significant obstructive sleep apnea with a TRUPTI of 8.1/hr      Plan:  Clinical impression is apparently straight forward and impression with management as below.    Martell was seen today for follow-up.    Diagnoses and all orders for this visit:    Steroid-induced diabetes mellitus, initial encounter    Chronic obstructive pulmonary disease, unspecified COPD type    Asthma-COPD overlap syndrome  -     Discontinue: predniSONE (DELTASONE) 20 MG tablet; Take 3 tablets (60 mg total) by mouth once daily.  -     predniSONE (DELTASONE) 20 MG tablet; Take 3 tablets (60 mg total) by mouth once daily.    Eosinophilic asthma  -     benzonatate (TESSALON) 200 MG capsule; Take 1 capsule (200 mg total) by mouth 3 (three) times daily as needed for Cough.  -     Discontinue: predniSONE (DELTASONE) 20 MG tablet; Take 3 tablets (60 mg total) by mouth once daily.  -     predniSONE (DELTASONE) 20 MG tablet; Take 3 tablets (60 mg total) by mouth once daily.    COPD with acute exacerbation  -     fluticasone-umeclidin-vilanter (TRELEGY ELLIPTA) 200-62.5-25 mcg inhaler; Inhale 1 puff into the lungs once daily.             - continue COPD medication regiment   - lung nodules unchanged.        Follow up in about 4 weeks (around 7/27/2023), or if symptoms worsen or fail to improve.    Discussed with patient above for education the following:      Patient Instructions   Cxr June 13th viewed  - no  pneumonia      You had high blood sugars in hospital -- 160's.  You may have problems with using high dose steroids.     Stop prednisone when out -- drop to 2 daily for 2 days then one daily (if able)- 14 days.      Have prednisone on hand-- start 3 20 mg daily and call if needs again    You have been on trelegy -- continue.     You had unusually high eosinophils when flared lungs with recent heart attack    You have asthma and copd.    You may be steroid dependant if need to repeat-- consider special shots.         No bad germs in lung mucous on June 15th      May need diabetes medication-- would check sugars in future if on steroids......

## 2023-06-30 ENCOUNTER — OUTPATIENT CASE MANAGEMENT (OUTPATIENT)
Dept: ADMINISTRATIVE | Facility: OTHER | Age: 68
End: 2023-06-30
Payer: MEDICARE

## 2023-06-30 ENCOUNTER — PATIENT MESSAGE (OUTPATIENT)
Dept: ADMINISTRATIVE | Facility: OTHER | Age: 68
End: 2023-06-30
Payer: MEDICARE

## 2023-06-30 NOTE — PROGRESS NOTES
06/30/23-Attempt assessment with patient for outpatient case management. No answer. Left message requesting call back.  Letter thru my chart. RN OPCM 1'st assessment attempt.

## 2023-07-05 ENCOUNTER — OUTPATIENT CASE MANAGEMENT (OUTPATIENT)
Dept: ADMINISTRATIVE | Facility: OTHER | Age: 68
End: 2023-07-05
Payer: MEDICARE

## 2023-07-05 NOTE — LETTER
July 5, 2023             Dear Martell Corcoran:    Welcome to [facility]s Complex Care Management Program.  It was a pleasure talking with you today.  My name is Consuelo Gomez RN CCM and I look forward to being your Care Manager.  My goal is to help you function at the healthiest and highest level possible.  You can contact me directly at [phone].    As an [facility] patient, some of the services we may be able to provide include:      Development of an individualized care plan with a Registered Nurse    Connection with a    Connection with available resources and services     Coordinate communication among your care team members    Provide coaching and education    Help you understand your doctors treatment plan   Help you obtain information about your insurance coverage.     All services provided by [facility]s Complex Care Managers and other care team members are coordinated with and communicated to your primary care team.      As part of your enrollment, you will be receiving education materials and more information about these services in your [facility] account, by phone or through the mail.  If you do not wish to participate or receive information, please contact our office at [phone].      Sincerely,        Consuelo Gomez RN CCM [facility]   Out-patient RN Complex Care Manager

## 2023-07-05 NOTE — PROGRESS NOTES
Outpatient Care Management  Initial Patient Assessment    Patient: Martell Corcoran  MRN: [id number]  Date of Service: 07/05/2023  Completed by: Consuelo Gomez RN  Referral Date: 06/14/2023  Program: No programs to display      Reason for Visit   Patient presents with    OPCM Enrollment Call    Case Closure     7/5/2023  3rd attempt to complete Initial Assessment  for Outpatient Care Management, left message..        Brief Summary:  Martell Corcoran was referred by DARRYL Benitez for NSTEMI. Patient qualifies for program based on high risk score 65.9%. .   Active problem list, medical, surgical and social history reviewed. Active comorbidities include AFib, COPD, HTN, GERD. Areas of need identified by patient include heart. .   Received call back from Martell and assessment done.   He cancelled his cardiac ablation with Dr. Aguilar on 06/12/23. He went to the ER on 06/13/23 with complaints of shortness of breath and chest pain. He was exposed to insecticides spray in May.  Principal Problem- NSTEMI. Stress test on 06/15/23 . Discharged home on 06/17/23. He lives with his marybeth Goldberg. He drives and is independent with his ADL's.   He checks his BP every two to three days. /76, 142/77. He does not know when he is in Afib. He is having shortness of breat and minor chest pains. Weighs daily- 153 pounds to 158 pounds. Denies edema in ankles/legs or abdomen. Pain is a 3 in back and shoulder. He does not drink or smoke. He is on a cardiac diet.   Cardiology - Dr. Lemon-Appt on 07/06/23-cardiology appt with RJ Fuentes  Electrophysiology, cardiovascular disease- Dr. Aguilar - Appt. On 07/13/23-ablation with      Dr. Aguilar   Pulmonary- Dr. Carolina - Appt on 07/27/23  PCP- Dr. Burrell- Appt  on 09/11/23  G I- Dr. Navarro     Med Rec done     Next steps: Follow up in two weeks in or around 07/19/23  Refer to  for benefits of Medicaid and SDOH.   Mail heart healthy diet

## 2023-07-06 ENCOUNTER — OFFICE VISIT (OUTPATIENT)
Dept: CARDIOLOGY | Facility: CLINIC | Age: 68
End: 2023-07-06
Payer: MEDICARE

## 2023-07-06 VITALS
OXYGEN SATURATION: 95 % | DIASTOLIC BLOOD PRESSURE: 62 MMHG | BODY MASS INDEX: 24.91 KG/M2 | RESPIRATION RATE: 16 BRPM | HEIGHT: 66 IN | WEIGHT: 155 LBS | SYSTOLIC BLOOD PRESSURE: 130 MMHG | HEART RATE: 91 BPM

## 2023-07-06 DIAGNOSIS — I10 PRIMARY HYPERTENSION: ICD-10-CM

## 2023-07-06 DIAGNOSIS — R07.9 CHEST PAIN, UNSPECIFIED TYPE: ICD-10-CM

## 2023-07-06 DIAGNOSIS — R06.09 DOE (DYSPNEA ON EXERTION): ICD-10-CM

## 2023-07-06 DIAGNOSIS — I21.4 NSTEMI (NON-ST ELEVATED MYOCARDIAL INFARCTION): ICD-10-CM

## 2023-07-06 DIAGNOSIS — I70.0 ATHEROSCLEROSIS OF AORTA: ICD-10-CM

## 2023-07-06 DIAGNOSIS — J43.2 CENTRILOBULAR EMPHYSEMA: ICD-10-CM

## 2023-07-06 DIAGNOSIS — I48.91 ATRIAL FIBRILLATION, UNSPECIFIED TYPE: ICD-10-CM

## 2023-07-06 DIAGNOSIS — R94.39 ABNORMAL NUCLEAR STRESS TEST: ICD-10-CM

## 2023-07-06 DIAGNOSIS — E78.5 HYPERLIPIDEMIA, UNSPECIFIED HYPERLIPIDEMIA TYPE: Primary | ICD-10-CM

## 2023-07-06 DIAGNOSIS — G47.33 OSA ON CPAP: ICD-10-CM

## 2023-07-06 DIAGNOSIS — F17.200 TOBACCO DEPENDENCE: ICD-10-CM

## 2023-07-06 DIAGNOSIS — R07.2 PRECORDIAL PAIN: ICD-10-CM

## 2023-07-06 DIAGNOSIS — I25.118 ATHEROSCLEROTIC HEART DISEASE OF NATIVE CORONARY ARTERY WITH OTHER FORMS OF ANGINA PECTORIS: ICD-10-CM

## 2023-07-06 PROCEDURE — 93010 EKG 12-LEAD: ICD-10-PCS | Mod: S$PBB,,, | Performed by: INTERNAL MEDICINE

## 2023-07-06 PROCEDURE — 93005 ELECTROCARDIOGRAM TRACING: CPT | Mod: PBBFAC,PN | Performed by: INTERNAL MEDICINE

## 2023-07-06 PROCEDURE — 99215 OFFICE O/P EST HI 40 MIN: CPT | Mod: PBBFAC,PN

## 2023-07-06 PROCEDURE — 93010 ELECTROCARDIOGRAM REPORT: CPT | Mod: S$PBB,,, | Performed by: INTERNAL MEDICINE

## 2023-07-06 PROCEDURE — 99214 OFFICE O/P EST MOD 30 MIN: CPT | Mod: S$PBB,,,

## 2023-07-06 PROCEDURE — 99214 PR OFFICE/OUTPT VISIT, EST, LEVL IV, 30-39 MIN: ICD-10-PCS | Mod: S$PBB,,,

## 2023-07-06 PROCEDURE — 99999 PR PBB SHADOW E&M-EST. PATIENT-LVL V: CPT | Mod: PBBFAC,,,

## 2023-07-06 PROCEDURE — 99999 PR PBB SHADOW E&M-EST. PATIENT-LVL V: ICD-10-PCS | Mod: PBBFAC,,,

## 2023-07-06 RX ORDER — SODIUM CHLORIDE 9 MG/ML
INJECTION, SOLUTION INTRAVENOUS ONCE
Status: CANCELLED | OUTPATIENT
Start: 2023-07-06 | End: 2023-07-06

## 2023-07-06 RX ORDER — SODIUM CHLORIDE 0.9 % (FLUSH) 0.9 %
2 SYRINGE (ML) INJECTION
Status: SHIPPED | OUTPATIENT
Start: 2023-07-06

## 2023-07-06 RX ORDER — DIPHENHYDRAMINE HCL 25 MG
50 CAPSULE ORAL
Status: CANCELLED | OUTPATIENT
Start: 2023-07-06

## 2023-07-06 RX ORDER — ISOSORBIDE MONONITRATE 60 MG/1
60 TABLET, EXTENDED RELEASE ORAL DAILY
Qty: 90 TABLET | Refills: 3 | Status: SHIPPED | OUTPATIENT
Start: 2023-07-06 | End: 2024-07-05

## 2023-07-06 RX ORDER — PITAVASTATIN CALCIUM 4.18 MG/1
1 TABLET, FILM COATED ORAL DAILY
Qty: 90 TABLET | Refills: 3 | Status: SHIPPED | OUTPATIENT
Start: 2023-07-06 | End: 2023-08-16 | Stop reason: ALTCHOICE

## 2023-07-06 NOTE — PROGRESS NOTES
Subjective:    Patient ID:  Martell Corcoran is a 68 y.o. male patient here for evaluation Hospital Follow Up (He was seen at [facility], NSTEMI. He is still having some left sided chest pains. Some sob. He does have a history of copd)      History of Present Illness:   Patient is here for a hospital follow-up.  He c/o L CP and QUAHC.  He does have COPD.  He has noticed the CP has increased in frequency since discharge.  Patient denies CP alleviating and aggravating factors.  CP not reproducible with movement, deep breaths, and palpation.  He also complained fatigue and occasional dizziness with exertion.  He was recently exposed to insecticide spray and has been treated with steroids and breathing treatments.  Despite starting and or his chest pain persists intermittently.  No chest pain during examination.  Blood pressure 130/62.  EKG normal sinus rhythm with left axis deviation incomplete right bundle-branch. patient had abnormal nuclear stress test during recent hospitalization.  Last cardiac catheterization was several years ago.  Patient is concerned about another blockage causing his chest pain and worsening dyspnea on exertion.  Recommend cardiac catheterization at this time for further evaluation of coronary blockages.    NUCLEAR STRESS TEST  6/15/23  Impression:     There is a small focus of decreased tracer accumulation in the inferior wall of the left ventricle seen on the rest images, a possible small scar with surrounding moderate area of reversible ischemia seen on the stress images.      ECHO 6/14/23  The left ventricle is normal in size with low normal systolic function.  The estimated ejection fraction is 50%.  Normal left ventricular diastolic function.  There is no evidence of intracardiac shunting.  Normal right ventricular size with normal right ventricular systolic function.  Normal central venous pressure (3 mmHg).       Discharge summary 06/17/2023       HPI:   Martell Corcoran is  a 67-year-old male who presents emergency room for evaluation of shortness of breath and chest pain.  He reports chest pain onset approximately 8 hours prior to arrival.  He does report intermittent chest pain for the past several weeks after he was exposed to insecticides spray.  He describes chest pain as a heavy sensation in the left anterior chest region.  No reported radiation of the pain, nausea, vomiting, or diaphoresis.  He was given nitroglycerin in the emergency room with improvement in pain.  He denies fever or chills.  No known sick contacts or travel.  Previous medical history includes COPD, AFib, obstructive sleep apnea on CPAP, hypertension, GERD, and coronary artery disease.  Of note patient is scheduled for an AFib ablation at [facility] tomorrow morning.  ER workup:  CBC and CMP unremarkable.  Troponin elevated at 0.254.  EKG demonstrates sinus rhythm without ST or T-wave elevation.  Patient admitted to Hospital Medicine for treatment management.  ER physician spoke with  who is okay with patient staying here for care.        * No surgery found *       Hospital Course:   Patient was admitted to the hospital with elevated troponin and concerns for NSTEMI.  The patient was monitored closely throughout his hospital stay.  Cardiology was consulted on admission.  Troponins were trended and peaked and trended downward. Patient reported continued dyspnea, started on IV steroids, and pulmonology was consulted.  Patient underwent stress test on 6/15.  Steroids were transitioned to oral.  Patient reported worsening shortness a breath and patient was placed back on IV steroids per pulmonology. Patient's breathing improved following steroids and satting well on RA. Underwent desaturation screening and did not require Home O2. Patient to be discharged with close pulmonology and cardiology follow up. Patient discharged with oral steroids per pulmonology recommendations. Patient and family verbalized  understanding of discharge instructions and return precautions.        START taking these medications    benzonatate 100 MG capsule  Commonly known as: TESSALON  Take 1 capsule (100 mg total) by mouth 3 (three) times daily as needed for Cough.      guaiFENesin 100 mg/5 ml 100 mg/5 mL syrup  Commonly known as: ROBITUSSIN  Take 10 mLs (200 mg total) by mouth every 6 (six) hours as needed for Cough or Congestion.      isosorbide mononitrate 30 MG 24 hr tablet  Commonly known as: IMDUR  Take 1 tablet (30 mg total) by mouth once daily.          CHANGE how you take these medications    predniSONE 20 MG tablet  Commonly known as: DELTASONE  Take 3 tablets (60 mg total) by mouth once daily. for 14 days  What changed:   how much to take  how to take this  when to take this  additional instructions          CONTINUE taking these medications    * albuterol 90 mcg/actuation inhaler  Commonly known as: PROVENTIL/VENTOLIN HFA  Inhale 1-2 puffs into the lungs every 4 (four) hours as needed for Shortness of Breath (coughing). Rescue      * albuterol 2.5 mg /3 mL (0.083 %) nebulizer solution  Commonly known as: PROVENTIL  Take 3 mLs (2.5 mg total) by nebulization every 6 (six) hours as needed.      albuterol-ipratropium 2.5 mg-0.5 mg/3 mL nebulizer solution  Commonly known as: DUO-NEB  USE 1 AMPULE IN NEBULIZER EVERY 4 HOURS AS NEEDED FOR WHEEZING      alfuzosin 10 mg Tb24  Commonly known as: UROXATRAL  TAKE 1 TABLET BY MOUTH ONCE DAILY AFTER BREAKFAST      aspirin 81 MG EC tablet  Commonly known as: ECOTRIN      azelastine 137 mcg (0.1 %) nasal spray  Commonly known as: ASTELIN  1 spray (137 mcg total) by Nasal route 2 (two) times daily.      azithromycin 500 MG tablet  Commonly known as: ZITHROMAX  One daily for yellow mucous, repeat if needed      dicyclomine 10 MG capsule  Commonly known as: BENTYL  Take 1 capsule (10 mg total) by mouth 4 (four) times daily before meals and nightly.      ELIQUIS 5 mg Tab  Generic drug:  apixaban  Take 1 tablet (5 mg total) by mouth 2 (two) times daily.      EScitalopram oxalate 20 MG tablet  Commonly known as: LEXAPRO  Take by mouth.      esomeprazole 40 MG capsule  Commonly known as: NEXIUM  Take 40 mg by mouth once daily.      ezetimibe 10 mg tablet  Commonly known as: ZETIA  Take 1 tablet (10 mg total) by mouth once daily.      finasteride 5 mg tablet  Commonly known as: PROSCAR  Take 5 mg by mouth once daily.      levoFLOXacin 500 MG tablet  Commonly known as: LEVAQUIN  Take 1 tablet (500 mg total) by mouth once daily.      LIVALO 4 mg Tab  Generic drug: pitavastatin calcium  Take 1 tablet by mouth once daily.      nitroGLYCERIN 0.4 MG SL tablet  Commonly known as: NITROSTAT  Place under the tongue.      STIOLTO RESPIMAT 2.5-2.5 mcg/actuation Mist  Generic drug: tiotropium-olodateroL  Inhale 2 puffs into the lungs once daily. Controller      TRELEGY ELLIPTA 200-62.5-25 mcg inhaler  Generic drug: fluticasone-umeclidin-vilanter  Inhale 1 puff into the lungs once daily.      valACYclovir 1000 MG tablet  Commonly known as: VALTREX  Take 1 g by mouth 2 (two) times daily.      zolpidem 5 MG Tab  Commonly known as: AMBIEN  Take 1 tablet (5 mg total) by mouth every evening.     Review of patient's allergies indicates:   Allergen Reactions    Msg [glutamic acid] Nausea Only, Palpitations, Shortness Of Breath and Swelling    Oyster extract Swelling     PT swells in his throat after eating oysters on occasion       Past Medical History:   Diagnosis Date    Benign enlargement of prostate     Had a biopsy in around 2015    Elevated PSA     GERD (gastroesophageal reflux disease)     Hypertension     Started with meds in 2012.    Kidney stone     Urinary tract infection      Past Surgical History:   Procedure Laterality Date    FISTULA REPAIR      LEFT HEART CATHETERIZATION N/A 10/23/2018    Procedure: Left heart cath;  Surgeon: Niharika Squires MD;  Location: [facility];  Service: Cardiology;  Laterality: N/A;      Social History     Tobacco Use    Smoking status: Former     Packs/day: 1.00     Years: 55.00     Pack years: 55.00     Types: Cigarettes     Quit date: 2023     Years since quittin.4    Smokeless tobacco: Never   Substance Use Topics    Alcohol use: Yes     Comment: Previous 12 beer a day for 20 years. Now occ.    Drug use: No        Review of Systems:    As noted in HPI in addition      REVIEW OF SYSTEMS  CARDIOVASCULAR: No recent chest pain, palpitations, arm, neck, or jaw pain  RESPIRATORY: No recent fever, cough chills, SOB or congestion  : No blood in the urine  GI: No Nausea, vomiting, constipation, diarrhea, blood, or reflux.  MUSCULOSKELETAL: No myalgias  NEURO: No lightheadedness or dizziness  EYES: No Double vision, blurry, vision or headache              Objective        Vitals:    23 1032   BP: 130/62   Pulse: 91   Resp: 16       LIPIDS - LAST 2   Lab Results   Component Value Date    CHOL 225 (H) 2022    CHOL 152 2022    HDL 56 2022    HDL 51 2022    LDLCALC 146 (H) 2022    LDLCALC 84 2022    TRIG 110 2022    TRIG 77 2022    CHOLHDL 4.0 2022    CHOLHDL 3.0 2022       CBC - LAST 2  Lab Results   Component Value Date    WBC 11.66 2023    WBC 9.96 06/15/2023    RBC 4.38 (L) 2023    RBC 4.83 06/15/2023    HGB 14.1 2023    HGB 15.8 06/15/2023    HCT 42.8 2023    HCT 48.0 06/15/2023    MCV 98 2023    MCV 99 (H) 06/15/2023    MCH 32.2 (H) 2023    MCH 32.7 (H) 06/15/2023    MCHC 32.9 2023    MCHC 32.9 06/15/2023    RDW 13.1 2023    RDW 13.0 06/15/2023     2023     06/15/2023    MPV 8.5 (L) 2023    MPV 8.4 (L) 06/15/2023    GRAN 9.4 (H) 2023    GRAN 80.4 (H) 2023    LYMPH 1.2 2023    LYMPH 10.0 (L) 2023    MONO 1.0 2023    MONO 8.8 2023    BASO 0.01 2023    BASO 0.03 06/15/2023    NRBC 0 2023    NRBC 0 06/15/2023        CHEMISTRY & LIVER FUNCTION - LAST 2  Lab Results   Component Value Date     06/16/2023     06/15/2023    K 4.3 06/16/2023    K 4.3 06/15/2023     06/16/2023     06/15/2023    CO2 25 06/16/2023    CO2 26 06/15/2023    ANIONGAP 10 06/16/2023    ANIONGAP 12 06/15/2023    BUN 18 06/16/2023    BUN 17 06/15/2023    CREATININE 0.9 06/16/2023    CREATININE 1.1 06/15/2023     (H) 06/16/2023     (H) 06/15/2023    CALCIUM 9.2 06/16/2023    CALCIUM 9.4 06/15/2023    MG 2.0 06/16/2023    MG 1.9 06/15/2023    ALBUMIN 3.1 (L) 06/16/2023    ALBUMIN 3.3 (L) 06/15/2023    PROT 5.9 (L) 06/16/2023    PROT 6.5 06/15/2023    ALKPHOS 67 06/16/2023    ALKPHOS 75 06/15/2023    ALT 19 06/16/2023    ALT 21 06/15/2023    AST 13 06/16/2023    AST 19 06/15/2023    BILITOT 0.2 06/16/2023    BILITOT 0.3 06/15/2023        CARDIAC PROFILE - LAST 2  Lab Results   Component Value Date    BNP 16 06/13/2023    BNP 52 08/13/2020    TROPONINI 0.365 (H) 06/14/2023    TROPONINI 0.428 (H) 06/14/2023        COAGULATION - LAST 2  Lab Results   Component Value Date    INR 0.9 06/09/2023    INR 0.9 10/17/2018    APTT 30 06/09/2023    APTT 26 10/17/2018       ENDOCRINE & PSA - LAST 2  Lab Results   Component Value Date    MICROALBUR 0.4 11/08/2022    MICROALBUR 0.4 03/22/2021    PROCAL 0.02 06/15/2023    PROCAL 0.02 08/13/2020        ECHOCARDIOGRAM RESULTS  Results for orders placed during the hospital encounter of 06/13/23    STRESS TEST REPORT      CURRENT/PREVIOUS VISIT EKG  Results for orders placed or performed during the hospital encounter of 06/13/23   EKG 12-lead    Collection Time: 06/14/23  7:35 AM    Narrative    Test Reason : I49.9    Vent. Rate : 076 BPM     Atrial Rate : 076 BPM     P-R Int : 162 ms          QRS Dur : 104 ms      QT Int : 394 ms       P-R-T Axes : 080 268 076 degrees     QTc Int : 443 ms    Normal sinus rhythm  Right superior axis deviation  Incomplete right bundle branch block  Right  ventricular hypertrophy  Anterior infarct (cited on or before 01-NOV-2022)  Abnormal ECG  When compared with ECG of 13-JUN-2023 23:29,  No significant change was found  Confirmed by Jacky Pérez MD ([id number]) on 6/15/2023 5:42:39 PM    Referred By: ANGELITA   SELF           Confirmed By:Jacky Pérez MD     No valid procedures specified.   Results for orders placed during the hospital encounter of 06/13/23    Nuclear Stress Test    Interpretation Summary    The ECG portion of the study is negative for ischemia.    There were no arrhythmias during stress.    No valid procedures specified.    PHYSICAL EXAM  CONSTITUTIONAL: Well built, well nourished in no apparent distress  NECK: no carotid bruit, no JVD  LUNGS: CTA  CHEST WALL: no tenderness  HEART: regular rate and rhythm, S1, S2 normal, soft systolic murmur  ABDOMEN: soft, non-tender; bowel sounds normal  EXTREMITIES: Extremities normal, no edema, no calf tenderness noted  NEURO: AAO X 3    I HAVE REVIEWED :    The vital signs, nurses notes, and all the pertinent radiology and labs.        Current Outpatient Medications   Medication Instructions    albuterol (PROVENTIL) 2.5 mg, Nebulization, Every 6 hours PRN    albuterol (PROVENTIL/VENTOLIN HFA) 90 mcg/actuation inhaler 1-2 puffs, Inhalation, Every 4 hours PRN, Rescue    albuterol-ipratropium (DUO-NEB) 2.5 mg-0.5 mg/3 mL nebulizer solution USE 1 AMPULE IN NEBULIZER EVERY 4 HOURS AS NEEDED FOR WHEEZING    alfuzosin (UROXATRAL) 10 mg Tb24 TAKE 1 TABLET BY MOUTH ONCE DAILY AFTER BREAKFAST    ALPRAZolam (XANAX) 0.5 mg, Oral, 3 times daily PRN    aspirin (ECOTRIN) 81 MG EC tablet No dose, route, or frequency recorded.    azelastine (ASTELIN) 137 mcg, Nasal, 2 times daily    benzonatate (TESSALON) 200 mg, Oral, 3 times daily PRN    ELIQUIS 5 mg, Oral, 2 times daily    EScitalopram oxalate (LEXAPRO) 20 MG tablet Oral, Taking 1/2 tablet daily    esomeprazole (NEXIUM) 40 mg, Oral, Daily    ezetimibe (ZETIA) 10 mg, Oral,  Daily    finasteride (PROSCAR) 5 mg, Oral, Daily    fluticasone-umeclidin-vilanter (TRELEGY ELLIPTA) 200-62.5-25 mcg inhaler 1 puff, Inhalation, Daily    isosorbide mononitrate (IMDUR) 60 mg, Oral, Daily    LIVALO 4 mg Tab 1 tablet, Oral, Daily    nitroGLYCERIN (NITROSTAT) 0.4 MG SL tablet Sublingual    predniSONE (DELTASONE) 60 mg, Oral, Daily    tiotropium-olodateroL (STIOLTO RESPIMAT) 2.5-2.5 mcg/actuation Mist 2 puffs, Inhalation, Daily, Controller    valACYclovir (VALTREX) 1 g, Oral, 2 times daily    zolpidem (AMBIEN) 5 mg, Oral, Nightly          Assessment & Plan     Chronic obstructive pulmonary disease  Patient has COPD.  Recent exposure to insecticide.  He is currently on steroids and breathing treatments.  He is using albuterol nebulizers approximately every 4-6 hours.  He follows with Dr. Carolina.    Atherosclerosis of aorta  Risk factor modification.  Continue low-sodium diet.  Continue current medication regimen.    Hyperlipidemia  Patient is Livalo 4 mg daily and Zetia 10 mg daily. Continue low sodium heart healthy diet.  Recommend rechecking lipid panel.      Latest Reference Range & Units Most Recent   Cholesterol <200 mg/dL 225 (H)  11/8/22 10:29   HDL > OR = 40 mg/dL 56  11/8/22 10:29   HDL/Cholesterol Ratio <5.0 (calc) 4.0  11/8/22 10:29   LDL Calculated 0 - 99 mg/dL 209 (H)  11/25/19 07:48   LDL Cholesterol External mg/dL (calc) 146 (H)  11/8/22 10:29   Non HDL Chol. (LDL+VLDL) <130 mg/dL (calc) 169 (H)  11/8/22 10:29   Triglycerides <150 mg/dL 110  11/8/22 10:29   VLDL Cholesterol Christophe 5 - 40 mg/dL 21  11/25/19 07:48   (H): Data is abnormally high    HTN (hypertension)  Blood pressure today in office is 130/62.  Increase Imdur to 60 milligrams daily.  Monitor blood pressure closely at home.  Low-sodium heart healthy diet.    Chest pain  Patient has been complaining of intermittent chest pain predominantly on the left side.  No aggravating or relieving factors.  Chest pain has been occurring more  frequently since discharge.  Recent admission for hypoxia and respiratory distress.  Patient was recently exposed to insecticides.  He is currently on steroids and breathing treatments.  He also had a nuclear stress test during recent admission was positive for reversible ischemia.  With patient's complaints of chest pain, dyspnea on exertion and is abnormal on stress test, recommend further evaluation coronary blockages with cardiac catheterization.    Discussed with patient the risks and benefits of the procedure including, but not limited to, the following 1:1000 risk of Heart attack, stroke and death with 3-5% Risk of bleeding, vessel damage, and the need for emergent CABG Surgery.  All questions have been answered to patient's satisfaction.  Informed consent obtained  Will keep him nothing by mouth post midnight and proceed with the coronary angiography possible PCI in the morning of procedure.    Atrial fibrillation  Patient is planning to have ablation with Dr. Aguilar in near future.  Continue current medication regimen.  NSR today in office.     NSTEMI (non-ST elevated myocardial infarction)  Recent NSTEMI during recent hospitalization.  Abnormal stress test.  Patient complains of occasional chest pain dyspnea on exertion.  Patient also has COPD and recent at this site exposure with a COPD exacerbation during recent hospitalization.  However, recommend further evaluation of coronary blockages with cardiac catheterization.  Cardiac catheterization on July 11th with Dr. Lemon.  Patient will need to hold Eliquis 3 days prior to procedure.  Instructions given today in clinic.  Consent signed in clinic.    Increased Imdur to 60 milligrams daily.  Monitor blood pressure closely at home.    Tobacco dependence  Continue smoking cessation.     GLENNA on CPAP  Managed by Dr. Carolina. Recommend CPAP daily.           Follow up in about 1 month (around 8/6/2023).

## 2023-07-06 NOTE — ASSESSMENT & PLAN NOTE
Recent NSTEMI during recent hospitalization.  Abnormal stress test.  Patient complains of occasional chest pain dyspnea on exertion.  Patient also has COPD and recent at this site exposure with a COPD exacerbation during recent hospitalization.  However, recommend further evaluation of coronary blockages with cardiac catheterization.  Cardiac catheterization on July 11th with Dr. Lemon.  Patient will need to hold Eliquis 3 days prior to procedure.  Instructions given today in clinic.  Consent signed in clinic.    Increased Imdur to 60 milligrams daily.  Monitor blood pressure closely at home.

## 2023-07-06 NOTE — ASSESSMENT & PLAN NOTE
Patient has COPD.  Recent exposure to insecticide.  He is currently on steroids and breathing treatments.  He is using albuterol nebulizers approximately every 4-6 hours.  He follows with Dr. Carolina.

## 2023-07-06 NOTE — ASSESSMENT & PLAN NOTE
Patient is planning to have ablation with Dr. Aguilar in near future.  Continue current medication regimen.  NSR today in office.

## 2023-07-06 NOTE — ASSESSMENT & PLAN NOTE
Blood pressure today in office is 130/62.  Increase Imdur to 60 milligrams daily.  Monitor blood pressure closely at home.  Low-sodium heart healthy diet.

## 2023-07-06 NOTE — ASSESSMENT & PLAN NOTE
Patient has been complaining of intermittent chest pain predominantly on the left side.  No aggravating or relieving factors.  Chest pain has been occurring more frequently since discharge.  Recent admission for hypoxia and respiratory distress.  Patient was recently exposed to insecticides.  He is currently on steroids and breathing treatments.  He also had a nuclear stress test during recent admission was positive for reversible ischemia.  With patient's complaints of chest pain, dyspnea on exertion and is abnormal on stress test, recommend further evaluation coronary blockages with cardiac catheterization.    Discussed with patient the risks and benefits of the procedure including, but not limited to, the following 1:1000 risk of Heart attack, stroke and death with 3-5% Risk of bleeding, vessel damage, and the need for emergent CABG Surgery.  All questions have been answered to patient's satisfaction.  Informed consent obtained  Will keep him nothing by mouth post midnight and proceed with the coronary angiography possible PCI in the morning of procedure.

## 2023-07-06 NOTE — ASSESSMENT & PLAN NOTE
Patient is Livalo 4 mg daily and Zetia 10 mg daily. Continue low sodium heart healthy diet.  Recommend rechecking lipid panel.      Latest Reference Range & Units Most Recent   Cholesterol <200 mg/dL 225 (H)  11/8/22 10:29   HDL > OR = 40 mg/dL 56  11/8/22 10:29   HDL/Cholesterol Ratio <5.0 (calc) 4.0  11/8/22 10:29   LDL Calculated 0 - 99 mg/dL 209 (H)  11/25/19 07:48   LDL Cholesterol External mg/dL (calc) 146 (H)  11/8/22 10:29   Non HDL Chol. (LDL+VLDL) <130 mg/dL (calc) 169 (H)  11/8/22 10:29   Triglycerides <150 mg/dL 110  11/8/22 10:29   VLDL Cholesterol Christophe 5 - 40 mg/dL 21  11/25/19 07:48   (H): Data is abnormally high

## 2023-07-10 ENCOUNTER — OUTPATIENT CASE MANAGEMENT (OUTPATIENT)
Dept: ADMINISTRATIVE | Facility: OTHER | Age: 68
End: 2023-07-10
Payer: MEDICARE

## 2023-07-11 ENCOUNTER — PATIENT MESSAGE (OUTPATIENT)
Dept: CARDIOLOGY | Facility: HOSPITAL | Age: 68
End: 2023-07-11

## 2023-07-11 ENCOUNTER — PATIENT MESSAGE (OUTPATIENT)
Dept: MEDSURG UNIT | Facility: HOSPITAL | Age: 68
End: 2023-07-11
Payer: MEDICARE

## 2023-07-11 ENCOUNTER — HOSPITAL ENCOUNTER (OUTPATIENT)
Facility: HOSPITAL | Age: 68
Discharge: HOME | End: 2023-07-11
Attending: GENERAL PRACTICE | Admitting: GENERAL PRACTICE
Payer: MEDICARE

## 2023-07-11 VITALS
HEIGHT: 66 IN | SYSTOLIC BLOOD PRESSURE: 137 MMHG | OXYGEN SATURATION: 98 % | DIASTOLIC BLOOD PRESSURE: 69 MMHG | WEIGHT: 155 LBS | HEART RATE: 71 BPM | RESPIRATION RATE: 20 BRPM | BODY MASS INDEX: 24.91 KG/M2

## 2023-07-11 DIAGNOSIS — R94.39 ABNORMAL NUCLEAR STRESS TEST: ICD-10-CM

## 2023-07-11 DIAGNOSIS — R07.9 CHEST PAIN, UNSPECIFIED TYPE: Primary | ICD-10-CM

## 2023-07-11 DIAGNOSIS — R06.09 DOE (DYSPNEA ON EXERTION): ICD-10-CM

## 2023-07-11 LAB
ANION GAP SERPL CALC-SCNC: 7 MMOL/L (ref 8–16)
APTT PPP: 21.7 SEC (ref 21–32)
BASOPHILS # BLD AUTO: 0.01 K/UL (ref 0–0.2)
BASOPHILS NFR BLD: 0.1 % (ref 0–1.9)
BUN SERPL-MCNC: 26 MG/DL (ref 8–23)
CALCIUM SERPL-MCNC: 9.3 MG/DL (ref 8.7–10.5)
CATH EF QUANTITATIVE: 60 %
CHLORIDE SERPL-SCNC: 101 MMOL/L (ref 95–110)
CO2 SERPL-SCNC: 28 MMOL/L (ref 23–29)
CREAT SERPL-MCNC: 0.9 MG/DL (ref 0.5–1.4)
DIFFERENTIAL METHOD: ABNORMAL
EOSINOPHIL # BLD AUTO: 0 K/UL (ref 0–0.5)
EOSINOPHIL NFR BLD: 0 % (ref 0–8)
ERYTHROCYTE [DISTWIDTH] IN BLOOD BY AUTOMATED COUNT: 14.7 % (ref 11.5–14.5)
EST. GFR  (NO RACE VARIABLE): >60 ML/MIN/1.73 M^2
GLUCOSE SERPL-MCNC: 148 MG/DL (ref 70–110)
HCT VFR BLD AUTO: 47.1 % (ref 40–54)
HGB BLD-MCNC: 15.9 G/DL (ref 14–18)
IMM GRANULOCYTES # BLD AUTO: 0.06 K/UL (ref 0–0.04)
IMM GRANULOCYTES NFR BLD AUTO: 0.6 % (ref 0–0.5)
INR PPP: 0.8 (ref 0.8–1.2)
LYMPHOCYTES # BLD AUTO: 0.5 K/UL (ref 1–4.8)
LYMPHOCYTES NFR BLD: 4.8 % (ref 18–48)
MCH RBC QN AUTO: 33.4 PG (ref 27–31)
MCHC RBC AUTO-ENTMCNC: 33.8 G/DL (ref 32–36)
MCV RBC AUTO: 99 FL (ref 82–98)
MONOCYTES # BLD AUTO: 0.4 K/UL (ref 0.3–1)
MONOCYTES NFR BLD: 4 % (ref 4–15)
NEUTROPHILS # BLD AUTO: 9.9 K/UL (ref 1.8–7.7)
NEUTROPHILS NFR BLD: 90.5 % (ref 38–73)
NRBC BLD-RTO: 0 /100 WBC
PLATELET # BLD AUTO: 199 K/UL (ref 150–450)
PMV BLD AUTO: 8.8 FL (ref 9.2–12.9)
POTASSIUM SERPL-SCNC: 4.7 MMOL/L (ref 3.5–5.1)
PROTHROMBIN TIME: 9.4 SEC (ref 9–12.5)
RBC # BLD AUTO: 4.76 M/UL (ref 4.6–6.2)
SODIUM SERPL-SCNC: 136 MMOL/L (ref 136–145)
WBC # BLD AUTO: 10.88 K/UL (ref 3.9–12.7)

## 2023-07-11 PROCEDURE — C1894 INTRO/SHEATH, NON-LASER: HCPCS | Performed by: GENERAL PRACTICE

## 2023-07-11 PROCEDURE — 93458 L HRT ARTERY/VENTRICLE ANGIO: CPT | Mod: 26,,, | Performed by: GENERAL PRACTICE

## 2023-07-11 PROCEDURE — 85610 PROTHROMBIN TIME: CPT

## 2023-07-11 PROCEDURE — 93010 ELECTROCARDIOGRAM REPORT: CPT | Mod: ,,, | Performed by: INTERNAL MEDICINE

## 2023-07-11 PROCEDURE — 25000003 PHARM REV CODE 250

## 2023-07-11 PROCEDURE — 93005 ELECTROCARDIOGRAM TRACING: CPT | Performed by: INTERNAL MEDICINE

## 2023-07-11 PROCEDURE — 63600175 PHARM REV CODE 636 W HCPCS: Performed by: GENERAL PRACTICE

## 2023-07-11 PROCEDURE — 80048 BASIC METABOLIC PNL TOTAL CA: CPT | Performed by: GENERAL PRACTICE

## 2023-07-11 PROCEDURE — 93458 PR CATH PLACE/CORON ANGIO, IMG SUPER/INTERP,W LEFT HEART VENTRICULOGRAPHY: ICD-10-PCS | Mod: 26,,, | Performed by: GENERAL PRACTICE

## 2023-07-11 PROCEDURE — 93458 L HRT ARTERY/VENTRICLE ANGIO: CPT | Performed by: GENERAL PRACTICE

## 2023-07-11 PROCEDURE — 25000003 PHARM REV CODE 250: Performed by: GENERAL PRACTICE

## 2023-07-11 PROCEDURE — C1887 CATHETER, GUIDING: HCPCS | Performed by: GENERAL PRACTICE

## 2023-07-11 PROCEDURE — 99153 MOD SED SAME PHYS/QHP EA: CPT | Performed by: GENERAL PRACTICE

## 2023-07-11 PROCEDURE — C1760 CLOSURE DEV, VASC: HCPCS | Performed by: GENERAL PRACTICE

## 2023-07-11 PROCEDURE — C1769 GUIDE WIRE: HCPCS | Performed by: GENERAL PRACTICE

## 2023-07-11 PROCEDURE — 99152 MOD SED SAME PHYS/QHP 5/>YRS: CPT | Performed by: GENERAL PRACTICE

## 2023-07-11 PROCEDURE — 85025 COMPLETE CBC W/AUTO DIFF WBC: CPT

## 2023-07-11 PROCEDURE — 99152 PR MOD CONSCIOUS SEDATION, SAME PHYS, 5+ YRS, FIRST 15 MIN: ICD-10-PCS | Mod: ,,, | Performed by: GENERAL PRACTICE

## 2023-07-11 PROCEDURE — 93010 EKG 12-LEAD: ICD-10-PCS | Mod: ,,, | Performed by: INTERNAL MEDICINE

## 2023-07-11 PROCEDURE — 99152 MOD SED SAME PHYS/QHP 5/>YRS: CPT | Mod: ,,, | Performed by: GENERAL PRACTICE

## 2023-07-11 PROCEDURE — 85730 THROMBOPLASTIN TIME PARTIAL: CPT

## 2023-07-11 DEVICE — ANGIO-SEAL VIP VASCULAR CLOSURE DEVICE
Type: IMPLANTABLE DEVICE | Site: GROIN | Status: FUNCTIONAL
Brand: ANGIO-SEAL

## 2023-07-11 RX ORDER — MIDAZOLAM HYDROCHLORIDE 1 MG/ML
INJECTION INTRAMUSCULAR; INTRAVENOUS
Status: DISCONTINUED | OUTPATIENT
Start: 2023-07-11 | End: 2023-07-11 | Stop reason: HOSPADM

## 2023-07-11 RX ORDER — SODIUM CHLORIDE 9 MG/ML
INJECTION, SOLUTION INTRAVENOUS ONCE
Status: COMPLETED | OUTPATIENT
Start: 2023-07-11 | End: 2023-07-11

## 2023-07-11 RX ORDER — HEPARIN SODIUM 1000 [USP'U]/ML
INJECTION, SOLUTION INTRAVENOUS; SUBCUTANEOUS
Status: DISCONTINUED | OUTPATIENT
Start: 2023-07-11 | End: 2023-07-11 | Stop reason: HOSPADM

## 2023-07-11 RX ORDER — LIDOCAINE HYDROCHLORIDE 10 MG/ML
INJECTION, SOLUTION EPIDURAL; INFILTRATION; INTRACAUDAL; PERINEURAL
Status: DISCONTINUED | OUTPATIENT
Start: 2023-07-11 | End: 2023-07-11 | Stop reason: HOSPADM

## 2023-07-11 RX ORDER — DIPHENHYDRAMINE HCL 25 MG
50 CAPSULE ORAL
Status: DISCONTINUED | OUTPATIENT
Start: 2023-07-11 | End: 2023-07-11 | Stop reason: HOSPADM

## 2023-07-11 RX ORDER — METHYLPREDNISOLONE SOD SUCC 125 MG
VIAL (EA) INJECTION
Status: DISCONTINUED | OUTPATIENT
Start: 2023-07-11 | End: 2023-07-11 | Stop reason: HOSPADM

## 2023-07-11 RX ORDER — FENTANYL CITRATE 50 UG/ML
INJECTION, SOLUTION INTRAMUSCULAR; INTRAVENOUS
Status: DISCONTINUED | OUTPATIENT
Start: 2023-07-11 | End: 2023-07-11 | Stop reason: HOSPADM

## 2023-07-11 RX ADMIN — DIPHENHYDRAMINE HYDROCHLORIDE 50 MG: 25 CAPSULE ORAL at 06:07

## 2023-07-11 RX ADMIN — SODIUM CHLORIDE: 0.9 INJECTION, SOLUTION INTRAVENOUS at 06:07

## 2023-07-11 NOTE — Clinical Note
The catheter was inserted into the and was inserted over the wire into the ostium   left main. An angiography was performed of the left coronary arteries. Multiple views were taken. The angiography was performed via hand injection with 6 mL of contrast.

## 2023-07-11 NOTE — Clinical Note
The catheter was inserted into the and was inserted over the wire into the ostium   right coronary artery. An angiography was performed of the right coronary arteries. Multiple views were taken. The angiography was performed via hand injection with 6 mL of contrast.

## 2023-07-11 NOTE — NURSING
Pt ambulated in bales without any difficulties, returned to room and while getting dressed started to bleed per right groin. Assissted pt to lie down, pressure held, cath lab called. Drsg removed, site no longer bleeding. Redressed per cath lab RN. Area soft to touch

## 2023-07-11 NOTE — NURSING
Notified Dr. Lemon of pt groin bleeding after walking, pressure held, new dressing on site, area soft to touch, Instructed to keep on bedrest for additional 3 hours

## 2023-07-11 NOTE — Clinical Note
The catheter was inserted into the and was inserted over the wire into the left ventricle left coronary artery. An angiography was performed of the L. The angiography was performed via power injection.

## 2023-07-11 NOTE — Clinical Note
The closure device was deployed in the right femoral artery. PER DR. RUELAS Hx of DM. A1C 9.6, indicating poor glycemic control. Patient received total of 112 units of insulin. Noted hypoglycemic event during overnight shift --> likely due to over-correction sliding scale insulin, On Lantus 100U Qhs and metformin 1000mg BID at home.   - Hold metformin while inpatient  - Lantus 50U BID   - change from moderate corrective insulin sliding scale to low corrective insulin sliding scale Hx of PAD s/p 2 stents   - C/w ASA and plavix   - JONAH/PVR noted, vascular requesting  angiogram records from [facility] with Dr. Eduardo Baker. Plan to f/u on tomorrow during office hours.  - pending arterial duplex of LE b/l  - Podiatry planning left foot partial 2nd ray resection next week, but will need to determine if pt needs vascular surgery intervention

## 2023-07-11 NOTE — Clinical Note
The catheter was reinserted into the left ventricle. Hemodynamics were performed.  and Pullback was recorded.  An angiography was performed of the LV GRAM. The angiography was performed via power injection. The injected amount was 30 mL contrast at 15 mL/s. The PSI from the power injection was 700.

## 2023-07-12 ENCOUNTER — ANESTHESIA EVENT (OUTPATIENT)
Dept: MEDSURG UNIT | Facility: HOSPITAL | Age: 68
End: 2023-07-12
Payer: MEDICARE

## 2023-07-12 ENCOUNTER — TELEPHONE (OUTPATIENT)
Dept: ELECTROPHYSIOLOGY | Facility: CLINIC | Age: 68
End: 2023-07-12
Payer: MEDICARE

## 2023-07-12 ENCOUNTER — PATIENT MESSAGE (OUTPATIENT)
Dept: ELECTROPHYSIOLOGY | Facility: CLINIC | Age: 68
End: 2023-07-12
Payer: MEDICARE

## 2023-07-12 NOTE — TELEPHONE ENCOUNTER
Patient noted to have been admitted to [facility] for evaluation of chest pain , and underwent Lt heart cath on 7/11/2023. Dr Aguilar notified and states that it is OK to proceed with scheduled ablation procedure tomorrow. Patient called. See pre op call below:    CONFIRMED procedure arrival time of 8 am on 7/13/2023 for Cardiac ablation with Dr Aguilar.     Reiterated instructions including:    -Directions to check in desk.    -NPO after midnight night prior to procedure. Fasting upon arrival to the hospital the day of the procedure.     -High importance of HOLDING Eliquis the morning of the procedure. ( Patient has not taken his Eliquis x 2 days due to recent heart cath. Dr Aguilar aware and as per Dr Aguilar's instruction, patient will take his evening dose of Eliquis today, and follow instruction to hold Eliquis in the morning prior to the procedure)    -Pre-procedure LABS reviewed with no alerts noted.     -Confirmed absence or presence of implanted device/stimulator N/A    -Confirmed no recent or current fever, cough, or signs of illness. .    -Confirmed no redness, rash, irritation, or yeast infection to skin/ chest / groin area. (S/P lt heart cath 7/11/23 via groin access)    -Reviewed current visitor policy.    Patient verbalized understanding of above, denies any further questions and appreciated the call.

## 2023-07-13 ENCOUNTER — ANESTHESIA (OUTPATIENT)
Dept: MEDSURG UNIT | Facility: HOSPITAL | Age: 68
End: 2023-07-13
Payer: MEDICARE

## 2023-07-13 ENCOUNTER — HOSPITAL ENCOUNTER (OUTPATIENT)
Facility: HOSPITAL | Age: 68
Discharge: HOME | End: 2023-07-14
Attending: INTERNAL MEDICINE | Admitting: INTERNAL MEDICINE
Payer: MEDICARE

## 2023-07-13 DIAGNOSIS — I48.91 ATRIAL FIBRILLATION: ICD-10-CM

## 2023-07-13 DIAGNOSIS — I49.9 ARRHYTHMIA: ICD-10-CM

## 2023-07-13 DIAGNOSIS — I48.19 PERSISTENT ATRIAL FIBRILLATION: ICD-10-CM

## 2023-07-13 DIAGNOSIS — R07.9 CHEST PAIN: ICD-10-CM

## 2023-07-13 DIAGNOSIS — I48.91 A-FIB: ICD-10-CM

## 2023-07-13 LAB
POC ACTIVATED CLOTTING TIME K: 113 SEC (ref 74–137)
POC ACTIVATED CLOTTING TIME K: 119 SEC (ref 74–137)
POC ACTIVATED CLOTTING TIME K: 149 SEC (ref 74–137)
POC ACTIVATED CLOTTING TIME K: 155 SEC (ref 74–137)
POC ACTIVATED CLOTTING TIME K: 299 SEC (ref 74–137)
POC ACTIVATED CLOTTING TIME K: 317 SEC (ref 74–137)
POC ACTIVATED CLOTTING TIME K: 347 SEC (ref 74–137)
POC ACTIVATED CLOTTING TIME K: 353 SEC (ref 74–137)
POC ACTIVATED CLOTTING TIME K: 365 SEC (ref 74–137)
POC ACTIVATED CLOTTING TIME K: 365 SEC (ref 74–137)
POCT GLUCOSE: 128 MG/DL (ref 70–110)
SAMPLE: ABNORMAL
SAMPLE: NORMAL
SAMPLE: NORMAL

## 2023-07-13 PROCEDURE — 25000003 PHARM REV CODE 250: Performed by: STUDENT IN AN ORGANIZED HEALTH CARE EDUCATION/TRAINING PROGRAM

## 2023-07-13 PROCEDURE — 36620 ARTERIAL: ICD-10-PCS | Mod: 59,,, | Performed by: ANESTHESIOLOGY

## 2023-07-13 PROCEDURE — 93005 ELECTROCARDIOGRAM TRACING: CPT | Mod: 59

## 2023-07-13 PROCEDURE — 93656 PR ELECTROPHYS EVAL, COMPREHEN, W/ABLATION OF ATRIAL FIBR: ICD-10-PCS | Mod: ,,, | Performed by: INTERNAL MEDICINE

## 2023-07-13 PROCEDURE — C1753 CATH, INTRAVAS ULTRASOUND: HCPCS | Performed by: INTERNAL MEDICINE

## 2023-07-13 PROCEDURE — 93657 TX L/R ATRIAL FIB ADDL: CPT | Performed by: INTERNAL MEDICINE

## 2023-07-13 PROCEDURE — 36620 INSERTION CATHETER ARTERY: CPT | Mod: 59,,, | Performed by: ANESTHESIOLOGY

## 2023-07-13 PROCEDURE — 93656 COMPRE EP EVAL ABLTJ ATR FIB: CPT | Mod: ,,, | Performed by: INTERNAL MEDICINE

## 2023-07-13 PROCEDURE — 63600175 PHARM REV CODE 636 W HCPCS: Performed by: ANESTHESIOLOGY

## 2023-07-13 PROCEDURE — 93010 EKG 12-LEAD: ICD-10-PCS | Mod: ,,, | Performed by: INTERNAL MEDICINE

## 2023-07-13 PROCEDURE — D9220A PRA ANESTHESIA: Mod: CRNA,,, | Performed by: NURSE ANESTHETIST, CERTIFIED REGISTERED

## 2023-07-13 PROCEDURE — 93005 ELECTROCARDIOGRAM TRACING: CPT

## 2023-07-13 PROCEDURE — 27201037 HC PRESSURE MONITORING SET UP

## 2023-07-13 PROCEDURE — 93010 ELECTROCARDIOGRAM REPORT: CPT | Mod: ,,, | Performed by: INTERNAL MEDICINE

## 2023-07-13 PROCEDURE — 63600175 PHARM REV CODE 636 W HCPCS: Performed by: NURSE ANESTHETIST, CERTIFIED REGISTERED

## 2023-07-13 PROCEDURE — 93657 PR TX L/R ATRIAL FIB ADDL: ICD-10-PCS | Mod: ,,, | Performed by: INTERNAL MEDICINE

## 2023-07-13 PROCEDURE — 93656 COMPRE EP EVAL ABLTJ ATR FIB: CPT | Performed by: INTERNAL MEDICINE

## 2023-07-13 PROCEDURE — 93657 TX L/R ATRIAL FIB ADDL: CPT | Mod: ,,, | Performed by: INTERNAL MEDICINE

## 2023-07-13 PROCEDURE — 37000008 HC ANESTHESIA 1ST 15 MINUTES: Performed by: INTERNAL MEDICINE

## 2023-07-13 PROCEDURE — D9220A PRA ANESTHESIA: ICD-10-PCS | Mod: CRNA,,, | Performed by: NURSE ANESTHETIST, CERTIFIED REGISTERED

## 2023-07-13 PROCEDURE — C1894 INTRO/SHEATH, NON-LASER: HCPCS | Performed by: INTERNAL MEDICINE

## 2023-07-13 PROCEDURE — C1730 CATH, EP, 19 OR FEW ELECT: HCPCS | Performed by: INTERNAL MEDICINE

## 2023-07-13 PROCEDURE — 25000003 PHARM REV CODE 250: Performed by: INTERNAL MEDICINE

## 2023-07-13 PROCEDURE — C1731 CATH, EP, 20 OR MORE ELEC: HCPCS | Performed by: INTERNAL MEDICINE

## 2023-07-13 PROCEDURE — D9220A PRA ANESTHESIA: ICD-10-PCS | Mod: ANES,,, | Performed by: ANESTHESIOLOGY

## 2023-07-13 PROCEDURE — 27201423 OPTIME MED/SURG SUP & DEVICES STERILE SUPPLY: Performed by: INTERNAL MEDICINE

## 2023-07-13 PROCEDURE — 63600175 PHARM REV CODE 636 W HCPCS: Performed by: INTERNAL MEDICINE

## 2023-07-13 PROCEDURE — C1732 CATH, EP, DIAG/ABL, 3D/VECT: HCPCS | Performed by: INTERNAL MEDICINE

## 2023-07-13 PROCEDURE — 25000003 PHARM REV CODE 250: Performed by: NURSE ANESTHETIST, CERTIFIED REGISTERED

## 2023-07-13 PROCEDURE — 93010 ELECTROCARDIOGRAM REPORT: CPT | Mod: 76,,, | Performed by: INTERNAL MEDICINE

## 2023-07-13 PROCEDURE — 37000009 HC ANESTHESIA EA ADD 15 MINS: Performed by: INTERNAL MEDICINE

## 2023-07-13 PROCEDURE — C1766 INTRO/SHEATH,STRBLE,NON-PEEL: HCPCS | Performed by: INTERNAL MEDICINE

## 2023-07-13 PROCEDURE — D9220A PRA ANESTHESIA: Mod: ANES,,, | Performed by: ANESTHESIOLOGY

## 2023-07-13 RX ORDER — LIDOCAINE HYDROCHLORIDE 20 MG/ML
INJECTION, SOLUTION INFILTRATION; PERINEURAL
Status: DISCONTINUED | OUTPATIENT
Start: 2023-07-13 | End: 2023-07-14 | Stop reason: HOSPADM

## 2023-07-13 RX ORDER — PROTAMINE SULFATE 10 MG/ML
INJECTION, SOLUTION INTRAVENOUS
Status: DISCONTINUED | OUTPATIENT
Start: 2023-07-13 | End: 2023-07-13

## 2023-07-13 RX ORDER — ONDANSETRON 2 MG/ML
INJECTION INTRAMUSCULAR; INTRAVENOUS
Status: DISCONTINUED | OUTPATIENT
Start: 2023-07-13 | End: 2023-07-13

## 2023-07-13 RX ORDER — EPHEDRINE SULFATE 50 MG/ML
INJECTION, SOLUTION INTRAVENOUS
Status: DISCONTINUED | OUTPATIENT
Start: 2023-07-13 | End: 2023-07-13

## 2023-07-13 RX ORDER — HEPARIN SODIUM 1000 [USP'U]/ML
INJECTION, SOLUTION INTRAVENOUS; SUBCUTANEOUS
Status: DISCONTINUED | OUTPATIENT
Start: 2023-07-13 | End: 2023-07-13

## 2023-07-13 RX ORDER — SODIUM CHLORIDE 0.9 % (FLUSH) 0.9 %
10 SYRINGE (ML) INJECTION EVERY 12 HOURS PRN
Status: DISCONTINUED | OUTPATIENT
Start: 2023-07-13 | End: 2023-07-14 | Stop reason: HOSPADM

## 2023-07-13 RX ORDER — HYDRALAZINE HYDROCHLORIDE 25 MG/1
25 TABLET, FILM COATED ORAL EVERY 8 HOURS PRN
Status: DISCONTINUED | OUTPATIENT
Start: 2023-07-13 | End: 2023-07-14 | Stop reason: HOSPADM

## 2023-07-13 RX ORDER — DEXAMETHASONE SODIUM PHOSPHATE 4 MG/ML
INJECTION, SOLUTION INTRA-ARTICULAR; INTRALESIONAL; INTRAMUSCULAR; INTRAVENOUS; SOFT TISSUE
Status: DISCONTINUED | OUTPATIENT
Start: 2023-07-13 | End: 2023-07-13

## 2023-07-13 RX ORDER — HALOPERIDOL 5 MG/ML
0.5 INJECTION INTRAMUSCULAR EVERY 10 MIN PRN
Status: DISCONTINUED | OUTPATIENT
Start: 2023-07-13 | End: 2023-07-13

## 2023-07-13 RX ORDER — DIPHENHYDRAMINE HYDROCHLORIDE 50 MG/ML
25 INJECTION INTRAMUSCULAR; INTRAVENOUS EVERY 6 HOURS PRN
Status: DISCONTINUED | OUTPATIENT
Start: 2023-07-13 | End: 2023-07-14 | Stop reason: HOSPADM

## 2023-07-13 RX ORDER — ACETAMINOPHEN 325 MG/1
650 TABLET ORAL EVERY 6 HOURS PRN
Status: DISCONTINUED | OUTPATIENT
Start: 2023-07-13 | End: 2023-07-14 | Stop reason: HOSPADM

## 2023-07-13 RX ORDER — MIDAZOLAM HYDROCHLORIDE 1 MG/ML
INJECTION INTRAMUSCULAR; INTRAVENOUS
Status: DISCONTINUED | OUTPATIENT
Start: 2023-07-13 | End: 2023-07-13

## 2023-07-13 RX ORDER — ROCURONIUM BROMIDE 10 MG/ML
INJECTION, SOLUTION INTRAVENOUS
Status: DISCONTINUED | OUTPATIENT
Start: 2023-07-13 | End: 2023-07-13

## 2023-07-13 RX ORDER — SILVER SULFADIAZINE 10 G/1000G
CREAM TOPICAL
Status: DISCONTINUED | OUTPATIENT
Start: 2023-07-13 | End: 2023-07-14 | Stop reason: HOSPADM

## 2023-07-13 RX ORDER — TRAMADOL HYDROCHLORIDE 50 MG/1
50 TABLET ORAL ONCE
Status: COMPLETED | OUTPATIENT
Start: 2023-07-13 | End: 2023-07-13

## 2023-07-13 RX ORDER — PANTOPRAZOLE SODIUM 40 MG/1
40 TABLET, DELAYED RELEASE ORAL DAILY
Status: DISCONTINUED | OUTPATIENT
Start: 2023-07-13 | End: 2023-07-14 | Stop reason: HOSPADM

## 2023-07-13 RX ORDER — FENTANYL CITRATE 50 UG/ML
25 INJECTION, SOLUTION INTRAMUSCULAR; INTRAVENOUS EVERY 5 MIN PRN
Status: DISCONTINUED | OUTPATIENT
Start: 2023-07-13 | End: 2023-07-13

## 2023-07-13 RX ORDER — LIDOCAINE HYDROCHLORIDE 20 MG/ML
INJECTION INTRAVENOUS
Status: DISCONTINUED | OUTPATIENT
Start: 2023-07-13 | End: 2023-07-13

## 2023-07-13 RX ORDER — FENTANYL CITRATE 50 UG/ML
INJECTION, SOLUTION INTRAMUSCULAR; INTRAVENOUS
Status: DISCONTINUED | OUTPATIENT
Start: 2023-07-13 | End: 2023-07-13

## 2023-07-13 RX ORDER — SUCCINYLCHOLINE CHLORIDE 20 MG/ML
INJECTION INTRAMUSCULAR; INTRAVENOUS
Status: DISCONTINUED | OUTPATIENT
Start: 2023-07-13 | End: 2023-07-13

## 2023-07-13 RX ORDER — HYDROMORPHONE HYDROCHLORIDE 1 MG/ML
0.2 INJECTION, SOLUTION INTRAMUSCULAR; INTRAVENOUS; SUBCUTANEOUS EVERY 5 MIN PRN
Status: DISCONTINUED | OUTPATIENT
Start: 2023-07-13 | End: 2023-07-13

## 2023-07-13 RX ORDER — HEPARIN SOD,PORCINE/0.9 % NACL 1000/500ML
INTRAVENOUS SOLUTION INTRAVENOUS
Status: DISCONTINUED | OUTPATIENT
Start: 2023-07-13 | End: 2023-07-14 | Stop reason: HOSPADM

## 2023-07-13 RX ORDER — PROPOFOL 10 MG/ML
VIAL (ML) INTRAVENOUS
Status: DISCONTINUED | OUTPATIENT
Start: 2023-07-13 | End: 2023-07-13

## 2023-07-13 RX ORDER — KETAMINE HCL IN 0.9 % NACL 50 MG/5 ML
SYRINGE (ML) INTRAVENOUS
Status: DISCONTINUED | OUTPATIENT
Start: 2023-07-13 | End: 2023-07-13

## 2023-07-13 RX ORDER — PROCHLORPERAZINE EDISYLATE 5 MG/ML
5 INJECTION INTRAMUSCULAR; INTRAVENOUS EVERY 30 MIN PRN
Status: DISCONTINUED | OUTPATIENT
Start: 2023-07-13 | End: 2023-07-13

## 2023-07-13 RX ORDER — HEPARIN SODIUM 10000 [USP'U]/100ML
INJECTION, SOLUTION INTRAVENOUS CONTINUOUS PRN
Status: DISCONTINUED | OUTPATIENT
Start: 2023-07-13 | End: 2023-07-13

## 2023-07-13 RX ORDER — SODIUM CHLORIDE 0.9 % (FLUSH) 0.9 %
3 SYRINGE (ML) INJECTION
Status: DISCONTINUED | OUTPATIENT
Start: 2023-07-13 | End: 2023-07-14 | Stop reason: HOSPADM

## 2023-07-13 RX ORDER — FUROSEMIDE 10 MG/ML
20 INJECTION INTRAMUSCULAR; INTRAVENOUS ONCE
Status: DISCONTINUED | OUTPATIENT
Start: 2023-07-14 | End: 2023-07-14 | Stop reason: HOSPADM

## 2023-07-13 RX ADMIN — HYDRALAZINE HYDROCHLORIDE 25 MG: 25 TABLET, FILM COATED ORAL at 09:07

## 2023-07-13 RX ADMIN — EPHEDRINE SULFATE 5 MG: 50 INJECTION INTRAVENOUS at 12:07

## 2023-07-13 RX ADMIN — PROPOFOL 50 MG: 10 INJECTION, EMULSION INTRAVENOUS at 12:07

## 2023-07-13 RX ADMIN — HYDROMORPHONE HYDROCHLORIDE 0.2 MG: 1 INJECTION, SOLUTION INTRAMUSCULAR; INTRAVENOUS; SUBCUTANEOUS at 05:07

## 2023-07-13 RX ADMIN — PROPOFOL 100 MG: 10 INJECTION, EMULSION INTRAVENOUS at 11:07

## 2023-07-13 RX ADMIN — PANTOPRAZOLE SODIUM 40 MG: 40 TABLET, DELAYED RELEASE ORAL at 05:07

## 2023-07-13 RX ADMIN — SUCCINYLCHOLINE CHLORIDE 120 MG: 20 INJECTION, SOLUTION INTRAMUSCULAR; INTRAVENOUS at 11:07

## 2023-07-13 RX ADMIN — DEXAMETHASONE SODIUM PHOSPHATE 4 MG: 4 INJECTION, SOLUTION INTRAMUSCULAR; INTRAVENOUS at 02:07

## 2023-07-13 RX ADMIN — ONDANSETRON 4 MG: 2 INJECTION INTRAMUSCULAR; INTRAVENOUS at 02:07

## 2023-07-13 RX ADMIN — APIXABAN 5 MG: 5 TABLET, FILM COATED ORAL at 09:07

## 2023-07-13 RX ADMIN — EPHEDRINE SULFATE 5 MG: 50 INJECTION INTRAVENOUS at 01:07

## 2023-07-13 RX ADMIN — ACETAMINOPHEN 650 MG: 325 TABLET ORAL at 05:07

## 2023-07-13 RX ADMIN — PROTAMINE SULFATE 70 MG: 10 INJECTION, SOLUTION INTRAVENOUS at 02:07

## 2023-07-13 RX ADMIN — EPHEDRINE SULFATE 10 MG: 50 INJECTION INTRAVENOUS at 02:07

## 2023-07-13 RX ADMIN — LIDOCAINE HYDROCHLORIDE 100 MG: 20 INJECTION INTRAVENOUS at 11:07

## 2023-07-13 RX ADMIN — TRAMADOL HYDROCHLORIDE 50 MG: 50 TABLET, COATED ORAL at 06:07

## 2023-07-13 RX ADMIN — HEPARIN SODIUM 4200 UNITS: 1000 INJECTION, SOLUTION INTRAVENOUS; SUBCUTANEOUS at 12:07

## 2023-07-13 RX ADMIN — HEPARIN SODIUM 14000 UNITS: 1000 INJECTION, SOLUTION INTRAVENOUS; SUBCUTANEOUS at 12:07

## 2023-07-13 RX ADMIN — MIDAZOLAM HYDROCHLORIDE 2 MG: 1 INJECTION INTRAMUSCULAR; INTRAVENOUS at 11:07

## 2023-07-13 RX ADMIN — FENTANYL CITRATE 100 MCG: 50 INJECTION, SOLUTION INTRAMUSCULAR; INTRAVENOUS at 11:07

## 2023-07-13 RX ADMIN — Medication 20 MG: at 12:07

## 2023-07-13 RX ADMIN — SODIUM CHLORIDE 0.5 MCG/KG/MIN: 9 INJECTION, SOLUTION INTRAVENOUS at 11:07

## 2023-07-13 RX ADMIN — Medication 30 MG: at 12:07

## 2023-07-13 RX ADMIN — SODIUM CHLORIDE: 9 INJECTION, SOLUTION INTRAVENOUS at 11:07

## 2023-07-13 RX ADMIN — ROCURONIUM BROMIDE 5 MG: 10 INJECTION INTRAVENOUS at 11:07

## 2023-07-13 RX ADMIN — HEPARIN SODIUM AND DEXTROSE 12 UNITS/KG/HR: 10000; 5 INJECTION INTRAVENOUS at 12:07

## 2023-07-13 RX ADMIN — EPHEDRINE SULFATE 10 MG: 50 INJECTION INTRAVENOUS at 01:07

## 2023-07-13 NOTE — SUBJECTIVE & OBJECTIVE
Past Medical History:   Diagnosis Date    A-fib     Anticoagulant long-term use     Benign enlargement of prostate     Had a biopsy in around 2015    Bladder cancer     Cancer     COPD (chronic obstructive pulmonary disease)     Elevated PSA     GERD (gastroesophageal reflux disease)     Hypertension     Started with meds in 2012.    Kidney stone     Sleep apnea     Urinary tract infection        Past Surgical History:   Procedure Laterality Date    Bladder tumor removed      FISTULA REPAIR      LEFT HEART CATHETERIZATION N/A 10/23/2018    Procedure: Left heart cath;  Surgeon: Niharika Squires MD;  [facility];  Service: Cardiology;  Laterality: N/A;       Review of patient's allergies indicates:   Allergen Reactions    Msg [glutamic acid] Nausea Only, Palpitations, Shortness Of Breath and Swelling    Oyster extract Swelling     PT swells in his throat after eating oysters on occasion       No current facility-administered medications on file prior to encounter.     Current Outpatient Medications on File Prior to Encounter   Medication Sig    alfuzosin (UROXATRAL) 10 mg Tb24 TAKE 1 TABLET BY MOUTH ONCE DAILY AFTER BREAKFAST    aspirin (ECOTRIN) 81 MG EC tablet     esomeprazole (NEXIUM) 40 MG capsule Take 40 mg by mouth once daily.    finasteride (PROSCAR) 5 mg tablet Take 5 mg by mouth once daily.    nitroGLYCERIN (NITROSTAT) 0.4 MG SL tablet Place under the tongue.    albuterol-ipratropium (DUO-NEB) 2.5 mg-0.5 mg/3 mL nebulizer solution USE 1 AMPULE IN NEBULIZER EVERY 4 HOURS AS NEEDED FOR WHEEZING    azelastine (ASTELIN) 137 mcg (0.1 %) nasal spray 1 spray (137 mcg total) by Nasal route 2 (two) times daily.    valACYclovir (VALTREX) 1000 MG tablet Take 1 g by mouth 2 (two) times daily.     Family History       Problem Relation (Age of Onset)    Cancer Father, Brother    Drug abuse Brother    Heart attack Brother    Heart disease Brother, Brother    Heart failure Mother          Tobacco Use    Smoking  status: Former     Packs/day: 1.00     Years: 55.00     Pack years: 55.00     Types: Cigarettes     Quit date: 2023     Years since quittin.5    Smokeless tobacco: Never   Substance and Sexual Activity    Alcohol use: Yes     Comment: occ    Drug use: No    Sexual activity: Not on file     Review of Systems   All other systems reviewed and are negative.  Objective:     Vital Signs (Most Recent):  Temp: 99.5 °F (37.5 °C) (23 0842)  Pulse: 81 (23 0842)  Resp: 16 (23 0842)  BP: 128/72 (23 0843)  SpO2: 96 % (23 0842) Vital Signs (24h Range):  Temp:  [99.5 °F (37.5 °C)] 99.5 °F (37.5 °C)  Pulse:  [81] 81  Resp:  [16] 16  SpO2:  [96 %] 96 %  BP: (128-129)/(69-72) 128/72       Weight: 70.3 kg (155 lb)  Body mass index is 25.02 kg/m².    SpO2: 96 %        Physical Exam  Constitutional:       General: He is not in acute distress.     Appearance: Normal appearance. He is not ill-appearing.   HENT:      Head: Normocephalic and atraumatic.      Nose: No congestion.      Mouth/Throat:      Mouth: Mucous membranes are moist.   Eyes:      Conjunctiva/sclera: Conjunctivae normal.   Cardiovascular:      Rate and Rhythm: Normal rate and regular rhythm.      Pulses: Normal pulses.   Pulmonary:      Effort: Pulmonary effort is normal. No respiratory distress.   Abdominal:      General: Abdomen is flat. There is no distension.      Palpations: Abdomen is soft.   Musculoskeletal:      Cervical back: Normal range of motion.      Right lower leg: No edema.      Left lower leg: No edema.   Skin:     Capillary Refill: Capillary refill takes less than 2 seconds.      Findings: No rash.   Neurological:      Mental Status: He is alert and oriented to person, place, and time.   Psychiatric:         Mood and Affect: Mood normal.          Significant Labs: All pertinent lab results from the last 24 hours have been reviewed.

## 2023-07-13 NOTE — BRIEF OP NOTE
Attending: Juma Aguilar MD  Date of Procedure: 7/13/23    Post-operative Diagnosis: AF    Procedure Performed: Pulmonary vein isolation of all 4 pulmonary veins via radiofrequency ablation.     Description of Procedure: The patient was brought to the EP lab in the fasting state. Prepped and draped in sterile fashion. Safety timeout was performed. Sedation administered by anesthesia staff. Ultrasound guided venous access of the bilateral femoral veins was performed. ICE and CS catheters placed via left femoral vein access. Long sheaths via right femoral venous access. Heparin bolus and continuous infusion per protocol. Two transseptal punctures performed using combination of fluoroscopic and ICE guidance. Map created. RFA to isolate all pulmonary veins. ICE confirmed no significant pericardial effusion.     EBL: <10 mL    Specimens: none  Complications: no immediate    Plan:  Bedrest x 6 hrs  Remove sutures at 4 hours post-procedure  Ambulation at 6 hours post-procedure if there is no evidence of access site complications  Close monitoring of hemodynamics, access site, and neurologic status  ECG upon arrival to PACU  Patient to resume OAC this evening. If bleeding or hematoma formation, please notify EP service before holding OAC  Medication changes: Initiation of Protonix 40 mg QD x 30 days & Lasix 20 mg PRN swelling/SOB  Recommend ibuprofen 800 mg TID x 3 days for pericarditis post-procedure  Plan for to admit for obs overnight

## 2023-07-13 NOTE — ASSESSMENT & PLAN NOTE
In summary, Martell Corcoran is a 67M with symptomatic persistent AF. His [id number] Score is 2 (age, HTN) so he should continue eliquis.     Mr. Corcoran is symptomatic with his AF. He underwent NAILA/DCCV in 11/2022, but is currently not on an antiarrhythmic as he tolerated neither sotalol nor flecainide.     We discussed in detail the pathophysiology of AF as well as the myriad of treatment options available to manage it including antiarrhythmics and catheter ablation. We specifically discussed the risks, benefits, indications, and alternatives to PVI. Risks discussed include bleeding, hematoma, vascular damage, cardiac tamponade, stroke, PV stenosis, AE fistula, phrenic nerve damage, and death.  After considering his options he has decided to proceed.      CARTO. Hold eliquis AM of procedure. NAILA--cancel if sinus. Post-procedure will not start an antiarrhythmic.      Sinus rhythm on admit. Cancel naila    - RF-PVI  - NAILA, cancel if NSR  - PPI x 30 days    Anticoagulation: apixiban  EF (most recent): 50  AAD/AVN agents: n/a    The risks, benefits and alternatives of the procedure were explained to the patient, patient's family and/or surrogate decision maker. Risks include (but not limited to) bleeding, hematoma, infection, pain, vascular damage, damage to structures surrounding the vasculature, myocardial damage [perforation, valvular damage], cardiac tamponade, phrenic nerve damage, pulmonary vein stenosis, atrioesophageal (AE) fistula, CVA, MI, and death. Patient is understanding that repeat ablations may be required. All questions were answered. Patient is understanding of these risks, and would like to proceed. Consents signed.

## 2023-07-13 NOTE — NURSING TRANSFER
Nursing Transfer Note      7/13/2023   6:04 PM    Nurse giving handoff:ivan rn  Nurse receiving handoff:ira [facility] rn     Reason patient is being transferred: [facility] 302    Transfer To: 302    Transfer via stretcher    Transfer with cardiac monitoring    Transported by escort with ticket to ride and let them know patient is flat bedrest and rn to help move patient due to patient having to lay flat     Transfer Vital Signs:  Blood Pressure:see epic   Heart Rate:see epic   O2:room air   Temperature:see epic   Respirations:see epic     Telemetry: yes and register patient on telemetry box and verified patient can be seen on monitor   Order for Tele Monitor? Yes    Additional Lines: condom cath intact     4eyes on Skin: no but reported to [facility] nurse about sutures to be removed at 1815 1 to right and 1 to left and then bedrest over 2015 also patient to get apixaban tonight per md order and lasix in am     Medicines sent: no    Any special needs or follow-up needed: groin checks also let nurse know about bruising to groins and did not change from admit to here from the lab     Patient belongings transferred with patient: Yes and patient has his belongings from [facility] that were sent with patient     Chart send with patient: Yes    Notified: reported to [facility]  nurse and also let patients finance know patient going to her room     Patient reassessed at: see epic  (date, time)  1  Upon arrival to floor: to room no complaints no distress noted. Also patient has his elliot in his left hand on leaving recovery (little statue elliot that his neighbor gave him.

## 2023-07-13 NOTE — PROGRESS NOTES
Patient admitted to recovery see [facility] for complete assessment pacu bcg's maintained safety measures verified patient instructed on pain scale and patient verbalized understanding. Called for ekg and it was done and placed in patient's chart. Also called patient's finance and updated on patient location. Patient's right  groin has bruisng noted to right groin from previous cath per patient no distress no complaints noted.

## 2023-07-13 NOTE — ANESTHESIA PROCEDURE NOTES
Arterial    Diagnosis: afib    Patient location during procedure: done in OR    Staffing  Authorizing Provider: Raymond Milian MD  Performing Provider: Raymond Milian MD    Anesthesiologist was present at the time of the procedure.    Preanesthetic Checklist  Completed: patient identified, IV checked, site marked, risks and benefits discussed, surgical consent, monitors and equipment checked, pre-op evaluation, timeout performed and anesthesia consent givenArterial  Skin Prep: chlorhexidine gluconate  Orientation: right  Location: radial    Catheter Size: 20 G  Catheter placement by Anatomical landmarks. Heme positive aspiration all ports. Insertion Attempts: 1  Assessment  Dressing: secured with tape and tegaderm  Patient: Tolerated well

## 2023-07-13 NOTE — PLAN OF CARE
Cardiology Plan of Care     67yo male with atrial fibrillation on eliquis, non-obstructive CAD, prior PFO, HTN, GLENNA on CPAP, COPD, and prior bladder cancer presenting for PVI ablation.     In sinus rhythm and compliant with eliquis. He had missed his eliquis for 2 days for [facility] 7/11/23. No prior strokes and [id number] 4.     VALERIE canceled.     Wisam Koch MD  Cardiovascular Disease PGY-V  [facility]

## 2023-07-13 NOTE — HPI
67M with bladder CA (status-post resection, BCG infusions, currently in remission), HTN, GLENNA on CPAP, COPD/emphysema, who was incidentally noted to be in rate controlled AF at a routine cardiology visit in 11/2021. Started on eliquis at that point. Has noted a reduction in his energy level. On eliquis. No history of antiarrhythmic use or cardioversion.     DSE in 7/2022 showed an EF of 65% and no evidence of ischemia.     At his initial visit we discussed options including sotalol/DCCV and catheter ablation. He chose the former, which was performed on 11/1/2022. Notably, VALERIE notable for PFO with possible R-->L shunting, mild-moderate MR.     At his 11/2022 visit, Mr. Corcoran described worsening SOB, increased chest pain and fatigue, increased dizziness and weakness. Sinus bharath at 59 bpm. Sotalol stopped at that point, which improved his symptoms. Flecainide started, which caused similar problems and had to be discontinued. Currently not on an antiarrhythmic. Complaining of blurry vision, leg pain, fatigue, dizziness which he attributes to toprol.

## 2023-07-13 NOTE — H&P
[facility] Cardiac Unit  Cardiac Electrophysiology  History and Physical     Admission Date: 7/13/2023  Code Status: Prior   Attending Provider: Juma Aguilar MD   Principal Problem:<principal problem not specified>    Subjective:     Chief Complaint:  Ablation      HPI:  67M with bladder CA (status-post resection, BCG infusions, currently in remission), HTN, GLENNA on CPAP, COPD/emphysema, who was incidentally noted to be in rate controlled AF at a routine cardiology visit in 11/2021. Started on eliquis at that point. Has noted a reduction in his energy level. On eliquis. No history of antiarrhythmic use or cardioversion.     DSE in 7/2022 showed an EF of 65% and no evidence of ischemia.     At his initial visit we discussed options including sotalol/DCCV and catheter ablation. He chose the former, which was performed on 11/1/2022. Notably, VALERIE notable for PFO with possible R-->L shunting, mild-moderate MR.     At his 11/2022 visit, Mr. Corcoran described worsening SOB, increased chest pain and fatigue, increased dizziness and weakness. Sinus bharath at 59 bpm. Sotalol stopped at that point, which improved his symptoms. Flecainide started, which caused similar problems and had to be discontinued. Currently not on an antiarrhythmic. Complaining of blurry vision, leg pain, fatigue, dizziness which he attributes to toprol.      Past Medical History:   Diagnosis Date    A-fib     Anticoagulant long-term use     Benign enlargement of prostate     Had a biopsy in around 2015    Bladder cancer     Cancer     COPD (chronic obstructive pulmonary disease)     Elevated PSA     GERD (gastroesophageal reflux disease)     Hypertension     Started with meds in 2012.    Kidney stone     Sleep apnea     Urinary tract infection        Past Surgical History:   Procedure Laterality Date    Bladder tumor removed      FISTULA REPAIR      LEFT HEART CATHETERIZATION N/A 10/23/2018    Procedure: Left heart cath;   Surgeon: Niharika Squires MD;  Location: [facility];  Service: Cardiology;  Laterality: N/A;       Review of patient's allergies indicates:   Allergen Reactions    Msg [glutamic acid] Nausea Only, Palpitations, Shortness Of Breath and Swelling    Oyster extract Swelling     PT swells in his throat after eating oysters on occasion       No current facility-administered medications on file prior to encounter.     Current Outpatient Medications on File Prior to Encounter   Medication Sig    alfuzosin (UROXATRAL) 10 mg Tb24 TAKE 1 TABLET BY MOUTH ONCE DAILY AFTER BREAKFAST    aspirin (ECOTRIN) 81 MG EC tablet     esomeprazole (NEXIUM) 40 MG capsule Take 40 mg by mouth once daily.    finasteride (PROSCAR) 5 mg tablet Take 5 mg by mouth once daily.    nitroGLYCERIN (NITROSTAT) 0.4 MG SL tablet Place under the tongue.    albuterol-ipratropium (DUO-NEB) 2.5 mg-0.5 mg/3 mL nebulizer solution USE 1 AMPULE IN NEBULIZER EVERY 4 HOURS AS NEEDED FOR WHEEZING    azelastine (ASTELIN) 137 mcg (0.1 %) nasal spray 1 spray (137 mcg total) by Nasal route 2 (two) times daily.    valACYclovir (VALTREX) 1000 MG tablet Take 1 g by mouth 2 (two) times daily.     Family History       Problem Relation (Age of Onset)    Cancer Father, Brother    Drug abuse Brother    Heart attack Brother    Heart disease Brother, Brother    Heart failure Mother          Tobacco Use    Smoking status: Former     Packs/day: 1.00     Years: 55.00     Pack years: 55.00     Types: Cigarettes     Quit date: 2023     Years since quittin.5    Smokeless tobacco: Never   Substance and Sexual Activity    Alcohol use: Yes     Comment: occ    Drug use: No    Sexual activity: Not on file     Review of Systems   All other systems reviewed and are negative.  Objective:     Vital Signs (Most Recent):  Temp: 99.5 °F (37.5 °C) (23)  Pulse: 81 (23)  Resp: 16 (23)  BP: 128/72 (2343)  SpO2: 96 % (23) Vital  Signs (24h Range):  Temp:  [99.5 °F (37.5 °C)] 99.5 °F (37.5 °C)  Pulse:  [81] 81  Resp:  [16] 16  SpO2:  [96 %] 96 %  BP: (128-129)/(69-72) 128/72       Weight: 70.3 kg (155 lb)  Body mass index is 25.02 kg/m².    SpO2: 96 %        Physical Exam  Constitutional:       General: He is not in acute distress.     Appearance: Normal appearance. He is not ill-appearing.   HENT:      Head: Normocephalic and atraumatic.      Nose: No congestion.      Mouth/Throat:      Mouth: Mucous membranes are moist.   Eyes:      Conjunctiva/sclera: Conjunctivae normal.   Cardiovascular:      Rate and Rhythm: Normal rate and regular rhythm.      Pulses: Normal pulses.   Pulmonary:      Effort: Pulmonary effort is normal. No respiratory distress.   Abdominal:      General: Abdomen is flat. There is no distension.      Palpations: Abdomen is soft.   Musculoskeletal:      Cervical back: Normal range of motion.      Right lower leg: No edema.      Left lower leg: No edema.   Skin:     Capillary Refill: Capillary refill takes less than 2 seconds.      Findings: No rash.   Neurological:      Mental Status: He is alert and oriented to person, place, and time.   Psychiatric:         Mood and Affect: Mood normal.          Significant Labs: All pertinent lab results from the last 24 hours have been reviewed.        Assessment and Plan:     Atrial fibrillation  In summary, Martell Corcoran is a 67M with symptomatic persistent AF. His [id number] Score is 2 (age, HTN) so he should continue eliquis.     Mr. Corcoran is symptomatic with his AF. He underwent VALERIE/DCCV in 11/2022, but is currently not on an antiarrhythmic as he tolerated neither sotalol nor flecainide.     We discussed in detail the pathophysiology of AF as well as the myriad of treatment options available to manage it including antiarrhythmics and catheter ablation. We specifically discussed the risks, benefits, indications, and alternatives to PVI. Risks discussed include  bleeding, hematoma, vascular damage, cardiac tamponade, stroke, PV stenosis, AE fistula, phrenic nerve damage, and death.  After considering his options he has decided to proceed.      CARTO. Hold eliquis AM of procedure. NAILA--cancel if sinus. Post-procedure will not start an antiarrhythmic.      Sinus rhythm on admit. Cancel naila    - RF-PVI  - NAILA, cancel if NSR  - PPI x 30 days    Anticoagulation: apixiban  EF (most recent): 50  AAD/AVN agents: n/a    The risks, benefits and alternatives of the procedure were explained to the patient, patient's family and/or surrogate decision maker. Risks include (but not limited to) bleeding, hematoma, infection, pain, vascular damage, damage to structures surrounding the vasculature, myocardial damage [perforation, valvular damage], cardiac tamponade, phrenic nerve damage, pulmonary vein stenosis, atrioesophageal (AE) fistula, CVA, MI, and death. Patient is understanding that repeat ablations may be required. All questions were answered. Patient is understanding of these risks, and would like to proceed. Consents signed.        Tigre Brooks MD  Cardiac Electrophysiology  [facility] Cardiac [facility]

## 2023-07-13 NOTE — NURSING
Sutures removed to right groin. Patient complains of tenderness when palpated, no hematoma noted to site. Site covered with gauze and tegaderm. Left groin site bruised and ecchymotic and open to air from prior procedure on Tuesday.

## 2023-07-13 NOTE — DISCHARGE SUMMARY
Discharge Summary        Admit Date: 7/13/2023    Discharge Date:  7/14/2023    Attending Physician: Juma Aguilar MD    Discharge Physician: Tigre Brooks MD    Principal Diagnoses: <principal problem not specified>  Indication for Admission: ABLATION, ARRHYTHMOGENIC FOCUS, FOR ATRIAL FIBRILLATION (N/A), Cardioversion or Defibrillation    Discharged Condition: Good    Hospital Course:   Patient underwent successful RF-PVI for treatment of atrial fibrillation. No evidence of intra- or post-procedure complications. Post-ablation ECG shows NSR, and no acute abnormalities. Admitted overnight for obs. Remained in sinus rhythm     EP medications at discharge:  Antiarrhythmics and/or AVN agents: unchanged.   Initiation of Protonix 40 mg QD x 30 days & Lasix 20 mg PRN swelling/SOB.   Patient was instructed to continue their oral anticoagulation as previously prescribed  Patient was instructed to take ibuprofen 800 mg TID x 3 days for pericarditis.     Groin access sites without hematoma or bleeding. Activity restrictions given to patient. Instructed to seek medical attention for shortness of breath, chest discomfort not alleviated with NSAIDs, bleeding/hematoma formation at the access sites, acute onset of neurologic symptoms, N/V, or hematemesis. At discharge the patient denied CP, SOB, access site bleeding/hematoma, or any other complaints or evidence of complications.      Vitals:    07/14/23 0745   BP: (!) 158/83   Pulse: 72   Resp: 14   Temp: 98.1 °F (36.7 °C)     Physical Exam  Constitutional:       General: He is not in acute distress.     Appearance: Normal appearance. He is not ill-appearing.   HENT:      Head: Normocephalic and atraumatic.      Nose: No congestion.      Mouth/Throat:      Mouth: Mucous membranes are moist.   Eyes:      Conjunctiva/sclera: Conjunctivae normal.   Cardiovascular:      Rate and Rhythm: Normal rate and regular rhythm.      Pulses: Normal pulses. Bilateral groin sutures removed  yesterday. No hematoma or oozing   Pulmonary:      Effort: Pulmonary effort is normal. No respiratory distress.   Abdominal:      General: Abdomen is flat. There is no distension.      Palpations: Abdomen is soft.   Musculoskeletal:      Cervical back: Normal range of motion.      Right lower leg: No edema.      Left lower leg: No edema.   Skin:     Capillary Refill: Capillary refill takes less than 2 seconds.      Findings: No rash.   Neurological:      Mental Status: He is alert and oriented to person, place, and time.   Psychiatric:         Mood and Affect: Mood normal.   Outpatient Plan:  - Medication changes/ additions include: detailed below    Diet: Cardiac diet    Activity: Ad екатерина, wound care instructions provided    Disposition: Home or Self Care    Follow Up:   Follow-up Information       Juma Aguilar MD Follow up in 3 month(s).    Specialties: Electrophysiology, Cardiovascular Disease, Cardiology  Contact information:  [address] [address]  [facility] [address]  [phone]                             Discharge Medications:      Medication List        START taking these medications      furosemide 20 MG tablet  Commonly known as: LASIX  Take 1 tablet (20 mg total) by mouth daily as needed.            CONTINUE taking these medications      * albuterol 90 mcg/actuation inhaler  Commonly known as: PROVENTIL/VENTOLIN HFA  Inhale 1-2 puffs into the lungs every 4 (four) hours as needed for Shortness of Breath (coughing). Rescue     * albuterol 2.5 mg /3 mL (0.083 %) nebulizer solution  Commonly known as: PROVENTIL  Take 3 mLs (2.5 mg total) by nebulization every 6 (six) hours as needed.     albuterol-ipratropium 2.5 mg-0.5 mg/3 mL nebulizer solution  Commonly known as: DUO-NEB  USE 1 AMPULE IN NEBULIZER EVERY 4 HOURS AS NEEDED FOR WHEEZING     alfuzosin 10 mg Tb24  Commonly known as: UROXATRAL     ALPRAZolam 0.5 MG tablet  Commonly known as: XANAX  Take 1 tablet (0.5 mg total) by mouth 3 (three) times daily as  needed for Anxiety.     aspirin 81 MG EC tablet  Commonly known as: ECOTRIN     azelastine 137 mcg (0.1 %) nasal spray  Commonly known as: ASTELIN  1 spray (137 mcg total) by Nasal route 2 (two) times daily.     benzonatate 200 MG capsule  Commonly known as: TESSALON  Take 1 capsule (200 mg total) by mouth 3 (three) times daily as needed for Cough.     ELIQUIS 5 mg Tab  Generic drug: apixaban  Take 1 tablet (5 mg total) by mouth 2 (two) times daily.     EScitalopram oxalate 20 MG tablet  Commonly known as: LEXAPRO     esomeprazole 40 MG capsule  Commonly known as: NEXIUM     ezetimibe 10 mg tablet  Commonly known as: ZETIA  Take 1 tablet (10 mg total) by mouth once daily.     finasteride 5 mg tablet  Commonly known as: PROSCAR     isosorbide mononitrate 60 MG 24 hr tablet  Commonly known as: IMDUR  Take 1 tablet (60 mg total) by mouth once daily.     LIVALO 4 mg Tab  Generic drug: pitavastatin calcium  Take 1 tablet by mouth once daily.     nitroGLYCERIN 0.4 MG SL tablet  Commonly known as: NITROSTAT     predniSONE 20 MG tablet  Commonly known as: DELTASONE  Take 3 tablets (60 mg total) by mouth once daily.     STIOLTO RESPIMAT 2.5-2.5 mcg/actuation Mist  Generic drug: tiotropium-olodateroL  Inhale 2 puffs into the lungs once daily. Controller     TRELEGY ELLIPTA 200-62.5-25 mcg inhaler  Generic drug: fluticasone-umeclidin-vilanter  Inhale 1 puff into the lungs once daily.     valACYclovir 1000 MG tablet  Commonly known as: VALTREX     zolpidem 5 MG Tab  Commonly known as: AMBIEN  Take 1 tablet (5 mg total) by mouth every evening.           * This list has 2 medication(s) that are the same as other medications prescribed for you. Read the directions carefully, and ask your doctor or other care provider to review them with you.                   Where to Get Your Medications        These medications were sent to [facility]  [address] [facility] [address] [address]      Hours: Mon-Fri 7a-7p, Sat-Sun  10a-4p Phone: [phone]   furosemide 20 MG tablet

## 2023-07-13 NOTE — ANESTHESIA PREPROCEDURE EVALUATION
07/13/2023  Pre-operative evaluation for Procedure(s) (LRB):  ABLATION, ARRHYTHMOGENIC FOCUS, FOR ATRIAL FIBRILLATION (N/A)  ECHOCARDIOGRAM,TRANSESOPHAGEAL (N/A)    Martell Corcoran is a 68 y.o. male     Patient Active Problem List   Diagnosis    Chest pain    QUACH (dyspnea on exertion)    Chronic obstructive pulmonary disease with acute exacerbation    HTN (hypertension)    GERD (gastroesophageal reflux disease)    Bladder cancer    Tobacco dependence    Ischemia due to increased oxygen demand    Lung nodule    Chronic obstructive pulmonary disease    Atherosclerosis of aorta    Atherosclerotic heart disease of native coronary artery with other forms of angina pectoris    GLENNA on CPAP    Atrial fibrillation    Major depressive disorder in partial remission    NSTEMI (non-ST elevated myocardial infarction)    Acute chemical bronchitis    Hyperlipidemia       Review of patient's allergies indicates:   Allergen Reactions    Msg [glutamic acid] Nausea Only, Palpitations, Shortness Of Breath and Swelling    Oyster extract Swelling     PT swells in his throat after eating oysters on occasion       No current facility-administered medications on file prior to encounter.     Current Outpatient Medications on File Prior to Encounter   Medication Sig Dispense Refill    alfuzosin (UROXATRAL) 10 mg Tb24 TAKE 1 TABLET BY MOUTH ONCE DAILY AFTER BREAKFAST      aspirin (ECOTRIN) 81 MG EC tablet       esomeprazole (NEXIUM) 40 MG capsule Take 40 mg by mouth once daily.      finasteride (PROSCAR) 5 mg tablet Take 5 mg by mouth once daily.      nitroGLYCERIN (NITROSTAT) 0.4 MG SL tablet Place under the tongue.      albuterol-ipratropium (DUO-NEB) 2.5 mg-0.5 mg/3 mL nebulizer solution USE 1 AMPULE IN NEBULIZER EVERY 4 HOURS AS NEEDED FOR WHEEZING 180 mL 0    azelastine (ASTELIN) 137 mcg (0.1 %)  nasal spray 1 spray (137 mcg total) by Nasal route 2 (two) times daily. 30 mL 2    valACYclovir (VALTREX) 1000 MG tablet Take 1 g by mouth 2 (two) times daily.         Past Surgical History:   Procedure Laterality Date    Bladder tumor removed      FISTULA REPAIR      LEFT HEART CATHETERIZATION N/A 10/23/2018    Procedure: Left heart cath;  Surgeon: Niharika Squires MD;  Location: [facility] CATH;  Service: Cardiology;  Laterality: N/A;       Social History     Socioeconomic History    Marital status: Significant Other   Tobacco Use    Smoking status: Former     Packs/day: 1.00     Years: 55.00     Pack years: 55.00     Types: Cigarettes     Quit date: 2023     Years since quittin.5    Smokeless tobacco: Never   Substance and Sexual Activity    Alcohol use: Yes     Comment: occ    Drug use: No     Social Determinants of Health     Financial Resource Strain: High Risk    Difficulty of Paying Living Expenses: Very hard   Food Insecurity: Food Insecurity Present    Worried About Running Out of Food in the Last Year: Sometimes true    Ran Out of Food in the Last Year: Patient refused   Transportation Needs: No Transportation Needs    Lack of Transportation (Medical): No    Lack of Transportation (Non-Medical): No   Physical Activity: Unknown    Days of Exercise per Week: 7 days    Minutes of Exercise per Session: Patient refused   Stress: Stress Concern Present    Feeling of Stress : To some extent   Social Connections: Unknown    Frequency of Communication with Friends and Family: Patient refused    Frequency of Social Gatherings with Friends and Family: Patient refused    Active Member of Clubs or Organizations: No    Attends Club or Organization Meetings: Never    Marital Status: Patient refused   Housing Stability: Low Risk     Unable to Pay for Housing in the Last Year: No    Number of Places Lived in the Last Year: 1    Unstable Housing in the Last Year: No         CBC:   Recent Labs      07/11/23  0634   WBC 10.88   RBC 4.76   HGB 15.9   HCT 47.1      MCV 99*   MCH 33.4*   MCHC 33.8       CMP:   Recent Labs     07/11/23  0653      K 4.7      CO2 28   BUN 26*   CREATININE 0.9   *   CALCIUM 9.3       INR  Recent Labs     07/11/23  0634   INR 0.8   APTT 21.7               Pre-op Assessment    I have reviewed the Patient Summary Reports.     I have reviewed the Nursing Notes. I have reviewed the NPO Status.      Review of Systems  Anesthesia Hx:  No problems with previous Anesthesia    Cardiovascular:   Hypertension CAD  Dysrhythmias atrial fibrillation Angina    Pulmonary:   COPD Shortness of breath Sleep Apnea    Renal/:   renal calculi    Hepatic/GI:   GERD        Physical Exam    Airway:  Mouth Opening: Normal  Tongue: Normal    Chest/Lungs:  Normal Respiratory Rate    Heart:  Rhythm: Regular Rhythm        Anesthesia Plan  Type of Anesthesia, risks & benefits discussed:    Anesthesia Type: Gen ETT  Intra-op Monitoring Plan: Standard ASA Monitors and Art Line  Induction:  IV  Informed Consent: Informed consent signed with the Patient and all parties understand the risks and agree with anesthesia plan.  All questions answered.   ASA Score: 3    Ready For Surgery From Anesthesia Perspective.     .

## 2023-07-13 NOTE — PLAN OF CARE
Received pt to floor from home accompanied by significant other.  AAO x 4. Denies pain or discomfort. Respirations even and unlabored. No distress noted. Pt stable.  Admit assessment complete. IV x 2 placed.  Pt oriented to room and call bell placed within reach.  Will continue to monitor.

## 2023-07-13 NOTE — TRANSFER OF CARE
"Anesthesia Transfer of Care Note    Patient: Martell Corcoran    Procedure(s) Performed: Procedure(s) (LRB):  ABLATION, ARRHYTHMOGENIC FOCUS, FOR ATRIAL FIBRILLATION (N/A)  Cardioversion or Defibrillation    Patient location: PACU    Transport from OR: Transported from OR on 6-10 L/min O2 by face mask with adequate spontaneous ventilation    Post pain: adequate analgesia    Post assessment: no apparent anesthetic complications    Post vital signs: stable    Level of consciousness: awake    Nausea/Vomiting: no nausea/vomiting    Complications: none    Transfer of care protocol was followed      Last vitals:   Visit Vitals  /72 (BP Location: Left arm, Patient Position: Lying)   Pulse 81   Temp 37.5 °C (99.5 °F) (Temporal)   Resp 16   Ht 5' 6" (1.676 m)   Wt 70.3 kg (155 lb)   SpO2 96%   BMI 25.02 kg/m²     "

## 2023-07-13 NOTE — DISCHARGE INSTRUCTIONS
"Medications:  Make sure to continue taking your blood thinner apixiban (trade name: Eliquis) after your procedure as you would normally take  Take pantoprazole (trade name: Protonix) nightly for at least 30 days after your procedure. This is to protect your esophagus during the post-operative period.  If given a prescription of furosemide (trade name: Lasix), which is a diuretic (fluid pill), you can take it daily or twice daily as needed for fluid retention or shortness of breath following your ablation  You may experience chest discomfort (also known as "pericarditis") with deep breathes, coughing, and/or laying down which is typically normal following your procedure. If this occurs, you can take ibuprofen (Motrin) 800 mg every 8 hours for 2-3 days. If the chest pain is persistent or severe please visit the nearest emergency department.    Groin site management, precautions, and restrictions:  Remove the bandages over your groin area the morning after your procedure. You can shower after you remove these bandages. Keep the groin sites clean and dry. You do not need to apply ointments or bandages to the area.   If oozing from groin site occurs, apply pressure without letting up for 15 minutes and lay flat for 1 hour. If bleeding has resolved, you can continue to monitor. If the bleeding continues or there is significant swelling or pain in the groin area, please visit the nearest ER for evaluation and treatment. DO NOT STOP TAKING YOUR BLOOD THINNER UNLESS INSTRUCTED BY A PHYSICIAN.   Do not take baths or submerge your groin area or at least 1 week or when the puncture sites in your groin have completely healed  Do not lift anything over 10 lbs for the first week after your procedure, and avoid strenuous activity during this time to allow for the groin sites to heal. After 1 week, there are no activity restrictions.  Please contact the electrophysiology clinic or go to the ER if you experience: severe chest pain, " shortness of breath, bleeding or swelling of the groin sites, or any other concerns.

## 2023-07-14 VITALS
BODY MASS INDEX: 24.91 KG/M2 | RESPIRATION RATE: 18 BRPM | HEART RATE: 82 BPM | SYSTOLIC BLOOD PRESSURE: 125 MMHG | WEIGHT: 155 LBS | OXYGEN SATURATION: 95 % | TEMPERATURE: 98 F | DIASTOLIC BLOOD PRESSURE: 60 MMHG | HEIGHT: 66 IN

## 2023-07-14 PROCEDURE — 94761 N-INVAS EAR/PLS OXIMETRY MLT: CPT

## 2023-07-14 PROCEDURE — 25000003 PHARM REV CODE 250: Performed by: STUDENT IN AN ORGANIZED HEALTH CARE EDUCATION/TRAINING PROGRAM

## 2023-07-14 RX ORDER — FUROSEMIDE 20 MG/1
20 TABLET ORAL DAILY PRN
Qty: 10 TABLET | Refills: 0 | Status: SHIPPED | OUTPATIENT
Start: 2023-07-14 | End: 2023-09-06 | Stop reason: ALTCHOICE

## 2023-07-14 RX ADMIN — HYDRALAZINE HYDROCHLORIDE 25 MG: 25 TABLET, FILM COATED ORAL at 05:07

## 2023-07-14 RX ADMIN — PANTOPRAZOLE SODIUM 40 MG: 40 TABLET, DELAYED RELEASE ORAL at 09:07

## 2023-07-14 RX ADMIN — APIXABAN 5 MG: 5 TABLET, FILM COATED ORAL at 09:07

## 2023-07-14 NOTE — ANESTHESIA POSTPROCEDURE EVALUATION
Anesthesia Post Evaluation    Patient: Martell Corcoran    Procedure(s) Performed: Procedure(s) (LRB):  ABLATION, ARRHYTHMOGENIC FOCUS, FOR ATRIAL FIBRILLATION (N/A)  Cardioversion or Defibrillation    Final Anesthesia Type: general      Patient location during evaluation: PACU  Patient participation: Yes- Able to Participate  Level of consciousness: awake and alert  Post-procedure vital signs: reviewed and stable  Pain management: adequate  Airway patency: patent    PONV status at discharge: No PONV  Anesthetic complications: no      Cardiovascular status: blood pressure returned to baseline  Respiratory status: unassisted  Follow-up not needed.          Vitals Value Taken Time   /60 07/14/23 1249   Temp 36.6 °C (97.9 °F) 07/14/23 1249   Pulse 82 07/14/23 1249   Resp 18 07/14/23 1249   SpO2 95 % 07/14/23 1249         Event Time   Out of Recovery 17:50:00         Pain/Inna Score: Pain Rating Prior to Med Admin: 9 (7/13/2023  6:58 PM)  Pain Rating Post Med Admin: 2 (7/13/2023  7:58 PM)  Inna Score: 10 (7/13/2023  3:00 PM)

## 2023-07-14 NOTE — NURSING
Patient is ready for discharge. Patient stable alert and oriented. IVs removed. No complaints of pain. Discussed discharge plan. Reviewed medications and side effects, appointments, and answered questions with patient and family. RX given to patient...

## 2023-07-19 ENCOUNTER — OUTPATIENT CASE MANAGEMENT (OUTPATIENT)
Dept: ADMINISTRATIVE | Facility: OTHER | Age: 68
End: 2023-07-19
Payer: MEDICARE

## 2023-07-19 NOTE — LETTER
Martell Corcoran  [address] [facility] [address]      Dear Martell Corcoran,     I am your nurse with [facility]s Out[facility] Management Department. I have been unsuccessful at reaching you since we spoke on 07/05/2023.  At your earliest convenience, I would like to discuss your healthcare progress.      Please contact me at [phone] from 8:00AM to 4:30 PM on Monday thru Friday.     As you know, [facility] is a program offered to you through [facility] where a nurse is available 24/7 to answer questions or provide medical advice, their number is [phone].    Thanks,  Consuelo Gomez RN [facility]   Outpatient Care Management

## 2023-07-19 NOTE — PROGRESS NOTES
07/19/23-Attempt follow up with patient for outpatient case management. No answer. Left message requesting call back.  Letter thru [facility] portal. RN OPCM 1'st follow up attempt.

## 2023-07-20 ENCOUNTER — PATIENT MESSAGE (OUTPATIENT)
Dept: CARDIOLOGY | Facility: CLINIC | Age: 68
End: 2023-07-20
Payer: MEDICARE

## 2023-07-20 ENCOUNTER — TELEPHONE (OUTPATIENT)
Dept: ELECTROPHYSIOLOGY | Facility: CLINIC | Age: 68
End: 2023-07-20
Payer: MEDICARE

## 2023-07-27 ENCOUNTER — OFFICE VISIT (OUTPATIENT)
Dept: PULMONOLOGY | Facility: CLINIC | Age: 68
End: 2023-07-27
Payer: MEDICARE

## 2023-07-27 VITALS
OXYGEN SATURATION: 96 % | SYSTOLIC BLOOD PRESSURE: 121 MMHG | BODY MASS INDEX: 25.49 KG/M2 | DIASTOLIC BLOOD PRESSURE: 78 MMHG | WEIGHT: 158.63 LBS | HEIGHT: 66 IN | HEART RATE: 79 BPM

## 2023-07-27 DIAGNOSIS — J45.50 SEVERE PERSISTENT ASTHMA WITHOUT COMPLICATION: Primary | ICD-10-CM

## 2023-07-27 DIAGNOSIS — J44.89 ASTHMA-COPD OVERLAP SYNDROME: ICD-10-CM

## 2023-07-27 DIAGNOSIS — J82.83 EOSINOPHILIC ASTHMA: ICD-10-CM

## 2023-07-27 PROBLEM — F17.200 TOBACCO DEPENDENCE: Status: RESOLVED | Noted: 2020-08-13 | Resolved: 2023-07-27

## 2023-07-27 PROCEDURE — 99213 PR OFFICE/OUTPT VISIT, EST, LEVL III, 20-29 MIN: ICD-10-PCS | Mod: S$PBB,,, | Performed by: INTERNAL MEDICINE

## 2023-07-27 PROCEDURE — 99999 PR PBB SHADOW E&M-EST. PATIENT-LVL IV: ICD-10-PCS | Mod: PBBFAC,,, | Performed by: INTERNAL MEDICINE

## 2023-07-27 PROCEDURE — 99214 OFFICE O/P EST MOD 30 MIN: CPT | Mod: PBBFAC,PO | Performed by: INTERNAL MEDICINE

## 2023-07-27 PROCEDURE — 99213 OFFICE O/P EST LOW 20 MIN: CPT | Mod: S$PBB,,, | Performed by: INTERNAL MEDICINE

## 2023-07-27 PROCEDURE — 99999 PR PBB SHADOW E&M-EST. PATIENT-LVL IV: CPT | Mod: PBBFAC,,, | Performed by: INTERNAL MEDICINE

## 2023-07-27 NOTE — PATIENT INSTRUCTIONS
Non fasting sugar of 146 on July 11th suggest steroid induced diabetes.  Should be better off steroids.    If you relapse soon would recommend biologic shots for asthma -- Fasenra would be good?    Mediastinal abnormality was eval by Dr Thornton in past-- could do follow up ct chest to screen for lung cancer... we discuss    Continue trelegy    Lung capacity was 55% in 2020

## 2023-07-27 NOTE — PROGRESS NOTES
7/27/2023    Martell Corcoran  Follow up    Chief Complaint   Patient presents with    Follow-up     4 week f/u        HPI:   7/27/2023 just got off steroids last 2 days -- uses trelegy..  had a fib ablation on 13th July and was feeling well.    Pt off smokes since 1/7/2023     Pt was seen by Dr Thornton -- no f/u for mediastinal cyst --we discuss today    6/29/2023 pt presented nsr er June 13th -- had bad coughing spell with sob and chest pains -- pt went home on 18th on prednisone 60/d with few days left today.  Pt still has wheezes in early am.   Codeine did not work -- may have worsened.    Still on trelegy.      Eos were elevated to 800 at presentation --   Patient Instructions   Cxr June 13th viewed  - no pneumonia  You had high blood sugars in hospital -- 160's.  You may have problems with using high dose steroids.   Stop prednisone when out -- drop to 2 daily for 2 days then one daily (if able)- 14 days.    Have prednisone on hand-- start 3 20 mg daily and call if needs again  You have been on trelegy -- continue.   You had unusually high eosinophils when flared lungs with recent heart attack  You have asthma and copd.  You may be steroid dependant if need to repeat-- consider special shots.       No bad germs in lung mucous on June 15th  May need diabetes medication-- would check sugars in future if on steroids......        5/23/2023 pt was doing cleaning for elderly cat lady- 5/12/2023- had to spray acontainer for roaches- loaded with roaches- he was given fogger,  and was trying to fog container, sprayed and then closed container,  exposed to [place] fogger off and on for roughly 1/2 hr to 40 min. No burning. No sob nor cough til roughly 4-6 hrs later.  Similar exposures to cleaning was no problem in past. Pt was advised to go to er after 4 -5 days -- pt had to use neb q 4-6 hr post exposure whereas was using neb rx every 6 months prior.  Pt seen in er-- prednisone 20/d x 4 done.  Sat 96 and wheezes  noted.      Pt did use zpak but mucous still yellow.   Patient Instructions   Chest xray 5/17/2023 viewed with no problems.    Breathing test 2020 with copd 56% lung capacity      Use trelegy or stiolto-- trelegy has steroids which are more effective to prevent wheezes    Increase steroids-- may need into future?? For now use prednisone 20mg 3 x 3 and taper,,,,,  may repeat.  Would be best to inhale steroids to prevent relapse -- not sure long term need.    Codiene not covered but not $$      Use azithromycin, may need special antibiotic??  Need to have mucous clear.      Call if not back to usual in 2-3 wks???    3/7/2023 quit smokes, feels fatigued chronically. Had naila and cardioversion November.  Back to rhythm but could not use meds to continue.   Uses anoro and qvar--- not using reg as before.    Pt missed lexapro somehow-- had increased anxiety and stomach issues.  Uses occ xanax.   Uses astelin      Has occ blurred vision.  Patient Instructions   Pet scan viewed.  Deviated septum noted.     Would use stiolto 2 daily -- could skip -- anoro not good for regular use.    Novmeber screening ct chest     Nerves may cause fatigue    Sleep apnea may cause excess sleepiness-- would re try cpap once nerves stable    Would re try cholesterol  medications.     Try cpap -- may use ambein to sleep--dedicate 8 hrs sleep to use ambein.      Sept 13, 2022  in past, had own company.  Lung dz developed 4 + yrs ago.  Bladder cancer removed.  Had bcg infusions 2020 . Some garcia. Problem nasal stuffy tolerating cpap.    Patient Instructions   Nasal stuffy -- afrin or generic afrin --- use one to 2 sprays up to once daily -- alternate nares ?  If regular use may get tolerant and afrin ineffective.  Could see ent to improve passages.      Flonase should work better (or astelin) if passages open.     Sleep study was only 8 episodes an hour-- less than 5 is normal.     Cpap titration needed 8-9 cm  pressure    Mediastinal abnormality varies-- would send to cardiothoracic surgery.    Copd is moderate at 55% or so.  Anoro and qvar effective -- as needed albuterol.  Would stop smokes.     CT films are reviewed directly with the patient.  Extensive discussion is made.  Patient's evaluation took 56 minutes today.          Below from RJ Salgado  6/1/2022- SOB persistent complaint, pain with deep inspiration on left chest. Followed by cardiology for A fib and vascular blockages pacemaker procedure postponed.    Wearing CPAP with nose mask, moves across face when sleeping. Does not feel better after wearing.   Cough- improved, less often and severe, most days, worse when weather changes, productive clear mucous  Currently smoking 3-4 cigarettes daily, trying to cut down.   Patient Instructions   CT of chest shows clear lungs, no change to previously seen lung nodules.     A cyst is seen in muscle could be cause of chest pain    Recommend smoking cessation    Continue COPD medication regiment    Recommend trying CPAP therapy again.     2/4/2022-  States breathing is labored. States usually worse after infusion for bladder cancer. States does have benefit with qvar and Anoro, using albuterol as needed 2x daily.     Daytime fatigue persistent.  purchased CPAP from third party. Has not started to use.     Scheduled for pacemaker to treat A-fib. Followed by Dr. Miranda.     Cough- decreased, 2x daily, productive clear mucous, cut back on smoking to 6 cigarettes daily    1/14/2022- not able to get CPAP due to high co-pay.   States breathing improved after decreasing smoking to 3-6 cigarettes daily.   Shortness of breath- daily complaint, slightly improved, worse with exertion, improves with rest. Daytime fatigue unchanged.   Complaint of cough- varies in severity, currently mild. Productive small amount clear mucous, did notice worsening after first starting to cut back on smoking. Has returned to normal   Followed by Urologist  Dr. Pham for bladder cancer. Undergoing infusion therapy.     10/4/2021- concerned he had COVID 19 late July early August, lost sense of taste and smell. Complaint of fatigue, body aches, shortness of breath when walking,   No fever. Gradually improved over 4 weeks. Taste and smell has not returned. Experiencing short term memory loss and metal grogginess. Has daytime drowsiness onset years worsened in past two months  Persistent cough- productive clear mucous, associated with wheeze. Improves with albuterol inhaler.   Currently on Anoro, QVAR, using albuterol when needed 1-3 x weekly.     2021- Admitted To hospital in [place] while on vacation 2021 for COPD exacerbation. Seen at [facility] ER 2020 for COPD exacerbation.   Complaint of SOB- daily, worse with exertion, improves with rest. Treated with antibiotics and steroid therapy April.   Cough- recurrent complaint,  improved since hospital discharge, worsened in last 2 weeks after spending time with grandson who had bronchitis that has improved with nebulizer treatments every 4 hours. Associated with chest tightness and wheeze.       2020- he had bladder biopsy at [facility] with Dr. Pham on 11/3- per outside records path with High grade papillary urothelial cell carcinoma. He has cough w/ postnasal drip and allergies but breathing is much better. He has been dealing with a lot for the family- 2 brothers just . Hematuria is much better. Not getting bladder infections any more. He continues to smoke 1/2 ppd. Wants to quit but too much stress recently- not ready.    2020-   Start taking prednisone again- long taper over 3 weeks then try to stop. If lungs flaring while decreasing prednisone go up to the last dose that helped you and call our office.  Continue trelegy  Refilled duo nebs to use as needed  Use albuterol as needed  Quit smoking- go to program  Follow up with urologist  Follow up with PCP for kidney testing  and possible urinary infection  pt was recently hospitalized for COPD exacerbation, seen by me while admitted 20 and also having worsening hematuria at that time. He was sent home with a course of levaquin and steroid taper. He did not qualify for home O2. He is being worked up for a bladder mass per urology and pending transurethral resection.  Pt c/o pain around L kidney after doing yard work and urine turned darker. He was coughing last night and woke up with face feeling puffy. Switched urologists to a Dr. Pham at [place] and has appt at end of September.  He finished prednisone taper. Still taking levaquin. Still feels some congestion in chest but it is getting better. Also has sinus problems w/ nose blocked. He denies frequent exacerbations but this one has been very bad. Feels like worsening since stopped prednisone. Still smokes but trying to cut back. Has smoking cessation appt later this month.  Inhalers: nebs 4-5x/day, trelegy daily, albuterol rescue 1-2x/day    7/15/20- Pt is a 64 yo male with HTN, GERD and BPH presents for new evaluation of breathing issues. He complains of SOB especially if he carries anything like taking out trash to the dumpster. This has been progressive over about 3.5 yrs. He wheezes and coughs up clear mucous, throughout the day.  Pt smokes 1 PPD x 55 yrs.  At age 5 pt had pna and a collapsed lung requiring chest tube on the left side and hospitalization. Didn't have any other problems w/ breathing growing up. He took up playing [employer] to help lung function.  Pt had abd/p scan for UTI recently which showed some emphysematous changes.    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 One brother  at 65 from leukemia.    Grew up in [place]    The chief compliant  problem varies with instablilty at time      PFSH:  Past Medical History:   Diagnosis Date    A-fib      "Anticoagulant long-term use     Benign enlargement of prostate     Had a biopsy in around     Bladder cancer     Cancer     COPD (chronic obstructive pulmonary disease)     Elevated PSA     GERD (gastroesophageal reflux disease)     Hypertension     Started with meds in .    Kidney stone     Sleep apnea     Urinary tract infection      Past surg hist  Past Surgical History:   Procedure Laterality Date    FISTULA REPAIR        LEFT HEART CATHETERIZATION N/A 10/23/2018     Procedure: Left heart cath;  Surgeon: Niharika Squires MD;  Location: [facility] CATH;  Service: Cardiology;  Laterality: N/A;          Social History     Tobacco Use    Smoking status: Former     Packs/day: 1.00     Years: 55.00     Pack years: 55.00     Types: Cigarettes     Quit date: 2023     Years since quittin.5    Smokeless tobacco: Never   Substance Use Topics    Alcohol use: Yes     Comment: occ    Drug use: No     Family History   Problem Relation Age of Onset    Heart failure Mother     Cancer Father     Heart disease Brother     Heart attack Brother     Cancer Brother     Heart disease Brother     Drug abuse Brother      Review of patient's allergies indicates:   Allergen Reactions    Msg [glutamic acid] Nausea Only, Palpitations, Shortness Of Breath and Swelling    Oyster extract Swelling     PT swells in his throat after eating oysters on occasion       Performance Status:The patient's activity level is functions out of house. QUACH improved and does not limit him. Back pain limits activity.    Review of Systems:  a review of eleven systems covering constitutional, Eye, HEENT, Psych, Respiratory, Cardiac, GI, , Musculoskeletal, Endocrine, Dermatologic was negative except for pertinent findings as listed ABOVE and below:   wheeze, shortness of breath, fatigue      Exam:Comprehensive exam done. /78 (BP Location: Left arm, Patient Position: Sitting, BP Method: Medium (Automatic))   Pulse 79   Ht 5' 6" (1.676 m)   Wt 72 " kg (158 lb 9.9 oz)   SpO2 96% Comment: on room air at rest  BMI 25.60 kg/m²   Exam included Vitals as listed, and patient's appearance and affect and alertness and mood, oral exam for yeast and hygiene and pharynx lesions and Mallapatti (M) score, neck with inspection for jvd and masses and thyroid abnormalities and lymph nodes (supraclavicular and infraclavicular nodes and axillary also examined and noted if abn), chest exam included symmetry and effort and fremitus and percussion and auscultation, cardiac exam included rhythm and gallops and murmur and rubs and jvd and edema, abdominal exam for mass and hepatosplenomegaly and tenderness and hernias and bowel sounds, Musculoskeletal exam with muscle tone and posture and mobility/gait and  strength, and skin for rashes and cyanosis and pallor and turgor, extremity for clubbing.  Findings were normal except for pertinent findings listed below:  M1  Bilateral clear lungs w/ faint rhonchi bilateral lower lung zones  No clubbing or edema    Radiographs (ct chest and cxr) reviewed: view by direct vision   CT scan of the chest September 7, 2022 viewed by direct vision.  Fairly small mediastinal cystic structure looks to be a little smaller than March.  There was very very difficult to see in 2021 however.    CT Chest Without Contrast 03/14/22 Emphysema and unchanged lung nodules  Indeterminate smoothly marginated structure along the anterior superior mediastinum, measuring just above simple fluid attenuation suggestive of a minimally complex/complicated cyst, possibly a lymphovascular malformation, synovial cyst (extending from the sternoclavicular joint), foregut duplication cyst, thymic epithelial lesion/cyst, and much less likely malignancy, however this has slightly increased in size from the previous exam and may warrant interval attention on follow-up imaging.       CT Chest Without Contrast  05/20/2021   In the left upper lobe is a confluence of vessels and a  possible 4 mm nodule decreased in size and conspicuousness since the prior study of July 16, 2020.  No new or enlarging pulmonary nodules are identified.  Emphysema.     CT chest/abd/p 11/20/20- mild emphysema, lungs clear aside from small 6mm nodule DANA which is unchanged from prior exam  1. No metastatic disease.  2. Nodular thickening of the left posterior aspect of the bladder.  3. Enlarged prostate.  3. Mild pulmonary emphysema.   CXR 8/14/20- possible vague infiltrate/opacifications bilateral bases  CT chest 7/16/20- DANA 6mm nodule  CT abd/p 6/26/20- bases of lungs w/ scant emphysematous changes, some strands of atelectasis    Labs reviewed    Lab Results   Component Value Date    WBC 10.88 07/11/2023    RBC 4.76 07/11/2023    HGB 15.9 07/11/2023    HCT 47.1 07/11/2023    MCV 99 (H) 07/11/2023    MCH 33.4 (H) 07/11/2023    MCHC 33.8 07/11/2023    RDW 14.7 (H) 07/11/2023     07/11/2023    MPV 8.8 (L) 07/11/2023    GRAN 9.9 (H) 07/11/2023    GRAN 90.5 (H) 07/11/2023    LYMPH 0.5 (L) 07/11/2023    LYMPH 4.8 (L) 07/11/2023    MONO 0.4 07/11/2023    MONO 4.0 07/11/2023    EOS 0.0 07/11/2023    BASO 0.01 07/11/2023    EOSINOPHIL 0.0 07/11/2023    BASOPHIL 0.1 07/11/2023     Results for MARTELL ALBRECHT (MRN [id number]) as of 5/24/2021 15:33   Ref. Range 8/13/2020 02:30 8/13/2020 06:01 8/14/2020 05:03 12/16/2020 09:20 3/22/2021 15:14   Eos # Latest Ref Range: 15 - 500 cells/uL 1.4 (H) 0.3 0.0 0.5 122       PFT reviewed  7/16/20- moderately severe obstruction, reduced DLCO, air trapping    EPWORTH SLEEPINESS SCALE SCORE 13 on 10/4/2021  Sleep study 10/7/2021 AHI Significant obstructive sleep apnea with a TRUPTI of 8.1/hr      Plan:  Clinical impression is apparently straight forward and impression with management as below.    Martell was seen today for follow-up.    Diagnoses and all orders for this visit:    Severe persistent asthma without complication    Eosinophilic asthma    Asthma-COPD overlap  syndrome               - continue COPD medication regiment   - lung nodules unchanged.        Follow up in about 6 months (around 1/27/2024), or if symptoms worsen or fail to improve.    Discussed with patient above for education the following:         Discussed with patient above for education the following:      Patient Instructions   Non fasting sugar of 146 on July 11th suggest steroid induced diabetes.  Should be better off steroids.    If you relapse soon would recommend biologic shots for asthma -- Fasenra would be good?    Mediastinal abnormality was eval by Dr Thornton in past-- could do follow up ct chest to screen for lung cancer... we discuss    Continue trelegy    Lung capacity was 55% in 2020

## 2023-07-28 ENCOUNTER — OUTPATIENT CASE MANAGEMENT (OUTPATIENT)
Dept: ADMINISTRATIVE | Facility: OTHER | Age: 68
End: 2023-07-28
Payer: MEDICARE

## 2023-07-28 NOTE — PROGRESS NOTES
7/28/2023  2nd attempt to complete Follow-Up  for Outpatient Care Management, left message.  Will send letter thru [facility] portal.

## 2023-07-28 NOTE — LETTER
Martell Corcoarn  [facility] [facility] [id number]      Dear Martell Corcoran,     I am your nurse with [facility]s Outpatient Care Management Department. I have been unsuccessful at reaching you since we spoke on 07/05/2023.  At your earliest convenience, I would like to discuss your healthcare progress.      Please contact me at [phone] from 8:00AM to 4:30 PM on Monday thru Friday.     As you know, [facility] is a program offered to you through [facility] where a nurse is available 24/7 to answer questions or provide medical advice, their number is [phone].    Thanks,  Consuelo Gomez RN [facility]   Outpatient Care Management

## 2023-08-03 ENCOUNTER — OUTPATIENT CASE MANAGEMENT (OUTPATIENT)
Dept: ADMINISTRATIVE | Facility: OTHER | Age: 68
End: 2023-08-03
Payer: MEDICARE

## 2023-08-03 NOTE — PROGRESS NOTES
8/3/2023  3rd attempt to complete Follow-Up  for Outpatient Care Management, left message.  Dis-enroll from [facility]. [facility] Case Closure.

## 2023-08-16 ENCOUNTER — OFFICE VISIT (OUTPATIENT)
Dept: CARDIOLOGY | Facility: CLINIC | Age: 68
End: 2023-08-16
Payer: MEDICARE

## 2023-08-16 VITALS
HEIGHT: 66 IN | HEART RATE: 80 BPM | BODY MASS INDEX: 25.86 KG/M2 | SYSTOLIC BLOOD PRESSURE: 118 MMHG | DIASTOLIC BLOOD PRESSURE: 68 MMHG | WEIGHT: 160.94 LBS

## 2023-08-16 DIAGNOSIS — E78.2 MIXED HYPERLIPIDEMIA: ICD-10-CM

## 2023-08-16 DIAGNOSIS — J44.9 CHRONIC OBSTRUCTIVE PULMONARY DISEASE, UNSPECIFIED COPD TYPE: ICD-10-CM

## 2023-08-16 DIAGNOSIS — I48.91 ATRIAL FIBRILLATION, UNSPECIFIED TYPE: ICD-10-CM

## 2023-08-16 DIAGNOSIS — G47.33 OSA ON CPAP: ICD-10-CM

## 2023-08-16 DIAGNOSIS — I70.0 ATHEROSCLEROSIS OF AORTA: ICD-10-CM

## 2023-08-16 DIAGNOSIS — R06.09 DOE (DYSPNEA ON EXERTION): ICD-10-CM

## 2023-08-16 DIAGNOSIS — I10 HYPERTENSION, UNSPECIFIED TYPE: ICD-10-CM

## 2023-08-16 PROCEDURE — 99999 PR PBB SHADOW E&M-EST. PATIENT-LVL V: CPT | Mod: PBBFAC,,,

## 2023-08-16 PROCEDURE — 99999 PR PBB SHADOW E&M-EST. PATIENT-LVL V: ICD-10-PCS | Mod: PBBFAC,,,

## 2023-08-16 PROCEDURE — 99214 OFFICE O/P EST MOD 30 MIN: CPT | Mod: S$PBB,,,

## 2023-08-16 PROCEDURE — 93005 ELECTROCARDIOGRAM TRACING: CPT | Mod: PBBFAC,PN | Performed by: GENERAL PRACTICE

## 2023-08-16 PROCEDURE — 93010 ELECTROCARDIOGRAM REPORT: CPT | Mod: S$PBB,,, | Performed by: GENERAL PRACTICE

## 2023-08-16 PROCEDURE — 99214 PR OFFICE/OUTPT VISIT, EST, LEVL IV, 30-39 MIN: ICD-10-PCS | Mod: S$PBB,,,

## 2023-08-16 PROCEDURE — 99215 OFFICE O/P EST HI 40 MIN: CPT | Mod: PBBFAC,PN

## 2023-08-16 PROCEDURE — 93010 EKG 12-LEAD: ICD-10-PCS | Mod: S$PBB,,, | Performed by: GENERAL PRACTICE

## 2023-08-16 NOTE — ASSESSMENT & PLAN NOTE
Continue aspirin 81 mg daily, zetia 10 mg daily, and Eliquis 5 mg BID.  Continue low sodium heart healthy diet.  Reduce saturated fats.  Heart healthy diet.     Risk factor modification.  Continue low-sodium diet.  Continue current medication regimen.

## 2023-08-16 NOTE — PROGRESS NOTES
Subjective:    Patient ID:  Martell Corcoran is a 68 y.o. male patient here for evaluation Hyperlipidemia, NSTEMI, Atrial Fibrillation, and Hypertension      History of Present Illness:     Patient is here for a follow up s/p cardiac cath.  Denies CP, shortness of breath, lightheadedness, dizziness, jaw/neck/arm pain, edema, or bleeding.   Breathing is improved since recent insecticide exposure.  He has been weaned from steroids. Increased fatigue since coming off steroids. History of COPD.  Patient had QUACH in the past and had cardiac work up with nonobstructive CAD on angiogram.  BP stable today in office.   7/13/23 Patient underwent successful RF-PVI for treatment of atrial fibrillation.   RRR today in office.  EKG NSR    7/11/23  Cardiac cath.         The ejection fraction was calculated to be 60%.    The left ventricular ejection fraction was grossly normal.    The left ventricular systolic function was normal.    The left ventricular end diastolic pressure was normal.    The pre-procedure left ventricular end diastolic pressure was 15.    The post-procedure left ventricular end diastolic pressure was 13.    The estimated blood loss was none.    There was non-obstructive coronary artery disease..    ECHO 6/14/23  The left ventricle is normal in size with low normal systolic function.  The estimated ejection fraction is 50%.  Normal left ventricular diastolic function.  There is no evidence of intracardiac shunting.  Normal right ventricular size with normal right ventricular systolic function.  Normal central venous pressure (3 mmHg).    Review of patient's allergies indicates:   Allergen Reactions    Msg [glutamic acid] Nausea Only, Palpitations, Shortness Of Breath and Swelling    Oyster extract Swelling     PT swells in his throat after eating oysters on occasion       Past Medical History:   Diagnosis Date    A-fib     Anticoagulant long-term use     Benign enlargement of prostate     Had a biopsy in  around     Bladder cancer     Cancer     COPD (chronic obstructive pulmonary disease)     Elevated PSA     GERD (gastroesophageal reflux disease)     Hypertension     Started with meds in .    Kidney stone     Sleep apnea     Urinary tract infection      Past Surgical History:   Procedure Laterality Date    ABLATION OF ARRHYTHMOGENIC FOCUS FOR ATRIAL FIBRILLATION N/A 2023    Procedure: ABLATION, ARRHYTHMOGENIC FOCUS, FOR ATRIAL FIBRILLATION;  Surgeon: Juma Aguilar MD;  Location: [facility] EP LAB;  Service: Cardiology;  Laterality: N/A;  AF, VALERIE(Cx if SR), PVI, RFA, CARTO, GEN, GP, 3 PREP    ANGIOGRAM, CORONARY, WITH LEFT HEART CATHETERIZATION N/A 2023    Procedure: Angiogram, Coronary, with Left Heart Cath;  Surgeon: Osman Lemon MD;  Location: [facility] CATH/EP LAB;  Service: Cardiology;  Laterality: N/A;  PLEASE CALL PHONE ASSESSMENT    Bladder tumor removed      FISTULA REPAIR      LEFT HEART CATHETERIZATION N/A 10/23/2018    Procedure: Left heart cath;  Surgeon: Niharika Squires MD;  Location: [facility] CATH;  Service: Cardiology;  Laterality: N/A;    TREATMENT OF CARDIAC ARRHYTHMIA  2023    Procedure: Cardioversion or Defibrillation;  Surgeon: Juma Aguilar MD;  Location: [facility] EP LAB;  Service: Cardiology;;     Social History     Tobacco Use    Smoking status: Former     Current packs/day: 0.00     Average packs/day: 1 pack/day for 55.0 years (55.0 ttl pk-yrs)     Types: Cigarettes     Start date: 1968     Quit date: 2023     Years since quittin.6    Smokeless tobacco: Never   Substance Use Topics    Alcohol use: Yes     Comment: occ    Drug use: No        Review of Systems:    As noted in HPI in addition      REVIEW OF SYSTEMS  CARDIOVASCULAR: No recent chest pain, palpitations, arm, neck, or jaw pain  RESPIRATORY: No recent fever, cough chills, SOB or congestion  : No blood in the urine  GI: No Nausea, vomiting, constipation, diarrhea, blood, or reflux.  MUSCULOSKELETAL: No  myalgias  NEURO: No lightheadedness or dizziness  EYES: No Double vision, blurry, vision or headache              Objective        Vitals:    08/16/23 1017   BP: 118/68   Pulse: 80       LIPIDS - LAST 2   Lab Results   Component Value Date    CHOL 225 (H) 11/08/2022    CHOL 152 03/04/2022    HDL 56 11/08/2022    HDL 51 03/04/2022    LDLCALC 146 (H) 11/08/2022    LDLCALC 84 03/04/2022    TRIG 110 11/08/2022    TRIG 77 03/04/2022    CHOLHDL 4.0 11/08/2022    CHOLHDL 3.0 03/04/2022       CBC - LAST 2  Lab Results   Component Value Date    WBC 10.88 07/11/2023    WBC 11.66 06/16/2023    RBC 4.76 07/11/2023    RBC 4.38 (L) 06/16/2023    HGB 15.9 07/11/2023    HGB 14.1 06/16/2023    HCT 47.1 07/11/2023    HCT 42.8 06/16/2023    MCV 99 (H) 07/11/2023    MCV 98 06/16/2023    MCH 33.4 (H) 07/11/2023    MCH 32.2 (H) 06/16/2023    MCHC 33.8 07/11/2023    MCHC 32.9 06/16/2023    RDW 14.7 (H) 07/11/2023    RDW 13.1 06/16/2023     07/11/2023     06/16/2023    MPV 8.8 (L) 07/11/2023    MPV 8.5 (L) 06/16/2023    GRAN 9.9 (H) 07/11/2023    GRAN 90.5 (H) 07/11/2023    LYMPH 0.5 (L) 07/11/2023    LYMPH 4.8 (L) 07/11/2023    MONO 0.4 07/11/2023    MONO 4.0 07/11/2023    BASO 0.01 07/11/2023    BASO 0.01 06/16/2023    NRBC 0 07/11/2023    NRBC 0 06/16/2023       CHEMISTRY & LIVER FUNCTION - LAST 2  Lab Results   Component Value Date     07/11/2023     06/16/2023    K 4.7 07/11/2023    K 4.3 06/16/2023     07/11/2023     06/16/2023    CO2 28 07/11/2023    CO2 25 06/16/2023    ANIONGAP 7 (L) 07/11/2023    ANIONGAP 10 06/16/2023    BUN 26 (H) 07/11/2023    BUN 18 06/16/2023    CREATININE 0.9 07/11/2023    CREATININE 0.9 06/16/2023     (H) 07/11/2023     (H) 06/16/2023    CALCIUM 9.3 07/11/2023    CALCIUM 9.2 06/16/2023    MG 2.0 06/16/2023    MG 1.9 06/15/2023    ALBUMIN 3.1 (L) 06/16/2023    ALBUMIN 3.3 (L) 06/15/2023    PROT 5.9 (L) 06/16/2023    PROT 6.5 06/15/2023    ALKPHOS 67  06/16/2023    ALKPHOS 75 06/15/2023    ALT 19 06/16/2023    ALT 21 06/15/2023    AST 13 06/16/2023    AST 19 06/15/2023    BILITOT 0.2 06/16/2023    BILITOT 0.3 06/15/2023        CARDIAC PROFILE - LAST 2  Lab Results   Component Value Date    BNP 16 06/13/2023    BNP 52 08/13/2020    TROPONINI 0.365 (H) 06/14/2023    TROPONINI 0.428 (H) 06/14/2023        COAGULATION - LAST 2  Lab Results   Component Value Date    INR 0.8 07/11/2023    INR 0.9 06/09/2023    APTT 21.7 07/11/2023    APTT 30 06/09/2023       ENDOCRINE & PSA - LAST 2  Lab Results   Component Value Date    MICROALBUR 0.4 11/08/2022    MICROALBUR 0.4 03/22/2021    PROCAL 0.02 06/15/2023    PROCAL 0.02 08/13/2020        ECHOCARDIOGRAM RESULTS  Results for orders placed during the hospital encounter of 06/13/23    STRESS TEST REPORT      CURRENT/PREVIOUS VISIT EKG  Results for orders placed or performed during the hospital encounter of 07/13/23   EKG 12-lead    Collection Time: 07/13/23  2:36 PM    Narrative    Test Reason : I48.91,    Vent. Rate : 104 BPM     Atrial Rate : 104 BPM     P-R Int : 132 ms          QRS Dur : 090 ms      QT Int : 334 ms       P-R-T Axes : 074 -45 076 degrees     QTc Int : 439 ms    Sinus tachycardia  Left axis deviation  Abnormal ECG  When compared with ECG of 13-JUL-2023 08:13,  Sinus rhythm has replaced Atrial fibrillation  T wave inversion no longer evident in Inferior leads  Confirmed by Greg HOLLOWAY MD ([id number]) on 7/13/2023 3:04:40 PM    Referred By: SARAH IVORY           Confirmed By:Greg HOLLOWAY MD     No valid procedures specified.   Results for orders placed during the hospital encounter of 06/13/23    Nuclear Stress Test    Interpretation Summary    The ECG portion of the study is negative for ischemia.    There were no arrhythmias during stress.    No valid procedures specified.    PHYSICAL EXAM  CONSTITUTIONAL: Well built, well nourished in no apparent distress  NECK: no carotid bruit, no JVD  LUNGS: CTA  CHEST WALL: no  tenderness  HEART: regular rate and rhythm, S1, S2 normal, no murmur, click, rub or gallop   ABDOMEN: soft, non-tender; bowel sounds normal;  EXTREMITIES: Extremities normal, no edema  NEURO: AAO X 3    I HAVE REVIEWED :    The vital signs, nurses notes, and all the pertinent radiology and labs.        Current Outpatient Medications   Medication Instructions    albuterol (PROVENTIL) 2.5 mg, Nebulization, Every 6 hours PRN    albuterol (PROVENTIL/VENTOLIN HFA) 90 mcg/actuation inhaler 1-2 puffs, Inhalation, Every 4 hours PRN, Rescue    albuterol-ipratropium (DUO-NEB) 2.5 mg-0.5 mg/3 mL nebulizer solution USE 1 AMPULE IN NEBULIZER EVERY 4 HOURS AS NEEDED FOR WHEEZING    alfuzosin (UROXATRAL) 10 mg Tb24 TAKE 1 TABLET BY MOUTH ONCE DAILY AFTER BREAKFAST    ALPRAZolam (XANAX) 0.5 mg, Oral, 3 times daily PRN    aspirin (ECOTRIN) 81 MG EC tablet No dose, route, or frequency recorded.    azelastine (ASTELIN) 137 mcg, Nasal, 2 times daily    benzonatate (TESSALON) 200 mg, Oral, 3 times daily PRN    ELIQUIS 5 mg, Oral, 2 times daily    EScitalopram oxalate (LEXAPRO) 20 MG tablet Oral, Taking 1/2 tablet daily    esomeprazole (NEXIUM) 40 mg, Oral, Daily    ezetimibe (ZETIA) 10 mg, Oral, Daily    finasteride (PROSCAR) 5 mg, Oral, Daily    fluticasone-umeclidin-vilanter (TRELEGY ELLIPTA) 200-62.5-25 mcg inhaler 1 puff, Inhalation, Daily    furosemide (LASIX) 20 mg, Oral, Daily PRN    isosorbide mononitrate (IMDUR) 60 mg, Oral, Daily    nitroGLYCERIN (NITROSTAT) 0.4 MG SL tablet Sublingual    predniSONE (DELTASONE) 60 mg, Oral, Daily    tiotropium-olodateroL (STIOLTO RESPIMAT) 2.5-2.5 mcg/actuation Mist 2 puffs, Inhalation, Daily, Controller    valACYclovir (VALTREX) 1 g, Oral, 2 times daily    zolpidem (AMBIEN) 5 mg, Oral, Nightly          Assessment & Plan     Chronic obstructive pulmonary disease  Managed by Dr. Carolina.  Continue breathing treatments/inhalers as prescribed.  Breathing stable.    Atherosclerosis of aorta  Continue  aspirin 81 mg daily, zetia 10 mg daily, and Eliquis 5 mg BID.  Continue low sodium heart healthy diet.  Reduce saturated fats.  Heart healthy diet.     Risk factor modification.  Continue low-sodium diet.  Continue current medication regimen.    Atrial fibrillation  RRR.  Continue Eliquis.  Denies bleeding.  Did not tolerate antiarrhythmics in the past.  He sees Dr. Aguilar.   Patient underwent successful RF-PVI for treatment of atrial fibrillation on 7/13/23.    Recommend follow-up with Dr. Aguilar s/p ablation.      Hyperlipidemia  Patient is on Zetia 10 mg daily. Continue low sodium heart healthy diet.  Recommend rechecking lipid panel. Not taking Livalo anymore.    HTN (hypertension)  Stable.  Continue current medication regimen. Continue low sodium heart healthy diet.    QUACH (dyspnea on exertion)  Stable. Denies QUACH.  COPD managed by Pulmonology.  Cardiac cath nonobstructive CAD.    GLENNA on CPAP  Continue CPAP.          Follow up in about 3 months (around 11/16/2023).

## 2023-08-16 NOTE — ASSESSMENT & PLAN NOTE
RRR.  Continue Eliquis.  Denies bleeding.  Did not tolerate antiarrhythmics in the past.  He sees Dr. Aguilar.   Patient underwent successful RF-PVI for treatment of atrial fibrillation on 7/13/23.    Recommend follow-up with Dr. Aguilar s/p ablation.

## 2023-08-16 NOTE — ASSESSMENT & PLAN NOTE
Managed by Dr. Carolina.  Continue breathing treatments/inhalers as prescribed.  Breathing stable.

## 2023-08-16 NOTE — ASSESSMENT & PLAN NOTE
Patient is on Zetia 10 mg daily. Continue low sodium heart healthy diet.  Recommend rechecking lipid panel. Not taking Livalo anymore.

## 2023-08-26 ENCOUNTER — TELEPHONE (OUTPATIENT)
Dept: CARDIOLOGY | Facility: CLINIC | Age: 68
End: 2023-08-26
Payer: MEDICARE

## 2023-08-26 DIAGNOSIS — I48.91 ATRIAL FIBRILLATION, UNSPECIFIED TYPE: Primary | ICD-10-CM

## 2023-08-26 NOTE — TELEPHONE ENCOUNTER
----- Message from Vanesa Eller RN sent at 2023  1:30 PM CDT -----  Regardin/16 EKG Order  The EKG performed on 23 is missing an order.  Please place an order so that the EKG can be read in [place].    Thank you,  Vanesa Eller RN  [facility]   [facility] [place] [place] Echo/ Stress Lab  3rd Floor Cardiology Clinic  [phone]/ [id number]

## 2023-09-06 ENCOUNTER — OFFICE VISIT (OUTPATIENT)
Dept: PULMONOLOGY | Facility: CLINIC | Age: 68
End: 2023-09-06
Payer: MEDICARE

## 2023-09-06 VITALS
HEIGHT: 66 IN | HEART RATE: 93 BPM | WEIGHT: 160.63 LBS | BODY MASS INDEX: 25.81 KG/M2 | SYSTOLIC BLOOD PRESSURE: 127 MMHG | OXYGEN SATURATION: 94 % | DIASTOLIC BLOOD PRESSURE: 82 MMHG

## 2023-09-06 DIAGNOSIS — J82.83 EOSINOPHILIC ASTHMA: ICD-10-CM

## 2023-09-06 DIAGNOSIS — J45.50 SEVERE PERSISTENT ASTHMA WITHOUT COMPLICATION: ICD-10-CM

## 2023-09-06 DIAGNOSIS — R06.09 DOE (DYSPNEA ON EXERTION): ICD-10-CM

## 2023-09-06 DIAGNOSIS — J44.89 ASTHMA-COPD OVERLAP SYNDROME: Primary | ICD-10-CM

## 2023-09-06 PROCEDURE — 99213 PR OFFICE/OUTPT VISIT, EST, LEVL III, 20-29 MIN: ICD-10-PCS | Mod: S$PBB,,, | Performed by: INTERNAL MEDICINE

## 2023-09-06 PROCEDURE — 99999 PR PBB SHADOW E&M-EST. PATIENT-LVL IV: ICD-10-PCS | Mod: PBBFAC,,, | Performed by: INTERNAL MEDICINE

## 2023-09-06 PROCEDURE — 99213 OFFICE O/P EST LOW 20 MIN: CPT | Mod: S$PBB,,, | Performed by: INTERNAL MEDICINE

## 2023-09-06 PROCEDURE — 99999 PR PBB SHADOW E&M-EST. PATIENT-LVL IV: CPT | Mod: PBBFAC,,, | Performed by: INTERNAL MEDICINE

## 2023-09-06 PROCEDURE — 99214 OFFICE O/P EST MOD 30 MIN: CPT | Mod: PBBFAC,PO | Performed by: INTERNAL MEDICINE

## 2023-09-06 RX ORDER — DUTASTERIDE 0.5 MG/1
1 CAPSULE, LIQUID FILLED ORAL DAILY
COMMUNITY
End: 2023-09-06 | Stop reason: ALTCHOICE

## 2023-09-06 RX ORDER — PREDNISONE 10 MG/1
10 TABLET ORAL 2 TIMES DAILY
Qty: 60 TABLET | Refills: 3 | Status: SHIPPED | OUTPATIENT
Start: 2023-09-06 | End: 2023-09-13 | Stop reason: SDUPTHER

## 2023-09-06 NOTE — PATIENT INSTRUCTIONS
You are off smokes and still needing steroids as breathing poorly.  Pattern is severe asthmatic and eosinophils have been very high.    Would recheck eosinophils    Ct from 2022 viewed and showed minimal emphysema.    Trelegy is dosed daily.    Use albuterol prior to activity.      Resume prednisone 10 mg daily to 20 mg daily after blood test.  Stop in 2 wks and resume as needed    If remains unstable by November may consider shots?     -

## 2023-09-06 NOTE — PROGRESS NOTES
9/6/2023    Martell Corcoran  Follow up    Chief Complaint   Patient presents with    Follow-up    Shortness of Breath       HPI:   9/6/2023 off smokes 7/26/2023  and still steroid dependant.  Pt got off prednisone and not thriving.   Breathing very short acitivity.  Marked garcia last month.  Sinuses ok and no yg mucous.  A fib remitted after ablation.      7/27/2023 just got off steroids last 2 days -- uses trelegy..  had a fib ablation on 13th July and was feeling well.    Pt off smokes since 1/7/2023     Pt was seen by Dr Thornton -- no f/u for mediastinal cyst --we discuss today  Patient Instructions   Non fasting sugar of 146 on July 11th suggest steroid induced diabetes.  Should be better off steroids.    If you relapse soon would recommend biologic shots for asthma -- Fasenra would be good?    Mediastinal abnormality was eval by Dr Thornton in past-- could do follow up ct chest to screen for lung cancer... we discuss    Continue trelegy    Lung capacity was 55% in 2020 6/29/2023 pt presented nsr er June 13th -- had bad coughing spell with sob and chest pains -- pt went home on 18th on prednisone 60/d with few days left today.  Pt still has wheezes in early am.   Codeine did not work -- may have worsened.    Still on trelegy.      Eos were elevated to 800 at presentation --   Patient Instructions   Cxr June 13th viewed  - no pneumonia  You had high blood sugars in hospital -- 160's.  You may have problems with using high dose steroids.   Stop prednisone when out -- drop to 2 daily for 2 days then one daily (if able)- 14 days.    Have prednisone on hand-- start 3 20 mg daily and call if needs again  You have been on trelegy -- continue.   You had unusually high eosinophils when flared lungs with recent heart attack  You have asthma and copd.  You may be steroid dependant if need to repeat-- consider special shots.       No bad germs in lung mucous on June 15th  May need diabetes medication-- would check sugars  in future if on steroids......        5/23/2023 pt was doing cleaning for elderly cat lady- 5/12/2023- had to spray acontainer for roaches- loaded with roaches- he was given fogger,  and was trying to fog container, sprayed and then closed container,  exposed to [place] fogger off and on for roughly 1/2 hr to 40 min. No burning. No sob nor cough til roughly 4-6 hrs later.  Similar exposures to cleaning was no problem in past. Pt was advised to go to er after 4 -5 days -- pt had to use neb q 4-6 hr post exposure whereas was using neb rx every 6 months prior.  Pt seen in er-- prednisone 20/d x 4 done.  Sat 96 and wheezes noted.      Pt did use zpak but mucous still yellow.   Patient Instructions   Chest xray 5/17/2023 viewed with no problems.    Breathing test 2020 with copd 56% lung capacity      Use trelegy or stiolto-- trelegy has steroids which are more effective to prevent wheezes    Increase steroids-- may need into future?? For now use prednisone 20mg 3 x 3 and taper,,,,,  may repeat.  Would be best to inhale steroids to prevent relapse -- not sure long term need.    Codiene not covered but not $$      Use azithromycin, may need special antibiotic??  Need to have mucous clear.      Call if not back to usual in 2-3 wks???    3/7/2023 quit smokes, feels fatigued chronically. Had naila and cardioversion November.  Back to rhythm but could not use meds to continue.   Uses anoro and qvar--- not using reg as before.    Pt missed lexapro somehow-- had increased anxiety and stomach issues.  Uses occ xanax.   Uses astelin      Has occ blurred vision.  Patient Instructions   Pet scan viewed.  Deviated septum noted.     Would use stiolto 2 daily -- could skip -- anoro not good for regular use.    Novmeber screening ct chest     Nerves may cause fatigue    Sleep apnea may cause excess sleepiness-- would re try cpap once nerves stable    Would re try cholesterol  medications.     Try cpap -- may use ambein to sleep--dedicate 8  hrs sleep to use ambein.      Sept 13, 2022  in past, had own company.  Lung dz developed 4 + yrs ago.  Bladder cancer removed.  Had bcg infusions 2020 . Some garcia. Problem nasal stuffy tolerating cpap.    Patient Instructions   Nasal stuffy -- afrin or generic afrin --- use one to 2 sprays up to once daily -- alternate nares ?  If regular use may get tolerant and afrin ineffective.  Could see ent to improve passages.      Flonase should work better (or astelin) if passages open.     Sleep study was only 8 episodes an hour-- less than 5 is normal.     Cpap titration needed 8-9 cm pressure    Mediastinal abnormality varies-- would send to cardiothoracic surgery.    Copd is moderate at 55% or so.  Anoro and qvar effective -- as needed albuterol.  Would stop smokes.     CT films are reviewed directly with the patient.  Extensive discussion is made.  Patient's evaluation took 56 minutes today.          Below from RJ Salgado  6/1/2022- SOB persistent complaint, pain with deep inspiration on left chest. Followed by cardiology for A fib and vascular blockages pacemaker procedure postponed.    Wearing CPAP with nose mask, moves across face when sleeping. Does not feel better after wearing.   Cough- improved, less often and severe, most days, worse when weather changes, productive clear mucous  Currently smoking 3-4 cigarettes daily, trying to cut down.   Patient Instructions   CT of chest shows clear lungs, no change to previously seen lung nodules.     A cyst is seen in muscle could be cause of chest pain    Recommend smoking cessation    Continue COPD medication regiment    Recommend trying CPAP therapy again.     2/4/2022-  States breathing is labored. States usually worse after infusion for bladder cancer. States does have benefit with qvar and Anoro, using albuterol as needed 2x daily.     Daytime fatigue persistent.  purchased CPAP from third party. Has not started to use.     Scheduled for pacemaker  to treat A-fib. Followed by Dr. Miranda.     Cough- decreased, 2x daily, productive clear mucous, cut back on smoking to 6 cigarettes daily    1/14/2022- not able to get CPAP due to high co-pay.   States breathing improved after decreasing smoking to 3-6 cigarettes daily.   Shortness of breath- daily complaint, slightly improved, worse with exertion, improves with rest. Daytime fatigue unchanged.   Complaint of cough- varies in severity, currently mild. Productive small amount clear mucous, did notice worsening after first starting to cut back on smoking. Has returned to normal   Followed by Urologist Dr. Pham for bladder cancer. Undergoing infusion therapy.     10/4/2021- concerned he had COVID 19 late July early August, lost sense of taste and smell. Complaint of fatigue, body aches, shortness of breath when walking,   No fever. Gradually improved over 4 weeks. Taste and smell has not returned. Experiencing short term memory loss and metal grogginess. Has daytime drowsiness onset years worsened in past two months  Persistent cough- productive clear mucous, associated with wheeze. Improves with albuterol inhaler.   Currently on Anoro, QVAR, using albuterol when needed 1-3 x weekly.     5/24/2021- Admitted To hospital in [place] while on vacation April 27, 2021 for COPD exacerbation. Seen at [facility] ER December 16, 2020 for COPD exacerbation.   Complaint of SOB- daily, worse with exertion, improves with rest. Treated with antibiotics and steroid therapy April.   Cough- recurrent complaint,  improved since hospital discharge, worsened in last 2 weeks after spending time with grandson who had bronchitis that has improved with nebulizer treatments every 4 hours. Associated with chest tightness and wheeze.       11/24/2020- he had bladder biopsy at [place] with Dr. Pham on 11/3- per outside records path with High grade papillary urothelial cell carcinoma. He has cough w/ postnasal drip and allergies but breathing  is much better. He has been dealing with a lot for the family- 2 brothers just . Hematuria is much better. Not getting bladder infections any more. He continues to smoke 1/2 ppd. Wants to quit but too much stress recently- not ready.    2020-   Start taking prednisone again- long taper over 3 weeks then try to stop. If lungs flaring while decreasing prednisone go up to the last dose that helped you and call our office.  Continue trelegy  Refilled duo nebs to use as needed  Use albuterol as needed  Quit smoking- go to program  Follow up with urologist  Follow up with PCP for kidney testing and possible urinary infection  pt was recently hospitalized for COPD exacerbation, seen by me while admitted 20 and also having worsening hematuria at that time. He was sent home with a course of levaquin and steroid taper. He did not qualify for home O2. He is being worked up for a bladder mass per urology and pending transurethral resection.  Pt c/o pain around L kidney after doing yard work and urine turned darker. He was coughing last night and woke up with face feeling puffy. Switched urologists to a Dr. Pham at [facility] and has appt at end of September.  He finished prednisone taper. Still taking levaquin. Still feels some congestion in chest but it is getting better. Also has sinus problems w/ nose blocked. He denies frequent exacerbations but this one has been very bad. Feels like worsening since stopped prednisone. Still smokes but trying to cut back. Has smoking cessation appt later this month.  Inhalers: nebs 4-5x/day, trelegy daily, albuterol rescue 1-2x/day    7/15/20- Pt is a 66 yo male with HTN, GERD and BPH presents for new evaluation of breathing issues. He complains of SOB especially if he carries anything like taking out trash to the dumpster. This has been progressive over about 3.5 yrs. He wheezes and coughs up clear mucous, throughout the day.  Pt smokes 1 PPD x 55 yrs.  At age 5 pt had pna  and a collapsed lung requiring chest tube on the left side and hospitalization. Didn't have any other problems w/ breathing growing up. He took up playing [employer] to help lung function.  Pt had abd/p scan for UTI recently which showed some emphysematous changes.    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 One brother  at 65 from leukemia.    Grew up in [place]    The chief compliant  problem varies with instablilty at time      PFSH:  Past Medical History:   Diagnosis Date    A-fib     Anticoagulant long-term use     Benign enlargement of prostate     Had a biopsy in around     Bladder cancer     Cancer     COPD (chronic obstructive pulmonary disease)     Elevated PSA     GERD (gastroesophageal reflux disease)     Hypertension     Started with meds in .    Kidney stone     Sleep apnea     Urinary tract infection      Past surg hist  Past Surgical History:   Procedure Laterality Date    FISTULA REPAIR        LEFT HEART CATHETERIZATION N/A 10/23/2018     Procedure: Left heart cath;  Surgeon: Niharika Squires MD;  [facility];  Service: Cardiology;  Laterality: N/A;          Social History     Tobacco Use    Smoking status: Former     Current packs/day: 0.00     Average packs/day: 1 pack/day for 55.0 years (55.0 ttl pk-yrs)     Types: Cigarettes     Start date: 1968     Quit date: 2023     Years since quittin.6    Smokeless tobacco: Never   Substance Use Topics    Alcohol use: Yes     Comment: occ    Drug use: No     Family History   Problem Relation Age of Onset    Heart failure Mother     Cancer Father     Heart disease Brother     Heart attack Brother     Cancer Brother     Heart disease Brother     Drug abuse Brother      Review of patient's allergies indicates:   Allergen Reactions    Msg [glutamic acid] Nausea Only, Palpitations, Shortness Of Breath and Swelling    Oyster extract  "Swelling     PT swells in his throat after eating oysters on occasion       Performance Status:The patient's activity level is functions out of house. QUACH improved and does not limit him. Back pain limits activity.    Review of Systems:  a review of eleven systems covering constitutional, Eye, HEENT, Psych, Respiratory, Cardiac, GI, , Musculoskeletal, Endocrine, Dermatologic was negative except for pertinent findings as listed ABOVE and below:   wheeze, shortness of breath, fatigue      Exam:Comprehensive exam done. /82 (BP Location: Left arm, Patient Position: Sitting, BP Method: Small (Automatic))   Pulse 93   Ht 5' 6" (1.676 m)   Wt 72.8 kg (160 lb 9.7 oz)   SpO2 (!) 94% Comment: on room air at rest  BMI 25.92 kg/m²   Exam included Vitals as listed, and patient's appearance and affect and alertness and mood, oral exam for yeast and hygiene and pharynx lesions and Mallapatti (M) score, neck with inspection for jvd and masses and thyroid abnormalities and lymph nodes (supraclavicular and infraclavicular nodes and axillary also examined and noted if abn), chest exam included symmetry and effort and fremitus and percussion and auscultation, cardiac exam included rhythm and gallops and murmur and rubs and jvd and edema, abdominal exam for mass and hepatosplenomegaly and tenderness and hernias and bowel sounds, Musculoskeletal exam with muscle tone and posture and mobility/gait and  strength, and skin for rashes and cyanosis and pallor and turgor, extremity for clubbing.  Findings were normal except for pertinent findings listed below:  M1  Bilateral clear lungs w/ faint rhonchi bilateral lower lung zones  No clubbing or edema    Radiographs (ct chest and cxr) reviewed: view by direct vision   CT scan of the chest September 7, 2022 viewed by direct vision.  Fairly small mediastinal cystic structure looks to be a little smaller than March.  There was very very difficult to see in 2021 however.    CT " Chest Without Contrast 03/14/22 Emphysema and unchanged lung nodules  Indeterminate smoothly marginated structure along the anterior superior mediastinum, measuring just above simple fluid attenuation suggestive of a minimally complex/complicated cyst, possibly a lymphovascular malformation, synovial cyst (extending from the sternoclavicular joint), foregut duplication cyst, thymic epithelial lesion/cyst, and much less likely malignancy, however this has slightly increased in size from the previous exam and may warrant interval attention on follow-up imaging.       CT Chest Without Contrast  05/20/2021   In the left upper lobe is a confluence of vessels and a possible 4 mm nodule decreased in size and conspicuousness since the prior study of July 16, 2020.  No new or enlarging pulmonary nodules are identified.  Emphysema.     CT chest/abd/p 11/20/20- mild emphysema, lungs clear aside from small 6mm nodule DANA which is unchanged from prior exam  1. No metastatic disease.  2. Nodular thickening of the left posterior aspect of the bladder.  3. Enlarged prostate.  3. Mild pulmonary emphysema.   CXR 8/14/20- possible vague infiltrate/opacifications bilateral bases  CT chest 7/16/20- DANA 6mm nodule  CT abd/p 6/26/20- bases of lungs w/ scant emphysematous changes, some strands of atelectasis    Labs reviewed    Lab Results   Component Value Date    WBC 10.88 07/11/2023    RBC 4.76 07/11/2023    HGB 15.9 07/11/2023    HCT 47.1 07/11/2023    MCV 99 (H) 07/11/2023    MCH 33.4 (H) 07/11/2023    MCHC 33.8 07/11/2023    RDW 14.7 (H) 07/11/2023     07/11/2023    MPV 8.8 (L) 07/11/2023    GRAN 9.9 (H) 07/11/2023    GRAN 90.5 (H) 07/11/2023    LYMPH 0.5 (L) 07/11/2023    LYMPH 4.8 (L) 07/11/2023    MONO 0.4 07/11/2023    MONO 4.0 07/11/2023    EOS 0.0 07/11/2023    BASO 0.01 07/11/2023    EOSINOPHIL 0.0 07/11/2023    BASOPHIL 0.1 07/11/2023     Results for GONSOULIN, ANDREW RAMEY (MRN [id number]) as of 5/24/2021 15:33   Ref.  Range 8/13/2020 02:30 8/13/2020 06:01 8/14/2020 05:03 12/16/2020 09:20 3/22/2021 15:14   Eos # Latest Ref Range: 15 - 500 cells/uL 1.4 (H) 0.3 0.0 0.5 122       PFT reviewed  7/16/20- moderately severe obstruction, reduced DLCO, air trapping    EPWORTH SLEEPINESS SCALE SCORE 13 on 10/4/2021  Sleep study 10/7/2021 AHI Significant obstructive sleep apnea with a TRUPTI of 8.1/hr      Plan:  Clinical impression is apparently straight forward and impression with management as below.    Martell was seen today for follow-up and shortness of breath.    Diagnoses and all orders for this visit:    Asthma-COPD overlap syndrome  -     CBC auto differential; Future  -     predniSONE (DELTASONE) 10 MG tablet; Take 1 tablet (10 mg total) by mouth 2 (two) times daily.    Eosinophilic asthma  -     CBC auto differential; Future  -     predniSONE (DELTASONE) 10 MG tablet; Take 1 tablet (10 mg total) by mouth 2 (two) times daily.    Severe persistent asthma without complication  -     CBC auto differential; Future  -     predniSONE (DELTASONE) 10 MG tablet; Take 1 tablet (10 mg total) by mouth 2 (two) times daily.    QUACH (dyspnea on exertion)  -     CBC auto differential; Future  -     predniSONE (DELTASONE) 10 MG tablet; Take 1 tablet (10 mg total) by mouth 2 (two) times daily.                 - continue COPD medication regiment   - lung nodules unchanged.        Follow up in about 8 weeks (around 11/1/2023), or if symptoms worsen or fail to improve.    Discussed with patient above for education the following:         Discussed with patient above for education the following:      Patient Instructions   You are off smokes and still needing steroids as breathing poorly.  Pattern is severe asthmatic and eosinophils have been very high.    Would recheck eosinophils    Ct from 2022 viewed and showed minimal emphysema.    Trelegy is dosed daily.    Use albuterol prior to activity.      Resume prednisone 10 mg daily to 20 mg daily after blood  test.  Stop in 2 wks and resume as needed    If remains unstable by November may consider shots?     -

## 2023-09-13 DIAGNOSIS — J45.50 SEVERE PERSISTENT ASTHMA WITHOUT COMPLICATION: ICD-10-CM

## 2023-09-13 DIAGNOSIS — J82.83 EOSINOPHILIC ASTHMA: ICD-10-CM

## 2023-09-13 DIAGNOSIS — J43.9 PULMONARY EMPHYSEMA, UNSPECIFIED EMPHYSEMA TYPE: ICD-10-CM

## 2023-09-13 DIAGNOSIS — J44.89 ASTHMA-COPD OVERLAP SYNDROME: ICD-10-CM

## 2023-09-13 DIAGNOSIS — J44.9 CHRONIC OBSTRUCTIVE PULMONARY DISEASE, UNSPECIFIED COPD TYPE: ICD-10-CM

## 2023-09-13 DIAGNOSIS — R06.09 DOE (DYSPNEA ON EXERTION): ICD-10-CM

## 2023-09-13 RX ORDER — PREDNISONE 10 MG/1
10 TABLET ORAL 2 TIMES DAILY
Qty: 60 TABLET | Refills: 3 | Status: SHIPPED | OUTPATIENT
Start: 2023-09-13

## 2023-09-13 RX ORDER — ALBUTEROL SULFATE 90 UG/1
1-2 AEROSOL, METERED RESPIRATORY (INHALATION) EVERY 4 HOURS PRN
Qty: 18 G | Refills: 11 | Status: SHIPPED | OUTPATIENT
Start: 2023-09-13

## 2023-09-14 ENCOUNTER — LAB VISIT (OUTPATIENT)
Dept: LAB | Facility: HOSPITAL | Age: 68
End: 2023-09-14
Attending: INTERNAL MEDICINE
Payer: MEDICARE

## 2023-09-14 DIAGNOSIS — J44.89 ASTHMA-COPD OVERLAP SYNDROME: ICD-10-CM

## 2023-09-14 DIAGNOSIS — R06.09 DOE (DYSPNEA ON EXERTION): ICD-10-CM

## 2023-09-14 DIAGNOSIS — J45.50 SEVERE PERSISTENT ASTHMA WITHOUT COMPLICATION: ICD-10-CM

## 2023-09-14 DIAGNOSIS — E78.2 MIXED HYPERLIPIDEMIA: ICD-10-CM

## 2023-09-14 DIAGNOSIS — J82.83 EOSINOPHILIC ASTHMA: ICD-10-CM

## 2023-09-14 LAB
BASOPHILS # BLD AUTO: 0.03 K/UL (ref 0–0.2)
BASOPHILS NFR BLD: 0.4 % (ref 0–1.9)
CHOLEST SERPL-MCNC: 316 MG/DL (ref 120–199)
CHOLEST/HDLC SERPL: 4.6 {RATIO} (ref 2–5)
DIFFERENTIAL METHOD: ABNORMAL
EOSINOPHIL # BLD AUTO: 0.1 K/UL (ref 0–0.5)
EOSINOPHIL NFR BLD: 1.3 % (ref 0–8)
ERYTHROCYTE [DISTWIDTH] IN BLOOD BY AUTOMATED COUNT: 14.5 % (ref 11.5–14.5)
HCT VFR BLD AUTO: 48.1 % (ref 40–54)
HDLC SERPL-MCNC: 69 MG/DL (ref 40–75)
HDLC SERPL: 21.8 % (ref 20–50)
HGB BLD-MCNC: 16.1 G/DL (ref 14–18)
IMM GRANULOCYTES # BLD AUTO: 0.03 K/UL (ref 0–0.04)
IMM GRANULOCYTES NFR BLD AUTO: 0.4 % (ref 0–0.5)
LDLC SERPL CALC-MCNC: 224.8 MG/DL (ref 63–159)
LYMPHOCYTES # BLD AUTO: 1.6 K/UL (ref 1–4.8)
LYMPHOCYTES NFR BLD: 18.5 % (ref 18–48)
MCH RBC QN AUTO: 32.5 PG (ref 27–31)
MCHC RBC AUTO-ENTMCNC: 33.5 G/DL (ref 32–36)
MCV RBC AUTO: 97 FL (ref 82–98)
MONOCYTES # BLD AUTO: 0.7 K/UL (ref 0.3–1)
MONOCYTES NFR BLD: 8.2 % (ref 4–15)
NEUTROPHILS # BLD AUTO: 6.1 K/UL (ref 1.8–7.7)
NEUTROPHILS NFR BLD: 71.2 % (ref 38–73)
NONHDLC SERPL-MCNC: 247 MG/DL
NRBC BLD-RTO: 0 /100 WBC
PLATELET # BLD AUTO: 299 K/UL (ref 150–450)
PMV BLD AUTO: 8.3 FL (ref 9.2–12.9)
RBC # BLD AUTO: 4.95 M/UL (ref 4.6–6.2)
TRIGL SERPL-MCNC: 111 MG/DL (ref 30–150)
WBC # BLD AUTO: 8.55 K/UL (ref 3.9–12.7)

## 2023-09-14 PROCEDURE — 85025 COMPLETE CBC W/AUTO DIFF WBC: CPT | Performed by: INTERNAL MEDICINE

## 2023-09-14 PROCEDURE — 80061 LIPID PANEL: CPT

## 2023-09-14 PROCEDURE — 36415 COLL VENOUS BLD VENIPUNCTURE: CPT

## 2023-09-15 ENCOUNTER — TELEPHONE (OUTPATIENT)
Dept: PULMONOLOGY | Facility: CLINIC | Age: 68
End: 2023-09-15
Payer: MEDICARE

## 2023-09-15 NOTE — TELEPHONE ENCOUNTER
----- Message from Karen Gould sent at 9/15/2023 10:32 AM CDT -----  Contact: Self  Type:  Patient Returning Call    Who Called:  Patient   Who Left Message for Patient:  Not Sure  Does the patient know what this is regarding?:  lab results possibly  Best Call Back Number:  [phone]  Additional Information:  Thank You

## 2023-09-15 NOTE — TELEPHONE ENCOUNTER
Attempted to reach the patient.  Left voicemail.  Results can be viewed in [facility] portal.

## 2023-09-18 PROBLEM — I21.4 NSTEMI (NON-ST ELEVATED MYOCARDIAL INFARCTION): Status: RESOLVED | Noted: 2023-06-14 | Resolved: 2023-09-18

## 2023-09-19 ENCOUNTER — TELEPHONE (OUTPATIENT)
Dept: CARDIOLOGY | Facility: CLINIC | Age: 68
End: 2023-09-19
Payer: MEDICARE

## 2023-09-19 DIAGNOSIS — Z78.9 STATIN INTOLERANCE: Primary | ICD-10-CM

## 2023-09-19 NOTE — TELEPHONE ENCOUNTER
----- Message from Gina Garcia NP sent at 9/15/2023  3:28 PM CDT -----  Can we clarify if patient was fasting for labs?  Also why the patient stopped Livalo- if by MD or for side effects etc?  Just want to clarify in case I need to restart statin.  Thank you

## 2023-09-20 NOTE — TELEPHONE ENCOUNTER
I contacted the pt via his my chart. He was fasting and he was having multiple issues with the livalo.

## 2023-09-22 NOTE — TELEPHONE ENCOUNTER
Attempted to call patient to discuss. Went to .  Did not leave voicemail. Recommend Repatha at this time as he is already on Zetia and LDL is uncontrolled.  This is a subq injection q 2 weeks.  Patient will administer to himself.  Please let me know if patient is agreeable and I will place order to speciality pharmacy.   Recommendation from Gina Garcia NP

## 2023-10-02 ENCOUNTER — TELEPHONE (OUTPATIENT)
Dept: FAMILY MEDICINE | Facility: CLINIC | Age: 68
End: 2023-10-02

## 2023-10-02 NOTE — TELEPHONE ENCOUNTER
----- Message from Zayra Francois sent at 10/2/2023 11:45 AM CDT -----  - 10:21-pt is calling kadie back about rescheduling his appt   [phone]

## 2023-10-02 NOTE — TELEPHONE ENCOUNTER
----- Message from Zayra Francois sent at 10/2/2023 10:16 AM CDT -----  - pt is calling [facility]   [phone]

## 2023-10-10 ENCOUNTER — TELEPHONE (OUTPATIENT)
Dept: CARDIOLOGY | Facility: CLINIC | Age: 68
End: 2023-10-10
Payer: MEDICARE

## 2023-10-24 ENCOUNTER — PATIENT MESSAGE (OUTPATIENT)
Dept: FAMILY MEDICINE | Facility: CLINIC | Age: 68
End: 2023-10-24
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
## 2023-10-24 DIAGNOSIS — I25.118 ATHEROSCLEROTIC HEART DISEASE OF NATIVE CORONARY ARTERY WITH OTHER FORMS OF ANGINA PECTORIS: ICD-10-CM

## 2023-10-24 DIAGNOSIS — Z79.899 LONG-TERM USE OF HIGH-RISK MEDICATION: ICD-10-CM

## 2023-10-24 DIAGNOSIS — R07.81 RIB PAIN ON LEFT SIDE: ICD-10-CM

## 2023-10-24 RX ORDER — HYDROCODONE BITARTRATE AND ACETAMINOPHEN 7.5; 325 MG/1; MG/1
1 TABLET ORAL EVERY 6 HOURS PRN
Qty: 28 TABLET | Refills: 0 | Status: SHIPPED | OUTPATIENT
Start: 2023-10-24 | End: 2024-01-05

## 2023-10-24 NOTE — TELEPHONE ENCOUNTER
He needs to be careful taking other people's pain med.  He needs to do an Xray of his ribs, to make sure he has not broken anything if his SOB is worse.  I assume he is still taking his steroids, so he doesn't need to take ibuprofen with that. It is duplicitous and will make his stomach hurt. Will send him in a pain med only on the stipulation he gets Xray and f/u with me or pulm. Can also use heat and/or topical lidocaine patch, by [facility]c can help.

## 2023-10-31 ENCOUNTER — HOSPITAL ENCOUNTER (OUTPATIENT)
Dept: RADIOLOGY | Facility: HOSPITAL | Age: 68
Discharge: HOME | End: 2023-10-31
Attending: INTERNAL MEDICINE
Payer: MEDICARE

## 2023-10-31 ENCOUNTER — HOSPITAL ENCOUNTER (OUTPATIENT)
Dept: RADIOLOGY | Facility: HOSPITAL | Age: 68
Discharge: HOME | End: 2023-10-31
Attending: FAMILY MEDICINE
Payer: MEDICARE

## 2023-10-31 ENCOUNTER — TELEPHONE (OUTPATIENT)
Dept: PULMONOLOGY | Facility: CLINIC | Age: 68
End: 2023-10-31
Payer: MEDICARE

## 2023-10-31 DIAGNOSIS — R07.81 RIB PAIN ON LEFT SIDE: ICD-10-CM

## 2023-10-31 DIAGNOSIS — Z87.891 PERSONAL HISTORY OF NICOTINE DEPENDENCE: ICD-10-CM

## 2023-10-31 DIAGNOSIS — F17.200 TOBACCO DEPENDENCE: ICD-10-CM
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
## 2023-10-31 PROCEDURE — 71100 X-RAY EXAM RIBS UNI 2 VIEWS: CPT | Mod: TC,PO,LT

## 2023-10-31 PROCEDURE — 71046 X-RAY EXAM CHEST 2 VIEWS: CPT | Mod: TC,PO

## 2023-10-31 PROCEDURE — 71271 CT THORAX LUNG CANCER SCR C-: CPT | Mod: TC,PO

## 2023-10-31 NOTE — TELEPHONE ENCOUNTER
----- Message from Charity Boogie, Patient Care Assistant sent at 10/31/2023  1:06 PM CDT -----  Regarding: returning call  Contact: pt  Type:  Patient Returning Call    Who Called:  pt     Who Left Message for Patient:  not sure     Does the patient know what this is regarding?:  yes     Best Call Back Number:  [phone] (home)     Additional Information:  please call pt to advise. Thanks!

## 2023-11-02 ENCOUNTER — OFFICE VISIT (OUTPATIENT)
Dept: PULMONOLOGY | Facility: CLINIC | Age: 68
End: 2023-11-02
Payer: MEDICARE

## 2023-11-02 ENCOUNTER — PATIENT MESSAGE (OUTPATIENT)
Dept: FAMILY MEDICINE | Facility: CLINIC | Age: 68
End: 2023-11-02

## 2023-11-02 VITALS
HEIGHT: 66 IN | OXYGEN SATURATION: 93 % | HEART RATE: 91 BPM | DIASTOLIC BLOOD PRESSURE: 78 MMHG | BODY MASS INDEX: 26.88 KG/M2 | WEIGHT: 167.25 LBS | SYSTOLIC BLOOD PRESSURE: 114 MMHG

## 2023-11-02 DIAGNOSIS — J82.83 EOSINOPHILIC ASTHMA: ICD-10-CM

## 2023-11-02 DIAGNOSIS — J44.89 ASTHMA-COPD OVERLAP SYNDROME: ICD-10-CM

## 2023-11-02 DIAGNOSIS — R73.9 HYPERGLYCEMIA: Primary | ICD-10-CM

## 2023-11-02 DIAGNOSIS — F33.41 RECURRENT MAJOR DEPRESSIVE DISORDER, IN PARTIAL REMISSION: ICD-10-CM

## 2023-11-02 DIAGNOSIS — T38.0X5A STEROID-INDUCED DIABETES MELLITUS, INITIAL ENCOUNTER: ICD-10-CM

## 2023-11-02 DIAGNOSIS — I25.118 ATHEROSCLEROTIC HEART DISEASE OF NATIVE CORONARY ARTERY WITH OTHER FORMS OF ANGINA PECTORIS: ICD-10-CM

## 2023-11-02 DIAGNOSIS — I25.118 ATHEROSCLEROSIS OF NATIVE CORONARY ARTERY OF NATIVE HEART WITH STABLE ANGINA PECTORIS: ICD-10-CM

## 2023-11-02 DIAGNOSIS — E09.9 STEROID-INDUCED DIABETES MELLITUS, INITIAL ENCOUNTER: ICD-10-CM

## 2023-11-02 DIAGNOSIS — E78.5 HYPERLIPIDEMIA, UNSPECIFIED HYPERLIPIDEMIA TYPE: ICD-10-CM

## 2023-11-02 PROCEDURE — 99999 PR PBB SHADOW E&M-EST. PATIENT-LVL IV: ICD-10-PCS | Mod: PBBFAC,,, | Performed by: INTERNAL MEDICINE

## 2023-11-02 PROCEDURE — 99213 PR OFFICE/OUTPT VISIT, EST, LEVL III, 20-29 MIN: ICD-10-PCS | Mod: S$PBB,,, | Performed by: INTERNAL MEDICINE

## 2023-11-02 PROCEDURE — 99213 OFFICE O/P EST LOW 20 MIN: CPT | Mod: S$PBB,,, | Performed by: INTERNAL MEDICINE

## 2023-11-02 PROCEDURE — 99214 OFFICE O/P EST MOD 30 MIN: CPT | Mod: PBBFAC,PO | Performed by: INTERNAL MEDICINE

## 2023-11-02 PROCEDURE — 99999 PR PBB SHADOW E&M-EST. PATIENT-LVL IV: CPT | Mod: PBBFAC,,, | Performed by: INTERNAL MEDICINE

## 2023-11-02 RX ORDER — PITAVASTATIN CALCIUM 4.18 MG/1
4 TABLET, FILM COATED ORAL DAILY
Qty: 90 TABLET | Refills: 3 | Status: SHIPPED | OUTPATIENT
Start: 2023-11-02 | End: 2023-11-22 | Stop reason: SDUPTHER

## 2023-11-02 RX ORDER — METFORMIN HYDROCHLORIDE 500 MG/1
500 TABLET ORAL 2 TIMES DAILY WITH MEALS
Qty: 180 TABLET | Refills: 3 | Status: SHIPPED | OUTPATIENT
Start: 2023-11-02 | End: 2024-01-05

## 2023-11-02 RX ORDER — ESCITALOPRAM OXALATE 20 MG/1
20 TABLET ORAL DAILY
Qty: 90 TABLET | Refills: 3 | Status: SHIPPED | OUTPATIENT
Start: 2023-11-02

## 2023-11-02 RX ORDER — PITAVASTATIN CALCIUM 4.18 MG/1
1 TABLET, FILM COATED ORAL DAILY
Qty: 90 TABLET | Refills: 3 | Status: SHIPPED | OUTPATIENT
Start: 2023-11-02 | End: 2024-01-05 | Stop reason: SDUPTHER

## 2023-11-02 RX ORDER — PREDNISONE 2.5 MG/1
5 TABLET ORAL 2 TIMES DAILY
Qty: 360 TABLET | Refills: 3 | Status: SHIPPED | OUTPATIENT
Start: 2023-11-02

## 2023-11-02 NOTE — PROGRESS NOTES
11/2/2023    Martell Corcoran  Follow up    Chief Complaint   Patient presents with    Follow-up       HPI:     November 2, 2023-absolute eosinophil count was only 100 in late October and also in middle September.  October 31st CT of the chest did not show any remarkable findings.  Resumed smokes-- frustrated with [employer].   treatment plant failed.  Pt resumed prednisone as lungs worsened-- now on 7.5 mg.       9/6/2023 off smokes 7/26/2023  and still steroid dependant.  Pt got off prednisone and not thriving.   Breathing very short acitivity.  Marked garcia last month.  Sinuses ok and no yg mucous.  A fib remitted after ablation.  Patient Instructions   You are off smokes and still needing steroids as breathing poorly.  Pattern is severe asthmatic and eosinophils have been very high.    Would recheck eosinophils    Ct from 2022 viewed and showed minimal emphysema.    Trelegy is dosed daily.    Use albuterol prior to activity.      Resume prednisone 10 mg daily to 20 mg daily after blood test.  Stop in 2 wks and resume as needed    If remains unstable by November may consider shots?     -     7/27/2023 just got off steroids last 2 days -- uses trelegy..  had a fib ablation on 13th July and was feeling well.    Pt off smokes since 1/7/2023     Pt was seen by Dr Thornton -- no f/u for mediastinal cyst --we discuss today  Patient Instructions   Non fasting sugar of 146 on July 11th suggest steroid induced diabetes.  Should be better off steroids.    If you relapse soon would recommend biologic shots for asthma -- Fasenra would be good?    Mediastinal abnormality was eval by Dr Thornton in past-- could do follow up ct chest to screen for lung cancer... we discuss    Continue trelegy    Lung capacity was 55% in 2020 6/29/2023 pt presented nsr er June 13th -- had bad coughing spell with sob and chest pains -- pt went home on 18th on prednisone 60/d with few days left today.  Pt still has wheezes in early am.   Codeine  did not work -- may have worsened.    Still on trelegy.      Eos were elevated to 800 at presentation --   Patient Instructions   Cxr June 13th viewed  - no pneumonia  You had high blood sugars in hospital -- 160's.  You may have problems with using high dose steroids.   Stop prednisone when out -- drop to 2 daily for 2 days then one daily (if able)- 14 days.    Have prednisone on hand-- start 3 20 mg daily and call if needs again  You have been on trelegy -- continue.   You had unusually high eosinophils when flared lungs with recent heart attack  You have asthma and copd.  You may be steroid dependant if need to repeat-- consider special shots.       No bad germs in lung mucous on June 15th  May need diabetes medication-- would check sugars in future if on steroids......        5/23/2023 pt was doing cleaning for elderly cat lady- 5/12/2023- had to spray acontainer for roaches- loaded with roaches- he was given fogger,  and was trying to fog container, sprayed and then closed container,  exposed to [place] fogger off and on for roughly 1/2 hr to 40 min. No burning. No sob nor cough til roughly 4-6 hrs later.  Similar exposures to cleaning was no problem in past. Pt was advised to go to er after 4 -5 days -- pt had to use neb q 4-6 hr post exposure whereas was using neb rx every 6 months prior.  Pt seen in er-- prednisone 20/d x 4 done.  Sat 96 and wheezes noted.      Pt did use zpak but mucous still yellow.   Patient Instructions   Chest xray 5/17/2023 viewed with no problems.    Breathing test 2020 with copd 56% lung capacity      Use trelegy or stiolto-- trelegy has steroids which are more effective to prevent wheezes    Increase steroids-- may need into future?? For now use prednisone 20mg 3 x 3 and taper,,,,,  may repeat.  Would be best to inhale steroids to prevent relapse -- not sure long term need.    Codiene not covered but not $$      Use azithromycin, may need special antibiotic??  Need to have mucous  clear.      Call if not back to usual in 2-3 wks???    3/7/2023 quit smokes, feels fatigued chronically. Had naila and cardioversion November.  Back to rhythm but could not use meds to continue.   Uses anoro and qvar--- not using reg as before.    Pt missed lexapro somehow-- had increased anxiety and stomach issues.  Uses occ xanax.   Uses astelin      Has occ blurred vision.  Patient Instructions   Pet scan viewed.  Deviated septum noted.     Would use stiolto 2 daily -- could skip -- anoro not good for regular use.    Novmeber screening ct chest     Nerves may cause fatigue    Sleep apnea may cause excess sleepiness-- would re try cpap once nerves stable    Would re try cholesterol  medications.     Try cpap -- may use ambein to sleep--dedicate 8 hrs sleep to use ambein.      Sept 13, 2022  in past, had own company.  Lung dz developed 4 + yrs ago.  Bladder cancer removed.  Had bcg infusions 2020 . Some garcia. Problem nasal stuffy tolerating cpap.    Patient Instructions   Nasal stuffy -- afrin or generic afrin --- use one to 2 sprays up to once daily -- alternate nares ?  If regular use may get tolerant and afrin ineffective.  Could see ent to improve passages.      Flonase should work better (or astelin) if passages open.     Sleep study was only 8 episodes an hour-- less than 5 is normal.     Cpap titration needed 8-9 cm pressure    Mediastinal abnormality varies-- would send to cardiothoracic surgery.    Copd is moderate at 55% or so.  Anoro and qvar effective -- as needed albuterol.  Would stop smokes.     CT films are reviewed directly with the patient.  Extensive discussion is made.  Patient's evaluation took 56 minutes today.          Below from RJ Salgado  6/1/2022- SOB persistent complaint, pain with deep inspiration on left chest. Followed by cardiology for A fib and vascular blockages pacemaker procedure postponed.    Wearing CPAP with nose mask, moves across face when sleeping. Does not  feel better after wearing.   Cough- improved, less often and severe, most days, worse when weather changes, productive clear mucous  Currently smoking 3-4 cigarettes daily, trying to cut down.   Patient Instructions   CT of chest shows clear lungs, no change to previously seen lung nodules.     A cyst is seen in muscle could be cause of chest pain    Recommend smoking cessation    Continue COPD medication regiment    Recommend trying CPAP therapy again.     2/4/2022-  States breathing is labored. States usually worse after infusion for bladder cancer. States does have benefit with qvar and Anoro, using albuterol as needed 2x daily.     Daytime fatigue persistent.  purchased CPAP from third party. Has not started to use.     Scheduled for pacemaker to treat A-fib. Followed by Dr. Miranda.     Cough- decreased, 2x daily, productive clear mucous, cut back on smoking to 6 cigarettes daily    1/14/2022- not able to get CPAP due to high co-pay.   States breathing improved after decreasing smoking to 3-6 cigarettes daily.   Shortness of breath- daily complaint, slightly improved, worse with exertion, improves with rest. Daytime fatigue unchanged.   Complaint of cough- varies in severity, currently mild. Productive small amount clear mucous, did notice worsening after first starting to cut back on smoking. Has returned to normal   Followed by Urologist Dr. Pham for bladder cancer. Undergoing infusion therapy.     10/4/2021- concerned he had COVID 19 late July early August, lost sense of taste and smell. Complaint of fatigue, body aches, shortness of breath when walking,   No fever. Gradually improved over 4 weeks. Taste and smell has not returned. Experiencing short term memory loss and metal grogginess. Has daytime drowsiness onset years worsened in past two months  Persistent cough- productive clear mucous, associated with wheeze. Improves with albuterol inhaler.   Currently on Anoro, QVAR, using albuterol when needed 1-3  x weekly.     2021- Admitted To hospital in [place] while on vacation 2021 for COPD exacerbation. Seen at [facility] ER 2020 for COPD exacerbation.   Complaint of SOB- daily, worse with exertion, improves with rest. Treated with antibiotics and steroid therapy April.   Cough- recurrent complaint,  improved since hospital discharge, worsened in last 2 weeks after spending time with grandson who had bronchitis that has improved with nebulizer treatments every 4 hours. Associated with chest tightness and wheeze.       2020- he had bladder biopsy at [place] with Dr. Pham on 11/3- per outside records path with High grade papillary urothelial cell carcinoma. He has cough w/ postnasal drip and allergies but breathing is much better. He has been dealing with a lot for the family- 2 brothers just . Hematuria is much better. Not getting bladder infections any more. He continues to smoke 1/2 ppd. Wants to quit but too much stress recently- not ready.    2020-   Start taking prednisone again- long taper over 3 weeks then try to stop. If lungs flaring while decreasing prednisone go up to the last dose that helped you and call our office.  Continue trelegy  Refilled duo nebs to use as needed  Use albuterol as needed  Quit smoking- go to program  Follow up with urologist  Follow up with PCP for kidney testing and possible urinary infection  pt was recently hospitalized for COPD exacerbation, seen by me while admitted 20 and also having worsening hematuria at that time. He was sent home with a course of levaquin and steroid taper. He did not qualify for home O2. He is being worked up for a bladder mass per urology and pending transurethral resection.  Pt c/o pain around L kidney after doing yard work and urine turned darker. He was coughing last night and woke up with face feeling puffy. Switched urologists to a Dr. Pham at [place] and has appt at end of September.  He  finished prednisone taper. Still taking levaquin. Still feels some congestion in chest but it is getting better. Also has sinus problems w/ nose blocked. He denies frequent exacerbations but this one has been very bad. Feels like worsening since stopped prednisone. Still smokes but trying to cut back. Has smoking cessation appt later this month.  Inhalers: nebs 4-5x/day, trelegy daily, albuterol rescue 1-2x/day    7/15/20- Pt is a 66 yo male with HTN, GERD and BPH presents for new evaluation of breathing issues. He complains of SOB especially if he carries anything like taking out trash to the dumpster. This has been progressive over about 3.5 yrs. He wheezes and coughs up clear mucous, throughout the day.  Pt smokes 1 PPD x 55 yrs.  At age 5 pt had pna and a collapsed lung requiring chest tube on the left side and hospitalization. Didn't have any other problems w/ breathing growing up. He took up playing [employer] to help lung function.  Pt had abd/p scan for UTI recently which showed some emphysematous changes.    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 One brother  at 65 from leukemia.    Grew up in [place]    The chief compliant  problem varies with instablilty at time      PFSH:  Past Medical History:   Diagnosis Date    A-fib     Anticoagulant long-term use     Benign enlargement of prostate     Had a biopsy in around     Bladder cancer     Cancer     COPD (chronic obstructive pulmonary disease)     Elevated PSA     GERD (gastroesophageal reflux disease)     Hypertension     Started with meds in .    Kidney stone     Sleep apnea     Urinary tract infection      Past surg hist  Past Surgical History:   Procedure Laterality Date    FISTULA REPAIR        LEFT HEART CATHETERIZATION N/A 10/23/2018     Procedure: Left heart cath;  Surgeon: Niharika Squires MD;  [facility];  Service: Cardiology;   "Laterality: N/A;          Social History     Tobacco Use    Smoking status: Former     Current packs/day: 0.00     Average packs/day: 1 pack/day for 55.0 years (55.0 ttl pk-yrs)     Types: Cigarettes     Start date: 1968     Quit date: 2023     Years since quittin.8    Smokeless tobacco: Never   Substance Use Topics    Alcohol use: Yes     Comment: occ    Drug use: No     Family History   Problem Relation Age of Onset    Heart failure Mother     Cancer Father     Heart disease Brother     Heart attack Brother     Cancer Brother     Heart disease Brother     Drug abuse Brother      Review of patient's allergies indicates:   Allergen Reactions    Msg [glutamic acid] Nausea Only, Palpitations, Shortness Of Breath and Swelling    Oyster extract Swelling     PT swells in his throat after eating oysters on occasion       Performance Status:The patient's activity level is functions out of house. QUACH improved and does not limit him. Back pain limits activity.    Review of Systems:  a review of eleven systems covering constitutional, Eye, HEENT, Psych, Respiratory, Cardiac, GI, , Musculoskeletal, Endocrine, Dermatologic was negative except for pertinent findings as listed ABOVE and below:   wheeze, shortness of breath, fatigue      Exam:Comprehensive exam done. /78 (BP Location: Left arm, Patient Position: Sitting, BP Method: Small (Automatic))   Pulse 91   Ht 5' 6" (1.676 m)   Wt 75.8 kg (167 lb 3.5 oz)   SpO2 (!) 93% Comment: on room air at rest  BMI 26.99 kg/m²   Exam included Vitals as listed, and patient's appearance and affect and alertness and mood, oral exam for yeast and hygiene and pharynx lesions and Mallapatti (M) score, neck with inspection for jvd and masses and thyroid abnormalities and lymph nodes (supraclavicular and infraclavicular nodes and axillary also examined and noted if abn), chest exam included symmetry and effort and fremitus and percussion and auscultation, cardiac exam " included rhythm and gallops and murmur and rubs and jvd and edema, abdominal exam for mass and hepatosplenomegaly and tenderness and hernias and bowel sounds, Musculoskeletal exam with muscle tone and posture and mobility/gait and  strength, and skin for rashes and cyanosis and pallor and turgor, extremity for clubbing.  Findings were normal except for pertinent findings listed below:  M1  Bilateral clear lungs w/ faint rhonchi bilateral lower lung zones  No clubbing or edema    Radiographs (ct chest and cxr) reviewed: view by direct vision   CT scan of the chest September 7, 2022 viewed by direct vision.  Fairly small mediastinal cystic structure looks to be a little smaller than March.  There was very very difficult to see in 2021 however.    CT Chest Without Contrast 03/14/22 Emphysema and unchanged lung nodules  Indeterminate smoothly marginated structure along the anterior superior mediastinum, measuring just above simple fluid attenuation suggestive of a minimally complex/complicated cyst, possibly a lymphovascular malformation, synovial cyst (extending from the sternoclavicular joint), foregut duplication cyst, thymic epithelial lesion/cyst, and much less likely malignancy, however this has slightly increased in size from the previous exam and may warrant interval attention on follow-up imaging.       CT Chest Without Contrast  05/20/2021   In the left upper lobe is a confluence of vessels and a possible 4 mm nodule decreased in size and conspicuousness since the prior study of July 16, 2020.  No new or enlarging pulmonary nodules are identified.  Emphysema.     CT chest/abd/p 11/20/20- mild emphysema, lungs clear aside from small 6mm nodule DANA which is unchanged from prior exam  1. No metastatic disease.  2. Nodular thickening of the left posterior aspect of the bladder.  3. Enlarged prostate.  3. Mild pulmonary emphysema.   CXR 8/14/20- possible vague infiltrate/opacifications bilateral bases  CT chest  7/16/20- DANA 6mm nodule  CT abd/p 6/26/20- bases of lungs w/ scant emphysematous changes, some strands of atelectasis    Labs reviewed    Lab Results   Component Value Date    WBC 6.22 10/31/2023    RBC 4.74 10/31/2023    HGB 15.4 10/31/2023    HCT 47.2 10/31/2023     (H) 10/31/2023    MCH 32.5 (H) 10/31/2023    MCHC 32.6 10/31/2023    RDW 14.6 (H) 10/31/2023     10/31/2023    MPV 8.6 (L) 10/31/2023    GRAN 3.9 10/31/2023    GRAN 61.8 10/31/2023    LYMPH 1.5 10/31/2023    LYMPH 24.0 10/31/2023    MONO 0.7 10/31/2023    MONO 11.9 10/31/2023    EOS 0.1 10/31/2023    BASO 0.03 10/31/2023    EOSINOPHIL 1.6 10/31/2023    BASOPHIL 0.5 10/31/2023     Results for GONLUCERO, MARTELL RAMEY (MRN [id number]) as of 5/24/2021 15:33   Ref. Range 8/13/2020 02:30 8/13/2020 06:01 8/14/2020 05:03 12/16/2020 09:20 3/22/2021 15:14   Eos # Latest Ref Range: 15 - 500 cells/uL 1.4 (H) 0.3 0.0 0.5 122       PFT reviewed  7/16/20- moderately severe obstruction, reduced DLCO, air trapping    EPWORTH SLEEPINESS SCALE SCORE 13 on 10/4/2021  Sleep study 10/7/2021 A[facility] Significant obstructive sleep apnea with a TRUPTI of 8.1/hr      Plan:  Clinical impression is apparently straight forward and impression with management as below.    Martell was seen today for follow-up.    Diagnoses and all orders for this visit:    Hyperglycemia  -     HEMOGLOBIN A1C; Future    Asthma-COPD overlap syndrome  -     predniSONE (DELTASONE) 2.5 MG tablet; Take 2 tablets (5 mg total) by mouth 2 (two) times daily. Do not stop without instructions..    Eosinophilic asthma  -     predniSONE (DELTASONE) 2.5 MG tablet; Take 2 tablets (5 mg total) by mouth 2 (two) times daily. Do not stop without instructions..    Hyperlipidemia, unspecified hyperlipidemia type  -     pitavastatin calcium (LIVALO) 4 mg Tab; Take 4 mg by mouth Daily.    Recurrent major depressive disorder, in partial remission  -     EScitalopram oxalate (LEXAPRO) 20 MG tablet; Take 1 tablet (20 mg total)  by mouth once daily. Taking 1/2 tablet daily    Steroid-induced diabetes mellitus, initial encounter  -     HEMOGLOBIN A1C; Future  -     metFORMIN (GLUCOPHAGE) 500 MG tablet; Take 1 tablet (500 mg total) by mouth 2 (two) times daily with meals.    Atherosclerosis of native coronary artery of native heart with stable angina pectoris  -     pitavastatin calcium (LIVALO) 4 mg Tab; Take 4 mg by mouth Daily.    Atherosclerotic heart disease of native coronary artery with other forms of angina pectoris  Comments:  Possibly symptomatic. To continue to follow with Cards.   Will try on livalo as pt unable to tolerate statins, most other, (crestor,lipitor, mevacor)  Orders:  -     pitavastatin calcium (LIVALO) 4 mg Tab; Take 1 tablet by mouth Daily.                   - continue COPD medication regiment   - lung nodules unchanged.        Follow up in about 4 months (around 3/2/2024), or if symptoms worsen or fail to improve.    Discussed with patient above for education the following:         Discussed with patient above for education the following:      Patient Instructions   Ct chest viewed  from 10/31/2023 -- no lung nodules of concern    Mucous seen in airway -- you appreciate mucous.      You need chronic steroids and you had hyperglycemia -- metformin 500 mg daily (could go to 2 daily if needed) may prevent    Pt had been intolerant of crestor, lipitor, mevacor, zocor, and provachol  -- you have been able to tolerate Livalo and need to continue as you have had MI and stable angina......

## 2023-11-02 NOTE — PATIENT INSTRUCTIONS
Ct chest viewed  from 10/31/2023 -- no lung nodules of concern    Mucous seen in airway -- you appreciate mucous.      You need chronic steroids and you had hyperglycemia -- metformin 500 mg daily (could go to 2 daily if needed) may prevent    Pt had been intolerant of crestor, lipitor, mevacor, zocor, and provachol  -- you have been able to tolerate Livalo and need to continue as you have had MI and stable angina......

## 2023-11-07 ENCOUNTER — TELEPHONE (OUTPATIENT)
Dept: FAMILY MEDICINE | Facility: CLINIC | Age: 68
End: 2023-11-07

## 2023-11-07 NOTE — TELEPHONE ENCOUNTER
----- Message from Ruth Acosta sent at 11/7/2023  1:00 PM CST -----  Dr. Burrell has never prescribed Livalo for the patient and we have never done a PA for this medication. Please advise. Thank you

## 2023-11-09 ENCOUNTER — PATIENT MESSAGE (OUTPATIENT)
Dept: FAMILY MEDICINE | Facility: CLINIC | Age: 68
End: 2023-11-09

## 2023-11-09 NOTE — TELEPHONE ENCOUNTER
The pt has significant CV disease. His cardiologist should be doing the PA on his livalo to make sure he gets it or something to adequately treat his cholesterol treated approved, not his Pulmonologist. He has been on 3 statins, (crestor,lipitor, and pravachol) without success.

## 2023-11-10 ENCOUNTER — TELEPHONE (OUTPATIENT)
Dept: PULMONOLOGY | Facility: CLINIC | Age: 68
End: 2023-11-10
Payer: MEDICARE

## 2023-11-10 NOTE — TELEPHONE ENCOUNTER
Spoke to [facility]/ clairified that patient is taking   Lexapro 20 mg.  Take 1/2 tablet by mouth daily.

## 2023-11-14 ENCOUNTER — PATIENT MESSAGE (OUTPATIENT)
Dept: PULMONOLOGY | Facility: CLINIC | Age: 68
End: 2023-11-14
Payer: MEDICARE

## 2023-11-16 ENCOUNTER — LAB VISIT (OUTPATIENT)
Dept: LAB | Facility: HOSPITAL | Age: 68
End: 2023-11-16
Attending: INTERNAL MEDICINE
Payer: MEDICARE

## 2023-11-16 DIAGNOSIS — T38.0X5A STEROID-INDUCED DIABETES MELLITUS, INITIAL ENCOUNTER: ICD-10-CM

## 2023-11-16 DIAGNOSIS — E09.9 STEROID-INDUCED DIABETES MELLITUS, INITIAL ENCOUNTER: ICD-10-CM

## 2023-11-16 DIAGNOSIS — R73.9 HYPERGLYCEMIA: ICD-10-CM

## 2023-11-16 PROCEDURE — 83036 HEMOGLOBIN GLYCOSYLATED A1C: CPT | Performed by: INTERNAL MEDICINE

## 2023-11-16 PROCEDURE — 36415 COLL VENOUS BLD VENIPUNCTURE: CPT | Performed by: INTERNAL MEDICINE

## 2023-11-17 LAB
ESTIMATED AVG GLUCOSE: 117 MG/DL (ref 68–131)
HBA1C MFR BLD: 5.7 % (ref 4.5–6.2)

## 2023-11-22 DIAGNOSIS — E78.5 HYPERLIPIDEMIA, UNSPECIFIED HYPERLIPIDEMIA TYPE: ICD-10-CM

## 2023-11-22 DIAGNOSIS — I25.118 ATHEROSCLEROSIS OF NATIVE CORONARY ARTERY OF NATIVE HEART WITH STABLE ANGINA PECTORIS: ICD-10-CM

## 2023-11-22 RX ORDER — PITAVASTATIN CALCIUM 4.18 MG/1
4 TABLET, FILM COATED ORAL DAILY
Qty: 90 TABLET | Refills: 3 | Status: ACTIVE | OUTPATIENT
Start: 2023-11-22 | End: 2024-11-21

## 2023-11-24 ENCOUNTER — TELEPHONE (OUTPATIENT)
Dept: PULMONOLOGY | Facility: CLINIC | Age: 68
End: 2023-11-24
Payer: MEDICARE

## 2023-11-24 NOTE — TELEPHONE ENCOUNTER
----- Message from Lexx Caorlina MD sent at 11/22/2023  5:22 PM CST -----  I re sumbitted livalo for pa to [facility]

## 2024-01-02 NOTE — PROGRESS NOTES
Subjective:     HPI    I had the pleasure of seeing Martell Corcoran in follow-up for his history of atrial fibrillation. He is a 67M with bladder CA (status-post resection, BCG infusions, currently in remission), HTN, GLENNA on CPAP, COPD/emphysema, who was incidentally noted to be in rate controlled AF at a routine cardiology visit in 11/2021. Started on eliquis at that point. Has noted a reduction in his energy level. On eliquis. No history of antiarrhythmic use or cardioversion.    DSE in 7/2022 showed an EF of 65% and no evidence of ischemia.    At his initial visit we discussed options including sotalol/DCCV and catheter ablation. He chose the former, which was performed on 11/1/2022. Notably, VALERIE notable for PFO with possible R-->L shunting, mild-moderate MR.    At his 11/2022 visit, Mr. Corcoran described worsening SOB, increased chest pain and fatigue, increased dizziness and weakness. Sinus bharath at 59 bpm. Sotalol stopped at that point, which improved his symptoms. Flecainide started, which caused similar problems and had to be discontinued. Currently not on an antiarrhythmic. Complaining of blurry vision, leg pain, fatigue, dizziness which he attributed to toprol.    PVI and LA PWI on 7/13/2023. Discharged on eliquis but not an antiarrhythmic.    Today in the office Mr. Corcoran feels well apart from intermittent chest pains.    My interpretation of today's ECG is sinus rhythm 80 bpm.    Review of Systems   Constitutional: Negative for decreased appetite, malaise/fatigue, weight gain and weight loss.   HENT:  Negative for sore throat.    Eyes:  Negative for blurred vision.   Cardiovascular:  Negative for chest pain, dyspnea on exertion, irregular heartbeat, leg swelling, near-syncope, orthopnea, palpitations, paroxysmal nocturnal dyspnea and syncope.   Respiratory:  Negative for shortness of breath.    Skin:  Negative for rash.   Musculoskeletal:  Negative for arthritis.   Gastrointestinal:   Negative for abdominal pain.   Neurological:  Negative for focal weakness.   Psychiatric/Behavioral:  Negative for altered mental status.         Objective:    Physical Exam  Vitals and nursing note reviewed.   Constitutional:       General: He is not in acute distress.     Appearance: He is well-developed.   HENT:      Head: Normocephalic and atraumatic.   Eyes:      General: No scleral icterus.     Pupils: Pupils are equal, round, and reactive to light.   Neck:      Thyroid: No thyromegaly.   Cardiovascular:      Rate and Rhythm: Regular rhythm.      Pulses: Normal pulses.      Heart sounds: Normal heart sounds. No murmur heard.     No friction rub. No gallop.   Pulmonary:      Effort: Pulmonary effort is normal.      Breath sounds: Normal breath sounds.   Abdominal:      General: Bowel sounds are normal. There is no distension.      Palpations: Abdomen is soft.      Tenderness: There is no abdominal tenderness.   Musculoskeletal:      Cervical back: Neck supple.   Skin:     General: Skin is warm and dry.      Findings: No rash.   Neurological:      Mental Status: He is alert and oriented to person, place, and time.   Psychiatric:         Behavior: Behavior normal.           Assessment:       1. Atrial fibrillation, unspecified type    2. Mixed hyperlipidemia    3. Asthma-COPD overlap syndrome         Plan:       In summary, Martell Corcoran is a 68M with symptomatic persistent AF. His [id number] Score is 2 (age, HTN) so he should continue eliquis.    Mr. Corcoran is symptomatic with his AF. He underwent VALERIE/DCCV in 11/2022, but could not tolerate either sotalol nor flecainide. Pt roughly 6 months post-PVI/PWI. No symptomatic or documented arrhythmias    Plan is 2 week monitor, follow-up 6 months.    Thank you for allowing me to participate in the care of this patient. Please do not hesitate to call me with any questions or concerns.

## 2024-01-03 ENCOUNTER — OFFICE VISIT (OUTPATIENT)
Dept: CARDIOLOGY | Facility: CLINIC | Age: 69
End: 2024-01-03
Payer: MEDICARE

## 2024-01-03 VITALS
RESPIRATION RATE: 16 BRPM | SYSTOLIC BLOOD PRESSURE: 136 MMHG | WEIGHT: 170.06 LBS | HEIGHT: 66 IN | OXYGEN SATURATION: 95 % | HEART RATE: 86 BPM | BODY MASS INDEX: 27.33 KG/M2 | DIASTOLIC BLOOD PRESSURE: 78 MMHG

## 2024-01-03 DIAGNOSIS — E78.2 MIXED HYPERLIPIDEMIA: ICD-10-CM

## 2024-01-03 DIAGNOSIS — I48.91 ATRIAL FIBRILLATION, UNSPECIFIED TYPE: Primary | ICD-10-CM

## 2024-01-03 DIAGNOSIS — J44.89 ASTHMA-COPD OVERLAP SYNDROME: ICD-10-CM

## 2024-01-03 PROCEDURE — 99214 OFFICE O/P EST MOD 30 MIN: CPT | Mod: S$GLB,,, | Performed by: INTERNAL MEDICINE

## 2024-01-03 PROCEDURE — 93010 ELECTROCARDIOGRAM REPORT: CPT | Mod: ,,, | Performed by: GENERAL PRACTICE

## 2024-01-05 ENCOUNTER — OFFICE VISIT (OUTPATIENT)
Dept: CARDIOLOGY | Facility: CLINIC | Age: 69
End: 2024-01-05
Payer: MEDICARE

## 2024-01-05 VITALS
WEIGHT: 170 LBS | HEART RATE: 81 BPM | DIASTOLIC BLOOD PRESSURE: 70 MMHG | SYSTOLIC BLOOD PRESSURE: 112 MMHG | OXYGEN SATURATION: 95 % | HEIGHT: 66 IN | BODY MASS INDEX: 27.32 KG/M2

## 2024-01-05 DIAGNOSIS — I10 HYPERTENSION, UNSPECIFIED TYPE: ICD-10-CM

## 2024-01-05 DIAGNOSIS — I48.91 ATRIAL FIBRILLATION, UNSPECIFIED TYPE: ICD-10-CM

## 2024-01-05 DIAGNOSIS — E78.2 MIXED HYPERLIPIDEMIA: ICD-10-CM

## 2024-01-05 DIAGNOSIS — I70.0 ATHEROSCLEROSIS OF AORTA: Primary | ICD-10-CM

## 2024-01-05 DIAGNOSIS — R07.2 PRECORDIAL PAIN: ICD-10-CM

## 2024-01-05 PROCEDURE — 99999 PR PBB SHADOW E&M-EST. PATIENT-LVL IV: CPT | Mod: PBBFAC,,,

## 2024-01-05 PROCEDURE — 99214 OFFICE O/P EST MOD 30 MIN: CPT | Mod: S$PBB,,,

## 2024-01-05 PROCEDURE — 99214 OFFICE O/P EST MOD 30 MIN: CPT | Mod: PBBFAC,PN

## 2024-01-05 NOTE — PROGRESS NOTES
Subjective:    Patient ID:  Martell Corcoran is a 68 y.o. male patient here for evaluation Follow-up      History of Present Illness:       Patient is here for a checkup. Occasional CP/shortness of breath with deep breathing and coughing.  He has had several upper respiratory congestion/cough recently.  Patient denies lightheadedness, dizziness, jaw neck or arm pain, edema or bleeding.  Patient saw Dr. Aguilar on 01/02/2024 and is having a Zio placed today for 2 week cardiac event monitoring.  History naila/DCCV in 11/2022.  S/p PVI/PWI 07/2023.          Review of patient's allergies indicates:   Allergen Reactions    Msg [glutamic acid] Nausea Only, Palpitations, Shortness Of Breath and Swelling    Oyster extract Swelling     PT swells in his throat after eating oysters on occasion       Past Medical History:   Diagnosis Date    A-fib     Anticoagulant long-term use     Benign enlargement of prostate     Had a biopsy in around 2015    Bladder cancer     Cancer     COPD (chronic obstructive pulmonary disease)     Elevated PSA     GERD (gastroesophageal reflux disease)     Hypertension     Started with meds in 2012.    Kidney stone     Sleep apnea     Urinary tract infection      Past Surgical History:   Procedure Laterality Date    ABLATION OF ARRHYTHMOGENIC FOCUS FOR ATRIAL FIBRILLATION N/A 7/13/2023    Procedure: ABLATION, ARRHYTHMOGENIC FOCUS, FOR ATRIAL FIBRILLATION;  Surgeon: Juma Aguilar MD;  Location: [facility] EP LAB;  Service: Cardiology;  Laterality: N/A;  AF, NAILA(Cx if SR), PVI, RFA, CARTO, GEN, GP, 3 PREP    ANGIOGRAM, CORONARY, WITH LEFT HEART CATHETERIZATION N/A 7/11/2023    Procedure: Angiogram, Coronary, with Left Heart Cath;  Surgeon: Osman Lemon MD;  Location: [facility] CATH/EP LAB;  Service: Cardiology;  Laterality: N/A;  PLEASE CALLPHONE ASSESSMENT    Bladder tumor removed      FISTULA REPAIR      LEFT HEART CATHETERIZATION N/A 10/23/2018    Procedure: Left heart cath;  Surgeon: Niharika Squires,  MD;  Location: [facility] CATH;  Service: Cardiology;  Laterality: N/A;    TREATMENT OF CARDIAC ARRHYTHMIA  2023    Procedure: Cardioversion or Defibrillation;  Surgeon: Juma Aguilar MD;  Location: [facility] EP LAB;  Service: Cardiology;;     Social History     Tobacco Use    Smoking status: Former     Current packs/day: 0.00     Average packs/day: 1 pack/day for 55.0 years (55.0 ttl pk-yrs)     Types: Cigarettes     Start date: 1968     Quit date: 2023     Years since quittin.9    Smokeless tobacco: Never   Substance Use Topics    Alcohol use: Yes     Comment: occ    Drug use: No        Review of Systems:    As noted in HPI in addition                 Objective        Vitals:    24 1418   BP: 112/70   Pulse: 81       LIPIDS - LAST 2   Lab Results   Component Value Date    CHOL 226 (H) 10/31/2023    CHOL 316 (H) 2023    HDL 73 10/31/2023    HDL 69 2023    LDLCALC 133.8 10/31/2023    LDLCALC 224.8 (H) 2023    TRIG 96 10/31/2023    TRIG 111 2023    CHOLHDL 32.3 10/31/2023    CHOLHDL 21.8 2023       CBC - LAST 2  Lab Results   Component Value Date    WBC 6.22 10/31/2023    WBC 8.55 2023    RBC 4.74 10/31/2023    RBC 4.95 2023    HGB 15.4 10/31/2023    HGB 16.1 2023    HCT 47.2 10/31/2023    HCT 48.1 2023     (H) 10/31/2023    MCV 97 2023    MCH 32.5 (H) 10/31/2023    MCH 32.5 (H) 2023    MCHC 32.6 10/31/2023    MCHC 33.5 2023    RDW 14.6 (H) 10/31/2023    RDW 14.5 2023     10/31/2023     2023    MPV 8.6 (L) 10/31/2023    MPV 8.3 (L) 2023    GRAN 3.9 10/31/2023    GRAN 61.8 10/31/2023    LYMPH 1.5 10/31/2023    LYMPH 24.0 10/31/2023    MONO 0.7 10/31/2023    MONO 11.9 10/31/2023    BASO 0.03 10/31/2023    BASO 0.03 2023    NRBC 0 10/31/2023    NRBC 0 2023       CHEMISTRY & LIVER FUNCTION - LAST 2  Lab Results   Component Value Date     10/31/2023     2023    K 4.0  10/31/2023    K 4.7 07/11/2023     10/31/2023     07/11/2023    CO2 32 (H) 10/31/2023    CO2 28 07/11/2023    ANIONGAP 3 (L) 10/31/2023    ANIONGAP 7 (L) 07/11/2023    BUN 17 10/31/2023    BUN 26 (H) 07/11/2023    CREATININE 0.9 10/31/2023    CREATININE 0.9 07/11/2023     (H) 10/31/2023     (H) 07/11/2023    CALCIUM 9.4 10/31/2023    CALCIUM 9.3 07/11/2023    MG 2.0 06/16/2023    MG 1.9 06/15/2023    ALBUMIN 4.1 10/31/2023    ALBUMIN 3.1 (L) 06/16/2023    PROT 6.6 10/31/2023    PROT 5.9 (L) 06/16/2023    ALKPHOS 56 10/31/2023    ALKPHOS 67 06/16/2023    ALT 19 10/31/2023    ALT 19 06/16/2023    AST 19 10/31/2023    AST 13 06/16/2023    BILITOT 0.5 10/31/2023    BILITOT 0.2 06/16/2023        CARDIAC PROFILE - LAST 2  Lab Results   Component Value Date    BNP 16 06/13/2023    BNP 52 08/13/2020    TROPONINI 0.365 (H) 06/14/2023    TROPONINI 0.428 (H) 06/14/2023        COAGULATION - LAST 2  Lab Results   Component Value Date    INR 0.8 07/11/2023    INR 0.9 06/09/2023    APTT 21.7 07/11/2023    APTT 30 06/09/2023       ENDOCRINE & PSA - LAST 2  Lab Results   Component Value Date    HGBA1C 5.7 11/16/2023    MICROALBUR 0.4 11/08/2022    MICROALBUR 0.4 03/22/2021    PROCAL 0.02 06/15/2023    PROCAL 0.02 08/13/2020        ECHOCARDIOGRAM RESULTS  Results for orders placed during the hospital encounter of 06/13/23    STRESS TEST REPORT      CURRENT/PREVIOUS VISIT EKG  Results for orders placed or performed in visit on 01/03/24   IN OFFICE EKG 12-LEAD (to [place])    Collection Time: 01/03/24 11:48 AM    Narrative    Test Reason : I48.91,    Vent. Rate : 080 BPM     Atrial Rate : 080 BPM     P-R Int : 154 ms          QRS Dur : 098 ms      QT Int : 372 ms       P-R-T Axes : 087 -69 079 degrees     QTc Int : 429 ms    Normal sinus rhythm  Left axis deviation  Incomplete right bundle branch block  Cannot rule out Anterior infarct ,age undetermined  Abnormal ECG  When compared with ECG of 16-AUG-2023  10:54,  No significant change was found    Referred By:  GP           Confirmed By:      No valid procedures specified.   Results for orders placed during the hospital encounter of 06/13/23    Nuclear Stress Test    Interpretation Summary    The ECG portion of the study is negative for ischemia.    There were no arrhythmias during stress.    No valid procedures specified.    PHYSICAL EXAM  CONSTITUTIONAL: Well built, well nourished in no apparent distress  NECK: no carotid bruit, no JVD  LUNGS: CTA  CHEST WALL: no tenderness  HEART: regular rate and rhythm, S1, S2 normal, no murmur, click, rub or gallop   ABDOMEN: soft, non-tender; bowel sounds normal;  EXTREMITIES: Extremities normal, no edema  NEURO: AAO X 3    I HAVE REVIEWED :    The vital signs, nurses notes, and all the pertinent radiology and labs.        Current Outpatient Medications   Medication Instructions    albuterol (PROVENTIL) 2.5 mg, Nebulization, Every 6 hours PRN    albuterol (PROVENTIL/VENTOLIN HFA) 90 mcg/actuation inhaler 1-2 puffs, Inhalation, Every 4 hours PRN, Rescue    albuterol-ipratropium (DUO-NEB) 2.5 mg-0.5 mg/3 mL nebulizer solution USE 1 AMPULE IN NEBULIZER EVERY 4 HOURS AS NEEDED FOR WHEEZING    alfuzosin (UROXATRAL) 10 mg Tb24 TAKE 1 TABLET BY MOUTH ONCE DAILY AFTER BREAKFAST    ALPRAZolam (XANAX) 0.5 mg, Oral, 3 times daily PRN    aspirin (ECOTRIN) 81 MG EC tablet No dose, route, or frequency recorded.    azelastine (ASTELIN) 137 mcg, Nasal, 2 times daily    ELIQUIS 5 mg, Oral, 2 times daily    EScitalopram oxalate (LEXAPRO) 20 mg, Oral, Daily, Taking 1/2 tablet daily    esomeprazole (NEXIUM) 40 mg, Oral, Daily    ezetimibe (ZETIA) 10 mg, Oral, Daily    finasteride (PROSCAR) 5 mg, Oral, Daily    fluticasone-umeclidin-vilanter (TRELEGY ELLIPTA) 200-62.5-25 mcg inhaler 1 puff, Inhalation, Daily    isosorbide mononitrate (IMDUR) 60 mg, Oral, Daily    nitroGLYCERIN (NITROSTAT) 0.4 MG SL tablet Sublingual    pitavastatin calcium  (LIVALO) 4 mg Tab Take 1 tablet (4 mg) by mouth Daily.    predniSONE (DELTASONE) 10 mg, Oral, 2 times daily    predniSONE (DELTASONE) 5 mg, Oral, 2 times daily, Do not stop without instructions..    valACYclovir (VALTREX) 1 g, Oral, 2 times daily          Assessment & Plan     Atherosclerosis of aorta  Risk Factor Modification. Continue aspirin 81 mg daily, zetia 10 mg daily, and Eliquis 5 mg BID.  Continue low sodium heart healthy diet.  Reduce saturated fats.  Heart healthy diet.     Atrial fibrillation  RRR.  Continue Eliquis.  Denies bleeding.  Did not tolerate antiarrhythmics in the past.  He sees Dr. Aguilar.   Patient underwent successful RF-PVI for treatment of atrial fibrillation on 7/13/23. Zio monitor placed today in office.     Chest pain  Denies.  Continue current medication regimen.  Continue low sodium heart healthy diet.     HTN (hypertension)  Stable.  Continue current medication regimen. Continue low sodium heart healthy diet     Hyperlipidemia  Patient is on Zetia 10 mg daily and Livalo 4 mg daily. Continue low sodium heart healthy diet. Recommend maintaining LDL <100.  He wants a more specified lipid panel drawn in the future.           Follow up in about 3 months (around 4/5/2024).

## 2024-01-05 NOTE — ASSESSMENT & PLAN NOTE
Patient is on Zetia 10 mg daily and Livalo 4 mg daily. Continue low sodium heart healthy diet. Recommend maintaining LDL <100.  He wants a more specified lipid panel drawn in the future.

## 2024-01-05 NOTE — ASSESSMENT & PLAN NOTE
RRR.  Continue Eliquis.  Denies bleeding.  Did not tolerate antiarrhythmics in the past.  He sees Dr. Aguilar.   Patient underwent successful RF-PVI for treatment of atrial fibrillation on 7/13/23. Zio monitor placed today in office.

## 2024-01-05 NOTE — ASSESSMENT & PLAN NOTE
Risk Factor Modification. Continue aspirin 81 mg daily, zetia 10 mg daily, and Eliquis 5 mg BID.  Continue low sodium heart healthy diet.  Reduce saturated fats.  Heart healthy diet.

## 2024-02-04 NOTE — PROGRESS NOTES
SUBJECTIVE:    Patient ID: Martell Corcoran is a 68 y.o. male.    Chief Complaint: Follow-up (3 mo f/u//no med bottles//declined flu vac//tc)      Pt here to follow up on acute and chronic conditions (Anxiety, Pepcid, HTN, CAD (65%), HLD, GERD)    Having chronic SOB.  (Ge) still on 7.5mg prednisone daily. Using albuterol inhaler 8-10 times a day.  Has not been using CPAP.      BP is doing ok today. Hx afib on Eliquis (Ion).  Had a holter on recently (Lauren) since his ablation, that was ok, and told not going into Afib. Denies CP/HA.      Has not been having heartburn, still taking nexium.  (failed prilosec,protonix)     Continues to smoke 1/2 ppd. Was smoking 1.5 ppd. Plans to stop again. Had stopped cold turkey this last time.       Has been having chronic neck pain.     Has a seborrheic keratosis that is large on his chest.     (Elvira). Has not had tuberculin (urothelial cell CA) bladder infusions. Has bladder cystoscopes and prostate biopsies every 6 months. Next in November.    Anxiety has been doing ok. Has not really been doing ok. Cleaning out the house of his friend. Takes xanax when needed.     A good friend of  recently.   -----------------------------------------------  claudio  (Salma), scheduled on 22 w/ Ramon  His dry eyes are bothering him today, and otc drops do not work much.        Hospital Outpatient Visit on 2024   Component Date Value Ref Range Status    Holter Hookup Date 2024 [id number]   Final    Holter Hookup Time 2024 [id number]   Final    Holter Study End Date 2024 [id number]   Final    Holter Study End Time 2024 [id number]   Final    Holter Scan Date 2024 [id number]   Final    Sinus min HR 2024 48  bpm Final    Sinus max hr 2024 136  bpm Final    Sinus avg hr 2024 75  bpm Final    Event Monitor Day 2024 13   Final    Holter length hours 2024 20   Final    holter length minutes 2024 0   Final     holter length dec hours 01/05/2024 332.00   Final   Lab Visit on 11/16/2023   Component Date Value Ref Range Status    Hemoglobin A1C 11/16/2023 5.7  4.5 - 6.2 % Final    Estimated Avg Glucose 11/16/2023 117  68 - 131 mg/dL Final   Lab Visit on 10/31/2023   Component Date Value Ref Range Status    WBC 10/31/2023 6.22  3.90 - 12.70 K/uL Final    RBC 10/31/2023 4.74  4.60 - 6.20 M/uL Final    Hemoglobin 10/31/2023 15.4  14.0 - 18.0 g/dL Final    Hematocrit 10/31/2023 47.2  40.0 - 54.0 % Final    MCV 10/31/2023 100 (H)  82 - 98 fL Final    MCH 10/31/2023 32.5 (H)  27.0 - 31.0 pg Final    MCHC 10/31/2023 32.6  32.0 - 36.0 g/dL Final    RDW 10/31/2023 14.6 (H)  11.5 - 14.5 % Final    Platelets 10/31/2023 289  150 - 450 K/uL Final    MPV 10/31/2023 8.6 (L)  9.2 - 12.9 fL Final    Immature Granulocytes 10/31/2023 0.2  0.0 - 0.5 % Final    Gran # (ANC) 10/31/2023 3.9  1.8 - 7.7 K/uL Final    Immature Grans (Abs) 10/31/2023 0.01  0.00 - 0.04 K/uL Final    Lymph # 10/31/2023 1.5  1.0 - 4.8 K/uL Final    Mono # 10/31/2023 0.7  0.3 - 1.0 K/uL Final    Eos # 10/31/2023 0.1  0.0 - 0.5 K/uL Final    Baso # 10/31/2023 0.03  0.00 - 0.20 K/uL Final    nRBC 10/31/2023 0  0 /100 WBC Final    Gran % 10/31/2023 61.8  38.0 - 73.0 % Final    Lymph % 10/31/2023 24.0  18.0 - 48.0 % Final    Mono % 10/31/2023 11.9  4.0 - 15.0 % Final    Eosinophil % 10/31/2023 1.6  0.0 - 8.0 % Final    Basophil % 10/31/2023 0.5  0.0 - 1.9 % Final    Differential Method 10/31/2023 Automated   Final    Sodium 10/31/2023 143  136 - 145 mmol/L Final    Potassium 10/31/2023 4.0  3.5 - 5.1 mmol/L Final    Chloride 10/31/2023 108  95 - 110 mmol/L Final    CO2 10/31/2023 32 (H)  23 - 29 mmol/L Final    Glucose 10/31/2023 115 (H)  70 - 110 mg/dL Final    BUN 10/31/2023 17  8 - 23 mg/dL Final    Creatinine 10/31/2023 0.9  0.5 - 1.4 mg/dL Final    Calcium 10/31/2023 9.4  8.7 - 10.5 mg/dL Final    Total Protein 10/31/2023 6.6  6.0 - 8.4 g/dL Final    Albumin 10/31/2023  4.1  3.5 - 5.2 g/dL Final    Total Bilirubin 10/31/2023 0.5  0.1 - 1.0 mg/dL Final    Alkaline Phosphatase 10/31/2023 56  55 - 135 U/L Final    AST 10/31/2023 19  10 - 40 U/L Final    ALT 10/31/2023 19  10 - 44 U/L Final    eGFR 10/31/2023 >60.0  >60 mL/min/1.73 m^2 Final    Anion Gap 10/31/2023 3 (L)  8 - 16 mmol/L Final    Cholesterol 10/31/2023 226 (H)  120 - 199 mg/dL Final    Triglycerides 10/31/2023 96  30 - 150 mg/dL Final    HDL 10/31/2023 73  40 - 75 mg/dL Final    LDL Cholesterol 10/31/2023 133.8  63.0 - 159.0 mg/dL Final    HDL/Cholesterol Ratio 10/31/2023 32.3  20.0 - 50.0 % Final    Total Cholesterol/HDL Ratio 10/31/2023 3.1  2.0 - 5.0 Final    Non-HDL Cholesterol 10/31/2023 153  mg/dL Final   Lab Visit on 09/14/2023   Component Date Value Ref Range Status    Cholesterol 09/14/2023 316 (H)  120 - 199 mg/dL Final    Triglycerides 09/14/2023 111  30 - 150 mg/dL Final    HDL 09/14/2023 69  40 - 75 mg/dL Final    LDL Cholesterol 09/14/2023 224.8 (H)  63.0 - 159.0 mg/dL Final    HDL/Cholesterol Ratio 09/14/2023 21.8  20.0 - 50.0 % Final    Total Cholesterol/HDL Ratio 09/14/2023 4.6  2.0 - 5.0 Final    Non-HDL Cholesterol 09/14/2023 247  mg/dL Final    WBC 09/14/2023 8.55  3.90 - 12.70 K/uL Final    RBC 09/14/2023 4.95  4.60 - 6.20 M/uL Final    Hemoglobin 09/14/2023 16.1  14.0 - 18.0 g/dL Final    Hematocrit 09/14/2023 48.1  40.0 - 54.0 % Final    MCV 09/14/2023 97  82 - 98 fL Final    MCH 09/14/2023 32.5 (H)  27.0 - 31.0 pg Final    MCHC 09/14/2023 33.5  32.0 - 36.0 g/dL Final    RDW 09/14/2023 14.5  11.5 - 14.5 % Final    Platelets 09/14/2023 299  150 - 450 K/uL Final    MPV 09/14/2023 8.3 (L)  9.2 - 12.9 fL Final    Immature Granulocytes 09/14/2023 0.4  0.0 - 0.5 % Final    Gran # (ANC) 09/14/2023 6.1  1.8 - 7.7 K/uL Final    Immature Grans (Abs) 09/14/2023 0.03  0.00 - 0.04 K/uL Final    Lymph # 09/14/2023 1.6  1.0 - 4.8 K/uL Final    Mono # 09/14/2023 0.7  0.3 - 1.0 K/uL Final    Eos # 09/14/2023 0.1   0.0 - 0.5 K/uL Final    Baso # 2023 0.03  0.00 - 0.20 K/uL Final    nRBC 2023 0  0 /100 WBC Final    Gran % 2023 71.2  38.0 - 73.0 % Final    Lymph % 2023 18.5  18.0 - 48.0 % Final    Mono % 2023 8.2  4.0 - 15.0 % Final    Eosinophil % 2023 1.3  0.0 - 8.0 % Final    Basophil % 2023 0.4  0.0 - 1.9 % Final    Differential Method 2023 Automated   Final       Past Medical History:   Diagnosis Date    A-fib     Anticoagulant long-term use     Benign enlargement of prostate     Had a biopsy in around     Bladder cancer     Cancer     COPD (chronic obstructive pulmonary disease)     Elevated PSA     GERD (gastroesophageal reflux disease)     Hypertension     Started with meds in .    Kidney stone     Sleep apnea     Urinary tract infection      Social History     Socioeconomic History    Marital status: Significant Other   Tobacco Use    Smoking status: Former     Current packs/day: 0.00     Average packs/day: 1 pack/day for 55.0 years (55.0 ttl pk-yrs)     Types: Cigarettes     Start date: 1968     Quit date: 2023     Years since quittin.0    Smokeless tobacco: Never   Substance and Sexual Activity    Alcohol use: Yes     Comment: occ    Drug use: No     Social Determinants of Health     Financial Resource Strain: High Risk (2024)    Overall Financial Resource Strain (CARDIA)     Difficulty of Paying Living Expenses: Very hard   Food Insecurity: Food Insecurity Present (2024)    Hunger Vital Sign     Worried About Running Out of Food in the Last Year: Sometimes true     Ran Out of Food in the Last Year: Sometimes true   Transportation Needs: No Transportation Needs (2024)    PRAPARE - Transportation     Lack of Transportation (Medical): No     Lack of Transportation (Non-Medical): No   Physical Activity: Patient Declined (2024)    Exercise Vital Sign     Days of Exercise per Week: Patient declined     Minutes of Exercise per Session:  Patient declined   Stress: Stress Concern Present (1/29/2024)    [employer] - Occupational Stress Questionnaire     Feeling of Stress : To some extent   Social Connections: Unknown (1/29/2024)    Social Connection and Isolation Panel [NHANES]     Frequency of Communication with Friends and Family: More than three times a week     Frequency of Social Gatherings with Friends and Family: Patient declined     Active Member of Clubs or Organizations: No     Attends Club or Organization Meetings: Never     Marital Status: Patient declined   Housing Stability: Unknown (1/29/2024)    Housing Stability Vital Sign     Unable to Pay for Housing in the Last Year: Patient declined     Number of Places Lived in the Last Year: 1     Unstable Housing in the Last Year: No     Past Surgical History:   Procedure Laterality Date    ABLATION OF ARRHYTHMOGENIC FOCUS FOR ATRIAL FIBRILLATION N/A 7/13/2023    Procedure: ABLATION, ARRHYTHMOGENIC FOCUS, FOR ATRIAL FIBRILLATION;  Surgeon: Juma Aguilar MD;  Location: [facility] EP LAB;  Service: Cardiology;  Laterality: N/A;  AF, VALERIE(Cx if SR), PVI, RFA, CARTO, GEN, GP, 3 PREP    ANGIOGRAM, CORONARY, WITH LEFT HEART CATHETERIZATION N/A 7/11/2023    Procedure: Angiogram, Coronary, with Left Heart Cath;  Surgeon: Osman Lemon MD;  Location: [facility] CATH/EP LAB;  Service: Cardiology;  Laterality: N/A;  PLEASE CALL ,PHONE ASSESSMENT    Bladder tumor removed      FISTULA REPAIR      LEFT HEART CATHETERIZATION N/A 10/23/2018    Procedure: Left heart cath;  Surgeon: Niharika Squires MD;  Location: [facility] CATH;  Service: Cardiology;  Laterality: N/A;    TREATMENT OF CARDIAC ARRHYTHMIA  7/13/2023    Procedure: Cardioversion or Defibrillation;  Surgeon: Juma Aguilar MD;  Location: [facility] EP LAB;  Service: Cardiology;;     Family History   Problem Relation Age of Onset    Heart failure Mother     Cancer Father     Heart disease Brother     Heart attack Brother     Cancer Brother      Heart disease Brother     Drug abuse Brother        Review of patient's allergies indicates:   Allergen Reactions    Msg [glutamic acid] Nausea Only, Palpitations, Shortness Of Breath and Swelling    Oyster extract Swelling     PT swells in his throat after eating oysters on occasion       Current Outpatient Medications:     albuterol (PROVENTIL) 2.5 mg /3 mL (0.083 %) nebulizer solution, Take 3 mLs (2.5 mg total) by nebulization every 6 (six) hours as needed., Disp: 120 each, Rfl: 11    albuterol (PROVENTIL/VENTOLIN HFA) 90 mcg/actuation inhaler, Inhale 1-2 puffs into the lungs every 4 (four) hours as needed for Shortness of Breath (coughing). Rescue, Disp: 18 g, Rfl: 11    alfuzosin (UROXATRAL) 10 mg Tb24, TAKE 1 TABLET BY MOUTH ONCE DAILY AFTER BREAKFAST, Disp: , Rfl:     ALPRAZolam (XANAX) 0.5 MG tablet, Take 1 tablet (0.5 mg total) by mouth 3 (three) times daily as needed for Anxiety., Disp: 180 tablet, Rfl: 1    aspirin (ECOTRIN) 81 MG EC tablet, , Disp: , Rfl:     azelastine (ASTELIN) 137 mcg (0.1 %) nasal spray, 1 spray (137 mcg total) by Nasal route 2 (two) times daily., Disp: 30 mL, Rfl: 2    ELIQUIS 5 mg Tab, Take 1 tablet (5 mg total) by mouth 2 (two) times daily., Disp: 180 tablet, Rfl: 3    EScitalopram oxalate (LEXAPRO) 20 MG tablet, Take 1 tablet (20 mg total) by mouth once daily. Taking 1/2 tablet daily (Patient taking differently: Take 10 mg by mouth once daily.), Disp: 90 tablet, Rfl: 3    esomeprazole (NEXIUM) 40 MG capsule, Take 40 mg by mouth once daily., Disp: , Rfl:     ezetimibe (ZETIA) 10 mg tablet, Take 1 tablet (10 mg total) by mouth once daily., Disp: 90 tablet, Rfl: 3    finasteride (PROSCAR) 5 mg tablet, Take 5 mg by mouth once daily., Disp: , Rfl:     fluticasone-umeclidin-vilanter (TRELEGY ELLIPTA) 200-62.5-25 mcg inhaler, Inhale 1 puff into the lungs once daily., Disp: 180 each, Rfl: 3    isosorbide mononitrate (IMDUR) 60 MG 24 hr tablet, Take 1 tablet (60 mg total) by mouth  once daily., Disp: 90 tablet, Rfl: 3    nitroGLYCERIN (NITROSTAT) 0.4 MG SL tablet, Place under the tongue., Disp: , Rfl:     pitavastatin calcium (LIVALO) 4 mg Tab, Take 1 tablet (4 mg) by mouth Daily., Disp: 90 tablet, Rfl: 3    predniSONE (DELTASONE) 10 MG tablet, Take 1 tablet (10 mg total) by mouth 2 (two) times daily., Disp: 60 tablet, Rfl: 3    predniSONE (DELTASONE) 2.5 MG tablet, Take 2 tablets (5 mg total) by mouth 2 (two) times daily. Do not stop without instructions.., Disp: 360 tablet, Rfl: 3    valACYclovir (VALTREX) 1000 MG tablet, Take 1 g by mouth 2 (two) times daily., Disp: , Rfl:     albuterol-ipratropium (DUO-NEB) 2.5 mg-0.5 mg/3 mL nebulizer solution, USE 1 AMPULE IN NEBULIZER EVERY 4 HOURS AS NEEDED FOR WHEEZING, Disp: 180 mL, Rfl: 0    Current Facility-Administered Medications:     sodium chloride 0.9% flush 2 mL, 2 mL, Intravenous, PRN, Gina Garcia, NP    Review of Systems   Constitutional:  Positive for activity change. Negative for appetite change, fatigue, fever and unexpected weight change.   HENT:  Negative for hearing loss, rhinorrhea and trouble swallowing.    Eyes:  Positive for visual disturbance. Negative for discharge.   Respiratory:  Positive for cough. Negative for chest tightness, shortness of breath and wheezing.    Cardiovascular:  Positive for palpitations. Negative for chest pain and leg swelling.   Gastrointestinal:  Negative for abdominal pain, blood in stool, constipation, diarrhea, nausea and vomiting.        -heartburn   Endocrine: Negative for polydipsia and polyuria.   Genitourinary:  Positive for urgency. Negative for decreased urine volume, difficulty urinating, dysuria, frequency and hematuria.   Musculoskeletal:  Positive for back pain. Negative for arthralgias, joint swelling, myalgias and neck pain.   Skin:  Negative for rash.   Neurological:  Negative for dizziness, weakness, numbness and headaches.   Hematological:  Does not bruise/bleed easily.  "  Psychiatric/Behavioral:  Negative for dysphoric mood, sleep disturbance and suicidal ideas. The patient is not nervous/anxious.    All other systems reviewed and are negative.         Objective:      Vitals:    02/05/24 1107 02/05/24 1113   BP: (!) 142/84 (!) 148/84   Pulse: 68    Weight: 79.4 kg (175 lb)    Height: 5' 6" (1.676 m)          Wt Readings from Last 6 Encounters:   02/05/24 79.4 kg (175 lb)   01/05/24 77.1 kg (170 lb)   01/03/24 77.2 kg (170 lb 1.4 oz)   11/02/23 75.8 kg (167 lb 3.5 oz)   10/16/23 75.8 kg (167 lb)   09/06/23 72.8 kg (160 lb 9.7 oz)           Physical Exam  Vitals reviewed.   Constitutional:       General: He is not in acute distress.     Appearance: Normal appearance. He is well-developed and overweight.   HENT:      Head: Normocephalic and atraumatic.   Neck:      Thyroid: No thyromegaly.   Cardiovascular:      Rate and Rhythm: Normal rate and regular rhythm.      Heart sounds: Normal heart sounds. No murmur heard.     No friction rub.   Pulmonary:      Effort: Pulmonary effort is normal.      Breath sounds: Normal breath sounds. No wheezing or rales.   Abdominal:      General: Bowel sounds are normal. There is no distension.      Palpations: Abdomen is soft.      Tenderness: There is abdominal tenderness in the right upper quadrant.   Musculoskeletal:      Cervical back: Neck supple.   Lymphadenopathy:      Cervical: No cervical adenopathy.   Skin:     General: Skin is warm and dry.      Findings: No rash.   Neurological:      Mental Status: He is alert and oriented to person, place, and time.   Psychiatric:         Attention and Perception: He is attentive.         Speech: Speech normal.         Behavior: Behavior normal.         Thought Content: Thought content normal.         Judgment: Judgment normal.           Assessment:       1. Essential hypertension    2. Gastroesophageal reflux disease without esophagitis    3. Generalized anxiety disorder    4. Chronic obstructive " pulmonary disease, unspecified COPD type    5. Tobacco dependence             Plan:       Essential hypertension  Comments:  Borderline. Will continue to monitor BP on current medication regimen    Gastroesophageal reflux disease without esophagitis  Comments:  Controlled. Will continue to monitor symptoms    Generalized anxiety disorder  Comments:  Controlled. Will continue to monitor symptoms    Chronic obstructive pulmonary disease, unspecified COPD type  Comments:  Controlled. Will continue to monitor symptoms. To continue to follow with Pulm    Tobacco dependence  Comments:  Plans to wean down and then stop.        Other  Lab results discussed and reviewed with patient.  Labs and/or tests have been ordered for the evaluation/monitoring of acute/chronic conditions, to be done just before next visit.    Follow up in about 4 months (around 6/5/2024) for HTN, GERD, Anxiety.        2/5/2024 Zo Burrell

## 2024-02-05 ENCOUNTER — OFFICE VISIT (OUTPATIENT)
Dept: FAMILY MEDICINE | Facility: CLINIC | Age: 69
End: 2024-02-05
Payer: MEDICARE

## 2024-02-05 VITALS
DIASTOLIC BLOOD PRESSURE: 84 MMHG | SYSTOLIC BLOOD PRESSURE: 148 MMHG | BODY MASS INDEX: 28.12 KG/M2 | HEART RATE: 68 BPM | WEIGHT: 175 LBS | HEIGHT: 66 IN

## 2024-02-05 DIAGNOSIS — I10 ESSENTIAL HYPERTENSION: Primary | ICD-10-CM

## 2024-02-05 DIAGNOSIS — J44.9 CHRONIC OBSTRUCTIVE PULMONARY DISEASE, UNSPECIFIED COPD TYPE: ICD-10-CM

## 2024-02-05 DIAGNOSIS — F41.1 GENERALIZED ANXIETY DISORDER: ICD-10-CM

## 2024-02-05 DIAGNOSIS — K21.9 GASTROESOPHAGEAL REFLUX DISEASE WITHOUT ESOPHAGITIS: ICD-10-CM

## 2024-02-05 DIAGNOSIS — F17.200 TOBACCO DEPENDENCE: ICD-10-CM

## 2024-02-05 PROCEDURE — 99214 OFFICE O/P EST MOD 30 MIN: CPT | Mod: S$GLB,,, | Performed by: FAMILY MEDICINE

## 2024-02-23 DIAGNOSIS — J44.1 COPD WITH ACUTE EXACERBATION: ICD-10-CM

## 2024-02-26 RX ORDER — FLUTICASONE FUROATE, UMECLIDINIUM BROMIDE AND VILANTEROL TRIFENATATE 200; 62.5; 25 UG/1; UG/1; UG/1
1 POWDER RESPIRATORY (INHALATION) DAILY
Qty: 180 EACH | Refills: 3 | Status: SHIPPED | OUTPATIENT
Start: 2024-02-26

## 2024-03-07 ENCOUNTER — OFFICE VISIT (OUTPATIENT)
Dept: PULMONOLOGY | Facility: CLINIC | Age: 69
End: 2024-03-07
Payer: MEDICARE

## 2024-03-07 VITALS
SYSTOLIC BLOOD PRESSURE: 141 MMHG | WEIGHT: 174.06 LBS | HEART RATE: 68 BPM | OXYGEN SATURATION: 96 % | BODY MASS INDEX: 28.09 KG/M2 | DIASTOLIC BLOOD PRESSURE: 82 MMHG

## 2024-03-07 DIAGNOSIS — J45.50 SEVERE PERSISTENT ASTHMA WITHOUT COMPLICATION: ICD-10-CM

## 2024-03-07 DIAGNOSIS — J82.83 EOSINOPHILIC ASTHMA: ICD-10-CM

## 2024-03-07 DIAGNOSIS — J44.89 ASTHMA-COPD OVERLAP SYNDROME: ICD-10-CM

## 2024-03-07 DIAGNOSIS — J45.50 STEROID-DEPENDENT ASTHMA, SEVERE PERSISTENT, UNCOMPLICATED: Primary | ICD-10-CM

## 2024-03-07 PROCEDURE — 99999 PR PBB SHADOW E&M-EST. PATIENT-LVL IV: CPT | Mod: PBBFAC,,, | Performed by: INTERNAL MEDICINE

## 2024-03-07 PROCEDURE — 99214 OFFICE O/P EST MOD 30 MIN: CPT | Mod: PBBFAC,PO | Performed by: INTERNAL MEDICINE

## 2024-03-07 PROCEDURE — 99214 OFFICE O/P EST MOD 30 MIN: CPT | Mod: S$PBB,,, | Performed by: INTERNAL MEDICINE

## 2024-03-07 NOTE — PROGRESS NOTES
3/7/2024    Martell Corcoran  Follow up    Chief Complaint   Patient presents with    Follow-up     4 month f/u       HPI:   3/7/2024 pt has history of smoking and prednisone down to 5/d.  Still uses trelegy once daily..   no longer has a fib-- may stop elireuben lombardijonathan... no yellow mucous  Pt has been on steroids since last June and needed continious  Not using cpap as not felt helpful..  November 2, 2023-absolute eosinophil count was only 100 in late October and also in middle September.  October 31st CT of the chest did not show any remarkable findings.  Resumed smokes-- frustrated with [employer].   treatment plant failed.  Pt resumed prednisone as lungs worsened-- now on 7.5 mg.     Patient Instructions   Ct chest viewed  from 10/31/2023 -- no lung nodules of concern    Mucous seen in airway -- you appreciate mucous.      You need chronic steroids and you had hyperglycemia -- metformin 500 mg daily (could go to 2 daily if needed) may prevent    Pt had been intolerant of crestor, lipitor, mevacor, zocor, and provachol  -- you have been able to tolerate Livalo and need to continue as you have had MI and stable angina......    9/6/2023 off smokes 7/26/2023  and still steroid dependant.  Pt got off prednisone and not thriving.   Breathing very short acitivity.  Marked garcia last month.  Sinuses ok and no yg mucous.  A fib remitted after ablation.  Patient Instructions   You are off smokes and still needing steroids as breathing poorly.  Pattern is severe asthmatic and eosinophils have been very high.    Would recheck eosinophils    Ct from 2022 viewed and showed minimal emphysema.    Trelegy is dosed daily.    Use albuterol prior to activity.      Resume prednisone 10 mg daily to 20 mg daily after blood test.  Stop in 2 wks and resume as needed    If remains unstable by November may consider shots?     -     7/27/2023 just got off steroids last 2 days -- uses trelegy..  had a fib ablation on 13th July and was  feeling well.    Pt off smokes since 1/7/2023     Pt was seen by Dr Thornton -- no f/u for mediastinal cyst --we discuss today  Patient Instructions   Non fasting sugar of 146 on July 11th suggest steroid induced diabetes.  Should be better off steroids.    If you relapse soon would recommend biologic shots for asthma -- Fasenra would be good?    Mediastinal abnormality was eval by Dr Thornton in past-- could do follow up ct chest to screen for lung cancer... we discuss    Continue trelegy    Lung capacity was 55% in 2020 6/29/2023 pt presented nsr er June 13th -- had bad coughing spell with sob and chest pains -- pt went home on 18th on prednisone 60/d with few days left today.  Pt still has wheezes in early am.   Codeine did not work -- may have worsened.    Still on trelegy.      Eos were elevated to 800 at presentation --   Patient Instructions   Cxr June 13th viewed  - no pneumonia  You had high blood sugars in hospital -- 160's.  You may have problems with using high dose steroids.   Stop prednisone when out -- drop to 2 daily for 2 days then one daily (if able)- 14 days.    Have prednisone on hand-- start 3 20 mg daily and call if needs again  You have been on trelegy -- continue.   You had unusually high eosinophils when flared lungs with recent heart attack  You have asthma and copd.  You may be steroid dependant if need to repeat-- consider special shots.       No bad germs in lung mucous on June 15th  May need diabetes medication-- would check sugars in future if on steroids......        5/23/2023 pt was doing cleaning for elderly cat lady- 5/12/2023- had to spray acontainer for roaches- loaded with roaches- he was given fogger,  and was trying to fog container, sprayed and then closed container,  exposed to [place] fogger off and on for roughly 1/2 hr to 40 min. No burning. No sob nor cough til roughly 4-6 hrs later.  Similar exposures to cleaning was no problem in past. Pt was advised to go to er after 4 -5  days -- pt had to use neb q 4-6 hr post exposure whereas was using neb rx every 6 months prior.  Pt seen in er-- prednisone 20/d x 4 done.  Sat 96 and wheezes noted.      Pt did use zpak but mucous still yellow.   Patient Instructions   Chest xray 5/17/2023 viewed with no problems.    Breathing test 2020 with copd 56% lung capacity      Use trelegy or stiolto-- trelegy has steroids which are more effective to prevent wheezes    Increase steroids-- may need into future?? For now use prednisone 20mg 3 x 3 and taper,,,,,  may repeat.  Would be best to inhale steroids to prevent relapse -- not sure long term need.    Codiene not covered but not $$      Use azithromycin, may need special antibiotic??  Need to have mucous clear.      Call if not back to usual in 2-3 wks???    3/7/2023 quit smokes, feels fatigued chronically. Had naila and cardioversion November.  Back to rhythm but could not use meds to continue.   Uses anoro and qvar--- not using reg as before.    Pt missed lexapro somehow-- had increased anxiety and stomach issues.  Uses occ xanax.   Uses astelin      Has occ blurred vision.  Patient Instructions   Pet scan viewed.  Deviated septum noted.     Would use stiolto 2 daily -- could skip -- anoro not good for regular use.    Novmeber screening ct chest     Nerves may cause fatigue    Sleep apnea may cause excess sleepiness-- would re try cpap once nerves stable    Would re try cholesterol  medications.     Try cpap -- may use ambein to sleep--dedicate 8 hrs sleep to use ambein.      Sept 13, 2022  in past, had own company.  Lung dz developed 4 + yrs ago.  Bladder cancer removed.  Had bcg infusions 2020 . Some garcia. Problem nasal stuffy tolerating cpap.    Patient Instructions   Nasal stuffy -- afrin or generic afrin --- use one to 2 sprays up to once daily -- alternate nares ?  If regular use may get tolerant and afrin ineffective.  Could see ent to improve passages.      Flonase should work  better (or astelin) if passages open.     Sleep study was only 8 episodes an hour-- less than 5 is normal.     Cpap titration needed 8-9 cm pressure    Mediastinal abnormality varies-- would send to cardiothoracic surgery.    Copd is moderate at 55% or so.  Anoro and qvar effective -- as needed albuterol.  Would stop smokes.     CT films are reviewed directly with the patient.  Extensive discussion is made.  Patient's evaluation took 56 minutes today.          Below from RJ Salgado  6/1/2022- SOB persistent complaint, pain with deep inspiration on left chest. Followed by cardiology for A fib and vascular blockages pacemaker procedure postponed.    Wearing CPAP with nose mask, moves across face when sleeping. Does not feel better after wearing.   Cough- improved, less often and severe, most days, worse when weather changes, productive clear mucous  Currently smoking 3-4 cigarettes daily, trying to cut down.   Patient Instructions   CT of chest shows clear lungs, no change to previously seen lung nodules.     A cyst is seen in muscle could be cause of chest pain    Recommend smoking cessation    Continue COPD medication regiment    Recommend trying CPAP therapy again.     2/4/2022-  States breathing is labored. States usually worse after infusion for bladder cancer. States does have benefit with qvar and Anoro, using albuterol as needed 2x daily.     Daytime fatigue persistent.  purchased CPAP from third party. Has not started to use.     Scheduled for pacemaker to treat A-fib. Followed by Dr. Miranda.     Cough- decreased, 2x daily, productive clear mucous, cut back on smoking to 6 cigarettes daily    1/14/2022- not able to get CPAP due to high co-pay.   States breathing improved after decreasing smoking to 3-6 cigarettes daily.   Shortness of breath- daily complaint, slightly improved, worse with exertion, improves with rest. Daytime fatigue unchanged.   Complaint of cough- varies in severity, currently mild. Productive  small amount clear mucous, did notice worsening after first starting to cut back on smoking. Has returned to normal   Followed by Urologist Dr. Pham for bladder cancer. Undergoing infusion therapy.     10/4/2021- concerned he had COVID 19 late July early August, lost sense of taste and smell. Complaint of fatigue, body aches, shortness of breath when walking,   No fever. Gradually improved over 4 weeks. Taste and smell has not returned. Experiencing short term memory loss and metal grogginess. Has daytime drowsiness onset years worsened in past two months  Persistent cough- productive clear mucous, associated with wheeze. Improves with albuterol inhaler.   Currently on Anoro, QVAR, using albuterol when needed 1-3 x weekly.     2021- Admitted To hospital in [place] while on vacation 2021 for COPD exacerbation. Seen at [facility] ER 2020 for COPD exacerbation.   Complaint of SOB- daily, worse with exertion, improves with rest. Treated with antibiotics and steroid therapy April.   Cough- recurrent complaint,  improved since hospital discharge, worsened in last 2 weeks after spending time with grandson who had bronchitis that has improved with nebulizer treatments every 4 hours. Associated with chest tightness and wheeze.       2020- he had bladder biopsy at [place] with Dr. Pham on 11/3- per outside records path with High grade papillary urothelial cell carcinoma. He has cough w/ postnasal drip and allergies but breathing is much better. He has been dealing with a lot for the family- 2 brothers just . Hematuria is much better. Not getting bladder infections any more. He continues to smoke 1/2 ppd. Wants to quit but too much stress recently- not ready.    2020-   Start taking prednisone again- long taper over 3 weeks then try to stop. If lungs flaring while decreasing prednisone go up to the last dose that helped you and call our office.  Continue trelegy  Refilled duo  nebs to use as needed  Use albuterol as needed  Quit smoking- go to program  Follow up with urologist  Follow up with PCP for kidney testing and possible urinary infection  pt was recently hospitalized for COPD exacerbation, seen by me while admitted 20 and also having worsening hematuria at that time. He was sent home with a course of levaquin and steroid taper. He did not qualify for home O2. He is being worked up for a bladder mass per urology and pending transurethral resection.  Pt c/o pain around L kidney after doing yard work and urine turned darker. He was coughing last night and woke up with face feeling puffy. Switched urologists to a Dr. Pham at [place] and has appt at end of September.  He finished prednisone taper. Still taking levaquin. Still feels some congestion in chest but it is getting better. Also has sinus problems w/ nose blocked. He denies frequent exacerbations but this one has been very bad. Feels like worsening since stopped prednisone. Still smokes but trying to cut back. Has smoking cessation appt later this month.  Inhalers: nebs 4-5x/day, trelegy daily, albuterol rescue 1-2x/day    7/15/20- Pt is a 66 yo male with HTN, GERD and BPH presents for new evaluation of breathing issues. He complains of SOB especially if he carries anything like taking out trash to the dumpster. This has been progressive over about 3.5 yrs. He wheezes and coughs up clear mucous, throughout the day.  Pt smokes 1 PPD x 55 yrs.  At age 5 pt had pna and a collapsed lung requiring chest tube on the left side and hospitalization. Didn't have any other problems w/ breathing growing up. He took up playing [employer] to help lung function.  Pt had abd/p scan for UTI recently which showed some emphysematous changes.    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 One brother  at 65 from leukemia.    Grew up in [facility]  [place]    The chief compliant  problem varies with instablilty at time      PFSH:  Past Medical History:   Diagnosis Date    A-fib     Anticoagulant long-term use     Benign enlargement of prostate     Had a biopsy in around     Bladder cancer     Cancer     COPD (chronic obstructive pulmonary disease)     Elevated PSA     GERD (gastroesophageal reflux disease)     Hypertension     Started with meds in .    Kidney stone     Sleep apnea     Urinary tract infection      Past surg hist  Past Surgical History:   Procedure Laterality Date    FISTULA REPAIR        LEFT HEART CATHETERIZATION N/A 10/23/2018     Procedure: Left heart cath;  Surgeon: Niharika Squires MD;  [facility];  Service: Cardiology;  Laterality: N/A;          Social History     Tobacco Use    Smoking status: Former     Current packs/day: 0.00     Average packs/day: 1 pack/day for 55.0 years (55.0 ttl pk-yrs)     Types: Cigarettes     Start date: 1968     Quit date: 2023     Years since quittin.1    Smokeless tobacco: Never   Substance Use Topics    Alcohol use: Yes     Comment: occ    Drug use: No     Family History   Problem Relation Age of Onset    Heart failure Mother     Cancer Father     Heart disease Brother     Heart attack Brother     Cancer Brother     Heart disease Brother     Drug abuse Brother      Review of patient's allergies indicates:   Allergen Reactions    Msg [glutamic acid] Nausea Only, Palpitations, Shortness Of Breath and Swelling    Oyster extract Swelling     PT swells in his throat after eating oysters on occasion       Performance Status:The patient's activity level is functions out of house. QUACH improved and does not limit him. Back pain limits activity.    Review of Systems:  a review of eleven systems covering constitutional, Eye, HEENT, Psych, Respiratory, Cardiac, GI, , Musculoskeletal, Endocrine, Dermatologic was negative except for pertinent findings as listed ABOVE and below:   wheeze,  shortness of breath, fatigue      Exam:Comprehensive exam done. BP (!) 141/82   Pulse 68   Wt 78.9 kg (174 lb 0.9 oz)   SpO2 96%   BMI 28.09 kg/m²   Exam included Vitals as listed, and patient's appearance and affect and alertness and mood, oral exam for yeast and hygiene and pharynx lesions and Mallapatti (M) score, neck with inspection for jvd and masses and thyroid abnormalities and lymph nodes (supraclavicular and infraclavicular nodes and axillary also examined and noted if abn), chest exam included symmetry and effort and fremitus and percussion and auscultation, cardiac exam included rhythm and gallops and murmur and rubs and jvd and edema, abdominal exam for mass and hepatosplenomegaly and tenderness and hernias and bowel sounds, Musculoskeletal exam with muscle tone and posture and mobility/gait and  strength, and skin for rashes and cyanosis and pallor and turgor, extremity for clubbing.  Findings were normal except for pertinent findings listed below:  M1  Bilateral clear lungs w/ faint rhonchi bilateral lower lung zones  No clubbing or edema    Radiographs (ct chest and cxr) reviewed: view by direct vision   CT scan of the chest September 7, 2022 viewed by direct vision.  Fairly small mediastinal cystic structure looks to be a little smaller than March.  There was very very difficult to see in 2021 however.    CT Chest Without Contrast 03/14/22 Emphysema and unchanged lung nodules  Indeterminate smoothly marginated structure along the anterior superior mediastinum, measuring just above simple fluid attenuation suggestive of a minimally complex/complicated cyst, possibly a lymphovascular malformation, synovial cyst (extending from the sternoclavicular joint), foregut duplication cyst, thymic epithelial lesion/cyst, and much less likely malignancy, however this has slightly increased in size from the previous exam and may warrant interval attention on follow-up imaging.       CT Chest Without  Contrast  05/20/2021   In the left upper lobe is a confluence of vessels and a possible 4 mm nodule decreased in size and conspicuousness since the prior study of July 16, 2020.  No new or enlarging pulmonary nodules are identified.  Emphysema.     CT chest/abd/p 11/20/20- mild emphysema, lungs clear aside from small 6mm nodule DANA which is unchanged from prior exam  1. No metastatic disease.  2. Nodular thickening of the left posterior aspect of the bladder.  3. Enlarged prostate.  3. Mild pulmonary emphysema.   CXR 8/14/20- possible vague infiltrate/opacifications bilateral bases  CT chest 7/16/20- DANA 6mm nodule  CT abd/p 6/26/20- bases of lungs w/ scant emphysematous changes, some strands of atelectasis    Labs reviewed    Lab Results   Component Value Date    WBC 6.22 10/31/2023    RBC 4.74 10/31/2023    HGB 15.4 10/31/2023    HCT 47.2 10/31/2023     (H) 10/31/2023    MCH 32.5 (H) 10/31/2023    MCHC 32.6 10/31/2023    RDW 14.6 (H) 10/31/2023     10/31/2023    MPV 8.6 (L) 10/31/2023    GRAN 3.9 10/31/2023    GRAN 61.8 10/31/2023    LYMPH 1.5 10/31/2023    LYMPH 24.0 10/31/2023    MONO 0.7 10/31/2023    MONO 11.9 10/31/2023    EOS 0.1 10/31/2023    BASO 0.03 10/31/2023    EOSINOPHIL 1.6 10/31/2023    BASOPHIL 0.5 10/31/2023     Results for GONSOALBERTO, MARTELL RAMEY (MRN [id number]) as of 5/24/2021 15:33   Ref. Range 8/13/2020 02:30 8/13/2020 06:01 8/14/2020 05:03 12/16/2020 09:20 3/22/2021 15:14   Eos # Latest Ref Range: 15 - 500 cells/uL 1.4 (H) 0.3 0.0 0.5 122       PFT reviewed  7/16/20- moderately severe obstruction, reduced DLCO, air trapping    EPWORTH SLEEPINESS SCALE SCORE 13 on 10/4/2021  Sleep study 10/7/2021 AHI Significant obstructive sleep apnea with a TRUPTI of 8.1/hr      Plan:  Clinical impression is apparently straight forward and impression with management as below.    Martell was seen today for follow-up.    Diagnoses and all orders for this visit:    Steroid-dependent asthma, severe  persistent, uncomplicated  -     dupilumab (DUPIXENT) 300 mg/2 mL Syrg; Inject 2 mLs (300 mg total) into the skin every 14 (fourteen) days.    Asthma-COPD overlap syndrome  -     dupilumab (DUPIXENT) 300 mg/2 mL Syrg; Inject 2 mLs (300 mg total) into the skin every 14 (fourteen) days.    Eosinophilic asthma  -     dupilumab (DUPIXENT) 300 mg/2 mL Syrg; Inject 2 mLs (300 mg total) into the skin every 14 (fourteen) days.    Severe persistent asthma without complication                     - continue COPD medication regiment   - lung nodules unchanged.        Follow up in about 3 months (around 6/7/2024), or if symptoms worsen or fail to improve.    Discussed with patient above for education the following:         Discussed with patient above for education the following:      Patient Instructions   You have severe steroid dependant asthma.  Would recommend  dupixent 300 mg every 2 wks    May consider going to prednisone 2.5 mg daily if dupixent works well    Dupixent sample given in office for total of 600 mg sq today        Ct viewed going back to  2017  There is vague mild tracheal stenosis seen on  ct and chest xray going back to 2017 -- may impair mucous clearing.  No intervention for now...

## 2024-03-07 NOTE — PROGRESS NOTES
Patient receiving his uploading dose of Dupixent 600 mg.  2 patient identifiers used (Name and )  1 injection of 300 mg given into the left lower abdomen and 1 injection of 300 mg given into right lower abdomen.  Patient tolerated injection well.  No redness, swelling, or reaction noted to the injection sites.      NDC:  9176-3788-08  LOT#:  [id number]  EXPIRATION DATE:  10-

## 2024-03-07 NOTE — PATIENT INSTRUCTIONS
You have severe steroid dependant asthma.  Would recommend  dupixent 300 mg every 2 wks    May consider going to prednisone 2.5 mg daily if dupixent works well    Dupixent sample given in office for total of 600 mg sq today        Ct viewed going back to  2017  There is vague mild tracheal stenosis seen on  ct and chest xray going back to 2017 -- may impair mucous clearing.  No intervention for now...

## 2024-03-12 DIAGNOSIS — J82.83 EOSINOPHILIC ASTHMA: ICD-10-CM

## 2024-03-12 DIAGNOSIS — J44.89 ASTHMA-COPD OVERLAP SYNDROME: ICD-10-CM

## 2024-03-12 DIAGNOSIS — J45.50 STEROID-DEPENDENT ASTHMA, SEVERE PERSISTENT, UNCOMPLICATED: ICD-10-CM

## 2024-03-14 ENCOUNTER — TELEPHONE (OUTPATIENT)
Dept: PULMONOLOGY | Facility: CLINIC | Age: 69
End: 2024-03-14
Payer: MEDICARE

## 2024-03-14 NOTE — TELEPHONE ENCOUNTER
Spoke to patient and got him rescheduled     ----- Message from Monserrat Brantley sent at 3/13/2024  3:32 PM CDT -----  Type: Needs Medical Advice  Who Called:  pt  Best Call Back Number: [phone]    Additional Information: pt is calling in regards to calling to let the office know that his shot for dupilumab (DUPIXENT) 300 mg/2 mL Syrg will be coming in tomorrow 03/14 Please call back and advise. Thanks!

## 2024-03-22 ENCOUNTER — CLINICAL SUPPORT (OUTPATIENT)
Dept: PULMONOLOGY | Facility: CLINIC | Age: 69
End: 2024-03-22
Payer: MEDICARE

## 2024-03-22 DIAGNOSIS — J45.50 SEVERE PERSISTENT ASTHMA WITHOUT COMPLICATION: Primary | ICD-10-CM

## 2024-03-22 LAB
OHS QRS DURATION: 98 MS
OHS QTC CALCULATION: 429 MS

## 2024-03-22 NOTE — PROGRESS NOTES
Patient is here for his biweekly Dupixent injection. Pt provided his medication.  2 patient identifiers used (Name and ).  Patient received the injection to the right lower abdomen subcutaneous.  No redness, bleeding, or swelling noted to the injection site.  Pt tolerated well.    NDC: [id number]  LOT: [id number]  EXP: 2026

## 2024-03-25 ENCOUNTER — TELEPHONE (OUTPATIENT)
Dept: PULMONOLOGY | Facility: CLINIC | Age: 69
End: 2024-03-25
Payer: MEDICARE

## 2024-03-25 NOTE — LETTER
[facility]  [address]  [facility] [address]  Phone: [phone]  Fax: [phone] March 25, 2024    Martell Corcoran  [address] [place] [id number]      To Whom It May Concern:    Martell Corcoran is unable to participate in jury duty due to Medication treatments..    If you have any questions or concerns, please feel free to call my office.    Sincerely,        Lexx Carolina MD

## 2024-03-25 NOTE — TELEPHONE ENCOUNTER
Placed a call to the pt in regards to receiving a jury duty paper. Left a message for the pt to call back.

## 2024-03-25 NOTE — TELEPHONE ENCOUNTER
----- Message from Fany Mitchell sent at 3/25/2024  8:25 AM CDT -----  Contact: Self  Type: Needs Medical Advice    Who Called:  Patient  What is this regarding?:  He needs a letter saying that he can't do jury duty bc he has to do a nebulizer every so often.   Best Call Back Number:  [phone]  Additional Information:  Please call the patient back at the phone number listed above to advise. Thank you!

## 2024-03-26 ENCOUNTER — PATIENT MESSAGE (OUTPATIENT)
Dept: PULMONOLOGY | Facility: CLINIC | Age: 69
End: 2024-03-26
Payer: MEDICARE

## 2024-05-03 ENCOUNTER — PATIENT MESSAGE (OUTPATIENT)
Dept: PULMONOLOGY | Facility: CLINIC | Age: 69
End: 2024-05-03
Payer: MEDICARE

## 2024-05-03 ENCOUNTER — TELEPHONE (OUTPATIENT)
Dept: ALLERGY | Facility: CLINIC | Age: 69
End: 2024-05-03
Payer: MEDICARE

## 2024-05-03 NOTE — TELEPHONE ENCOUNTER
Received a medication clarification request from [employer]. It has been signed by the provider and faxed back to 1 [phone].

## 2024-05-29 ENCOUNTER — OFFICE VISIT (OUTPATIENT)
Dept: CARDIOLOGY | Facility: CLINIC | Age: 69
End: 2024-05-29
Payer: MEDICARE

## 2024-05-29 VITALS
BODY MASS INDEX: 27.48 KG/M2 | RESPIRATION RATE: 16 BRPM | OXYGEN SATURATION: 98 % | DIASTOLIC BLOOD PRESSURE: 60 MMHG | WEIGHT: 171 LBS | HEIGHT: 66 IN | HEART RATE: 87 BPM | SYSTOLIC BLOOD PRESSURE: 116 MMHG

## 2024-05-29 DIAGNOSIS — I10 HYPERTENSION, UNSPECIFIED TYPE: Primary | ICD-10-CM

## 2024-05-29 DIAGNOSIS — I48.91 ATRIAL FIBRILLATION, UNSPECIFIED TYPE: ICD-10-CM

## 2024-05-29 DIAGNOSIS — F33.41 RECURRENT MAJOR DEPRESSIVE DISORDER, IN PARTIAL REMISSION: ICD-10-CM

## 2024-05-29 DIAGNOSIS — I25.118 ATHEROSCLEROTIC HEART DISEASE OF NATIVE CORONARY ARTERY WITH OTHER FORMS OF ANGINA PECTORIS: ICD-10-CM

## 2024-05-29 PROCEDURE — 99999 PR PBB SHADOW E&M-EST. PATIENT-LVL IV: CPT | Mod: PBBFAC,,, | Performed by: NURSE PRACTITIONER

## 2024-05-29 PROCEDURE — 99214 OFFICE O/P EST MOD 30 MIN: CPT | Mod: S$PBB,,, | Performed by: NURSE PRACTITIONER

## 2024-05-29 PROCEDURE — 99214 OFFICE O/P EST MOD 30 MIN: CPT | Mod: PBBFAC,PN | Performed by: NURSE PRACTITIONER

## 2024-05-29 RX ORDER — EZETIMIBE 10 MG/1
10 TABLET ORAL DAILY
Qty: 90 TABLET | Refills: 3 | Status: SHIPPED | OUTPATIENT
Start: 2024-05-29

## 2024-05-29 RX ORDER — APIXABAN 5 MG/1
5 TABLET, FILM COATED ORAL 2 TIMES DAILY
Qty: 180 TABLET | Refills: 3 | Status: SHIPPED | OUTPATIENT
Start: 2024-05-29

## 2024-05-29 RX ORDER — ISOSORBIDE MONONITRATE 60 MG/1
60 TABLET, EXTENDED RELEASE ORAL DAILY
Qty: 90 TABLET | Refills: 3 | Status: SHIPPED | OUTPATIENT
Start: 2024-05-29 | End: 2025-05-29

## 2024-05-29 RX ORDER — ESCITALOPRAM OXALATE 20 MG/1
10 TABLET ORAL DAILY
Qty: 90 TABLET | Refills: 0 | Status: SHIPPED | OUTPATIENT
Start: 2024-05-29

## 2024-05-29 NOTE — PROGRESS NOTES
Subjective:    Patient ID:  Martell Corcoran is a 68 y.o. male.  Chief Complaint   Patient presents with    Hypertension    Atrial Fibrillation    Hyperlipidemia       HPI:  Patient presents today for follow-up appointment.  Patient has no complaints of chest pain, dizziness, shortness of breath, palpitations, or syncope.  Patient has been taking medications as ordered.  Denies any falls or head injuries.  Denies any blood in the stool or in the urine. He does report having a bleeding mole recently which has stopped. He has been feeling tired and fatigued.       Review of patient's allergies indicates:   Allergen Reactions    Msg [glutamic acid] Nausea Only, Palpitations, Shortness Of Breath and Swelling    Oyster extract Swelling     PT swells in his throat after eating oysters on occasion       Past Medical History:   Diagnosis Date    A-fib     Anticoagulant long-term use     Benign enlargement of prostate     Had a biopsy in around     Bladder cancer     Cancer     COPD (chronic obstructive pulmonary disease)     Elevated PSA     GERD (gastroesophageal reflux disease)     Hypertension     Started with meds in .    Kidney stone     Sleep apnea     Urinary tract infection        Social History     Tobacco Use    Smoking status: Former     Current packs/day: 0.00     Average packs/day: 1 pack/day for 55.0 years (55.0 ttl pk-yrs)     Types: Cigarettes     Start date: 1968     Quit date: 2023     Years since quittin.3    Smokeless tobacco: Never   Substance Use Topics    Alcohol use: Yes     Comment: occ    Drug use: No     Family History   Problem Relation Name Age of Onset    Heart failure Mother      Cancer Father      Heart disease Brother      Heart attack Brother      Cancer Brother      Heart disease Brother      Drug abuse Brother          Review of Systems:   Per HPI         Objective:        Vitals:    24 1435   BP: 116/60   Pulse: 87   Resp: 16       Lab Results   Component  Value Date    WBC 6.22 10/31/2023    HGB 15.4 10/31/2023    HCT 47.2 10/31/2023     10/31/2023    CHOL 226 (H) 10/31/2023    TRIG 96 10/31/2023    HDL 73 10/31/2023    ALT 19 10/31/2023    AST 19 10/31/2023     10/31/2023    K 4.0 10/31/2023     10/31/2023    CREATININE 0.9 10/31/2023    BUN 17 10/31/2023    CO2 32 (H) 10/31/2023    INR 0.8 07/11/2023    HGBA1C 5.7 11/16/2023    MICROALBUR 0.4 11/08/2022        ECHOCARDIOGRAM RESULTS  Results for orders placed during the hospital encounter of 06/13/23    STRESS TEST REPORT        CURRENT/PREVIOUS VISIT EKG  Results for orders placed or performed in visit on 01/03/24   IN OFFICE EKG 12-LEAD (to [place])    Collection Time: 01/03/24 11:48 AM   Result Value Ref Range    QRS Duration 98 ms    [facility] QTC Calculation 429 ms    Narrative    Test Reason : I48.91,    Vent. Rate : 080 BPM     Atrial Rate : 080 BPM     P-R Int : 154 ms          QRS Dur : 098 ms      QT Int : 372 ms       P-R-T Axes : 087 -69 079 degrees     QTc Int : 429 ms    Normal sinus rhythm  Left axis deviation  Incomplete right bundle branch block  Cannot rule out Anterior infarct ,age undetermined  Abnormal ECG  When compared with ECG of 16-AUG-2023 10:54,  No significant change was found  Confirmed by Ion VELASQUEZ, Osman HINES ([id number]) on 3/22/2024 5:21:05 PM    Referred By:  GP           Confirmed By:Osman Lemon MD     No valid procedures specified.   Results for orders placed during the hospital encounter of 06/13/23    Nuclear Stress Test    Interpretation Summary    The ECG portion of the study is negative for ischemia.    There were no arrhythmias during stress.      Physical Exam:  CONSTITUTIONAL: No fever, no chills  HEENT: Normocephalic, atraumatic,pupils reactive to light                 NECK:  No JVD no carotid bruit  CVS: S1S2+, RRR  LUNGS: Clear but diminished   ABDOMEN: Soft, NT, BS+  EXTREMITIES: No cyanosis, edema  : No serra catheter  NEURO: AAO X 3  PSY: Normal  affect      Medication List with Changes/Refills   Current Medications    ALBUTEROL (PROVENTIL) 2.5 MG /3 ML (0.083 %) NEBULIZER SOLUTION    Take 3 mLs (2.5 mg total) by nebulization every 6 (six) hours as needed.    ALBUTEROL (PROVENTIL/VENTOLIN HFA) 90 MCG/ACTUATION INHALER    Inhale 1-2 puffs into the lungs every 4 (four) hours as needed for Shortness of Breath (coughing). Rescue    ALBUTEROL-IPRATROPIUM (DUO-NEB) 2.5 MG-0.5 MG/3 ML NEBULIZER SOLUTION    USE 1 AMPULE IN NEBULIZER EVERY 4 HOURS AS NEEDED FOR WHEEZING    ALFUZOSIN (UROXATRAL) 10 MG TB24    TAKE 1 TABLET BY MOUTH ONCE DAILY AFTER BREAKFAST    ALPRAZOLAM (XANAX) 0.5 MG TABLET    Take 1 tablet (0.5 mg total) by mouth 3 (three) times daily as needed for Anxiety.    ASPIRIN (ECOTRIN) 81 MG EC TABLET        AZELASTINE (ASTELIN) 137 MCG (0.1 %) NASAL SPRAY    1 spray (137 mcg total) by Nasal route 2 (two) times daily.    DUPILUMAB (DUPIXENT) 300 MG/2 ML SYRG    Inject 2 mLs (300 mg total) into the skin every 14 (fourteen) days.    ESOMEPRAZOLE (NEXIUM) 40 MG CAPSULE    Take 40 mg by mouth once daily.    FINASTERIDE (PROSCAR) 5 MG TABLET    Take 5 mg by mouth once daily.    FLUTICASONE-UMECLIDIN-VILANTER (TRELEGY ELLIPTA) 200-62.5-25 MCG INHALER    Inhale 1 puff into the lungs once daily.    NITROGLYCERIN (NITROSTAT) 0.4 MG SL TABLET    Place under the tongue.    PITAVASTATIN CALCIUM (LIVALO) 4 MG TAB    Take 1 tablet (4 mg) by mouth Daily.    PREDNISONE (DELTASONE) 2.5 MG TABLET    Take 2 tablets (5 mg total) by mouth 2 (two) times daily. Do not stop without instructions..    VALACYCLOVIR (VALTREX) 1000 MG TABLET    Take 1 g by mouth 2 (two) times daily.   Changed and/or Refilled Medications    Modified Medication Previous Medication    ELIQUIS 5 MG TAB ELIQUIS 5 mg Tab       Take 1 tablet (5 mg total) by mouth 2 (two) times daily.    Take 1 tablet (5 mg total) by mouth 2 (two) times daily.    ESCITALOPRAM OXALATE (LEXAPRO) 20 MG TABLET EScitalopram  oxalate (LEXAPRO) 20 MG tablet       Take 0.5 tablets (10 mg total) by mouth once daily.    Take 1 tablet (20 mg total) by mouth once daily. Taking 1/2 tablet daily    EZETIMIBE (ZETIA) 10 MG TABLET ezetimibe (ZETIA) 10 mg tablet       Take 1 tablet (10 mg total) by mouth once daily.    Take 1 tablet (10 mg total) by mouth once daily.    ISOSORBIDE MONONITRATE (IMDUR) 60 MG 24 HR TABLET isosorbide mononitrate (IMDUR) 60 MG 24 hr tablet       Take 1 tablet (60 mg total) by mouth once daily.    Take 1 tablet (60 mg total) by mouth once daily.   Discontinued Medications    PREDNISONE (DELTASONE) 10 MG TABLET    Take 1 tablet (10 mg total) by mouth 2 (two) times daily.             Assessment:       1. Hypertension, unspecified type    2. Atherosclerotic heart disease of native coronary artery with other forms of angina pectoris    3. Atrial fibrillation, unspecified type    4. Recurrent major depressive disorder, in partial remission         Plan:     Problem List Items Addressed This Visit          Unprioritized    HTN (hypertension) - Primary    Current Assessment & Plan     BP stable on current regimen. Continue Imdur 60 mg po daily.          Atherosclerotic heart disease of native coronary artery with other forms of angina pectoris    Atrial fibrillation    Current Assessment & Plan     Continue Eliquis 5 mg po BID.          Major depressive disorder in partial remission    Relevant Medications    EScitalopram oxalate (LEXAPRO) 20 MG tablet       Follow up in about 6 months (around 11/29/2024).

## 2024-06-07 ENCOUNTER — TELEPHONE (OUTPATIENT)
Dept: SURGERY | Facility: CLINIC | Age: 69
End: 2024-06-07
Payer: MEDICARE

## 2024-06-07 NOTE — TELEPHONE ENCOUNTER
Spoke with patient, states he has a mole on his chest that is bleeding and has some pus in it.  Appointment scheduled 6-13-24 at 1115 am

## 2024-06-07 NOTE — TELEPHONE ENCOUNTER
----- Message from Chari Marks sent at 6/7/2024  2:05 PM CDT -----  Contact: pt [phone]  Type: Needs Medical Advice  Who Called:  Pt     Best Call Back Number: [phone]    Additional Information: Pt stated he was referred to provider by Dr Hermann Jefferson. Pt requesting a call back to schedule. Pls call back advise

## 2024-06-08 NOTE — PROGRESS NOTES
SUBJECTIVE:    Patient ID: Martell Corcoran is a 68 y.o. male.    Chief Complaint: Follow-up (4 mo f/u//no med bottles//complains of neck and back pain since Wednesday//last dose xanax 3-4 days ago//tc)      Pt here to follow up on acute and chronic conditions (Anxiety, Pepcid, HTN, CAD (65%), HLD, GERD)    Has been having a lot of fatigue, SOB, achy, and tired.    Pt has a large lesion on the front of his chest. In the last 3 months it has opened up and has been leaking pus. A picture of it has been sent to surgery, has a consult on Thursday with Dr. Drake.     Having chronic SOB.  (Ge) No longer on prednisone daily. Using albuterol inhaler 8-10 times a day, when walking a lot and exerting himself.  Has not been using CPAP.  Has been started on dupixent on asthma.  Has not been able to get up and do a lot to wear himself up.   Continues to smoke 1/2 ppd. Was smoking 1.5 ppd.     Pulled his back out last Wednesday trying to sit down on the sofa. Has taken an old medrol dose pack that he had. Has also been using ice and heat.  Has not muscle relaxers.     BP is doing ok today. Hx afib on Eliquis (Baker/Lauren) . Denies CP/HA.    Heartburn doing ok on nexium.  (failed prilosec,protonix)       Has been having chronic neck pain.       (Elvira). Has not had tuberculin (urothelial cell CA) bladder infusions. Has bladder cystoscopes and prostate biopsies every 6 months. Next in November.    Anxiety has been doing ok. Just got . Has a lot going on. Doing better than last time though.       Has not had labs done.  -----------------------------------------------  claudio 2011 (Salma), scheduled on 9/16/22 w/ Ramon  His dry eyes are bothering him today, and otc drops do not work much.        Hospital Outpatient Visit on 01/05/2024   Component Date Value Ref Range Status    Holter Hookup Date 01/05/2024 [id number]   Final    Holter Hookup Time 01/05/2024 [id number]   Final    Holter Study End Date 01/05/2024  [id number]   Final    Holter Study End Time 2024 [id number]   Final    Holter Scan Date 2024 [id number]   Final    Sinus min HR 2024 48  bpm Final    Sinus max hr 2024 136  bpm Final    Sinus avg hr 2024 75  bpm Final    Event Monitor Day 2024 13   Final    Holter length hours 2024 20   Final    holter length minutes 2024 0   Final    holter length dec hours 2024 332.00   Final   Office Visit on 2024   Component Date Value Ref Range Status    QRS Duration 2024 98  ms Final    [facility] QTC Calculation 2024 429  ms Final       Past Medical History:   Diagnosis Date    A-fib     Anticoagulant long-term use     Benign enlargement of prostate     Had a biopsy in around     Bladder cancer     Cancer     COPD (chronic obstructive pulmonary disease)     Elevated PSA     GERD (gastroesophageal reflux disease)     Hypertension     Started with meds in .    Kidney stone     Sleep apnea     Urinary tract infection      Social History     Socioeconomic History    Marital status: Significant Other   Tobacco Use    Smoking status: Former     Current packs/day: 0.00     Average packs/day: 1 pack/day for 55.0 years (55.0 ttl pk-yrs)     Types: Cigarettes     Start date: 1968     Quit date: 2023     Years since quittin.4    Smokeless tobacco: Never   Substance and Sexual Activity    Alcohol use: Yes     Comment: occ    Drug use: No     Social Determinants of Health     Financial Resource Strain: High Risk (2024)    Overall Financial Resource Strain (CARDIA)     Difficulty of Paying Living Expenses: Very hard   Food Insecurity: Food Insecurity Present (2024)    Hunger Vital Sign     Worried About Running Out of Food in the Last Year: Sometimes true     Ran Out of Food in the Last Year: Sometimes true   Transportation Needs: No Transportation Needs (2024)    PRAPARE - Transportation     Lack of Transportation (Medical): No     Lack of  Transportation (Non-Medical): No   Physical Activity: Patient Declined (1/29/2024)    Exercise Vital Sign     Days of Exercise per Week: Patient declined     Minutes of Exercise per Session: Patient declined   Stress: Stress Concern Present (1/29/2024)    [employer] [place] [employer] - Occupational Stress Questionnaire     Feeling of Stress : To some extent   Housing Stability: Unknown (1/29/2024)    Housing Stability Vital Sign     Unable to Pay for Housing in the Last Year: Patient declined     Number of Places Lived in the Last Year: 1     Unstable Housing in the Last Year: No     Past Surgical History:   Procedure Laterality Date    ABLATION OF ARRHYTHMOGENIC FOCUS FOR ATRIAL FIBRILLATION N/A 7/13/2023    Procedure: ABLATION, ARRHYTHMOGENIC FOCUS, FOR ATRIAL FIBRILLATION;  Surgeon: Juma Aguilar MD;  Location: [facility] EP LAB;  Service: Cardiology;  Laterality: N/A;  AF, VALERIE(Cx if SR), PVI, RFA, CARTO, GEN, GP, 3 PREP    ANGIOGRAM, CORONARY, WITH LEFT HEART CATHETERIZATION N/A 7/11/2023    Procedure: Angiogram, Coronary, with Left Heart Cath;  Surgeon: Osman Lemon MD;  Location: [facility] CATH/EP LAB;  Service: Cardiology;  Laterality: N/A;  PLEASE CALL ,PHONE ASSESSMENT    Bladder tumor removed      FISTULA REPAIR      LEFT HEART CATHETERIZATION N/A 10/23/2018    Procedure: Left heart cath;  Surgeon: Niharika Squires MD;  Location: [facility] CATH;  Service: Cardiology;  Laterality: N/A;    TREATMENT OF CARDIAC ARRHYTHMIA  7/13/2023    Procedure: Cardioversion or Defibrillation;  Surgeon: Juma Aguilar MD;  Location: [facility] EP LAB;  Service: Cardiology;;     Family History   Problem Relation Name Age of Onset    Heart failure Mother      Cancer Father      Heart disease Brother      Heart attack Brother      Cancer Brother      Heart disease Brother      Drug abuse Brother         Review of patient's allergies indicates:   Allergen Reactions    Msg [glutamic acid] Nausea Only, Palpitations, Shortness Of  Breath and Swelling    Oyster extract Swelling     PT swells in his throat after eating oysters on occasion       Current Outpatient Medications:     albuterol (PROVENTIL/VENTOLIN HFA) 90 mcg/actuation inhaler, Inhale 1-2 puffs into the lungs every 4 (four) hours as needed for Shortness of Breath (coughing). Rescue, Disp: 18 g, Rfl: 11    albuterol-ipratropium (DUO-NEB) 2.5 mg-0.5 mg/3 mL nebulizer solution, USE 1 AMPULE IN NEBULIZER EVERY 4 HOURS AS NEEDED FOR WHEEZING, Disp: 180 mL, Rfl: 0    alfuzosin (UROXATRAL) 10 mg Tb24, TAKE 1 TABLET BY MOUTH ONCE DAILY AFTER BREAKFAST, Disp: , Rfl:     ALPRAZolam (XANAX) 0.5 MG tablet, Take 1 tablet (0.5 mg total) by mouth 3 (three) times daily as needed for Anxiety., Disp: 180 tablet, Rfl: 1    aspirin (ECOTRIN) 81 MG EC tablet, , Disp: , Rfl:     azelastine (ASTELIN) 137 mcg (0.1 %) nasal spray, 1 spray (137 mcg total) by Nasal route 2 (two) times daily., Disp: 30 mL, Rfl: 2    dupilumab (DUPIXENT) 300 mg/2 mL Syrg, Inject 2 mLs (300 mg total) into the skin every 14 (fourteen) days., Disp: 1 each, Rfl: 26    ELIQUIS 5 mg Tab, Take 1 tablet (5 mg total) by mouth 2 (two) times daily., Disp: 180 tablet, Rfl: 3    EScitalopram oxalate (LEXAPRO) 20 MG tablet, Take 0.5 tablets (10 mg total) by mouth once daily., Disp: 90 tablet, Rfl: 0    esomeprazole (NEXIUM) 40 MG capsule, Take 40 mg by mouth once daily., Disp: , Rfl:     ezetimibe (ZETIA) 10 mg tablet, Take 1 tablet (10 mg total) by mouth once daily., Disp: 90 tablet, Rfl: 3    finasteride (PROSCAR) 5 mg tablet, Take 5 mg by mouth once daily., Disp: , Rfl:     fluticasone-umeclidin-vilanter (TRELEGY ELLIPTA) 200-62.5-25 mcg inhaler, Inhale 1 puff into the lungs once daily., Disp: 180 each, Rfl: 3    isosorbide mononitrate (IMDUR) 60 MG 24 hr tablet, Take 1 tablet (60 mg total) by mouth once daily., Disp: 90 tablet, Rfl: 3    nitroGLYCERIN (NITROSTAT) 0.4 MG SL tablet, Place under the tongue., Disp: , Rfl:     pitavastatin  "calcium (LIVALO) 4 mg Tab, Take 1 tablet (4 mg) by mouth Daily., Disp: 90 tablet, Rfl: 3    valACYclovir (VALTREX) 1000 MG tablet, Take 1 g by mouth 2 (two) times daily., Disp: , Rfl:     methylPREDNISolone (MEDROL DOSEPACK) 4 mg tablet, use as directed, Disp: 21 each, Rfl: 0    tiZANidine (ZANAFLEX) 2 MG tablet, Take 2 tablets (4 mg total) by mouth nightly., Disp: 30 tablet, Rfl: 0    Current Facility-Administered Medications:     sodium chloride 0.9% flush 2 mL, 2 mL, Intravenous, PRN, Gina Garcia, NP    Review of Systems   Constitutional:  Negative for activity change, appetite change, fatigue, fever and unexpected weight change.   HENT:  Negative for hearing loss, rhinorrhea and trouble swallowing.    Eyes:  Positive for visual disturbance. Negative for discharge.   Respiratory:  Negative for cough, chest tightness, shortness of breath and wheezing.    Cardiovascular:  Negative for chest pain, palpitations and leg swelling.   Gastrointestinal:  Negative for abdominal pain, blood in stool, constipation, diarrhea, nausea and vomiting.        -heartburn   Endocrine: Negative for polydipsia and polyuria.   Genitourinary:  Positive for urgency. Negative for decreased urine volume, difficulty urinating, dysuria, frequency and hematuria.   Musculoskeletal:  Positive for back pain and neck pain. Negative for arthralgias, joint swelling and myalgias.   Skin:  Negative for rash.   Neurological:  Negative for dizziness, weakness, numbness and headaches.   Hematological:  Does not bruise/bleed easily.   Psychiatric/Behavioral:  Positive for sleep disturbance. Negative for confusion, dysphoric mood and suicidal ideas. The patient is not nervous/anxious.    All other systems reviewed and are negative.         Objective:      Vitals:    06/10/24 1049   BP: 114/72   Pulse: 78   SpO2: 95%   Weight: 77.1 kg (170 lb)   Height: 5' 6" (1.676 m)           Wt Readings from Last 6 Encounters:   06/10/24 77.1 kg (170 lb)   05/29/24 " 77.6 kg (171 lb)   03/07/24 78.9 kg (174 lb 0.9 oz)   02/05/24 79.4 kg (175 lb)   01/05/24 77.1 kg (170 lb)   01/03/24 77.2 kg (170 lb 1.4 oz)           Physical Exam  Vitals reviewed.   Constitutional:       General: He is not in acute distress.     Appearance: Normal appearance. He is well-developed and overweight.   HENT:      Head: Normocephalic and atraumatic.   Neck:      Thyroid: No thyromegaly.   Cardiovascular:      Rate and Rhythm: Normal rate and regular rhythm.      Heart sounds: Normal heart sounds. No murmur heard.     No friction rub.   Pulmonary:      Effort: Pulmonary effort is normal.      Breath sounds: Normal breath sounds. No wheezing or rales.   Abdominal:      General: Bowel sounds are normal. There is no distension.      Palpations: Abdomen is soft.      Tenderness: There is no abdominal tenderness.   Musculoskeletal:      Cervical back: Neck supple.      Lumbar back: Tenderness (bilateral paraspinal muscles) present. No bony tenderness.   Lymphadenopathy:      Cervical: No cervical adenopathy.   Skin:     General: Skin is warm and dry.      Findings: No rash.   Neurological:      Mental Status: He is alert and oriented to person, place, and time.   Psychiatric:         Attention and Perception: He is attentive.         Speech: Speech normal.         Behavior: Behavior normal.         Thought Content: Thought content normal.         Judgment: Judgment normal.           Assessment:       1. Essential hypertension    2. Acute bilateral low back pain without sciatica    3. Generalized anxiety disorder    4. Chronic obstructive pulmonary disease, unspecified COPD type    5. Long-term use of high-risk medication               Plan:       Essential hypertension  Comments:  Controlled. Will continue to monitor BP on current medication regimen.    Acute bilateral low back pain without sciatica  Comments:  Acute. Will give muscle relaxer to take at night.  Orders:  -     methylPREDNISolone (MEDROL  DOSEPACK) 4 mg tablet; use as directed  Dispense: 21 each; Refill: 0  -     tiZANidine (ZANAFLEX) 2 MG tablet; Take 2 tablets (4 mg total) by mouth nightly.  Dispense: 30 tablet; Refill: 0    Generalized anxiety disorder  Comments:  Controlled. Will continue to monitor symptoms    Chronic obstructive pulmonary disease, unspecified COPD type  Comments:  Stable. Will continue to monitor symptoms. To continue to follow with Pulmonlogy    Long-term use of high-risk medication  -     TSH w/reflex to FT4; Future; Expected date: 06/10/2024  -     Urinalysis, Reflex to Urine Culture Urine, Clean Catch; Future; Expected date: 06/10/2024  -     CBC Auto Differential; Future; Expected date: 06/10/2024  -     Lipid Panel; Future; Expected date: 06/10/2024  -     Comprehensive Metabolic Panel; Future; Expected date: 06/10/2024  -     Microalbumin/Creatinine Ratio, Urine; Future; Expected date: 06/10/2024          Other  Lab results discussed and reviewed with patient.  Labs and/or tests have been ordered for the evaluation/monitoring of acute/chronic conditions, to be done just before next visit.    Follow up in about 4 months (around 10/10/2024) for HTN, Anxiety, COPD, LABS.        6/10/2024 Zo Burrell

## 2024-06-10 ENCOUNTER — OFFICE VISIT (OUTPATIENT)
Dept: FAMILY MEDICINE | Facility: CLINIC | Age: 69
End: 2024-06-10
Payer: MEDICARE

## 2024-06-10 ENCOUNTER — PATIENT MESSAGE (OUTPATIENT)
Dept: FAMILY MEDICINE | Facility: CLINIC | Age: 69
End: 2024-06-10

## 2024-06-10 ENCOUNTER — OFFICE VISIT (OUTPATIENT)
Dept: PULMONOLOGY | Facility: CLINIC | Age: 69
End: 2024-06-10
Payer: MEDICARE

## 2024-06-10 VITALS
OXYGEN SATURATION: 95 % | WEIGHT: 170 LBS | SYSTOLIC BLOOD PRESSURE: 114 MMHG | DIASTOLIC BLOOD PRESSURE: 72 MMHG | HEART RATE: 78 BPM | HEIGHT: 66 IN | BODY MASS INDEX: 27.32 KG/M2

## 2024-06-10 VITALS
HEIGHT: 66 IN | WEIGHT: 171.19 LBS | BODY MASS INDEX: 27.51 KG/M2 | OXYGEN SATURATION: 95 % | DIASTOLIC BLOOD PRESSURE: 70 MMHG | HEART RATE: 88 BPM | SYSTOLIC BLOOD PRESSURE: 125 MMHG

## 2024-06-10 DIAGNOSIS — Z79.899 LONG-TERM USE OF HIGH-RISK MEDICATION: ICD-10-CM

## 2024-06-10 DIAGNOSIS — J45.50 SEVERE PERSISTENT ASTHMA WITHOUT COMPLICATION: ICD-10-CM

## 2024-06-10 DIAGNOSIS — M54.50 ACUTE BILATERAL LOW BACK PAIN WITHOUT SCIATICA: Primary | ICD-10-CM

## 2024-06-10 DIAGNOSIS — F17.210 NICOTINE DEPENDENCE, CIGARETTES, UNCOMPLICATED: Primary | ICD-10-CM

## 2024-06-10 DIAGNOSIS — F41.1 GENERALIZED ANXIETY DISORDER: ICD-10-CM

## 2024-06-10 DIAGNOSIS — J44.89 ASTHMA-COPD OVERLAP SYNDROME: ICD-10-CM

## 2024-06-10 DIAGNOSIS — M54.50 ACUTE BILATERAL LOW BACK PAIN WITHOUT SCIATICA: ICD-10-CM

## 2024-06-10 DIAGNOSIS — J44.9 CHRONIC OBSTRUCTIVE PULMONARY DISEASE, UNSPECIFIED COPD TYPE: ICD-10-CM

## 2024-06-10 DIAGNOSIS — I10 ESSENTIAL HYPERTENSION: Primary | ICD-10-CM

## 2024-06-10 DIAGNOSIS — J82.83 EOSINOPHILIC ASTHMA: ICD-10-CM

## 2024-06-10 PROCEDURE — 99999 PR PBB SHADOW E&M-EST. PATIENT-LVL III: CPT | Mod: PBBFAC,,, | Performed by: INTERNAL MEDICINE

## 2024-06-10 PROCEDURE — 99213 OFFICE O/P EST LOW 20 MIN: CPT | Mod: S$PBB,,, | Performed by: INTERNAL MEDICINE

## 2024-06-10 PROCEDURE — 99213 OFFICE O/P EST LOW 20 MIN: CPT | Mod: PBBFAC,PO | Performed by: INTERNAL MEDICINE

## 2024-06-10 PROCEDURE — 99214 OFFICE O/P EST MOD 30 MIN: CPT | Mod: S$GLB,,, | Performed by: FAMILY MEDICINE

## 2024-06-10 RX ORDER — HYDROCODONE BITARTRATE AND ACETAMINOPHEN 7.5; 325 MG/1; MG/1
1 TABLET ORAL EVERY 6 HOURS PRN
Qty: 28 TABLET | Refills: 0 | Status: SHIPPED | OUTPATIENT
Start: 2024-06-10

## 2024-06-10 RX ORDER — METHYLPREDNISOLONE 4 MG/1
TABLET ORAL
Qty: 21 EACH | Refills: 2 | Status: SHIPPED | OUTPATIENT
Start: 2024-06-10 | End: 2024-07-01

## 2024-06-10 RX ORDER — TIZANIDINE 2 MG/1
4 TABLET ORAL NIGHTLY
Qty: 30 TABLET | Refills: 0 | Status: SHIPPED | OUTPATIENT
Start: 2024-06-10 | End: 2024-06-19 | Stop reason: CLARIF

## 2024-06-10 RX ORDER — METHYLPREDNISOLONE 4 MG/1
TABLET ORAL
Qty: 21 EACH | Refills: 0 | Status: SHIPPED | OUTPATIENT
Start: 2024-06-10 | End: 2024-06-10 | Stop reason: SDUPTHER

## 2024-06-10 NOTE — PROGRESS NOTES
6/10/2024    Martell Corcoran  Follow up    Chief Complaint   Patient presents with    Follow-up       HPI:   6/10/204 pt off prednisone as weaned off-- had couple spells sob. Has been dupixent with very good result.  Pt missed couple doses.   Pt off prednisone   Newly  to girl friend....    Still h;aving smoking problems... off smokes intermittently...    3/7/2024 pt has history of smoking and prednisone down to 5/d.  Still uses trelegy once daily..   no longer has a fib-- may stop eliuis sooon... no yellow mucous  Pt has been on steroids since last June and needed continious  Not using cpap as not felt helpful..  November 2, 2023-absolute eosinophil count was only 100 in late October and also in middle September.  October 31st CT of the chest did not show any remarkable findings.  Resumed smokes-- frustrated with [employer].   treatment plant failed.  Pt resumed prednisone as lungs worsened-- now on 7.5 mg.     Patient Instructions   Ct chest viewed  from 10/31/2023 -- no lung nodules of concern    Mucous seen in airway -- you appreciate mucous.      You need chronic steroids and you had hyperglycemia -- metformin 500 mg daily (could go to 2 daily if needed) may prevent    Pt had been intolerant of crestor, lipitor, mevacor, zocor, and provachol  -- you have been able to tolerate Livalo and need to continue as you have had MI and stable angina......  Patient Instructions   You have severe steroid dependant asthma.  Would recommend  dupixent 300 mg every 2 wks    May consider going to prednisone 2.5 mg daily if dupixent works well    Dupixent sample given in office for total of 600 mg sq today        Ct viewed going back to  2017  There is vague mild tracheal stenosis seen on  ct and chest xray going back to 2017 -- may impair mucous clearing.  No intervention for now...  9/6/2023 off smokes 7/26/2023  and still steroid dependant.  Pt got off prednisone and not thriving.   Breathing very short  acitivity.  Marked gracia last month.  Sinuses ok and no yg mucous.  A fib remitted after ablation.  Patient Instructions   You are off smokes and still needing steroids as breathing poorly.  Pattern is severe asthmatic and eosinophils have been very high.    Would recheck eosinophils    Ct from 2022 viewed and showed minimal emphysema.    Trelegy is dosed daily.    Use albuterol prior to activity.      Resume prednisone 10 mg daily to 20 mg daily after blood test.  Stop in 2 wks and resume as needed    If remains unstable by November may consider shots?     -     7/27/2023 just got off steroids last 2 days -- uses trelegy..  had a fib ablation on 13th July and was feeling well.    Pt off smokes since 1/7/2023     Pt was seen by Dr Thornton -- no f/u for mediastinal cyst --we discuss today  Patient Instructions   Non fasting sugar of 146 on July 11th suggest steroid induced diabetes.  Should be better off steroids.    If you relapse soon would recommend biologic shots for asthma -- Fasenra would be good?    Mediastinal abnormality was eval by Dr Thornton in past-- could do follow up ct chest to screen for lung cancer... we discuss    Continue trelegy    Lung capacity was 55% in 2020 6/29/2023 pt presented nsr er June 13th -- had bad coughing spell with sob and chest pains -- pt went home on 18th on prednisone 60/d with few days left today.  Pt still has wheezes in early am.   Codeine did not work -- may have worsened.    Still on trelegy.      Eos were elevated to 800 at presentation --   Patient Instructions   Cxr June 13th viewed  - no pneumonia  You had high blood sugars in hospital -- 160's.  You may have problems with using high dose steroids.   Stop prednisone when out -- drop to 2 daily for 2 days then one daily (if able)- 14 days.    Have prednisone on hand-- start 3 20 mg daily and call if needs again  You have been on trelegy -- continue.   You had unusually high eosinophils when flared lungs with recent heart  attack  You have asthma and copd.  You may be steroid dependant if need to repeat-- consider special shots.       No bad germs in lung mucous on June 15th  May need diabetes medication-- would check sugars in future if on steroids......        5/23/2023 pt was doing cleaning for elderly cat lady- 5/12/2023- had to spray acontainer for roaches- loaded with roaches- he was given fogger,  and was trying to fog container, sprayed and then closed container,  exposed to [place] fogger off and on for roughly 1/2 hr to 40 min. No burning. No sob nor cough til roughly 4-6 hrs later.  Similar exposures to cleaning was no problem in past. Pt was advised to go to er after 4 -5 days -- pt had to use neb q 4-6 hr post exposure whereas was using neb rx every 6 months prior.  Pt seen in er-- prednisone 20/d x 4 done.  Sat 96 and wheezes noted.      Pt did use zpak but mucous still yellow.   Patient Instructions   Chest xray 5/17/2023 viewed with no problems.    Breathing test 2020 with copd 56% lung capacity      Use trelegy or stiolto-- trelegy has steroids which are more effective to prevent wheezes    Increase steroids-- may need into future?? For now use prednisone 20mg 3 x 3 and taper,,,,,  may repeat.  Would be best to inhale steroids to prevent relapse -- not sure long term need.    Codiene not covered but not $$      Use azithromycin, may need special antibiotic??  Need to have mucous clear.      Call if not back to usual in 2-3 wks???    3/7/2023 quit smokes, feels fatigued chronically. Had naila and cardioversion November.  Back to rhythm but could not use meds to continue.   Uses anoro and qvar--- not using reg as before.    Pt missed lexapro somehow-- had increased anxiety and stomach issues.  Uses occ xanax.   Uses astelin      Has occ blurred vision.  Patient Instructions   Pet scan viewed.  Deviated septum noted.     Would use stiolto 2 daily -- could skip -- anoro not good for regular use.    Novmeber screening ct  chest     Nerves may cause fatigue    Sleep apnea may cause excess sleepiness-- would re try cpap once nerves stable    Would re try cholesterol  medications.     Try cpap -- may use ambein to sleep--dedicate 8 hrs sleep to use ambein.      Sept 13, 2022  in past, had own company.  Lung dz developed 4 + yrs ago.  Bladder cancer removed.  Had bcg infusions 2020 . Some garcia. Problem nasal stuffy tolerating cpap.    Patient Instructions   Nasal stuffy -- afrin or generic afrin --- use one to 2 sprays up to once daily -- alternate nares ?  If regular use may get tolerant and afrin ineffective.  Could see ent to improve passages.      Flonase should work better (or astelin) if passages open.     Sleep study was only 8 episodes an hour-- less than 5 is normal.     Cpap titration needed 8-9 cm pressure    Mediastinal abnormality varies-- would send to cardiothoracic surgery.    Copd is moderate at 55% or so.  Anoro and qvar effective -- as needed albuterol.  Would stop smokes.     CT films are reviewed directly with the patient.  Extensive discussion is made.  Patient's evaluation took 56 minutes today.          Below from RJ Salgado  6/1/2022- SOB persistent complaint, pain with deep inspiration on left chest. Followed by cardiology for A fib and vascular blockages pacemaker procedure postponed.    Wearing CPAP with nose mask, moves across face when sleeping. Does not feel better after wearing.   Cough- improved, less often and severe, most days, worse when weather changes, productive clear mucous  Currently smoking 3-4 cigarettes daily, trying to cut down.   Patient Instructions   CT of chest shows clear lungs, no change to previously seen lung nodules.     A cyst is seen in muscle could be cause of chest pain    Recommend smoking cessation    Continue COPD medication regiment    Recommend trying CPAP therapy again.     2/4/2022-  States breathing is labored. States usually worse after infusion for  bladder cancer. States does have benefit with qvar and Anoro, using albuterol as needed 2x daily.     Daytime fatigue persistent.  purchased CPAP from third party. Has not started to use.     Scheduled for pacemaker to treat A-fib. Followed by Dr. Miranda.     Cough- decreased, 2x daily, productive clear mucous, cut back on smoking to 6 cigarettes daily    1/14/2022- not able to get CPAP due to high co-pay.   States breathing improved after decreasing smoking to 3-6 cigarettes daily.   Shortness of breath- daily complaint, slightly improved, worse with exertion, improves with rest. Daytime fatigue unchanged.   Complaint of cough- varies in severity, currently mild. Productive small amount clear mucous, did notice worsening after first starting to cut back on smoking. Has returned to normal   Followed by Urologist Dr. Pham for bladder cancer. Undergoing infusion therapy.     10/4/2021- concerned he had COVID 19 late July early August, lost sense of taste and smell. Complaint of fatigue, body aches, shortness of breath when walking,   No fever. Gradually improved over 4 weeks. Taste and smell has not returned. Experiencing short term memory loss and metal grogginess. Has daytime drowsiness onset years worsened in past two months  Persistent cough- productive clear mucous, associated with wheeze. Improves with albuterol inhaler.   Currently on Anoro, QVAR, using albuterol when needed 1-3 x weekly.     5/24/2021- Admitted To hospital in [place] while on vacation April 27, 2021 for COPD exacerbation. Seen at [facility] December 16, 2020 for COPD exacerbation.   Complaint of SOB- daily, worse with exertion, improves with rest. Treated with antibiotics and steroid therapy April.   Cough- recurrent complaint,  improved since hospital discharge, worsened in last 2 weeks after spending time with la who had bronchitis that has improved with nebulizer treatments every 4 hours. Associated with chest tightness and wheeze.        2020- he had bladder biopsy at [facility] with Dr. Pham on 11/3- per outside records path with High grade papillary urothelial cell carcinoma. He has cough w/ postnasal drip and allergies but breathing is much better. He has been dealing with a lot for the family- 2 brothers just . Hematuria is much better. Not getting bladder infections any more. He continues to smoke 1/2 ppd. Wants to quit but too much stress recently- not ready.    2020-   Start taking prednisone again- long taper over 3 weeks then try to stop. If lungs flaring while decreasing prednisone go up to the last dose that helped you and call our office.  Continue trelegy  Refilled duo nebs to use as needed  Use albuterol as needed  Quit smoking- go to program  Follow up with urologist  Follow up with PCP for kidney testing and possible urinary infection  pt was recently hospitalized for COPD exacerbation, seen by me while admitted 20 and also having worsening hematuria at that time. He was sent home with a course of levaquin and steroid taper. He did not qualify for home O2. He is being worked up for a bladder mass per urology and pending transurethral resection.  Pt c/o pain around L kidney after doing yard work and urine turned darker. He was coughing last night and woke up with face feeling puffy. Switched urologists to a Dr. Pham at [facility] and has appt at end of September.  He finished prednisone taper. Still taking levaquin. Still feels some congestion in chest but it is getting better. Also has sinus problems w/ nose blocked. He denies frequent exacerbations but this one has been very bad. Feels like worsening since stopped prednisone. Still smokes but trying to cut back. Has smoking cessation appt later this month.  Inhalers: nebs 4-5x/day, trelegy daily, albuterol rescue 1-2x/day    7/15/20- Pt is a 64 yo male with HTN, GERD and BPH presents for new evaluation of breathing issues. He complains of SOB  especially if he carries anything like taking out trash to the dumpster. This has been progressive over about 3.5 yrs. He wheezes and coughs up clear mucous, throughout the day.  Pt smokes 1 PPD x 55 yrs.  At age 5 pt had pna and a collapsed lung requiring chest tube on the left side and hospitalization. Didn't have any other problems w/ breathing growing up. He took up playing [employer] to help lung function.  Pt had abd/p scan for UTI recently which showed some emphysematous changes.    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 One brother  at 65 from leukemia.    Grew up in [place]    The chief compliant  problem varies with instablilty at time      PFSH:  Past Medical History:   Diagnosis Date    A-fib     Anticoagulant long-term use     Benign enlargement of prostate     Had a biopsy in around     Bladder cancer     Cancer     COPD (chronic obstructive pulmonary disease)     Elevated PSA     GERD (gastroesophageal reflux disease)     Hypertension     Started with meds in .    Kidney stone     Sleep apnea     Urinary tract infection      Past surg hist  Past Surgical History:   Procedure Laterality Date    FISTULA REPAIR        LEFT HEART CATHETERIZATION N/A 10/23/2018     Procedure: Left heart cath;  Surgeon: Niharika Squires MD;  Location: [facility] CATH;  Service: Cardiology;  Laterality: N/A;          Social History     Tobacco Use    Smoking status: Former     Current packs/day: 0.00     Average packs/day: 1 pack/day for 55.0 years (55.0 ttl pk-yrs)     Types: Cigarettes     Start date: 1968     Quit date: 2023     Years since quittin.4    Smokeless tobacco: Never   Substance Use Topics    Alcohol use: Yes     Comment: occ    Drug use: No     Family History   Problem Relation Name Age of Onset    Heart failure Mother      Cancer Father      Heart disease Brother      Heart attack Brother    "   Cancer Brother      Heart disease Brother      Drug abuse Brother       Review of patient's allergies indicates:   Allergen Reactions    Msg [glutamic acid] Nausea Only, Palpitations, Shortness Of Breath and Swelling    Oyster extract Swelling     PT swells in his throat after eating oysters on occasion       Performance Status:The patient's activity level is functions out of house. QUACH improved and does not limit him. Back pain limits activity.    Review of Systems:  a review of eleven systems covering constitutional, Eye, HEENT, Psych, Respiratory, Cardiac, GI, , Musculoskeletal, Endocrine, Dermatologic was negative except for pertinent findings as listed ABOVE and below:   wheeze, shortness of breath, fatigue      Exam:Comprehensive exam done. /70 (BP Location: Right arm, Patient Position: Sitting, BP Method: Small (Automatic))   Pulse 88   Ht 5' 6" (1.676 m)   Wt 77.7 kg (171 lb 3 oz)   SpO2 95% Comment: on room air at rest  BMI 27.63 kg/m²   Exam included Vitals as listed, and patient's appearance and affect and alertness and mood, oral exam for yeast and hygiene and pharynx lesions and Mallapatti (M) score, neck with inspection for jvd and masses and thyroid abnormalities and lymph nodes (supraclavicular and infraclavicular nodes and axillary also examined and noted if abn), chest exam included symmetry and effort and fremitus and percussion and auscultation, cardiac exam included rhythm and gallops and murmur and rubs and jvd and edema, abdominal exam for mass and hepatosplenomegaly and tenderness and hernias and bowel sounds, Musculoskeletal exam with muscle tone and posture and mobility/gait and  strength, and skin for rashes and cyanosis and pallor and turgor, extremity for clubbing.  Findings were normal except for pertinent findings listed below:  M1  Bilateral clear lungs w/ faint rhonchi bilateral lower lung zones  No clubbing or edema    Radiographs (ct chest and cxr) reviewed: " view by direct vision   CT scan of the chest September 7, 2022 viewed by direct vision.  Fairly small mediastinal cystic structure looks to be a little smaller than March.  There was very very difficult to see in 2021 however.    CT Chest Without Contrast 03/14/22 Emphysema and unchanged lung nodules  Indeterminate smoothly marginated structure along the anterior superior mediastinum, measuring just above simple fluid attenuation suggestive of a minimally complex/complicated cyst, possibly a lymphovascular malformation, synovial cyst (extending from the sternoclavicular joint), foregut duplication cyst, thymic epithelial lesion/cyst, and much less likely malignancy, however this has slightly increased in size from the previous exam and may warrant interval attention on follow-up imaging.       CT Chest Without Contrast  05/20/2021   In the left upper lobe is a confluence of vessels and a possible 4 mm nodule decreased in size and conspicuousness since the prior study of July 16, 2020.  No new or enlarging pulmonary nodules are identified.  Emphysema.     CT chest/abd/p 11/20/20- mild emphysema, lungs clear aside from small 6mm nodule DANA which is unchanged from prior exam  1. No metastatic disease.  2. Nodular thickening of the left posterior aspect of the bladder.  3. Enlarged prostate.  3. Mild pulmonary emphysema.   CXR 8/14/20- possible vague infiltrate/opacifications bilateral bases  CT chest 7/16/20- DANA 6mm nodule  CT abd/p 6/26/20- bases of lungs w/ scant emphysematous changes, some strands of atelectasis    Labs reviewed    Lab Results   Component Value Date    WBC 6.22 10/31/2023    RBC 4.74 10/31/2023    HGB 15.4 10/31/2023    HCT 47.2 10/31/2023     (H) 10/31/2023    MCH 32.5 (H) 10/31/2023    MCHC 32.6 10/31/2023    RDW 14.6 (H) 10/31/2023     10/31/2023    MPV 8.6 (L) 10/31/2023    GRAN 3.9 10/31/2023    GRAN 61.8 10/31/2023    LYMPH 1.5 10/31/2023    LYMPH 24.0 10/31/2023    MONO 0.7  10/31/2023    MONO 11.9 10/31/2023    EOS 0.1 10/31/2023    BASO 0.03 10/31/2023    EOSINOPHIL 1.6 10/31/2023    BASOPHIL 0.5 10/31/2023     Results for AMBROCIO, MARTELL RAMEY (MRN [id number]) as of 5/24/2021 15:33   Ref. Range 8/13/2020 02:30 8/13/2020 06:01 8/14/2020 05:03 12/16/2020 09:20 3/22/2021 15:14   Eos # Latest Ref Range: 15 - 500 cells/uL 1.4 (H) 0.3 0.0 0.5 122       PFT reviewed  7/16/20- moderately severe obstruction, reduced DLCO, air trapping    EPWORTH SLEEPINESS SCALE SCORE 13 on 10/4/2021  Sleep study 10/7/2021 AHI Significant obstructive sleep apnea with a TRUPTI of 8.1/hr      Plan:  Clinical impression is apparently straight forward and impression with management as below.    Martell was seen today for follow-up.    Diagnoses and all orders for this visit:    Nicotine dependence, cigarettes, uncomplicated  -     Cancel: CT Chest Lung Screening Low Dose; Standing  -     CT Chest Lung Screening Low Dose; Standing    Severe persistent asthma without complication    Asthma-COPD overlap syndrome    Eosinophilic asthma    Acute bilateral low back pain without sciatica  Comments:  Acute. Will give muscle relaxer to take at night.  Orders:  -     methylPREDNISolone (MEDROL DOSEPACK) 4 mg tablet; use as directed                       - continue COPD medication regiment   - lung nodules unchanged.        Follow up in about 6 months (around 12/10/2024).    Discussed with patient above for education the following:         Discussed with patient above for education the following:      Patient Instructions   Dupixent response very good -- expect better in 3-9 months    Check ct in [place]-- next 5 yrs ordered    Call if problems

## 2024-06-10 NOTE — PATIENT INSTRUCTIONS
Dupixent response very good -- expect better in 3-9 months    Check ct in [place]-- next 5 yrs ordered    Call if problems

## 2024-06-13 ENCOUNTER — OFFICE VISIT (OUTPATIENT)
Dept: SURGERY | Facility: CLINIC | Age: 69
End: 2024-06-13
Payer: MEDICARE

## 2024-06-13 VITALS
BODY MASS INDEX: 27.48 KG/M2 | DIASTOLIC BLOOD PRESSURE: 95 MMHG | HEART RATE: 71 BPM | SYSTOLIC BLOOD PRESSURE: 185 MMHG | WEIGHT: 171 LBS | TEMPERATURE: 98 F | HEIGHT: 66 IN

## 2024-06-13 DIAGNOSIS — L98.9 SKIN LESION OF CHEST WALL: Primary | ICD-10-CM

## 2024-06-13 PROCEDURE — 99215 OFFICE O/P EST HI 40 MIN: CPT | Mod: PBBFAC,PN | Performed by: SURGERY

## 2024-06-13 PROCEDURE — 99999 PR PBB SHADOW E&M-EST. PATIENT-LVL V: CPT | Mod: PBBFAC,,, | Performed by: SURGERY

## 2024-06-13 PROCEDURE — 99204 OFFICE O/P NEW MOD 45 MIN: CPT | Mod: S$PBB,,, | Performed by: SURGERY

## 2024-06-13 RX ORDER — CEFAZOLIN SODIUM 2 G/50ML
2 SOLUTION INTRAVENOUS
OUTPATIENT
Start: 2024-06-13

## 2024-06-13 NOTE — PROGRESS NOTES
Subjective:       Patient ID: Martell Corcoran is a 68 y.o. male.    Chief Complaint: Consult (Chest Mole)      HPI:  68-year-old male with past medical history of AFib on Eliquis, bladder cancer, COPD.  He has referred to the office with an enlarging an ulcerated skin lesion at the center of his anterior chest wall overlying the sternum.  He states he had a benign lesion removed from this area about 15 years ago.  It has slowly enlarged in size over that time from the small mole appearing lesion now to this large, raised in sometimes bleeding lesion.  He has no pain associated with it.  He keeps a Band-Aid over the top as it will bleed often.     Past Medical History:   Diagnosis Date    A-fib     Anticoagulant long-term use     Benign enlargement of prostate     Had a biopsy in around 2015    Bladder cancer     Cancer     COPD (chronic obstructive pulmonary disease)     Elevated PSA     GERD (gastroesophageal reflux disease)     Hypertension     Started with meds in 2012.    Kidney stone     Sleep apnea     Urinary tract infection      Past Surgical History:   Procedure Laterality Date    ABLATION OF ARRHYTHMOGENIC FOCUS FOR ATRIAL FIBRILLATION N/A 7/13/2023    Procedure: ABLATION, ARRHYTHMOGENIC FOCUS, FOR ATRIAL FIBRILLATION;  Surgeon: Juma Aguilar MD;  Location: [facility] EP LAB;  Service: Cardiology;  Laterality: N/A;  AF, VALERIE(Cx if SR), PVI, RFA, CARTO, GEN, GP, 3 PREP    ANGIOGRAM, CORONARY, WITH LEFT HEART CATHETERIZATION N/A 7/11/2023    Procedure: Angiogram, Coronary, with Left Heart Cath;  Surgeon: Osman Lemon MD;  Location: [facility] CATH/EP LAB;  Service: Cardiology;  Laterality: N/A;  PLEASE CALL PHONE ASSESSMENT    Bladder tumor removed      FISTULA REPAIR      LEFT HEART CATHETERIZATION N/A 10/23/2018    Procedure: Left heart cath;  Surgeon: Niharika Squires MD;  Location: [facility] CATH;  Service: Cardiology;  Laterality: N/A;    TREATMENT OF CARDIAC ARRHYTHMIA  7/13/2023    Procedure: Cardioversion  or Defibrillation;  Surgeon: Juma Aguilar MD;  Location: [facility] EP LAB;  Service: Cardiology;;     Review of patient's allergies indicates:   Allergen Reactions    Msg [glutamic acid] Nausea Only, Palpitations, Shortness Of Breath and Swelling    Oyster extract Swelling     PT swells in his throat after eating oysters on occasion     Medication List with Changes/Refills   Current Medications    ALBUTEROL (PROVENTIL/VENTOLIN HFA) 90 MCG/ACTUATION INHALER    Inhale 1-2 puffs into the lungs every 4 (four) hours as needed for Shortness of Breath (coughing). Rescue    ALBUTEROL-IPRATROPIUM (DUO-NEB) 2.5 MG-0.5 MG/3 ML NEBULIZER SOLUTION    USE 1 AMPULE IN NEBULIZER EVERY 4 HOURS AS NEEDED FOR WHEEZING    ALFUZOSIN (UROXATRAL) 10 MG TB24    TAKE 1 TABLET BY MOUTH ONCE DAILY AFTER BREAKFAST    ALPRAZOLAM (XANAX) 0.5 MG TABLET    Take 1 tablet (0.5 mg total) by mouth 3 (three) times daily as needed for Anxiety.    ASPIRIN (ECOTRIN) 81 MG EC TABLET        AZELASTINE (ASTELIN) 137 MCG (0.1 %) NASAL SPRAY    1 spray (137 mcg total) by Nasal route 2 (two) times daily.    DUPILUMAB (DUPIXENT) 300 MG/2 ML SYRG    Inject 2 mLs (300 mg total) into the skin every 14 (fourteen) days.    ELIQUIS 5 MG TAB    Take 1 tablet (5 mg total) by mouth 2 (two) times daily.    ESCITALOPRAM OXALATE (LEXAPRO) 20 MG TABLET    Take 0.5 tablets (10 mg total) by mouth once daily.    ESOMEPRAZOLE (NEXIUM) 40 MG CAPSULE    Take 40 mg by mouth once daily.    EZETIMIBE (ZETIA) 10 MG TABLET    Take 1 tablet (10 mg total) by mouth once daily.    FINASTERIDE (PROSCAR) 5 MG TABLET    Take 5 mg by mouth once daily.    FLUTICASONE-UMECLIDIN-VILANTER (TRELEGY ELLIPTA) 200-62.5-25 MCG INHALER    Inhale 1 puff into the lungs once daily.    HYDROCODONE-ACETAMINOPHEN (NORCO) 7.5-325 MG PER TABLET    Take 1 tablet by mouth every 6 (six) hours as needed for Pain.    ISOSORBIDE MONONITRATE (IMDUR) 60 MG 24 HR TABLET    Take 1 tablet (60 mg total) by mouth once daily.     METHYLPREDNISOLONE (MEDROL DOSEPACK) 4 MG TABLET    use as directed    NITROGLYCERIN (NITROSTAT) 0.4 MG SL TABLET    Place under the tongue.    PITAVASTATIN CALCIUM (LIVALO) 4 MG TAB    Take 1 tablet (4 mg) by mouth Daily.    TIZANIDINE (ZANAFLEX) 2 MG TABLET    Take 2 tablets (4 mg total) by mouth nightly.    VALACYCLOVIR (VALTREX) 1000 MG TABLET    Take 1 g by mouth 2 (two) times daily.     Family History   Problem Relation Name Age of Onset    Heart failure Mother      Cancer Father      Heart disease Brother      Heart attack Brother      Cancer Brother      Heart disease Brother      Drug abuse Brother       Social History     Socioeconomic History    Marital status: Significant Other   Tobacco Use    Smoking status: Former     Current packs/day: 0.00     Average packs/day: 1 pack/day for 55.0 years (55.0 ttl pk-yrs)     Types: Cigarettes     Start date: 1968     Quit date: 2023     Years since quittin.4    Smokeless tobacco: Never   Substance and Sexual Activity    Alcohol use: Yes     Comment: occ    Drug use: No     Social Determinants of Health     Financial Resource Strain: High Risk (2024)    Overall Financial Resource Strain (CARDIA)     Difficulty of Paying Living Expenses: Very hard   Food Insecurity: Food Insecurity Present (2024)    Hunger Vital Sign     Worried About Running Out of Food in the Last Year: Sometimes true     Ran Out of Food in the Last Year: Sometimes true   Transportation Needs: No Transportation Needs (2024)    PRAPARE - Transportation     Lack of Transportation (Medical): No     Lack of Transportation (Non-Medical): No   Physical Activity: Patient Declined (2024)    Exercise Vital Sign     Days of Exercise per Week: Patient declined     Minutes of Exercise per Session: Patient declined   Stress: Stress Concern Present (2024)    [employer] - Occupational Stress Questionnaire     Feeling of Stress : To some extent    Housing Stability: Unknown (1/29/2024)    Housing Stability Vital Sign     Unable to Pay for Housing in the Last Year: Patient declined     Number of Places Lived in the Last Year: 1     Unstable Housing in the Last Year: No         Review of Systems   Constitutional:  Negative for appetite change, chills, fever and unexpected weight change.   HENT:  Negative for hearing loss, rhinorrhea, sore throat and voice change.    Eyes:  Negative for photophobia and visual disturbance.   Respiratory:  Negative for cough, choking and shortness of breath.    Cardiovascular:  Negative for chest pain, palpitations and leg swelling.   Gastrointestinal:  Negative for abdominal pain, blood in stool, constipation, diarrhea, nausea and vomiting.   Endocrine: Negative for cold intolerance, heat intolerance, polydipsia and polyuria.   Musculoskeletal:  Negative for arthralgias, back pain, joint swelling and neck stiffness.   Skin:  Negative for color change, pallor and rash.        Large skin lesion anterior chest wall   Neurological:  Negative for dizziness, seizures, syncope and headaches.   Hematological:  Negative for adenopathy. Does not bruise/bleed easily.   Psychiatric/Behavioral:  Negative for agitation, behavioral problems and confusion.        Objective:      Physical Exam  Constitutional:       General: He is awake. He is not in acute distress.     Appearance: Normal appearance. He is well-developed. He is not toxic-appearing.   Pulmonary:      Effort: No tachypnea or bradypnea.   Chest:          Comments: 3 cm x 3 cm raised, hyperpigmented dark brown lesion.  Not actively ulcerated or bleeding.  Could be squamous  Abdominal:      General: There is no distension.      Palpations: Abdomen is soft.      Tenderness: There is no abdominal tenderness.   Neurological:      Mental Status: He is alert and oriented to person, place, and time.   Psychiatric:         Behavior: Behavior is cooperative.         Assessment/Plan:   Skin  lesion of chest wall  -     Vital signs; Standing  -     Insert peripheral IV; Standing  -     Diet NPO; Standing  -     Place sequential compression device; Standing  -     Pulse Oximetry Q4H; Standing  -     Case Request Operating Room: EXCISION-WIDE LOCAL  -     Full code; Standing  -     Place in Outpatient; Standing  -     Basic metabolic panel; Future; Expected date: 06/13/2024  -     CBC auto differential; Future; Expected date: 06/13/2024  -     EKG 12-lead; Future  -     X-Ray Chest PA And Lateral; Future; Expected date: 06/13/2024    Other orders  -     IP VTE LOW RISK PATIENT; Standing  -     cefazolin (ANCEF) 2 gram in dextrose 5% 50 mL IVPB (premix)    With enlarging skin lesion.  It is raised, hyperpigmented and bleeds from time to time.  On previous resection he reports it was benign about 15 years ago.  It does appear to be suspicious for malignancy - -maybe squamous cell.  Melanoma possible but does not appear like a melanoma right now.  Plan on excision with small margins in the operating room Monday the 24th.  Hold Eliquis 48 hours prior to surgery.          I discussed the proposed procedures with the patient including risks, benefits, indications, alternatives and special concerns.  The patient appears to understand and agrees to go ahead with surgery.  I have made no promises, warranties or verbal agreements beyond what was discussed above.    No follow-ups on file.

## 2024-06-13 NOTE — H&P (VIEW-ONLY)
Subjective:       Patient ID: Martell Corcoran is a 68 y.o. male.    Chief Complaint: Consult (Chest Mole)      HPI:  68-year-old male with past medical history of AFib on Eliquis, bladder cancer, COPD.  He has referred to the office with an enlarging an ulcerated skin lesion at the center of his anterior chest wall overlying the sternum.  He states he had a benign lesion removed from this area about 15 years ago.  It has slowly enlarged in size over that time from the small mole appearing lesion now to this large, raised in sometimes bleeding lesion.  He has no pain associated with it.  He keeps a Band-Aid over the top as it will bleed often.     Past Medical History:   Diagnosis Date    A-fib     Anticoagulant long-term use     Benign enlargement of prostate     Had a biopsy in around 2015    Bladder cancer     Cancer     COPD (chronic obstructive pulmonary disease)     Elevated PSA     GERD (gastroesophageal reflux disease)     Hypertension     Started with meds in 2012.    Kidney stone     Sleep apnea     Urinary tract infection      Past Surgical History:   Procedure Laterality Date    ABLATION OF ARRHYTHMOGENIC FOCUS FOR ATRIAL FIBRILLATION N/A 7/13/2023    Procedure: ABLATION, ARRHYTHMOGENIC FOCUS, FOR ATRIAL FIBRILLATION;  Surgeon: Juma Aguilar MD;  Location: [facility] EP LAB;  Service: Cardiology;  Laterality: N/A;  AF, VALERIE(Cx if SR), PVI, RFA, CARTO, GEN, GP, 3 PREP    ANGIOGRAM, CORONARY, WITH LEFT HEART CATHETERIZATION N/A 7/11/2023    Procedure: Angiogram, Coronary, with Left Heart Cath;  Surgeon: Osman Lemon MD;  Location: [facility] CATH/EP LAB;  Service: Cardiology;  Laterality: N/A;  PLEASE CALL PHONE ASSESSMENT    Bladder tumor removed      FISTULA REPAIR      LEFT HEART CATHETERIZATION N/A 10/23/2018    Procedure: Left heart cath;  Surgeon: Niharika Squires MD;  Location: [facility] CATH;  Service: Cardiology;  Laterality: N/A;    TREATMENT OF CARDIAC ARRHYTHMIA  7/13/2023    Procedure: Cardioversion  or Defibrillation;  Surgeon: Juma Aguilar MD;  Location: [facility] EP LAB;  Service: Cardiology;;     Review of patient's allergies indicates:   Allergen Reactions    Msg [glutamic acid] Nausea Only, Palpitations, Shortness Of Breath and Swelling    Oyster extract Swelling     PT swells in his throat after eating oysters on occasion     Medication List with Changes/Refills   Current Medications    ALBUTEROL (PROVENTIL/VENTOLIN HFA) 90 MCG/ACTUATION INHALER    Inhale 1-2 puffs into the lungs every 4 (four) hours as needed for Shortness of Breath (coughing). Rescue    ALBUTEROL-IPRATROPIUM (DUO-NEB) 2.5 MG-0.5 MG/3 ML NEBULIZER SOLUTION    USE 1 AMPULE IN NEBULIZER EVERY 4 HOURS AS NEEDED FOR WHEEZING    ALFUZOSIN (UROXATRAL) 10 MG TB24    TAKE 1 TABLET BY MOUTH ONCE DAILY AFTER BREAKFAST    ALPRAZOLAM (XANAX) 0.5 MG TABLET    Take 1 tablet (0.5 mg total) by mouth 3 (three) times daily as needed for Anxiety.    ASPIRIN (ECOTRIN) 81 MG EC TABLET        AZELASTINE (ASTELIN) 137 MCG (0.1 %) NASAL SPRAY    1 spray (137 mcg total) by Nasal route 2 (two) times daily.    DUPILUMAB (DUPIXENT) 300 MG/2 ML SYRG    Inject 2 mLs (300 mg total) into the skin every 14 (fourteen) days.    ELIQUIS 5 MG TAB    Take 1 tablet (5 mg total) by mouth 2 (two) times daily.    ESCITALOPRAM OXALATE (LEXAPRO) 20 MG TABLET    Take 0.5 tablets (10 mg total) by mouth once daily.    ESOMEPRAZOLE (NEXIUM) 40 MG CAPSULE    Take 40 mg by mouth once daily.    EZETIMIBE (ZETIA) 10 MG TABLET    Take 1 tablet (10 mg total) by mouth once daily.    FINASTERIDE (PROSCAR) 5 MG TABLET    Take 5 mg by mouth once daily.    FLUTICASONE-UMECLIDIN-VILANTER (TRELEGY ELLIPTA) 200-62.5-25 MCG INHALER    Inhale 1 puff into the lungs once daily.    HYDROCODONE-ACETAMINOPHEN (NORCO) 7.5-325 MG PER TABLET    Take 1 tablet by mouth every 6 (six) hours as needed for Pain.    ISOSORBIDE MONONITRATE (IMDUR) 60 MG 24 HR TABLET    Take 1 tablet (60 mg total) by mouth once daily.     METHYLPREDNISOLONE (MEDROL DOSEPACK) 4 MG TABLET    use as directed    NITROGLYCERIN (NITROSTAT) 0.4 MG SL TABLET    Place under the tongue.    PITAVASTATIN CALCIUM (LIVALO) 4 MG TAB    Take 1 tablet (4 mg) by mouth Daily.    TIZANIDINE (ZANAFLEX) 2 MG TABLET    Take 2 tablets (4 mg total) by mouth nightly.    VALACYCLOVIR (VALTREX) 1000 MG TABLET    Take 1 g by mouth 2 (two) times daily.     Family History   Problem Relation Name Age of Onset    Heart failure Mother      Cancer Father      Heart disease Brother      Heart attack Brother      Cancer Brother      Heart disease Brother      Drug abuse Brother       Social History     Socioeconomic History    Marital status: Significant Other   Tobacco Use    Smoking status: Former     Current packs/day: 0.00     Average packs/day: 1 pack/day for 55.0 years (55.0 ttl pk-yrs)     Types: Cigarettes     Start date: 1968     Quit date: 2023     Years since quittin.4    Smokeless tobacco: Never   Substance and Sexual Activity    Alcohol use: Yes     Comment: occ    Drug use: No     Social Determinants of Health     Financial Resource Strain: High Risk (2024)    Overall Financial Resource Strain (CARDIA)     Difficulty of Paying Living Expenses: Very hard   Food Insecurity: Food Insecurity Present (2024)    Hunger Vital Sign     Worried About Running Out of Food in the Last Year: Sometimes true     Ran Out of Food in the Last Year: Sometimes true   Transportation Needs: No Transportation Needs (2024)    PRAPARE - Transportation     Lack of Transportation (Medical): No     Lack of Transportation (Non-Medical): No   Physical Activity: Patient Declined (2024)    Exercise Vital Sign     Days of Exercise per Week: Patient declined     Minutes of Exercise per Session: Patient declined   Stress: Stress Concern Present (2024)    [employer] - Occupational Stress Questionnaire     Feeling of Stress : To some extent    Housing Stability: Unknown (1/29/2024)    Housing Stability Vital Sign     Unable to Pay for Housing in the Last Year: Patient declined     Number of Places Lived in the Last Year: 1     Unstable Housing in the Last Year: No         Review of Systems   Constitutional:  Negative for appetite change, chills, fever and unexpected weight change.   HENT:  Negative for hearing loss, rhinorrhea, sore throat and voice change.    Eyes:  Negative for photophobia and visual disturbance.   Respiratory:  Negative for cough, choking and shortness of breath.    Cardiovascular:  Negative for chest pain, palpitations and leg swelling.   Gastrointestinal:  Negative for abdominal pain, blood in stool, constipation, diarrhea, nausea and vomiting.   Endocrine: Negative for cold intolerance, heat intolerance, polydipsia and polyuria.   Musculoskeletal:  Negative for arthralgias, back pain, joint swelling and neck stiffness.   Skin:  Negative for color change, pallor and rash.        Large skin lesion anterior chest wall   Neurological:  Negative for dizziness, seizures, syncope and headaches.   Hematological:  Negative for adenopathy. Does not bruise/bleed easily.   Psychiatric/Behavioral:  Negative for agitation, behavioral problems and confusion.        Objective:      Physical Exam  Constitutional:       General: He is awake. He is not in acute distress.     Appearance: Normal appearance. He is well-developed. He is not toxic-appearing.   Pulmonary:      Effort: No tachypnea or bradypnea.   Chest:          Comments: 3 cm x 3 cm raised, hyperpigmented dark brown lesion.  Not actively ulcerated or bleeding.  Could be squamous  Abdominal:      General: There is no distension.      Palpations: Abdomen is soft.      Tenderness: There is no abdominal tenderness.   Neurological:      Mental Status: He is alert and oriented to person, place, and time.   Psychiatric:         Behavior: Behavior is cooperative.         Assessment/Plan:   Skin  lesion of chest wall  -     Vital signs; Standing  -     Insert peripheral IV; Standing  -     Diet NPO; Standing  -     Place sequential compression device; Standing  -     Pulse Oximetry Q4H; Standing  -     Case Request Operating Room: EXCISION-WIDE LOCAL  -     Full code; Standing  -     Place in Outpatient; Standing  -     Basic metabolic panel; Future; Expected date: 06/13/2024  -     CBC auto differential; Future; Expected date: 06/13/2024  -     EKG 12-lead; Future  -     X-Ray Chest PA And Lateral; Future; Expected date: 06/13/2024    Other orders  -     IP VTE LOW RISK PATIENT; Standing  -     cefazolin (ANCEF) 2 gram in dextrose 5% 50 mL IVPB (premix)    With enlarging skin lesion.  It is raised, hyperpigmented and bleeds from time to time.  On previous resection he reports it was benign about 15 years ago.  It does appear to be suspicious for malignancy - -maybe squamous cell.  Melanoma possible but does not appear like a melanoma right now.  Plan on excision with small margins in the operating room Monday the 24th.  Hold Eliquis 48 hours prior to surgery.          I discussed the proposed procedures with the patient including risks, benefits, indications, alternatives and special concerns.  The patient appears to understand and agrees to go ahead with surgery.  I have made no promises, warranties or verbal agreements beyond what was discussed above.    No follow-ups on file.

## 2024-06-18 ENCOUNTER — HOSPITAL ENCOUNTER (OUTPATIENT)
Dept: RADIOLOGY | Facility: HOSPITAL | Age: 69
Discharge: HOME | End: 2024-06-18
Attending: SURGERY
Payer: MEDICARE

## 2024-06-18 DIAGNOSIS — L98.9 SKIN LESION OF CHEST WALL: ICD-10-CM

## 2024-06-18 PROCEDURE — 71046 X-RAY EXAM CHEST 2 VIEWS: CPT | Mod: TC,PO

## 2024-06-18 PROCEDURE — 71046 X-RAY EXAM CHEST 2 VIEWS: CPT | Mod: 26,,, | Performed by: RADIOLOGY

## 2024-06-19 ENCOUNTER — HOSPITAL ENCOUNTER (OUTPATIENT)
Dept: PREADMISSION TESTING | Facility: HOSPITAL | Age: 69
Discharge: HOME | End: 2024-06-19
Attending: SURGERY
Payer: MEDICARE

## 2024-06-19 VITALS
OXYGEN SATURATION: 98 % | SYSTOLIC BLOOD PRESSURE: 122 MMHG | DIASTOLIC BLOOD PRESSURE: 74 MMHG | HEART RATE: 84 BPM | WEIGHT: 171 LBS | HEIGHT: 66 IN | RESPIRATION RATE: 14 BRPM | TEMPERATURE: 98 F | BODY MASS INDEX: 27.48 KG/M2

## 2024-06-19 NOTE — DISCHARGE INSTRUCTIONS
To confirm, Your doctor has instructed you that surgery is scheduled for:     Pre-Op will call the afternoon prior to surgery between 4:00 and 6:00 PM with the final arrival time.      Please report to Outpatient [place] via [facility] entrance. Check in at registration desk.    Do not eat or drink anything after midnight the night before your surgery - THIS INCLUDES  WATER, GUM, MINTS AND CANDY.  YOU MAY BRUSH YOUR TEETH BUT DO NOT SWALLOW     TAKE ONLY THESE MEDICATIONS WITH A SMALL SIP OF WATER THE MORNING OF YOUR PROCEDURE:      ONLY if you are diabetic, check your sugar in the morning before your procedure.       Do not take any diabetic medicines or insulin the morning of surgery .     PLEASE NOTE:  The surgery schedule has many variables which may affect the time of your surgery case.  Family members should be available if your surgery time changes.  Plan to be here the day of your procedure between 4-6 hours.      DO NOT TAKE THESE MEDICATIONS 5-7 DAYS PRIOR to your procedure or per your surgeon's request: ASPIRIN, ALEVE, ADVIL, IBUPROFEN,  TATYANA SELTZER, BC , FISH OIL , VITAMIN E, HERBALS  (May take Tylenol)      ONLY if you are prescribed any types of blood thinners such as:  Aspirin, Coumadin, Plavix, Pradaxa, Xarelto, Aggrenox, Effient, Eliquis, Savasya, Brilinta, or any other, ask your surgeon whether you should stop taking them and how long before surgery you should stop.  You may also need to verify with the prescribing physician if it is ok to stop your medication.                                                        IMPORTANT INSTRUCTIONS      Do not smoke, vape or drink alcoholic beverages 24 hours prior to your procedure.  Shower the night before AND the morning of your procedure with a Chlorhexidine wash such as Hibiclens or Dial antibacterial soap from the neck down. Do not apply any deodorants, lotions or powders after each shower.  Do not get it on your face or in your eyes.  You may use  your own shampoo and face wash. This helps your skin to be as bacteria free as possible.  DO NOT remove hair from the surgery site.  Do not shave the incision site unless you are given specific instructions to do so.    Sleep in a bed with clean sheets.  Do not sleep with a pet in the bed.   If you wear contact lenses, dentures, hearing aids or glasses, bring a container to put them in during surgery and give to a family member for safe keeping.    Please leave all jewelry, piercing's and valuables at home.   ONLY if you have been diagnosed with sleep apnea please bring your C-PAP machine.  ONLY if you wear home oxygen please bring your portable oxygen tank the day of your procedure.   ONLY for patients requiring bowel prep, written instructions will be given by your doctor's office.  ONLY if you have a neuro stimulator, please bring the controller with you the morning of surgery.    If your doctor has scheduled you for an overnight stay, bring a small overnight bag with any personal items you need.    Make arrangements in advance for transportation home by a responsible adult.      You must make arrangements for transportation, TAXI'S, UBER'S OR LYFTS ARE NOT ALLOWED.        If you have any questions about these instructions, call Pre-Op Admit  Nursing at [phone] or the Pre-Op Day Surgery Unit at [phone].

## 2024-06-23 ENCOUNTER — PATIENT MESSAGE (OUTPATIENT)
Dept: SURGERY | Facility: CLINIC | Age: 69
End: 2024-06-23
Payer: MEDICARE

## 2024-06-24 ENCOUNTER — ANESTHESIA (OUTPATIENT)
Dept: SURGERY | Facility: HOSPITAL | Age: 69
End: 2024-06-24
Payer: MEDICARE

## 2024-06-24 ENCOUNTER — ANESTHESIA EVENT (OUTPATIENT)
Dept: SURGERY | Facility: HOSPITAL | Age: 69
End: 2024-06-24
Payer: MEDICARE

## 2024-06-24 ENCOUNTER — HOSPITAL ENCOUNTER (OUTPATIENT)
Facility: HOSPITAL | Age: 69
Discharge: HOME | End: 2024-06-24
Attending: SURGERY | Admitting: SURGERY
Payer: MEDICARE

## 2024-06-24 VITALS
RESPIRATION RATE: 16 BRPM | BODY MASS INDEX: 27.46 KG/M2 | DIASTOLIC BLOOD PRESSURE: 94 MMHG | HEIGHT: 66 IN | SYSTOLIC BLOOD PRESSURE: 158 MMHG | TEMPERATURE: 98 F | HEART RATE: 63 BPM | WEIGHT: 170.88 LBS | OXYGEN SATURATION: 97 %

## 2024-06-24 DIAGNOSIS — L98.9 SKIN LESION OF CHEST WALL: Primary | ICD-10-CM

## 2024-06-24 PROCEDURE — 11403 EXC TR-EXT B9+MARG 2.1-3CM: CPT | Mod: 51,,, | Performed by: SURGERY

## 2024-06-24 PROCEDURE — 63600175 PHARM REV CODE 636 W HCPCS: Mod: JZ,JG | Performed by: SURGERY

## 2024-06-24 PROCEDURE — 71000015 HC POSTOP RECOV 1ST HR: Performed by: SURGERY

## 2024-06-24 PROCEDURE — 63600175 PHARM REV CODE 636 W HCPCS: Performed by: SURGERY

## 2024-06-24 PROCEDURE — 25000003 PHARM REV CODE 250: Performed by: NURSE ANESTHETIST, CERTIFIED REGISTERED

## 2024-06-24 PROCEDURE — 37000009 HC ANESTHESIA EA ADD 15 MINS: Performed by: SURGERY

## 2024-06-24 PROCEDURE — C9290 INJ, BUPIVACAINE LIPOSOME: HCPCS | Performed by: SURGERY

## 2024-06-24 PROCEDURE — 12032 INTMD RPR S/A/T/EXT 2.6-7.5: CPT | Mod: ,,, | Performed by: SURGERY

## 2024-06-24 PROCEDURE — 63600175 PHARM REV CODE 636 W HCPCS: Performed by: NURSE ANESTHETIST, CERTIFIED REGISTERED

## 2024-06-24 PROCEDURE — 36000706: Performed by: SURGERY

## 2024-06-24 PROCEDURE — 36000707: Performed by: SURGERY

## 2024-06-24 PROCEDURE — 37000008 HC ANESTHESIA 1ST 15 MINUTES: Performed by: SURGERY

## 2024-06-24 PROCEDURE — 71000033 HC RECOVERY, INTIAL HOUR: Performed by: SURGERY

## 2024-06-24 PROCEDURE — 27200651 HC AIRWAY, LMA: Performed by: ANESTHESIOLOGY

## 2024-06-24 RX ORDER — FAMOTIDINE 10 MG/ML
INJECTION INTRAVENOUS
Status: DISCONTINUED | OUTPATIENT
Start: 2024-06-24 | End: 2024-06-24

## 2024-06-24 RX ORDER — ONDANSETRON HYDROCHLORIDE 2 MG/ML
INJECTION, SOLUTION INTRAVENOUS
Status: DISCONTINUED | OUTPATIENT
Start: 2024-06-24 | End: 2024-06-24

## 2024-06-24 RX ORDER — BUPIVACAINE HYDROCHLORIDE 2.5 MG/ML
INJECTION, SOLUTION EPIDURAL; INFILTRATION; INTRACAUDAL
Status: DISCONTINUED | OUTPATIENT
Start: 2024-06-24 | End: 2024-06-24 | Stop reason: HOSPADM

## 2024-06-24 RX ORDER — DIPHENHYDRAMINE HYDROCHLORIDE 50 MG/ML
12.5 INJECTION INTRAMUSCULAR; INTRAVENOUS
Status: DISCONTINUED | OUTPATIENT
Start: 2024-06-24 | End: 2024-06-24 | Stop reason: HOSPADM

## 2024-06-24 RX ORDER — PREDNISONE 5 MG/1
5 TABLET ORAL DAILY
COMMUNITY

## 2024-06-24 RX ORDER — FENTANYL CITRATE 50 UG/ML
INJECTION, SOLUTION INTRAMUSCULAR; INTRAVENOUS
Status: DISCONTINUED | OUTPATIENT
Start: 2024-06-24 | End: 2024-06-24

## 2024-06-24 RX ORDER — MIDAZOLAM HYDROCHLORIDE 1 MG/ML
INJECTION INTRAMUSCULAR; INTRAVENOUS
Status: DISCONTINUED | OUTPATIENT
Start: 2024-06-24 | End: 2024-06-24

## 2024-06-24 RX ORDER — LIDOCAINE HYDROCHLORIDE 20 MG/ML
INJECTION INTRAVENOUS
Status: DISCONTINUED | OUTPATIENT
Start: 2024-06-24 | End: 2024-06-24

## 2024-06-24 RX ORDER — ONDANSETRON HYDROCHLORIDE 2 MG/ML
4 INJECTION, SOLUTION INTRAVENOUS DAILY PRN
Status: DISCONTINUED | OUTPATIENT
Start: 2024-06-24 | End: 2024-06-24 | Stop reason: HOSPADM

## 2024-06-24 RX ORDER — PREDNISONE 2.5 MG/1
2.5 TABLET ORAL NIGHTLY
COMMUNITY

## 2024-06-24 RX ORDER — HYDROCODONE BITARTRATE AND ACETAMINOPHEN 5; 325 MG/1; MG/1
1 TABLET ORAL EVERY 6 HOURS PRN
Qty: 20 TABLET | Refills: 0 | Status: SHIPPED | OUTPATIENT
Start: 2024-06-24

## 2024-06-24 RX ORDER — SODIUM CHLORIDE, SODIUM LACTATE, POTASSIUM CHLORIDE, CALCIUM CHLORIDE 600; 310; 30; 20 MG/100ML; MG/100ML; MG/100ML; MG/100ML
INJECTION, SOLUTION INTRAVENOUS CONTINUOUS PRN
Status: DISCONTINUED | OUTPATIENT
Start: 2024-06-24 | End: 2024-06-24

## 2024-06-24 RX ORDER — HYDROMORPHONE HYDROCHLORIDE 1 MG/ML
0.2 INJECTION, SOLUTION INTRAMUSCULAR; INTRAVENOUS; SUBCUTANEOUS EVERY 5 MIN PRN
Status: DISCONTINUED | OUTPATIENT
Start: 2024-06-24 | End: 2024-06-24 | Stop reason: HOSPADM

## 2024-06-24 RX ORDER — CEFAZOLIN SODIUM 2 G/50ML
2 SOLUTION INTRAVENOUS
Status: COMPLETED | OUTPATIENT
Start: 2024-06-24 | End: 2024-06-24

## 2024-06-24 RX ORDER — OXYCODONE HYDROCHLORIDE 5 MG/1
5 TABLET ORAL
Status: DISCONTINUED | OUTPATIENT
Start: 2024-06-24 | End: 2024-06-24 | Stop reason: HOSPADM

## 2024-06-24 RX ORDER — MEPERIDINE HYDROCHLORIDE 50 MG/ML
12.5 INJECTION INTRAMUSCULAR; INTRAVENOUS; SUBCUTANEOUS EVERY 10 MIN PRN
Status: DISCONTINUED | OUTPATIENT
Start: 2024-06-24 | End: 2024-06-24 | Stop reason: HOSPADM

## 2024-06-24 RX ORDER — PROPOFOL 10 MG/ML
INJECTION, EMULSION INTRAVENOUS
Status: DISCONTINUED | OUTPATIENT
Start: 2024-06-24 | End: 2024-06-24

## 2024-06-24 RX ORDER — CEFAZOLIN SODIUM 1 G/3ML
INJECTION, POWDER, FOR SOLUTION INTRAMUSCULAR; INTRAVENOUS
Status: DISCONTINUED | OUTPATIENT
Start: 2024-06-24 | End: 2024-06-24

## 2024-06-24 RX ADMIN — FENTANYL CITRATE 25 MCG: 50 INJECTION, SOLUTION INTRAMUSCULAR; INTRAVENOUS at 08:06

## 2024-06-24 RX ADMIN — FENTANYL CITRATE 50 MCG: 50 INJECTION, SOLUTION INTRAMUSCULAR; INTRAVENOUS at 09:06

## 2024-06-24 RX ADMIN — FENTANYL CITRATE 25 MCG: 50 INJECTION, SOLUTION INTRAMUSCULAR; INTRAVENOUS at 09:06

## 2024-06-24 RX ADMIN — PROPOFOL 300 MG: 10 INJECTION, EMULSION INTRAVENOUS at 08:06

## 2024-06-24 RX ADMIN — SODIUM CHLORIDE, SODIUM LACTATE, POTASSIUM CHLORIDE, AND CALCIUM CHLORIDE: .6; .31; .03; .02 INJECTION, SOLUTION INTRAVENOUS at 08:06

## 2024-06-24 RX ADMIN — MIDAZOLAM HYDROCHLORIDE 2 MG: 1 INJECTION, SOLUTION INTRAMUSCULAR; INTRAVENOUS at 08:06

## 2024-06-24 RX ADMIN — ONDANSETRON 4 MG: 2 INJECTION INTRAMUSCULAR; INTRAVENOUS at 09:06

## 2024-06-24 RX ADMIN — HYDROCORTISONE SODIUM SUCCINATE 100 MG: 100 INJECTION, POWDER, FOR SOLUTION INTRAMUSCULAR; INTRAVENOUS at 08:06

## 2024-06-24 RX ADMIN — CEFAZOLIN 2 G: 330 INJECTION, POWDER, FOR SOLUTION INTRAMUSCULAR; INTRAVENOUS at 09:06

## 2024-06-24 RX ADMIN — FAMOTIDINE 20 MG: 10 INJECTION, SOLUTION INTRAVENOUS at 09:06

## 2024-06-24 RX ADMIN — LIDOCAINE HYDROCHLORIDE 75 MG: 20 INJECTION, SOLUTION INTRAVENOUS at 08:06

## 2024-06-24 RX ADMIN — GLYCOPYRROLATE 0.2 MG: 0.2 INJECTION, SOLUTION INTRAMUSCULAR; INTRAVITREAL at 09:06

## 2024-06-24 RX ADMIN — CEFAZOLIN SODIUM 2 G: 2 SOLUTION INTRAVENOUS at 08:06

## 2024-06-24 RX ADMIN — PROPOFOL 100 MG: 10 INJECTION, EMULSION INTRAVENOUS at 09:06

## 2024-06-24 NOTE — TRANSFER OF CARE
"Anesthesia Transfer of Care Note    Patient: Martell Corcoran    Procedure(s) Performed: Procedure(s) (LRB):  EXCISION, LESION, CHEST WALL (N/A)    Patient location: PACU    Anesthesia Type: general    Transport from OR: Transported from OR on room air with adequate spontaneous ventilation    Post assessment: no apparent anesthetic complications    Post vital signs: stable    Level of consciousness: awake    Nausea/Vomiting: no nausea/vomiting    Complications: none    Transfer of care protocol was followed      Last vitals: Visit Vitals  /78   Pulse 74   Temp 36.8 °C (98.3 °F) (Oral)   Resp 16   Ht 5' 6" (1.676 m)   Wt 77.5 kg (170 lb 13.7 oz)   SpO2 95%   BMI 27.58 kg/m²     "

## 2024-06-24 NOTE — ANESTHESIA PREPROCEDURE EVALUATION
06/24/2024  Martell Corcoran is a 69 y.o., male.      Patient Active Problem List   Diagnosis    Chest pain    QUACH (dyspnea on exertion)    Asthma-COPD overlap syndrome    HTN (hypertension)    GERD (gastroesophageal reflux disease)    Bladder cancer    Ischemia due to increased oxygen demand    Lung nodule    Chronic obstructive pulmonary disease    Atherosclerosis of aorta    Atherosclerotic heart disease of native coronary artery with other forms of angina pectoris    GLENNA on CPAP    Atrial fibrillation    Major depressive disorder in partial remission    Acute chemical bronchitis    Hyperlipidemia    Severe persistent asthma without complication    Eosinophilic asthma    Steroid-dependent asthma, severe persistent, uncomplicated    Acute bilateral low back pain without sciatica    Nicotine dependence, cigarettes, uncomplicated           Tobacco Use:  The patient  reports that he quit smoking about 17 months ago. His smoking use included cigarettes. He started smoking about 56 years ago. He has a 55 pack-year smoking history. He has never used smokeless tobacco.     Results for orders placed or performed in visit on 01/03/24   IN OFFICE EKG 12-LEAD (to [place])    Collection Time: 01/03/24 11:48 AM   Result Value Ref Range    QRS Duration 98 ms    [facility] QTC Calculation 429 ms    Narrative    Test Reason : I48.91,    Vent. Rate : 080 BPM     Atrial Rate : 080 BPM     P-R Int : 154 ms          QRS Dur : 098 ms      QT Int : 372 ms       P-R-T Axes : 087 -69 079 degrees     QTc Int : 429 ms    Normal sinus rhythm  Left axis deviation  Incomplete right bundle branch block  Cannot rule out Anterior infarct ,age undetermined  Abnormal ECG  When compared with ECG of 16-AUG-2023 10:54,  No significant change was found  Confirmed by Osman Lemon MD ([id number]) on 3/22/2024 5:21:05 PM    Referred By:  GP            Confirmed By:Osman Lemon MD             Lab Results   Component Value Date    WBC 10.50 06/19/2024    HGB 16.4 06/19/2024    HCT 49.9 06/19/2024    MCV 98 06/19/2024     06/19/2024     BMP  Lab Results   Component Value Date     06/19/2024    K 4.2 06/19/2024     06/19/2024    CO2 30 (H) 06/19/2024    BUN 15 06/19/2024    CREATININE 0.7 06/19/2024    CALCIUM 9.4 06/19/2024    ANIONGAP 7 (L) 06/19/2024     06/19/2024     (H) 10/31/2023     (H) 07/11/2023       Results for orders placed during the hospital encounter of 06/13/23    Echo Saline Bubble? Yes    Interpretation Summary  · The left ventricle is normal in size with low normal systolic function.  · The estimated ejection fraction is 50%.  · Normal left ventricular diastolic function.  · There is no evidence of intracardiac shunting.  · Normal right ventricular size with normal right ventricular systolic function.  · Normal central venous pressure (3 mmHg).    NM myocardial perfusion scan 6/15/23 Impression:     There is a small focus of decreased tracer accumulation in the inferior wall of the left ventricle seen on the rest images, a possible small scar with surrounding moderate area of reversible ischemia seen on the stress images.          Pre-op Assessment    I have reviewed the Patient Summary Reports.     I have reviewed the Nursing Notes. I have reviewed the NPO Status.   I have reviewed the Medications.     Review of Systems  Anesthesia Hx:  No problems with previous Anesthesia             Denies Family Hx of Anesthesia complications.    Denies Personal Hx of Anesthesia complications.                    Social:  Former Smoker       Hematology/Oncology:  Hematology Normal                                 Oncology Comments: Hx of bladder cancer     Cardiovascular:     Hypertension, well controlled   CAD (occ CP, NTG prn)      Angina     hyperlipidemia                             Pulmonary:   COPD, mild Asthma  moderate Shortness of breath  Sleep Apnea (not using CPAP)           Education provided regarding risk of obstructive sleep apnea            Renal/:   renal calculi               Hepatic/GI:     GERD, well controlled             Musculoskeletal:  Arthritis          Spine Disorders: lumbar and cervical Degenerative disease and Chronic Pain           Neurological:  Neurology Normal                                      Endocrine:  Endocrine Normal            Psych:  Psychiatric History  depression                Physical Exam  General: Well nourished, Cooperative, Alert and Oriented    Airway:  Mallampati: III / II  Mouth Opening: Normal  TM Distance: Normal  Tongue: Normal  Neck ROM: Normal ROM    Dental:  Intact    Chest/Lungs:  Clear to auscultation    Heart:  Rate: Normal  Rhythm: Regular Rhythm  Sounds: Normal    Abdomen:  Normal, Soft, Nontender        Anesthesia Plan  Type of Anesthesia, risks & benefits discussed:    Anesthesia Type: Gen ETT  Intra-op Monitoring Plan: Standard ASA Monitors  Post Op Pain Control Plan: multimodal analgesia and IV/PO Opioids PRN  Induction:  IV  Airway Plan: Video, Post-Induction  Informed Consent: Informed consent signed with the Patient and all parties understand the risks and agree with anesthesia plan.  All questions answered.   ASA Score: 3  Anesthesia Plan Notes:   General LMA   IV tylenol  Zofran Pepcid  Hydrocortisone 100mg IV    Ready For Surgery From Anesthesia Perspective.     .

## 2024-06-24 NOTE — ANESTHESIA POSTPROCEDURE EVALUATION
Anesthesia Post Evaluation    Patient: Martell Corcoran    Procedure(s) Performed: Procedure(s) (LRB):  EXCISION, LESION, CHEST WALL (N/A)    Final Anesthesia Type: general      Patient location during evaluation: PACU  Patient participation: Yes- Able to Participate  Level of consciousness: awake and alert and oriented  Post-procedure vital signs: reviewed and stable  Pain management: adequate  Airway patency: patent    PONV status at discharge: No PONV  Anesthetic complications: no      Cardiovascular status: blood pressure returned to baseline and hemodynamically stable  Respiratory status: unassisted, spontaneous ventilation and room air  Hydration status: euvolemic  Follow-up not needed.              Vitals Value Taken Time   /98 06/24/24 1030   Temp 36.2 °C (97.2 °F) 06/24/24 1000   Pulse 65 06/24/24 1031   Resp 19 06/24/24 1031   SpO2 97 % 06/24/24 1031   Vitals shown include unfiled device data.      No case tracking events are documented in the log.      Pain/Inna Score: Inna Score: 10 (6/24/2024 10:30 AM)

## 2024-07-03 ENCOUNTER — NURSE TRIAGE (OUTPATIENT)
Dept: ADMINISTRATIVE | Facility: CLINIC | Age: 69
End: 2024-07-03
Payer: MEDICARE

## 2024-07-16 ENCOUNTER — OFFICE VISIT (OUTPATIENT)
Dept: SURGERY | Facility: CLINIC | Age: 69
End: 2024-07-16
Payer: MEDICARE

## 2024-07-16 VITALS
BODY MASS INDEX: 27.32 KG/M2 | TEMPERATURE: 98 F | WEIGHT: 170 LBS | HEIGHT: 66 IN | SYSTOLIC BLOOD PRESSURE: 155 MMHG | DIASTOLIC BLOOD PRESSURE: 99 MMHG | HEART RATE: 82 BPM

## 2024-07-16 DIAGNOSIS — L82.1 SEBORRHEIC KERATOSIS: Primary | ICD-10-CM

## 2024-07-16 PROCEDURE — 99214 OFFICE O/P EST MOD 30 MIN: CPT | Mod: PBBFAC,PN | Performed by: SURGERY

## 2024-07-16 PROCEDURE — 99999 PR PBB SHADOW E&M-EST. PATIENT-LVL IV: CPT | Mod: PBBFAC,,, | Performed by: SURGERY

## 2024-07-16 PROCEDURE — 99024 POSTOP FOLLOW-UP VISIT: CPT | Mod: POP,,, | Performed by: SURGERY

## 2024-07-16 RX ORDER — HYDROCODONE BITARTRATE AND ACETAMINOPHEN 10; 325 MG/1; MG/1
1 TABLET ORAL EVERY 6 HOURS PRN
Qty: 20 TABLET | Refills: 0 | Status: SHIPPED | OUTPATIENT
Start: 2024-07-16

## 2024-07-16 NOTE — PROGRESS NOTES
Subjective:       Patient ID: Martell Corcoran is a 69 y.o. male.    Chief Complaint: Post-op Evaluation      HPI:  Status post excision of a large chest wall lesion.  Pathology returned seborrheic keratosis.  No evidence of malignancy.  No fevers.  Reports continued moderate discomfort at the incision site.  Nylon sutures still in place.    SKIN, CHEST WALL, WIDE LOCAL EXCISION     - SEBORRHEIC KERATOSIS     Review of Systems    Objective:      Physical Exam  Constitutional:       General: He is awake. He is not in acute distress.     Appearance: He is not toxic-appearing.   Pulmonary:      Effort: No tachypnea or bradypnea.   Chest:          Comments: Incision intact.  Nylon sutures intact.  There is erythema involving the nylon suture sites.  No evidence of infection.  No fluctuance.  No drainage.  Nylon sutures removed.  Neurological:      Mental Status: He is alert and oriented to person, place, and time.   Psychiatric:         Behavior: Behavior is cooperative.         Assessment/Plan:   Seborrheic keratosis      Sutures removed.  Incision little irritated at the nylon suture sites.  No evidence of infection.  Return to clinic p.r.n..

## 2024-07-18 NOTE — OP NOTE
Surgery Date: 6/24/2024     Surgeons and Role:     * Rodrigue Drake III, MD - Primary    Assisting Surgeon: None    Pre-op Diagnosis:  Skin lesion of chest wall [L98.9]    Post-op Diagnosis:  Post-Op Diagnosis Codes:     * Skin lesion of chest wall [L98.9]    Procedure(s) (LRB):  EXCISION, LESION, CHEST WALL (N/A) - 2.5 x 2.5 cm raised lesion anterior chest wall.    Anesthesia: Choice    Implants:  * No implants in log *    Operative Findings:  Patient brought to the operating room transferred the operating table supine position.  He was given general anesthesia intubated.  The chest was prepped and draped.  The large lesion was located anterior chest wall skin in the medial aspect of the right breast just lateral to the sternum.  The raised, large lesion measured about 2.5 x 2.5 cm.  A vertical elliptical midline incision was made.  I did not obtain more than a few mm of margin laterally.  Dissection was carried down into the subcutaneous tissue in the entire specimen was removed.  It was marked at the superior and inferior margins.  Hemostasis obtained.  I had to do quite a bit of undermining laterally to get the incision closed without much tension.  Incision was then closed in 2 layers.  Subcutaneous layer closed with interrupted Vicryl.  Skin closed with running 4-0 Monocryl.  I did place 5 interrupted nylons to help with tension.  Incision bandaged.  He was extubated and brought to recovery room stable condition.  Instrument counts correct.  Complications none.    Estimated Blood Loss: 10 mL    Estimated Blood Loss has been documented.         Specimens:   Specimen (24h ago, onward)      None            [id number]

## 2024-07-18 NOTE — DISCHARGE SUMMARY
Discharge Summary  General Surgery      Admit Date: 6/24/2024    Discharge Date :6/24/2024    Attending Physician: Rodrigue Drake III     Discharge Physician: Rodrigue Drake III    Discharged Condition: good    Discharge Diagnosis: Skin lesion of chest wall [L98.9]    Treatments/Procedures: Procedure(s) (LRB):  EXCISION, LESION, CHEST WALL (N/A)    Hospital Course: Uneventful; Discharged home from Recovery    Significant Diagnostic Studies: none    Disposition: Home or Self Care    Diet: Regular    Follow up: Office 10-14 days    Activity: No heavy lifting till seen in office.    Patient Instructions:   Discharge Medication List as of 6/24/2024 10:50 AM        START taking these medications    Details   HYDROcodone-acetaminophen (NORCO) 5-325 mg per tablet Take 1 tablet by mouth every 6 (six) hours as needed for Pain., Starting Mon 6/24/2024, Normal           CONTINUE these medications which have NOT CHANGED    Details   albuterol (PROVENTIL/VENTOLIN HFA) 90 mcg/actuation inhaler Inhale 1-2 puffs into the lungs every 4 (four) hours as needed for Shortness of Breath (coughing). Rescue, Starting Wed 9/13/2023, Normal      albuterol-ipratropium (DUO-NEB) 2.5 mg-0.5 mg/3 mL nebulizer solution USE 1 AMPULE IN NEBULIZER EVERY 4 HOURS AS NEEDED FOR WHEEZING, Normal      ALPRAZolam (XANAX) 0.5 MG tablet Take 1 tablet (0.5 mg total) by mouth 3 (three) times daily as needed for Anxiety., Starting Fri 2/23/2024, Normal      aspirin (ECOTRIN) 81 MG EC tablet Starting Sat 12/18/2021, Historical Med      azelastine (ASTELIN) 137 mcg (0.1 %) nasal spray 1 spray (137 mcg total) by Nasal route 2 (two) times daily., Starting Wed 9/14/2022, Normal      dupilumab (DUPIXENT) 300 mg/2 mL Syrg Inject 2 mLs (300 mg total) into the skin every 14 (fourteen) days., Starting Thu 3/21/2024, Normal      ELIQUIS 5 mg Tab Take 1 tablet (5 mg total) by mouth 2 (two) times daily., Starting Wed 5/29/2024, Normal      EScitalopram oxalate  (LEXAPRO) 20 MG tablet Take 0.5 tablets (10 mg total) by mouth once daily., Starting Wed 5/29/2024, Normal      esomeprazole (NEXIUM) 40 MG capsule Take 40 mg by mouth once daily., Starting Fri 10/1/2021, Historical Med      ezetimibe (ZETIA) 10 mg tablet Take 1 tablet (10 mg total) by mouth once daily., Starting Wed 5/29/2024, Normal      finasteride (PROSCAR) 5 mg tablet Take 5 mg by mouth once daily., Starting Wed 11/4/2020, Historical Med      fluticasone-umeclidin-vilanter (TRELEGY ELLIPTA) 200-62.5-25 mcg inhaler Inhale 1 puff into the lungs once daily., Starting Mon 2/26/2024, Normal      HYDROcodone-acetaminophen (NORCO) 7.5-325 mg per tablet Take 1 tablet by mouth every 6 (six) hours as needed for Pain., Starting Mon 6/10/2024, Normal      isosorbide mononitrate (IMDUR) 60 MG 24 hr tablet Take 1 tablet (60 mg total) by mouth once daily., Starting Wed 5/29/2024, Until Thu 5/29/2025, Normal      methylPREDNISolone (MEDROL DOSEPACK) 4 mg tablet use as directed, Normal      pitavastatin calcium (LIVALO) 4 mg Tab Take 1 tablet (4 mg) by mouth Daily., Starting Wed 11/22/2023, Until Thu 11/21/2024, Normal      !! predniSONE (DELTASONE) 2.5 MG tablet Take 2.5 mg by mouth every evening., Historical Med      !! predniSONE (DELTASONE) 5 MG tablet Take 5 mg by mouth once daily., Historical Med       !! - Potential duplicate medications found. Please discuss with provider.          Discharge Procedure Orders   Diet Adult Regular     Lifting restrictions   Order Comments: No lifting over twenty pounds for six weeks     Remove dressing in 48 hours

## 2024-07-18 NOTE — BRIEF OP NOTE
[facility]  Brief Operative Note    SUMMARY     Surgery Date: 6/24/2024     Surgeons and Role:     * Rodrigue Drake III, MD - Primary    Assisting Surgeon: None    Pre-op Diagnosis:  Skin lesion of chest wall [L98.9]    Post-op Diagnosis:  Post-Op Diagnosis Codes:     * Skin lesion of chest wall [L98.9]    Procedure(s) (LRB):  EXCISION, LESION, CHEST WALL (N/A) - 2.5 x 2.5 cm raised lesion anterior chest wall.    Anesthesia: Choice    Implants:  * No implants in log *    Operative Findings:  Patient brought to the operating room transferred the operating table supine position.  He was given general anesthesia intubated.  The chest was prepped and draped.  The large lesion was located anterior chest wall skin in the medial aspect of the right breast just lateral to the sternum.  The raised, large lesion measured about 2.5 x 2.5 cm.  A vertical elliptical midline incision was made.  I did not obtain more than a few mm of margin laterally.  Dissection was carried down into the subcutaneous tissue in the entire specimen was removed.  It was marked at the superior and inferior margins.  Hemostasis obtained.  I had to do quite a bit of undermining laterally to get the incision closed without much tension.  Incision was then closed in 2 layers.  Subcutaneous layer closed with interrupted Vicryl.  Skin closed with running 4-0 Monocryl.  I did place 5 interrupted nylons to help with tension.  Incision bandaged.  He was extubated and brought to recovery room stable condition.  Instrument counts correct.  Complications none.    Estimated Blood Loss: 10 mL    Estimated Blood Loss has been documented.         Specimens:   Specimen (24h ago, onward)      None            [id number]

## 2024-09-17 RX ORDER — EZETIMIBE 10 MG/1
10 TABLET ORAL DAILY
Qty: 90 TABLET | Refills: 3 | Status: SHIPPED | OUTPATIENT
Start: 2024-09-17

## 2024-10-28 DIAGNOSIS — J43.9 PULMONARY EMPHYSEMA, UNSPECIFIED EMPHYSEMA TYPE: ICD-10-CM

## 2024-10-28 DIAGNOSIS — E78.5 HYPERLIPIDEMIA, UNSPECIFIED HYPERLIPIDEMIA TYPE: ICD-10-CM

## 2024-10-28 DIAGNOSIS — J44.9 CHRONIC OBSTRUCTIVE PULMONARY DISEASE, UNSPECIFIED COPD TYPE: ICD-10-CM

## 2024-10-28 DIAGNOSIS — I25.118 ATHEROSCLEROSIS OF NATIVE CORONARY ARTERY OF NATIVE HEART WITH STABLE ANGINA PECTORIS: ICD-10-CM

## 2024-10-28 RX ORDER — PITAVASTATIN CALCIUM 4.18 MG/1
4 TABLET, FILM COATED ORAL DAILY
Qty: 90 TABLET | Refills: 3 | Status: SHIPPED | OUTPATIENT
Start: 2024-10-28 | End: 2025-10-28

## 2024-10-28 RX ORDER — ALBUTEROL SULFATE 90 UG/1
1-2 INHALANT RESPIRATORY (INHALATION) EVERY 4 HOURS PRN
Qty: 54 G | Refills: 3 | Status: SHIPPED | OUTPATIENT
Start: 2024-10-28

## 2024-10-29 DIAGNOSIS — Z00.00 ENCOUNTER FOR MEDICARE ANNUAL WELLNESS EXAM: ICD-10-CM

## 2024-11-04 ENCOUNTER — PATIENT MESSAGE (OUTPATIENT)
Dept: PULMONOLOGY | Facility: CLINIC | Age: 69
End: 2024-11-04
Payer: MEDICARE

## 2024-11-04 DIAGNOSIS — J45.50 STEROID-DEPENDENT ASTHMA, SEVERE PERSISTENT, UNCOMPLICATED: ICD-10-CM

## 2024-11-04 DIAGNOSIS — J44.89 ASTHMA-COPD OVERLAP SYNDROME: ICD-10-CM

## 2024-11-04 DIAGNOSIS — J82.83 EOSINOPHILIC ASTHMA: ICD-10-CM

## 2024-11-07 ENCOUNTER — TELEPHONE (OUTPATIENT)
Dept: CARDIOLOGY | Facility: CLINIC | Age: 69
End: 2024-11-07
Payer: MEDICARE

## 2024-11-08 ENCOUNTER — TELEPHONE (OUTPATIENT)
Dept: OPTOMETRY | Facility: CLINIC | Age: 69
End: 2024-11-08
Payer: MEDICARE

## 2024-11-08 NOTE — TELEPHONE ENCOUNTER
Pt came in for chalazion removal LLL.   Explained that Dr. Padilla does not excise so scheduled appt with Dr. Sung.   Pt also noticed that bump bothers OS more when eyes are dry.   Pt uses Visine regularly, advised to discontinue Visine gtts and gave drop sheet for ATS / liquigel to switch to.   States good understanding of how to use ATS and will cancel appt with Ana Lilia if feels no longer needs.   Regimen: Lana Barrett NP is supervising provider today.    Reviewed and verified Advanced Directives: No: Patient declined to create/provide document at this time     Nursing Assessment: A focused nursing assessment  was performed and the patient reports the following: Nausea: NO  Vomiting: NO  Other signs of infection: NO  Fatigue/Weakness: NO  Numbness/Tingling: NO  Anxiety/Depression/Insomnia: NO  Pain: NO    Pre-Treatment: - Patient has valid pre-authorization  - VS completed  - Premed orders are verified prior to administration  - Treatment parameters verified in patient protocol  - Patient is identified by first & last name, Date of birth that has been verified with the patient chairside.    Treatment: Refer to [facility] and MAR for line assessment and medication administration, Blood return confirmed before, during and after treatment administered and Infusion pump used for non-vesicant drugs    Post Treatment: Treatment tolerated well; no adverse reaction    Education: No new instructions needed    Next appointment scheduled:   Future Appointments   Date Time Provider Department Center   11/24/2023  8:30 AM [id number] INFUSION RN [facility]18 [facility]   11/28/2023  2:00 PM [id number] INFUSION RN [facility]18 [facility]   11/30/2023  8:30 AM [id number] INFUSION RN [facility]   12/5/2023  8:30 AM [id number] INFUSION RN [facility]   2/9/2024 11:15 AM Bola Moe MD [place] [id number] [facility]   3/11/2024  1:45 PM Jesús Sosa DO [facility]   12/26/2024  3:30 PM Inder Welch MD [facility]       Patient instructed to call the office with any questions or concerns.    Patient Discharged: patient discharged to home per self, ambulatory

## 2024-11-09 ENCOUNTER — PATIENT MESSAGE (OUTPATIENT)
Dept: PULMONOLOGY | Facility: CLINIC | Age: 69
End: 2024-11-09
Payer: MEDICARE

## 2024-11-19 NOTE — Clinical Note
dry, intact, no bleeding and no hematoma. Manual pressure applied for 10 mins, hemostasis achieved to bilateral groins Detail Level: Detailed Quality 226: Preventive Care And Screening: Tobacco Use: Screening And Cessation Intervention: Patient screened for tobacco use and is an ex/non-smoker

## 2024-11-20 ENCOUNTER — PATIENT MESSAGE (OUTPATIENT)
Dept: CARDIOLOGY | Facility: CLINIC | Age: 69
End: 2024-11-20
Payer: MEDICARE

## 2024-11-21 RX ORDER — EZETIMIBE 10 MG/1
10 TABLET ORAL DAILY
Qty: 90 TABLET | Refills: 3 | Status: SHIPPED | OUTPATIENT
Start: 2024-11-21

## 2024-11-28 ENCOUNTER — PATIENT MESSAGE (OUTPATIENT)
Dept: ADMINISTRATIVE | Facility: OTHER | Age: 69
End: 2024-11-28
Payer: MEDICARE

## 2024-11-29 ENCOUNTER — HOSPITAL ENCOUNTER (OUTPATIENT)
Dept: RADIOLOGY | Facility: HOSPITAL | Age: 69
Discharge: HOME | End: 2024-11-29
Attending: INTERNAL MEDICINE
Payer: MEDICARE

## 2024-11-29 DIAGNOSIS — F17.210 NICOTINE DEPENDENCE, CIGARETTES, UNCOMPLICATED: ICD-10-CM

## 2024-11-29 PROCEDURE — 71271 CT THORAX LUNG CANCER SCR C-: CPT | Mod: 26,,, | Performed by: RADIOLOGY

## 2024-11-29 PROCEDURE — 71271 CT THORAX LUNG CANCER SCR C-: CPT | Mod: TC,PO

## 2024-12-02 DIAGNOSIS — R91.1 SOLITARY PULMONARY NODULE: Primary | ICD-10-CM

## 2024-12-04 ENCOUNTER — PATIENT MESSAGE (OUTPATIENT)
Dept: PULMONOLOGY | Facility: CLINIC | Age: 69
End: 2024-12-04
Payer: MEDICARE

## 2024-12-05 ENCOUNTER — TELEPHONE (OUTPATIENT)
Dept: PULMONOLOGY | Facility: CLINIC | Age: 69
End: 2024-12-05
Payer: MEDICARE

## 2024-12-05 NOTE — TELEPHONE ENCOUNTER
----- Message from Shanae sent at 12/4/2024 11:50 AM CST -----  Type:  Patient Returning Call    Who Called:  pt  Who Left Message for Patient:  Vicky  Does the patient know what this is regarding?:  yes  Best Call Back Number:  [phone]    Additional Information:  please call and advise--thank you

## 2024-12-10 ENCOUNTER — OFFICE VISIT (OUTPATIENT)
Dept: PULMONOLOGY | Facility: CLINIC | Age: 69
End: 2024-12-10
Payer: MEDICARE

## 2024-12-10 VITALS
HEIGHT: 66 IN | OXYGEN SATURATION: 93 % | BODY MASS INDEX: 27.88 KG/M2 | HEART RATE: 73 BPM | DIASTOLIC BLOOD PRESSURE: 96 MMHG | SYSTOLIC BLOOD PRESSURE: 164 MMHG | WEIGHT: 173.5 LBS

## 2024-12-10 DIAGNOSIS — J45.51 SEVERE PERSISTENT ASTHMA WITH ACUTE EXACERBATION: ICD-10-CM

## 2024-12-10 DIAGNOSIS — R91.1 LUNG NODULE: ICD-10-CM

## 2024-12-10 DIAGNOSIS — J44.89 ASTHMA-COPD OVERLAP SYNDROME: Primary | ICD-10-CM

## 2024-12-10 DIAGNOSIS — J82.83 EOSINOPHILIC ASTHMA: ICD-10-CM

## 2024-12-10 DIAGNOSIS — F17.210 NICOTINE DEPENDENCE, CIGARETTES, UNCOMPLICATED: ICD-10-CM

## 2024-12-10 DIAGNOSIS — J44.9 CHRONIC OBSTRUCTIVE PULMONARY DISEASE, UNSPECIFIED COPD TYPE: ICD-10-CM

## 2024-12-10 PROCEDURE — 99999 PR PBB SHADOW E&M-EST. PATIENT-LVL IV: CPT | Mod: PBBFAC,,, | Performed by: INTERNAL MEDICINE

## 2024-12-10 PROCEDURE — 99214 OFFICE O/P EST MOD 30 MIN: CPT | Mod: PBBFAC,PO | Performed by: INTERNAL MEDICINE

## 2024-12-10 PROCEDURE — 99214 OFFICE O/P EST MOD 30 MIN: CPT | Mod: S$PBB,,, | Performed by: INTERNAL MEDICINE

## 2024-12-10 RX ORDER — TEZEPELUMAB-EKKO 210 MG/1.9ML
210 INJECTION, SOLUTION SUBCUTANEOUS
Qty: 1.91 ML | Refills: 12 | Status: ACTIVE | OUTPATIENT
Start: 2024-12-10

## 2024-12-10 RX ORDER — PREDNISONE 20 MG/1
TABLET ORAL
Qty: 36 TABLET | Refills: 0 | Status: SHIPPED | OUTPATIENT
Start: 2024-12-10

## 2024-12-10 RX ORDER — ALFUZOSIN HYDROCHLORIDE 10 MG/1
TABLET, EXTENDED RELEASE ORAL
COMMUNITY
Start: 2024-11-08

## 2024-12-10 NOTE — PROGRESS NOTES
12/10/2024    Martell Corcoran  Follow up    Chief Complaint   Patient presents with    Follow-up    COPD    Asthma       December 10, 2024- the patient is a smoker-worked at [employer]-had become steroid dependent asthma-does would Dupixent  did better -- was effective in June,but seems less effective now -- was on since April.     Had a mediastinal cyst that was evaluated and felt to be benign.  Pt usually better this time of year.   Pt now much worse-he thinks Dupixent is not effective anymore.  He was steroid dependent before starting Dupixent.  He has not taken steroids since.  He has been on Dupixent now for about 6 months plus.  Patient still smokes.  He has been using preventive therapy with Trelegy          6/10/204 pt off prednisone as weaned off-- had couple spells sob. Has been dupixent with very good result.  Pt missed couple doses.   Pt off prednisone   Newly  to girl friend....    Still h;aving smoking problems... off smokes intermittently...  Patient Instructions   Dupixent response very good -- expect better in 3-9 months    Check ct in octobers-- next 5 yrs ordered    Call if problems        3/7/2024 pt has history of smoking and prednisone down to 5/d.  Still uses trelegy once daily..   no longer has a fib-- may stop eliuis sooon... no yellow mucous  Pt has been on steroids since last June and needed continious  Not using cpap as not felt helpful..  November 2, 2023-absolute eosinophil count was only 100 in late October and also in middle September.  October 31st CT of the chest did not show any remarkable findings.  Resumed smokes-- frustrated with [facility].   treatment plant failed.  Pt resumed prednisone as lungs worsened-- now on 7.5 mg.     Patient Instructions   Ct chest viewed  from 10/31/2023 -- no lung nodules of concern    Mucous seen in airway -- you appreciate mucous.      You need chronic steroids and you had hyperglycemia -- metformin 500 mg daily (could go to 2 daily  if needed) may prevent    Pt had been intolerant of crestor, lipitor, mevacor, zocor, and provachol  -- you have been able to tolerate Livalo and need to continue as you have had MI and stable angina......  Patient Instructions   You have severe steroid dependant asthma.  Would recommend  dupixent 300 mg every 2 wks    May consider going to prednisone 2.5 mg daily if dupixent works well    Dupixent sample given in office for total of 600 mg sq today        Ct viewed going back to  2017  There is vague mild tracheal stenosis seen on  ct and chest xray going back to 2017 -- may impair mucous clearing.  No intervention for now...  9/6/2023 off smokes 7/26/2023  and still steroid dependant.  Pt got off prednisone and not thriving.   Breathing very short acitivity.  Marked garcia last month.  Sinuses ok and no yg mucous.  A fib remitted after ablation.  Patient Instructions   You are off smokes and still needing steroids as breathing poorly.  Pattern is severe asthmatic and eosinophils have been very high.    Would recheck eosinophils    Ct from 2022 viewed and showed minimal emphysema.    Trelegy is dosed daily.    Use albuterol prior to activity.      Resume prednisone 10 mg daily to 20 mg daily after blood test.  Stop in 2 wks and resume as needed    If remains unstable by November may consider shots?     -     7/27/2023 just got off steroids last 2 days -- uses trelegy..  had a fib ablation on 13th July and was feeling well.    Pt off smokes since 1/7/2023     Pt was seen by Dr Thornton -- no f/u for mediastinal cyst --we discuss today  Patient Instructions   Non fasting sugar of 146 on July 11th suggest steroid induced diabetes.  Should be better off steroids.    If you relapse soon would recommend biologic shots for asthma -- Fasenra would be good?    Mediastinal abnormality was eval by Dr Thornton in past-- could do follow up ct chest to screen for lung cancer... we discuss    Continue trelegy    Lung capacity was 55% in  2020 6/29/2023 pt presented nsr er June 13th -- had bad coughing spell with sob and chest pains -- pt went home on 18th on prednisone 60/d with few days left today.  Pt still has wheezes in early am.   Codeine did not work -- may have worsened.    Still on trelegy.      Eos were elevated to 800 at presentation --   Patient Instructions   Cxr June 13th viewed  - no pneumonia  You had high blood sugars in hospital -- 160's.  You may have problems with using high dose steroids.   Stop prednisone when out -- drop to 2 daily for 2 days then one daily (if able)- 14 days.    Have prednisone on hand-- start 3 20 mg daily and call if needs again  You have been on trelegy -- continue.   You had unusually high eosinophils when flared lungs with recent heart attack  You have asthma and copd.  You may be steroid dependant if need to repeat-- consider special shots.       No bad germs in lung mucous on June 15th  May need diabetes medication-- would check sugars in future if on steroids......        5/23/2023 pt was doing cleaning for elderly cat lady- 5/12/2023- had to spray acontainer for roaches- loaded with roaches- he was given fogger,  and was trying to fog container, sprayed and then closed container,  exposed to [place] fogger off and on for roughly 1/2 hr to 40 min. No burning. No sob nor cough til roughly 4-6 hrs later.  Similar exposures to cleaning was no problem in past. Pt was advised to go to er after 4 -5 days -- pt had to use neb q 4-6 hr post exposure whereas was using neb rx every 6 months prior.  Pt seen in er-- prednisone 20/d x 4 done.  Sat 96 and wheezes noted.      Pt did use zpak but mucous still yellow.   Patient Instructions   Chest xray 5/17/2023 viewed with no problems.    Breathing test 2020 with copd 56% lung capacity      Use trelegy or stiolto-- trelegy has steroids which are more effective to prevent wheezes    Increase steroids-- may need into future?? For now use prednisone 20mg 3 x 3 and  taper,,,,,  may repeat.  Would be best to inhale steroids to prevent relapse -- not sure long term need.    Codiene not covered but not $$      Use azithromycin, may need special antibiotic??  Need to have mucous clear.      Call if not back to usual in 2-3 wks???    3/7/2023 quit smokes, feels fatigued chronically. Had naila and cardioversion November.  Back to rhythm but could not use meds to continue.   Uses anoro and qvar--- not using reg as before.    Pt missed lexapro somehow-- had increased anxiety and stomach issues.  Uses occ xanax.   Uses astelin      Has occ blurred vision.  Patient Instructions   Pet scan viewed.  Deviated septum noted.     Would use stiolto 2 daily -- could skip -- anoro not good for regular use.    Novmeber screening ct chest     Nerves may cause fatigue    Sleep apnea may cause excess sleepiness-- would re try cpap once nerves stable    Would re try cholesterol  medications.     Try cpap -- may use ambein to sleep--dedicate 8 hrs sleep to use ambein.      Sept 13, 2022  in past, had own company.  Lung dz developed 4 + yrs ago.  Bladder cancer removed.  Had bcg infusions 2020 . Some garcia. Problem nasal stuffy tolerating cpap.    Patient Instructions   Nasal stuffy -- afrin or generic afrin --- use one to 2 sprays up to once daily -- alternate nares ?  If regular use may get tolerant and afrin ineffective.  Could see ent to improve passages.      Flonase should work better (or astelin) if passages open.     Sleep study was only 8 episodes an hour-- less than 5 is normal.     Cpap titration needed 8-9 cm pressure    Mediastinal abnormality varies-- would send to cardiothoracic surgery.    Copd is moderate at 55% or so.  Anoro and qvar effective -- as needed albuterol.  Would stop smokes.     CT films are reviewed directly with the patient.  Extensive discussion is made.  Patient's evaluation took 56 minutes today.          Below from RJ Salgado  6/1/2022- SOB persistent  complaint, pain with deep inspiration on left chest. Followed by cardiology for A fib and vascular blockages pacemaker procedure postponed.    Wearing CPAP with nose mask, moves across face when sleeping. Does not feel better after wearing.   Cough- improved, less often and severe, most days, worse when weather changes, productive clear mucous  Currently smoking 3-4 cigarettes daily, trying to cut down.   Patient Instructions   CT of chest shows clear lungs, no change to previously seen lung nodules.     A cyst is seen in muscle could be cause of chest pain    Recommend smoking cessation    Continue COPD medication regiment    Recommend trying CPAP therapy again.     2/4/2022-  States breathing is labored. States usually worse after infusion for bladder cancer. States does have benefit with qvar and Anoro, using albuterol as needed 2x daily.     Daytime fatigue persistent.  purchased CPAP from third party. Has not started to use.     Scheduled for pacemaker to treat A-fib. Followed by Dr. Miranda.     Cough- decreased, 2x daily, productive clear mucous, cut back on smoking to 6 cigarettes daily    1/14/2022- not able to get CPAP due to high co-pay.   States breathing improved after decreasing smoking to 3-6 cigarettes daily.   Shortness of breath- daily complaint, slightly improved, worse with exertion, improves with rest. Daytime fatigue unchanged.   Complaint of cough- varies in severity, currently mild. Productive small amount clear mucous, did notice worsening after first starting to cut back on smoking. Has returned to normal   Followed by Urologist Dr. Pham for bladder cancer. Undergoing infusion therapy.     10/4/2021- concerned he had COVID 19 late July early August, lost sense of taste and smell. Complaint of fatigue, body aches, shortness of breath when walking,   No fever. Gradually improved over 4 weeks. Taste and smell has not returned. Experiencing short term memory loss and metal grogginess. Has  daytime drowsiness onset years worsened in past two months  Persistent cough- productive clear mucous, associated with wheeze. Improves with albuterol inhaler.   Currently on Anoro, QVAR, using albuterol when needed 1-3 x weekly.     2021- Admitted To hospital in [place] while on vacation 2021 for COPD exacerbation. Seen at [facility] 2020 for COPD exacerbation.   Complaint of SOB- daily, worse with exertion, improves with rest. Treated with antibiotics and steroid therapy April.   Cough- recurrent complaint,  improved since hospital discharge, worsened in last 2 weeks after spending time with grandson who had bronchitis that has improved with nebulizer treatments every 4 hours. Associated with chest tightness and wheeze.       2020- he had bladder biopsy at [facility] with Dr. Pham on 11/3- per outside records path with High grade papillary urothelial cell carcinoma. He has cough w/ postnasal drip and allergies but breathing is much better. He has been dealing with a lot for the family- 2 brothers just . Hematuria is much better. Not getting bladder infections any more. He continues to smoke 1/2 ppd. Wants to quit but too much stress recently- not ready.    2020-   Start taking prednisone again- long taper over 3 weeks then try to stop. If lungs flaring while decreasing prednisone go up to the last dose that helped you and call our office.  Continue trelegy  Refilled duo nebs to use as needed  Use albuterol as needed  Quit smoking- go to program  Follow up with urologist  Follow up with PCP for kidney testing and possible urinary infection  pt was recently hospitalized for COPD exacerbation, seen by me while admitted 20 and also having worsening hematuria at that time. He was sent home with a course of levaquin and steroid taper. He did not qualify for home O2. He is being worked up for a bladder mass per urology and pending transurethral resection.  Pt c/o pain  around L kidney after doing yard work and urine turned darker. He was coughing last night and woke up with face feeling puffy. Switched urologists to a Dr. Pham at [facility] and has appt at end of September.  He finished prednisone taper. Still taking levaquin. Still feels some congestion in chest but it is getting better. Also has sinus problems w/ nose blocked. He denies frequent exacerbations but this one has been very bad. Feels like worsening since stopped prednisone. Still smokes but trying to cut back. Has smoking cessation appt later this month.  Inhalers: nebs 4-5x/day, trelegy daily, albuterol rescue 1-2x/day    7/15/20- Pt is a 66 yo male with HTN, GERD and BPH presents for new evaluation of breathing issues. He complains of SOB especially if he carries anything like taking out trash to the dumpster. This has been progressive over about 3.5 yrs. He wheezes and coughs up clear mucous, throughout the day.  Pt smokes 1 PPD x 55 yrs.  At age 5 pt had pna and a collapsed lung requiring chest tube on the left side and hospitalization. Didn't have any other problems w/ breathing growing up. He took up playing [employer] to help lung function.  Pt had abd/p scan for UTI recently which showed some emphysematous changes.    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 One brother  at 65 from leukemia.    Grew up in [place]    The chief compliant  problem varies with instablilty at time      PFSH:  Past Medical History:   Diagnosis Date    A-fib     Anticoagulant long-term use     Asthma     Benign enlargement of prostate     Had a biopsy in around     Bladder cancer     Cancer     COPD (chronic obstructive pulmonary disease)     DDD (degenerative disc disease), lumbar     Elevated PSA     GERD (gastroesophageal reflux disease)     Hypertension     Started with meds in .    Kidney stone     Sleep apnea      "Urinary tract infection      Past surg hist  Past Surgical History:   Procedure Laterality Date    FISTULA REPAIR        LEFT HEART CATHETERIZATION N/A 10/23/2018     Procedure: Left heart cath;  Surgeon: Niharika Squires MD;  Location: [facility] CATH;  Service: Cardiology;  Laterality: N/A;          Social History     Tobacco Use    Smoking status: Former     Current packs/day: 0.00     Average packs/day: 1 pack/day for 55.0 years (55.0 ttl pk-yrs)     Types: Cigarettes     Start date: 1968     Quit date: 2023     Years since quittin.9    Smokeless tobacco: Never   Substance Use Topics    Alcohol use: Yes     Comment: occ    Drug use: No     Family History   Problem Relation Name Age of Onset    Heart failure Mother      Cancer Father      Heart disease Brother      Heart attack Brother      Cancer Brother      Heart disease Brother      Drug abuse Brother       Review of patient's allergies indicates:   Allergen Reactions    Msg [glutamic acid] Nausea Only, Palpitations, Shortness Of Breath and Swelling    Oyster extract Swelling     PT swells in his throat after eating oysters on occasion       Performance Status:The patient's activity level is functions out of house. QUACH improved and does not limit him. Back pain limits activity.    Review of Systems:  a review of eleven systems covering constitutional, Eye, HEENT, Psych, Respiratory, Cardiac, GI, , Musculoskeletal, Endocrine, Dermatologic was negative except for pertinent findings as listed ABOVE and below:   wheeze, shortness of breath, fatigue      Exam:Comprehensive exam done. BP (!) 164/96 (BP Location: Left arm, Patient Position: Sitting)   Pulse 73   Ht 5' 6" (1.676 m)   Wt 78.7 kg (173 lb 8 oz)   SpO2 (!) 93% Comment: on room air at rest  BMI 28.00 kg/m²   Exam included Vitals as listed, and patient's appearance and affect and alertness and mood, oral exam for yeast and hygiene and pharynx lesions and Mallapatti (M) score, neck with " inspection for jvd and masses and thyroid abnormalities and lymph nodes (supraclavicular and infraclavicular nodes and axillary also examined and noted if abn), chest exam included symmetry and effort and fremitus and percussion and auscultation, cardiac exam included rhythm and gallops and murmur and rubs and jvd and edema, abdominal exam for mass and hepatosplenomegaly and tenderness and hernias and bowel sounds, Musculoskeletal exam with muscle tone and posture and mobility/gait and  strength, and skin for rashes and cyanosis and pallor and turgor, extremity for clubbing.  Findings were normal except for pertinent findings listed below:  M1  Bilateral clear lungs w/ faint rhonchi bilateral lower lung zones  No clubbing or edema    Radiographs (ct chest and cxr) reviewed: view by direct vision   CT scan of the chest September 7, 2022 viewed by direct vision.  Fairly small mediastinal cystic structure looks to be a little smaller than March.  There was very very difficult to see in 2021 however.    CT Chest Without Contrast 03/14/22 Emphysema and unchanged lung nodules  Indeterminate smoothly marginated structure along the anterior superior mediastinum, measuring just above simple fluid attenuation suggestive of a minimally complex/complicated cyst, possibly a lymphovascular malformation, synovial cyst (extending from the sternoclavicular joint), foregut duplication cyst, thymic epithelial lesion/cyst, and much less likely malignancy, however this has slightly increased in size from the previous exam and may warrant interval attention on follow-up imaging.       CT Chest Without Contrast  05/20/2021   In the left upper lobe is a confluence of vessels and a possible 4 mm nodule decreased in size and conspicuousness since the prior study of July 16, 2020.  No new or enlarging pulmonary nodules are identified.  Emphysema.     CT chest/abd/p 11/20/20- mild emphysema, lungs clear aside from small 6mm nodule DANA  which is unchanged from prior exam  1. No metastatic disease.  2. Nodular thickening of the left posterior aspect of the bladder.  3. Enlarged prostate.  3. Mild pulmonary emphysema.   CXR 8/14/20- possible vague infiltrate/opacifications bilateral bases  CT chest 7/16/20- DANA 6mm nodule  CT abd/p 6/26/20- bases of lungs w/ scant emphysematous changes, some strands of atelectasis    Labs reviewed    Lab Results   Component Value Date    WBC 10.50 06/19/2024    RBC 5.09 06/19/2024    HGB 16.4 06/19/2024    HCT 49.9 06/19/2024    MCV 98 06/19/2024    MCH 32.2 (H) 06/19/2024    MCHC 32.9 06/19/2024    RDW 13.5 06/19/2024     06/19/2024    MPV 8.3 (L) 06/19/2024    GRAN 8.3 (H) 06/19/2024    GRAN 78.5 (H) 06/19/2024    LYMPH 1.3 06/19/2024    LYMPH 12.8 (L) 06/19/2024    MONO 0.8 06/19/2024    MONO 7.9 06/19/2024    EOS 0.0 06/19/2024    BASO 0.02 06/19/2024    EOSINOPHIL 0.3 06/19/2024    BASOPHIL 0.2 06/19/2024     Results for GONLUCERO, MARTELL RAMEY (MRN [id number]) as of 5/24/2021 15:33   Ref. Range 8/13/2020 02:30 8/13/2020 06:01 8/14/2020 05:03 12/16/2020 09:20 3/22/2021 15:14   Eos # Latest Ref Range: 15 - 500 cells/uL 1.4 (H) 0.3 0.0 0.5 122       PFT reviewed  7/16/20- moderately severe obstruction, reduced DLCO, air trapping    EPWORTH SLEEPINESS SCALE SCORE 13 on 10/4/2021  Sleep study 10/7/2021 AHI Significant obstructive sleep apnea with a TRUPTI of 8.1/hr      Plan:  Clinical impression is apparently straight forward and impression with management as below.    Martell was seen today for follow-up, copd and asthma.    Diagnoses and all orders for this visit:    Asthma-COPD overlap syndrome  -     Complete PFT without bronchodilator; Future  -     Six Minute Walk Test to qualify for Home Oxygen; Future    Nicotine dependence, cigarettes, uncomplicated    Severe persistent asthma with acute exacerbation  -     tezepelumab-ekko (TEZSPIRE) 210 mg/1.91 mL (110 mg/mL) PnIj; Inject 1.91 mLs (210.1 mg total) into the  skin every 28 days.  -     predniSONE (DELTASONE) 20 MG tablet; 3 for 3 days then 2 for 3 days then one for 3 days and repeat for breathing problems    Eosinophilic asthma  -     CBC auto differential; Future    Chronic obstructive pulmonary disease, unspecified COPD type  -     Complete PFT without bronchodilator; Future    Lung nodule                         - continue COPD medication regiment   - lung nodules unchanged.        Follow up in about 6 weeks (around 1/21/2025).    Discussed with patient above for education the following:         Discussed with patient above for education the following:      Patient Instructions   Ct chest with left mid lung (series 3 image 105) <5 mm mainly ground glass density,and also 4.7 mm right lower lobe superior segment (series 3 image 125) nodule--    Need to stop smokes  Will repeat ct in 6 months June 2nd.          Dupixent seemed very effective -- actually you are doing better as you are not on constant prednisone.  Will order tezspire.  Would recommend follow up eosiniophils    Take prednisone now.

## 2024-12-10 NOTE — PATIENT INSTRUCTIONS
Ct chest with left mid lung (series 3 image 105) <5 mm mainly ground glass density,and also 4.7 mm right lower lobe superior segment (series 3 image 125) nodule--    Need to stop smokes  Will repeat ct in 6 months June 2nd.          Dupixent seemed very effective -- actually you are doing better as you are not on constant prednisone.  Will order tezspire.  Would recommend follow up eosiniophils    Take prednisone now.

## 2024-12-17 ENCOUNTER — OFFICE VISIT (OUTPATIENT)
Dept: OPHTHALMOLOGY | Facility: CLINIC | Age: 69
End: 2024-12-17
Payer: MEDICARE

## 2024-12-17 DIAGNOSIS — H25.13 NUCLEAR SCLEROTIC CATARACT, BILATERAL: ICD-10-CM

## 2024-12-17 DIAGNOSIS — H04.123 DRY EYE SYNDROME, BILATERAL: ICD-10-CM

## 2024-12-17 DIAGNOSIS — H02.88B MEIBOMIAN GLAND DYSFUNCTION (MGD), BILATERAL, BOTH UPPER AND LOWER LIDS: ICD-10-CM

## 2024-12-17 DIAGNOSIS — H00.15 CHALAZION LEFT LOWER EYELID: Primary | ICD-10-CM

## 2024-12-17 DIAGNOSIS — H02.88A MEIBOMIAN GLAND DYSFUNCTION (MGD), BILATERAL, BOTH UPPER AND LOWER LIDS: ICD-10-CM

## 2024-12-17 PROCEDURE — 99213 OFFICE O/P EST LOW 20 MIN: CPT | Mod: PBBFAC,PO | Performed by: OPHTHALMOLOGY

## 2024-12-17 PROCEDURE — 99999 PR PBB SHADOW E&M-EST. PATIENT-LVL III: CPT | Mod: PBBFAC,,, | Performed by: OPHTHALMOLOGY

## 2024-12-17 PROCEDURE — 99203 OFFICE O/P NEW LOW 30 MIN: CPT | Mod: S$PBB,,, | Performed by: OPHTHALMOLOGY

## 2024-12-17 NOTE — PROGRESS NOTES
HPI    Pt states he has been having a chalazion on LLL for about 4 mths. States   has been using baby shampoo wash, warm compress, systane comp OU TID,   systane jayde OU BID. States has improved but feels like it has moved. Still   feeling  bump under bottom lid.     Pain in eyes sometimes. Has dry eyes.   Last edited by Fouzia Grayson on 12/17/2024  3:03 PM.            Assessment /Plan     For exam results, see Encounter Report.    Chalazion left lower eyelid    Meibomian gland dysfunction (MGD), bilateral, both upper and lower lids    Dry eye syndrome, bilateral    Nuclear sclerotic cataract, bilateral      1. Chalazion left lower eyelid (Primary)  Resolving  Continue warm compresses  Pt reassured    2. Meibomian gland dysfunction (MGD), bilateral, both upper and lower lids  Discussed w/ patient  Recommend warm compresses regularly    3. Dry eye syndrome, bilateral  Mild  ATs PRN    4. Nuclear sclerotic cataract, bilateral  Mild, observe    F/u 1 year, routine exam

## 2024-12-23 ENCOUNTER — HOSPITAL ENCOUNTER (OUTPATIENT)
Dept: PULMONOLOGY | Facility: HOSPITAL | Age: 69
Discharge: HOME | End: 2024-12-23
Attending: INTERNAL MEDICINE
Payer: MEDICARE

## 2024-12-23 ENCOUNTER — PATIENT MESSAGE (OUTPATIENT)
Dept: PULMONOLOGY | Facility: CLINIC | Age: 69
End: 2024-12-23
Payer: MEDICARE

## 2024-12-23 DIAGNOSIS — J44.9 CHRONIC OBSTRUCTIVE PULMONARY DISEASE, UNSPECIFIED COPD TYPE: ICD-10-CM

## 2024-12-23 DIAGNOSIS — J44.89 ASTHMA-COPD OVERLAP SYNDROME: ICD-10-CM

## 2024-12-23 DIAGNOSIS — R91.1 SOLITARY PULMONARY NODULE: ICD-10-CM

## 2024-12-23 LAB
DLCO SINGLE BREATH LLN: 17.08
DLCO SINGLE BREATH PRE REF: 64.7 %
DLCO SINGLE BREATH REF: 24.01
DLCOC SBVA LLN: 2.5
DLCOC SBVA REF: 3.8
DLCOC SINGLE BREATH LLN: 17.08
DLCOC SINGLE BREATH REF: 24.01
DLCOVA LLN: 2.5
DLCOVA PRE REF: 74.8 %
DLCOVA PRE: 2.85 ML/(MIN*MMHG*L) (ref 2.5–5.1)
DLCOVA REF: 3.8
ERV LLN: -16449.03
ERV PRE REF: 177.3 %
ERV REF: 0.97
FEF 25 75 LLN: 1.27
FEF 25 75 PRE REF: 18 %
FEF 25 75 REF: 2.99
FEV1 FVC LLN: 63
FEV1 FVC PRE REF: 62.7 %
FEV1 FVC REF: 77
FEV1 LLN: 2.05
FEV1 PRE REF: 48.9 %
FEV1 REF: 2.84
FRCPLETH LLN: 2.47
FRCPLETH PREREF: 252 %
FRCPLETH REF: 3.45
FVC LLN: 2.75
FVC PRE REF: 77.9 %
FVC REF: 3.7
IVC PRE: 3.13 L (ref 2.75–4.67)
IVC SINGLE BREATH LLN: 2.75
IVC SINGLE BREATH PRE REF: 84.5 %
IVC SINGLE BREATH REF: 3.7
MVV LLN: 92
MVV PRE REF: 43.7 %
MVV REF: 109
PEF LLN: 5.52
PEF PRE REF: 50.4 %
PEF REF: 7.58
PRE DLCO: 15.52 ML/(MIN*MMHG) (ref 17.08–30.94)
PRE ERV: 1.72 L (ref -16449.03–16450.97)
PRE FEF 25 75: 0.54 L/S (ref 1.27–4.7)
PRE FET 100: 9.66 SEC
PRE FEV1 FVC: 48.12 % (ref 63.33–88.65)
PRE FEV1: 1.39 L (ref 2.05–3.57)
PRE FRC PL: 8.7 L (ref 2.47–4.44)
PRE FVC: 2.89 L (ref 2.75–4.67)
PRE MVV: 47.55 L/MIN (ref 92.4–125.01)
PRE PEF: 3.82 L/S (ref 5.52–9.64)
PRE RV: 6.98 L (ref 1.81–3.16)
PRE TLC: 9.97 L (ref 5.16–7.47)
RAW LLN: 3.06
RAW PRE REF: 127.4 %
RAW PRE: 3.9 CMH2O*S/L (ref 3.06–3.06)
RAW REF: 3.06
RV LLN: 1.81
RV PRE REF: 281.1 %
RV REF: 2.48
RVTLC LLN: 32
RVTLC PRE REF: 171.4 %
RVTLC PRE: 70.06 % (ref 31.89–49.85)
RVTLC REF: 41
TLC LLN: 5.16
TLC PRE REF: 157.8 %
TLC REF: 6.31
VA PRE: 5.46 L (ref 6.16–6.16)
VA SINGLE BREATH LLN: 6.16
VA SINGLE BREATH PRE REF: 88.5 %
VA SINGLE BREATH REF: 6.16
VC LLN: 2.75
VC PRE REF: 80.6 %
VC PRE: 2.98 L (ref 2.75–4.67)
VC REF: 3.7

## 2024-12-23 PROCEDURE — 94010 BREATHING CAPACITY TEST: CPT

## 2024-12-23 PROCEDURE — 94726 PLETHYSMOGRAPHY LUNG VOLUMES: CPT

## 2024-12-23 PROCEDURE — 94729 DIFFUSING CAPACITY: CPT

## 2025-01-02 ENCOUNTER — TELEPHONE (OUTPATIENT)
Dept: FAMILY MEDICINE | Facility: CLINIC | Age: 70
End: 2025-01-02
Payer: MEDICARE

## 2025-01-02 NOTE — TELEPHONE ENCOUNTER
Does Dr Burrell want CBC again? This was done 12/23 for Dr Carolina, WBC=13.85, Dr Carolina addressed, but looks like he was checking eosinophils.

## 2025-01-03 ENCOUNTER — HOSPITAL ENCOUNTER (OUTPATIENT)
Dept: RADIOLOGY | Facility: HOSPITAL | Age: 70
Discharge: HOME | End: 2025-01-03
Attending: INTERNAL MEDICINE
Payer: MEDICARE

## 2025-01-03 ENCOUNTER — TELEPHONE (OUTPATIENT)
Dept: PULMONOLOGY | Facility: CLINIC | Age: 70
End: 2025-01-03
Payer: MEDICARE

## 2025-01-03 DIAGNOSIS — R91.1 SOLITARY PULMONARY NODULE: ICD-10-CM

## 2025-01-03 PROCEDURE — 71250 CT THORAX DX C-: CPT | Mod: 26,,, | Performed by: RADIOLOGY

## 2025-01-03 PROCEDURE — 71250 CT THORAX DX C-: CPT | Mod: TC,PO

## 2025-01-03 NOTE — TELEPHONE ENCOUNTER
Scan is done.     ----- Message from Chris sent at 1/3/2025 12:39 PM CST -----  Regarding: advice  Contact: MARTELL ALBRECHT [[id number]]  Type: Needs Medical Advice  Who Called:  Martell    Symptoms (please be specific):  na    How long has patient had these symptoms:  na    Pharmacy name and phone #:  na    Best Call Back Number: [phone]    Additional Information: [facility], but they will not do it because orders is dated for June. Please call to advise.
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
## 2025-01-16 ENCOUNTER — OFFICE VISIT (OUTPATIENT)
Dept: FAMILY MEDICINE | Facility: CLINIC | Age: 70
End: 2025-01-16
Payer: MEDICARE

## 2025-01-16 VITALS
SYSTOLIC BLOOD PRESSURE: 125 MMHG | OXYGEN SATURATION: 97 % | BODY MASS INDEX: 27.74 KG/M2 | WEIGHT: 172.63 LBS | HEIGHT: 66 IN | TEMPERATURE: 98 F | DIASTOLIC BLOOD PRESSURE: 68 MMHG | HEART RATE: 106 BPM

## 2025-01-16 VITALS
HEART RATE: 84 BPM | SYSTOLIC BLOOD PRESSURE: 126 MMHG | BODY MASS INDEX: 27.8 KG/M2 | DIASTOLIC BLOOD PRESSURE: 78 MMHG | WEIGHT: 173 LBS | HEIGHT: 66 IN

## 2025-01-16 DIAGNOSIS — I25.118 ATHEROSCLEROTIC HEART DISEASE OF NATIVE CORONARY ARTERY WITH OTHER FORMS OF ANGINA PECTORIS: ICD-10-CM

## 2025-01-16 DIAGNOSIS — F33.41 RECURRENT MAJOR DEPRESSIVE DISORDER, IN PARTIAL REMISSION: ICD-10-CM

## 2025-01-16 DIAGNOSIS — Z00.00 ENCOUNTER FOR PREVENTIVE HEALTH EXAMINATION: Primary | ICD-10-CM

## 2025-01-16 DIAGNOSIS — C67.9 MALIGNANT NEOPLASM OF URINARY BLADDER, UNSPECIFIED SITE: ICD-10-CM

## 2025-01-16 DIAGNOSIS — I70.0 ATHEROSCLEROSIS OF AORTA: ICD-10-CM

## 2025-01-16 DIAGNOSIS — Z00.00 ENCOUNTER FOR MEDICARE ANNUAL WELLNESS EXAM: ICD-10-CM

## 2025-01-16 DIAGNOSIS — Z79.899 LONG-TERM USE OF HIGH-RISK MEDICATION: ICD-10-CM

## 2025-01-16 DIAGNOSIS — E78.2 MIXED HYPERLIPIDEMIA: ICD-10-CM

## 2025-01-16 DIAGNOSIS — F41.1 GENERALIZED ANXIETY DISORDER: Primary | ICD-10-CM

## 2025-01-16 DIAGNOSIS — R73.9 ELEVATED BLOOD SUGAR: ICD-10-CM

## 2025-01-16 DIAGNOSIS — K21.9 GASTROESOPHAGEAL REFLUX DISEASE WITHOUT ESOPHAGITIS: ICD-10-CM

## 2025-01-16 DIAGNOSIS — J44.9 CHRONIC OBSTRUCTIVE PULMONARY DISEASE, UNSPECIFIED COPD TYPE: ICD-10-CM

## 2025-01-16 DIAGNOSIS — I48.91 ATRIAL FIBRILLATION, UNSPECIFIED TYPE: ICD-10-CM

## 2025-01-16 PROCEDURE — 99999 PR PBB SHADOW E&M-EST. PATIENT-LVL V: CPT | Mod: PBBFAC,,, | Performed by: NURSE PRACTITIONER

## 2025-01-16 PROCEDURE — 99214 OFFICE O/P EST MOD 30 MIN: CPT | Mod: S$GLB,,, | Performed by: FAMILY MEDICINE

## 2025-01-16 PROCEDURE — G0439 PPPS, SUBSEQ VISIT: HCPCS | Mod: ,,, | Performed by: NURSE PRACTITIONER

## 2025-01-16 PROCEDURE — 99215 OFFICE O/P EST HI 40 MIN: CPT | Mod: PBBFAC,PO | Performed by: NURSE PRACTITIONER

## 2025-01-16 NOTE — PATIENT INSTRUCTIONS
Counseling and Referral of Other Preventative  (Italic type indicates deductible and co-insurance are waived)    Patient Name: Martell Corcoran  Today's Date: 1/16/2025    Health Maintenance       Date Due Completion Date    COVID-19 Vaccine (1) Never done ---    Shingles Vaccine (1 of 2) Never done ---    Pneumococcal Vaccines (Age 50+) (1 of 2 - PCV) Never done ---    RSV Vaccine (Age 60+ and Pregnant patients) (1 - Risk 60-74 years 1-dose series) Never done ---    Influenza Vaccine (1) 09/01/2024 3/16/2023 (Declined)    Override on 3/16/2023: Declined    LDCT Lung Screen 01/03/2026 1/3/2025    High Dose Statin 01/16/2026 1/16/2025    Hemoglobin A1c (Diabetic Prevention Screening) 11/16/2026 11/16/2023    TETANUS VACCINE 12/04/2027 12/4/2017    Lipid Panel 01/13/2030 1/13/2025    Colorectal Cancer Screening 09/16/2032 9/16/2022        No orders of the defined types were placed in this encounter.      The following information is provided to all patients.  This information is to help you find resources for any of the problems found today that may be affecting your health:                  Living healthy guide: www.[facility].[place].gov      Understanding Diabetes: www.diabetes.org      Eating healthy: www.cdc.gov/healthyweight      CDC home safety checklist: www.cdc.gov/steadi/patient.html      Agency on Aging: www.goea.[place].[facility]      Alcoholics anonymous (AA): www.aa.org      Physical Activity: www.yoselyn.nih.gov/pq0sjhf      Tobacco use: www.quitwithusla.org

## 2025-01-16 NOTE — PROGRESS NOTES
"  Martell Corcoran presented for a  Medicare AWV and comprehensive Health Risk Assessment today. The following components were reviewed and updated:    Medical history  Family History  Social history  Allergies and Current Medications  Health Risk Assessment  Health Maintenance  Care Team         ** See Completed Assessments for Annual Wellness Visit within the encounter summary.**         The following assessments were completed:  Living Situation  CAGE  Depression Screening  Timed Get Up and Go  Whisper Test  Cognitive Function Screening  Nutrition Screening  ADL Screening  PAQ Screening    Clock in media   Opioid documentation:      Patient does not have a current opioid prescription.        Vitals:    01/16/25 1324   BP: 125/68   Pulse: 106   Temp: 98.2 °F (36.8 °C)   TempSrc: Oral   SpO2: 97%   Weight: 78.3 kg (172 lb 9.9 oz)   Height: 5' 6" (1.676 m)     Body mass index is 27.86 kg/m².  Physical Exam  Constitutional:       Appearance: He is well-developed.   HENT:      Head: Normocephalic and atraumatic.      Right Ear: Hearing normal.      Left Ear: Hearing normal.      Nose: Nose normal.   Eyes:      General: Lids are normal.      Conjunctiva/sclera: Conjunctivae normal.      Pupils: Pupils are equal, round, and reactive to light.   Cardiovascular:      Rate and Rhythm: Normal rate.   Pulmonary:      Effort: Pulmonary effort is normal.   Abdominal:      Palpations: Abdomen is soft.   Musculoskeletal:         General: Normal range of motion.      Cervical back: Normal range of motion and neck supple.   Skin:     General: Skin is warm and dry.   Neurological:      Mental Status: He is alert and oriented to person, place, and time.               Diagnoses and health risks identified today and associated recommendations/orders:    1. Encounter for Medicare annual wellness exam  Discussed health maintenance guidelines appropriate for age.      - Ambulatory Referral/Consult to Enhanced Annual Wellness Visit " (eAWV)    2. Encounter for preventive health examination      3. Malignant neoplasm of urinary bladder, unspecified site  Stable, continue care and follow up per urology     4. Recurrent major depressive disorder, in partial remission  Stable, continue current meds  Followed by pcp    5. Atherosclerotic heart disease of native coronary artery with other forms of angina pectoris  Stable, continue to monitor   Followed by cardiology   6. Atherosclerosis of aorta  Stable, continue to monitor  Bp controlled  Followed by pcp and cardiology       Provided Martell with a 5-10 year written screening schedule and personal prevention plan. Recommendations were developed using the USPSTF age appropriate recommendations. Education, counseling, and referrals were provided as needed. After Visit Summary printed and given to patient which includes a list of additional screenings\tests needed.    Follow up for One year for Annual Wellness Visit.    Philly Epperson NP    I offered to discuss advanced care planning, including how to pick a person who would make decisions for you if you were unable to make them for yourself, called a health care power of , and what kind of decisions you might make such as use of life sustaining treatments such as ventilators and tube feeding when faced with a life limiting illness recorded on a living will that they will need to know. (How you want to be cared for as you near the end of your natural life)     X  Patient is unwilling to engage in a discussion regarding advance directives at this time.

## 2025-01-18 NOTE — TELEPHONE ENCOUNTER
Pt navigator transfers this pt to this NT via escalation. Pt is s/p excision of skin lesion of chest wall on 6/24/24. Pt calls today experiencing SOB, pain at excision area, and notes a redness that is starting to form around the stitches. Pt states that he is currently in [place], [place] on a vacation and would need any prescriptions to be called in to a pharmacy in [place], [place]     Care Advice recommends that pt be seen in ED or UCC now. Pt states that [place] [facility] [place]. He requests a direct callback from his surgeon, Dr. Noelle MESSINA. High priority message is sent to Dr. Drake's office at this time. Pt is instructed to call back with any new/worsening sxs, questions, or concerns; he verbalizes understanding.   Reason for Disposition   SEVERE pain in the incision    Additional Information   Negative: Major abdominal surgical incision and wound gaping open with visible internal organs   Negative: Sounds like a life-threatening emergency to the triager   Negative: Bleeding from incision and won't stop after 10 minutes of direct pressure   Negative: Bleeding (more than a few drops) from incision and after tracheostomy or blood vessel surgery (e.g., carotid endarterectomy, femoral bypass graft, kidney dialysis fistula)   Negative: Bright red, wide-spread, sunburn-like rash    Protocols used: Post-Op Incision Symptoms and [id number]     no

## 2025-01-23 ENCOUNTER — PATIENT MESSAGE (OUTPATIENT)
Dept: PULMONOLOGY | Facility: CLINIC | Age: 70
End: 2025-01-23

## 2025-01-23 ENCOUNTER — OFFICE VISIT (OUTPATIENT)
Dept: PULMONOLOGY | Facility: CLINIC | Age: 70
End: 2025-01-23
Payer: MEDICARE

## 2025-01-23 DIAGNOSIS — J44.89 ASTHMA-COPD OVERLAP SYNDROME: ICD-10-CM

## 2025-01-23 DIAGNOSIS — R09.89 PULMONARY HYPERINFLATION: Primary | ICD-10-CM

## 2025-01-23 DIAGNOSIS — I48.91 ATRIAL FIBRILLATION, UNSPECIFIED TYPE: ICD-10-CM

## 2025-01-23 PROCEDURE — 98007 SYNCH AUDIO-VIDEO EST HI 40: CPT | Mod: 95,,, | Performed by: HOSPITALIST

## 2025-01-23 NOTE — ASSESSMENT & PLAN NOTE
- candidate for BLVR by PFT and stratx analysis  - discussed that he would need to have quit smoking for 4 months, ok if on nicotine replacement  - reviewed general education video  - discussed that he would ideally reach stability of his symptoms- not requiring frequent increased steroid courses

## 2025-01-23 NOTE — PROGRESS NOTES
Subjective:      Patient ID: Martell Corcoran is a 69 y.o. male.    Chief Complaint: Pulmonary Hyperinflation, Emphysema    The patient location is: [place]    Visit type: Audiovisual    Face to Face time with patient: 15 minutes  45 minutes of total time spent on the encounter, which includes face to face time and non-face to face time preparing to see the patient (eg, review of tests), Obtaining and/or reviewing separately obtained history, Documenting clinical information in the electronic or other health record, Independently interpreting results (not separately reported) and communicating results to the patient/family/caregiver, or Care coordination (not separately reported).     Each patient to whom he or she provides medical services by telemedicine is:  (1) informed of the relationship between the physician and patient and the respective role of any other health care provider with respect to management of the patient; and (2) notified that he or she may decline to receive medical services by telemedicine and may withdraw from such care at any time.      HPI 1/23/25:    69 year old male with history of HTN, GERD, Afib (Eliquis), depression, hx tobacco use, hx bladder cancer, Sleep apnea (AHI 8 2021 HST), Asthma-COPD overlap who is referred by Ge Hallman MD for bronchoscopic lung volume reduction.   Chart reviewed- he was last seen by Dr. Hallman 12/2024- at that visit was reported to be doing well, had been weaned off of daily steroids, was continued on Dupixent with plan to start Tezspire for eosinophilic asthma, was restarted on steroids.     Currently smoking about a half pack.   Had a pneumothorax when he was 5 years old.   Diagnosed with asthma a little over a year ago. Not a lot of phlegm recently. Mostly coughs up sputum after the Trelegy and then is clear for the day.    BLVR Evaluation:     Pre-Op Testing Date     PFT 12/23/24 FEV1 48.9%, .8%, %, DLCO 64%   6MW 12/23/24 Resting  "sat 92%, range 91-94% on RA, total distance 425m, Cely not noted   CT Chest 1/3/25 "The lungs are somewhat hyperexpanded with flattening of the hemidiaphragms compatible with obstructive lung disease.  Minimal linear bandlike scarring/atelectasis is seen in the right lateral lower lobe.  Mild, upper lobe predominant emphysematous lung disease is seen.-the previously described 5 mm pulmonary nodule in the superior segment of the right lower lobe has resolved in the interval.  No new concerning pulmonary nodules/masses identified."   Stratx  1/3/25 In Media   ECHO 6/2023  No TR, normal LV systolic and diastolic function, normal RV systolic function   ABG      V/Q                                                                                COPD exacerbation history:   - Last admission- over a year ago, after pesticide exposure  - Never had to be intubated    COPD Regimen:   Meds: Trelegy, Albuterol HFA, Albuterol neb, Dupixent, Tezspire  Tools: Nebulizer  Prednisone:  Was weaned off, now back on steroids  Smoking Hx: Current, half pack per day     Exclusion Criteria:  Prior Lung tx, LVRS, median sternotomy, lobectomy: No  Hx MI or CVA: Possible remote heart attack  Allergy to Nitinol, nickel, titanium, or silicone: No  PaCO2 > 50mmHg or PaO2 < 45mmHg on room air: N/A  ePASP >45mmHg: N/A  EF <45%: No    Review of Systems   Respiratory:  Positive for cough, sputum production, wheezing and dyspnea on extertion.    Objective:     Physical Exam   Constitutional:   Pt viewed via phone camera, he is awake and alert, in no distress, on room air, able to provide clear and full history without significant breathlessness or cough   Personal Diagnostic Review  As Above      1/16/2025     1:24 PM 1/16/2025    10:06 AM 12/10/2024     2:38 PM 7/16/2024    10:33 AM 6/24/2024    11:10 AM 6/24/2024    10:40 AM 6/24/2024    10:30 AM   Pulmonary Function Tests   Peak Flow  98 L/min        SpO2 97 %  93 %  97 % 97 % 98 %   Height 5' 6" " "(1.676 m) 5' 6" (1.676 m) 5' 6" (1.676 m) 5' 6" (1.676 m)      Weight 78.3 kg (172 lb 9.9 oz) 78.5 kg (173 lb) 78.7 kg (173 lb 8 oz) 77.1 kg (170 lb)      BMI (Calculated) 27.9 27.9 28 27.5           Assessment:     No diagnosis found.     Outpatient Encounter Medications as of 1/23/2025   Medication Sig Dispense Refill    albuterol (PROVENTIL/VENTOLIN HFA) 90 mcg/actuation inhaler Inhale 1-2 puffs into the lungs every 4 (four) hours as needed for Shortness of Breath (coughing). Rescue 54 g 3    albuterol-ipratropium (DUO-NEB) 2.5 mg-0.5 mg/3 mL nebulizer solution USE 1 AMPULE IN NEBULIZER EVERY 4 HOURS AS NEEDED FOR WHEEZING 180 mL 0    alfuzosin (UROXATRAL) 10 mg Tb24       ALPRAZolam (XANAX) 0.5 MG tablet Take 1 tablet (0.5 mg total) by mouth 3 (three) times daily as needed for Anxiety. 180 tablet 1    azelastine (ASTELIN) 137 mcg (0.1 %) nasal spray 1 spray (137 mcg total) by Nasal route 2 (two) times daily. 30 mL 2    dupilumab 300 mg/2 mL PnIj Inject 2 mLs (300 mg total) into the skin every 14 (fourteen) days. 2 mL 26    ELIQUIS 5 mg Tab Take 1 tablet (5 mg total) by mouth 2 (two) times daily. 180 tablet 3    EScitalopram oxalate (LEXAPRO) 20 MG tablet Take 0.5 tablets (10 mg total) by mouth once daily. 90 tablet 0    esomeprazole (NEXIUM) 40 MG capsule Take 40 mg by mouth once daily.      ezetimibe (ZETIA) 10 mg tablet Take 1 tablet (10 mg total) by mouth once daily. 90 tablet 3    finasteride (PROSCAR) 5 mg tablet Take 5 mg by mouth once daily.      fluticasone-umeclidin-vilanter (TRELEGY ELLIPTA) 200-62.5-25 mcg inhaler Inhale 1 puff into the lungs once daily. 180 each 3    isosorbide mononitrate (IMDUR) 60 MG 24 hr tablet Take 1 tablet (60 mg total) by mouth once daily. 90 tablet 3    methylPREDNISolone (MEDROL DOSEPACK) 4 mg tablet Take 4 mg by mouth. (Patient not taking: Reported on 1/16/2025)      pitavastatin calcium (LIVALO) 4 mg Tab Take 1 tablet (4 mg) by mouth Daily. 90 tablet 3    predniSONE " (DELTASONE) 20 MG tablet 3 for 3 days then 2 for 3 days then one for 3 days and repeat for breathing problems 36 tablet 0    tezepelumab-ekko (TEZSPIRE) 210 mg/1.91 mL (110 mg/mL) PnIj Inject 1.91 mLs (210.1 mg total) into the skin every 28 days. (Patient not taking: Reported on 1/16/2025) 1.91 mL 12     No facility-administered encounter medications on file as of 1/23/2025.     No orders of the defined types were placed in this encounter.      Plan:     Problem List Items Addressed This Visit       Asthma-COPD overlap syndrome     - per primary Pulmonary provider         Atrial fibrillation     - on Eliquis         Pulmonary hyperinflation - Primary     - candidate for BLVR by PFT and stratx analysis  - discussed that he would need to have quit smoking for 4 months, ok if on nicotine replacement  - reviewed general education video  - discussed that he would ideally reach stability of his symptoms- not requiring frequent increased steroid courses            Thank you Dr. Hallman for referring Mr. Corcoran for evaluation. Pt is to contact me via the portal when he has had 4 months smoking cessation or with any further questions.

## 2025-01-28 DIAGNOSIS — J44.1 COPD WITH ACUTE EXACERBATION: ICD-10-CM

## 2025-01-28 RX ORDER — FLUTICASONE FUROATE, UMECLIDINIUM BROMIDE AND VILANTEROL TRIFENATATE 200; 62.5; 25 UG/1; UG/1; UG/1
1 POWDER RESPIRATORY (INHALATION) DAILY
Qty: 180 EACH | Refills: 3 | Status: SHIPPED | OUTPATIENT
Start: 2025-01-28

## 2025-02-19 DIAGNOSIS — J45.51 SEVERE PERSISTENT ASTHMA WITH ACUTE EXACERBATION: ICD-10-CM

## 2025-02-19 RX ORDER — TEZEPELUMAB-EKKO 210 MG/1.9ML
210 INJECTION, SOLUTION SUBCUTANEOUS
Qty: 1.91 ML | Refills: 12 | Status: SHIPPED | OUTPATIENT
Start: 2025-02-19

## 2025-03-13 RX ORDER — EZETIMIBE 10 MG/1
10 TABLET ORAL NIGHTLY
Qty: 90 TABLET | Refills: 1 | Status: SHIPPED | OUTPATIENT
Start: 2025-03-13

## 2025-03-24 ENCOUNTER — OFFICE VISIT (OUTPATIENT)
Dept: PULMONOLOGY | Facility: CLINIC | Age: 70
End: 2025-03-24
Payer: MEDICARE

## 2025-03-24 VITALS
HEIGHT: 66 IN | WEIGHT: 172.94 LBS | SYSTOLIC BLOOD PRESSURE: 158 MMHG | BODY MASS INDEX: 27.79 KG/M2 | DIASTOLIC BLOOD PRESSURE: 90 MMHG | OXYGEN SATURATION: 96 % | HEART RATE: 76 BPM

## 2025-03-24 DIAGNOSIS — J44.1 COPD WITH ACUTE EXACERBATION: ICD-10-CM

## 2025-03-24 DIAGNOSIS — K21.9 GASTROESOPHAGEAL REFLUX DISEASE WITHOUT ESOPHAGITIS: Primary | ICD-10-CM

## 2025-03-24 DIAGNOSIS — J45.51 SEVERE PERSISTENT ASTHMA WITH ACUTE EXACERBATION: ICD-10-CM

## 2025-03-24 DIAGNOSIS — J44.9 CHRONIC OBSTRUCTIVE PULMONARY DISEASE, UNSPECIFIED COPD TYPE: ICD-10-CM

## 2025-03-24 DIAGNOSIS — F17.210 NICOTINE DEPENDENCE, CIGARETTES, UNCOMPLICATED: ICD-10-CM

## 2025-03-24 DIAGNOSIS — J43.9 PULMONARY EMPHYSEMA, UNSPECIFIED EMPHYSEMA TYPE: ICD-10-CM

## 2025-03-24 DIAGNOSIS — F33.41 RECURRENT MAJOR DEPRESSIVE DISORDER, IN PARTIAL REMISSION: ICD-10-CM

## 2025-03-24 DIAGNOSIS — I25.118 ATHEROSCLEROTIC HEART DISEASE OF NATIVE CORONARY ARTERY WITH OTHER FORMS OF ANGINA PECTORIS: ICD-10-CM

## 2025-03-24 PROCEDURE — 99999 PR PBB SHADOW E&M-EST. PATIENT-LVL III: CPT | Mod: PBBFAC,,, | Performed by: INTERNAL MEDICINE

## 2025-03-24 PROCEDURE — 99213 OFFICE O/P EST LOW 20 MIN: CPT | Mod: PBBFAC,PO | Performed by: INTERNAL MEDICINE

## 2025-03-24 RX ORDER — ESCITALOPRAM OXALATE 20 MG/1
10 TABLET ORAL DAILY
Qty: 90 TABLET | Refills: 3 | Status: SHIPPED | OUTPATIENT
Start: 2025-03-24

## 2025-03-24 RX ORDER — IPRATROPIUM BROMIDE AND ALBUTEROL SULFATE 2.5; .5 MG/3ML; MG/3ML
3 SOLUTION RESPIRATORY (INHALATION)
Qty: 120 EACH | Refills: 11 | Status: SHIPPED | OUTPATIENT
Start: 2025-03-24 | End: 2025-03-27 | Stop reason: SDUPTHER

## 2025-03-24 RX ORDER — ISOSORBIDE MONONITRATE 60 MG/1
60 TABLET, EXTENDED RELEASE ORAL DAILY
Qty: 90 TABLET | Refills: 3 | Status: SHIPPED | OUTPATIENT
Start: 2025-03-24 | End: 2026-03-24

## 2025-03-24 RX ORDER — ESOMEPRAZOLE MAGNESIUM 40 MG/1
40 CAPSULE, DELAYED RELEASE ORAL DAILY
Qty: 90 CAPSULE | Refills: 3 | Status: SHIPPED | OUTPATIENT
Start: 2025-03-24

## 2025-03-24 RX ORDER — ENSIFENTRINE 3 MG/2.5ML
3 SUSPENSION RESPIRATORY (INHALATION) 2 TIMES DAILY
Qty: 150 ML | Refills: 11 | Status: SHIPPED | OUTPATIENT
Start: 2025-03-24

## 2025-03-24 RX ORDER — AZITHROMYCIN 500 MG/1
TABLET, FILM COATED ORAL
Qty: 3 TABLET | Refills: 3 | Status: SHIPPED | OUTPATIENT
Start: 2025-03-24

## 2025-03-24 RX ORDER — PREDNISONE 20 MG/1
TABLET ORAL
Qty: 36 TABLET | Refills: 2 | Status: SHIPPED | OUTPATIENT
Start: 2025-03-24

## 2025-03-24 RX ORDER — ALBUTEROL SULFATE 90 UG/1
1-2 INHALANT RESPIRATORY (INHALATION) EVERY 4 HOURS PRN
Qty: 54 G | Refills: 3 | Status: SHIPPED | OUTPATIENT
Start: 2025-03-24

## 2025-03-24 RX ORDER — FLUTICASONE FUROATE, UMECLIDINIUM BROMIDE AND VILANTEROL TRIFENATATE 200; 62.5; 25 UG/1; UG/1; UG/1
1 POWDER RESPIRATORY (INHALATION) DAILY
Qty: 180 EACH | Refills: 3 | Status: SHIPPED | OUTPATIENT
Start: 2025-03-24

## 2025-03-24 NOTE — PATIENT INSTRUCTIONS
Ct chest viewed from 1/2025 -- nodule seen last yr cleared.    Tezspire to start - -stopping dupixent    Use p[rednisone if needed -- not clearly helpful last dosed.    Ct chest due 1/2026    Continue lali Neumann may help -- ordered -- only avail twice daily    Need to stop smoking.

## 2025-03-24 NOTE — PROGRESS NOTES
3/24/2025    Martell Corcoran  Follow up    Chief Complaint   Patient presents with    Follow-up       3/24/2025 pt  to start tezspire later in week -- pt has still been getting dupixent.  Pt had good response after last dose steroids--felt good then didn't feel good.        December 10, 2024- the patient is a smoker-worked at [employer]-had become steroid dependent asthma-does would Dupixent  did better -- was effective in June,but seems less effective now -- was on since April.     Had a mediastinal cyst that was evaluated and felt to be benign.  Pt usually better this time of year.   Pt now much worse-he thinks Dupixent is not effective anymore.  He was steroid dependent before starting Dupixent.  He has not taken steroids since.  He has been on Dupixent now for about 6 months plus.  Patient still smokes.  He has been using preventive therapy with Trelegy  Patient Instructions   Ct chest with left mid lung (series 3 image 105) <5 mm mainly ground glass density,and also 4.7 mm right lower lobe superior segment (series 3 image 125) nodule--    Need to stop smokes  Will repeat ct in 6 months June 2nd.          Dupixent seemed very effective -- actually you are doing better as you are not on constant prednisone.  Will order tezspire.  Would recommend follow up eosiniophils    Take prednisone now.        6/10/204 pt off prednisone as weaned off-- had couple spells sob. Has been dupixent with very good result.  Pt missed couple doses.   Pt off prednisone   Newly  to girl friend....    Still h;aving smoking problems... off smokes intermittently...  Patient Instructions   Dupixent response very good -- expect better in 3-9 months    Check ct in octobers-- next 5 yrs ordered    Call if problems        3/7/2024 pt has history of smoking and prednisone down to 5/d.  Still uses trelegy once daily..   no longer has a fib-- may stop eliuis sooon... no yellow mucous  Pt has been on steroids since last June and needed  continious  Not using cpap as not felt helpful..  November 2, 2023-absolute eosinophil count was only 100 in late October and also in middle September.  October 31st CT of the chest did not show any remarkable findings.  Resumed smokes-- frustrated with [employer].   treatment plant failed.  Pt resumed prednisone as lungs worsened-- now on 7.5 mg.     Patient Instructions   Ct chest viewed  from 10/31/2023 -- no lung nodules of concern    Mucous seen in airway -- you appreciate mucous.      You need chronic steroids and you had hyperglycemia -- metformin 500 mg daily (could go to 2 daily if needed) may prevent    Pt had been intolerant of crestor, lipitor, mevacor, zocor, and provachol  -- you have been able to tolerate Livalo and need to continue as you have had MI and stable angina......  Patient Instructions   You have severe steroid dependant asthma.  Would recommend  dupixent 300 mg every 2 wks    May consider going to prednisone 2.5 mg daily if dupixent works well    Dupixent sample given in office for total of 600 mg sq today        Ct viewed going back to  2017  There is vague mild tracheal stenosis seen on  ct and chest xray going back to 2017 -- may impair mucous clearing.  No intervention for now...  9/6/2023 off smokes 7/26/2023  and still steroid dependant.  Pt got off prednisone and not thriving.   Breathing very short acitivity.  Marked garcia last month.  Sinuses ok and no yg mucous.  A fib remitted after ablation.  Patient Instructions   You are off smokes and still needing steroids as breathing poorly.  Pattern is severe asthmatic and eosinophils have been very high.    Would recheck eosinophils    Ct from 2022 viewed and showed minimal emphysema.    Trelegy is dosed daily.    Use albuterol prior to activity.      Resume prednisone 10 mg daily to 20 mg daily after blood test.  Stop in 2 wks and resume as needed    If remains unstable by November may consider shots?     -     7/27/2023 just got off  steroids last 2 days -- uses trelegy..  had a fib ablation on 13th July and was feeling well.    Pt off smokes since 1/7/2023     Pt was seen by Dr Thornton -- no f/u for mediastinal cyst --we discuss today  Patient Instructions   Non fasting sugar of 146 on July 11th suggest steroid induced diabetes.  Should be better off steroids.    If you relapse soon would recommend biologic shots for asthma -- Fasenra would be good?    Mediastinal abnormality was eval by Dr Thornton in past-- could do follow up ct chest to screen for lung cancer... we discuss    Continue trelegy    Lung capacity was 55% in 2020 6/29/2023 pt presented nsr er June 13th -- had bad coughing spell with sob and chest pains -- pt went home on 18th on prednisone 60/d with few days left today.  Pt still has wheezes in early am.   Codeine did not work -- may have worsened.    Still on trelegy.      Eos were elevated to 800 at presentation --   Patient Instructions   Cxr June 13th viewed  - no pneumonia  You had high blood sugars in hospital -- 160's.  You may have problems with using high dose steroids.   Stop prednisone when out -- drop to 2 daily for 2 days then one daily (if able)- 14 days.    Have prednisone on hand-- start 3 20 mg daily and call if needs again  You have been on trelegy -- continue.   You had unusually high eosinophils when flared lungs with recent heart attack  You have asthma and copd.  You may be steroid dependant if need to repeat-- consider special shots.       No bad germs in lung mucous on June 15th  May need diabetes medication-- would check sugars in future if on steroids......        5/23/2023 pt was doing cleaning for elderly cat lady- 5/12/2023- had to spray acontainer for roaches- loaded with roaches- he was given fogger,  and was trying to fog container, sprayed and then closed container,  exposed to [place] fogger off and on for roughly 1/2 hr to 40 min. No burning. No sob nor cough til roughly 4-6 hrs later.  Similar  exposures to cleaning was no problem in past. Pt was advised to go to er after 4 -5 days -- pt had to use neb q 4-6 hr post exposure whereas was using neb rx every 6 months prior.  Pt seen in er-- prednisone 20/d x 4 done.  Sat 96 and wheezes noted.      Pt did use zpak but mucous still yellow.   Patient Instructions   Chest xray 5/17/2023 viewed with no problems.    Breathing test 2020 with copd 56% lung capacity      Use trelegy or stiolto-- trelegy has steroids which are more effective to prevent wheezes    Increase steroids-- may need into future?? For now use prednisone 20mg 3 x 3 and taper,,,,,  may repeat.  Would be best to inhale steroids to prevent relapse -- not sure long term need.    Codiene not covered but not $$      Use azithromycin, may need special antibiotic??  Need to have mucous clear.      Call if not back to usual in 2-3 wks???    3/7/2023 quit smokes, feels fatigued chronically. Had naila and cardioversion November.  Back to rhythm but could not use meds to continue.   Uses anoro and qvar--- not using reg as before.    Pt missed lexapro somehow-- had increased anxiety and stomach issues.  Uses occ xanax.   Uses astelin      Has occ blurred vision.  Patient Instructions   Pet scan viewed.  Deviated septum noted.     Would use stiolto 2 daily -- could skip -- anoro not good for regular use.    Novmeber screening ct chest     Nerves may cause fatigue    Sleep apnea may cause excess sleepiness-- would re try cpap once nerves stable    Would re try cholesterol  medications.     Try cpap -- may use ambein to sleep--dedicate 8 hrs sleep to use ambein.      Sept 13, 2022  in past, had own company.  Lung dz developed 4 + yrs ago.  Bladder cancer removed.  Had bcg infusions 2020 . Some garcia. Problem nasal stuffy tolerating cpap.    Patient Instructions   Nasal stuffy -- afrin or generic afrin --- use one to 2 sprays up to once daily -- alternate nares ?  If regular use may get tolerant  and afrin ineffective.  Could see ent to improve passages.      Flonase should work better (or astelin) if passages open.     Sleep study was only 8 episodes an hour-- less than 5 is normal.     Cpap titration needed 8-9 cm pressure    Mediastinal abnormality varies-- would send to cardiothoracic surgery.    Copd is moderate at 55% or so.  Anoro and qvar effective -- as needed albuterol.  Would stop smokes.     CT films are reviewed directly with the patient.  Extensive discussion is made.  Patient's evaluation took 56 minutes today.          Below from RJ Salgado  6/1/2022- SOB persistent complaint, pain with deep inspiration on left chest. Followed by cardiology for A fib and vascular blockages pacemaker procedure postponed.    Wearing CPAP with nose mask, moves across face when sleeping. Does not feel better after wearing.   Cough- improved, less often and severe, most days, worse when weather changes, productive clear mucous  Currently smoking 3-4 cigarettes daily, trying to cut down.   Patient Instructions   CT of chest shows clear lungs, no change to previously seen lung nodules.     A cyst is seen in muscle could be cause of chest pain    Recommend smoking cessation    Continue COPD medication regiment    Recommend trying CPAP therapy again.     2/4/2022-  States breathing is labored. States usually worse after infusion for bladder cancer. States does have benefit with qvar and Anoro, using albuterol as needed 2x daily.     Daytime fatigue persistent.  purchased CPAP from third party. Has not started to use.     Scheduled for pacemaker to treat A-fib. Followed by Dr. Miranda.     Cough- decreased, 2x daily, productive clear mucous, cut back on smoking to 6 cigarettes daily    1/14/2022- not able to get CPAP due to high co-pay.   States breathing improved after decreasing smoking to 3-6 cigarettes daily.   Shortness of breath- daily complaint, slightly improved, worse with exertion, improves with rest. Daytime  fatigue unchanged.   Complaint of cough- varies in severity, currently mild. Productive small amount clear mucous, did notice worsening after first starting to cut back on smoking. Has returned to normal   Followed by Urologist Dr. Pham for bladder cancer. Undergoing infusion therapy.     10/4/2021- concerned he had COVID 19 late July early August, lost sense of taste and smell. Complaint of fatigue, body aches, shortness of breath when walking,   No fever. Gradually improved over 4 weeks. Taste and smell has not returned. Experiencing short term memory loss and metal grogginess. Has daytime drowsiness onset years worsened in past two months  Persistent cough- productive clear mucous, associated with wheeze. Improves with albuterol inhaler.   Currently on Anoro, QVAR, using albuterol when needed 1-3 x weekly.     2021- Admitted To hospital in [place] while on vacation 2021 for COPD exacerbation. Seen at [facility] 2020 for COPD exacerbation.   Complaint of SOB- daily, worse with exertion, improves with rest. Treated with antibiotics and steroid therapy April.   Cough- recurrent complaint,  improved since hospital discharge, worsened in last 2 weeks after spending time with grandson who had bronchitis that has improved with nebulizer treatments every 4 hours. Associated with chest tightness and wheeze.       2020- he had bladder biopsy at [facility] with Dr. Pham on 11/3- per outside records path with High grade papillary urothelial cell carcinoma. He has cough w/ postnasal drip and allergies but breathing is much better. He has been dealing with a lot for the family- 2 brothers just . Hematuria is much better. Not getting bladder infections any more. He continues to smoke 1/2 ppd. Wants to quit but too much stress recently- not ready.    2020-   Start taking prednisone again- long taper over 3 weeks then try to stop. If lungs flaring while decreasing prednisone go up  to the last dose that helped you and call our office.  Continue trelegy  Refilled duo nebs to use as needed  Use albuterol as needed  Quit smoking- go to program  Follow up with urologist  Follow up with PCP for kidney testing and possible urinary infection  pt was recently hospitalized for COPD exacerbation, seen by me while admitted 8/13/20 and also having worsening hematuria at that time. He was sent home with a course of levaquin and steroid taper. He did not qualify for home O2. He is being worked up for a bladder mass per urology and pending transurethral resection.  Pt c/o pain around L kidney after doing yard work and urine turned darker. He was coughing last night and woke up with face feeling puffy. Switched urologists to a Dr. Pham at [place] and has appt at end of September.  He finished prednisone taper. Still taking levaquin. Still feels some congestion in chest but it is getting better. Also has sinus problems w/ nose blocked. He denies frequent exacerbations but this one has been very bad. Feels like worsening since stopped prednisone. Still smokes but trying to cut back. Has smoking cessation appt later this month.  Inhalers: nebs 4-5x/day, trelegy daily, albuterol rescue 1-2x/day    7/15/20- Pt is a 66 yo male with HTN, GERD and BPH presents for new evaluation of breathing issues. He complains of SOB especially if he carries anything like taking out trash to the dumpster. This has been progressive over about 3.5 yrs. He wheezes and coughs up clear mucous, throughout the day.  Pt smokes 1 PPD x 55 yrs.  At age 5 pt had pna and a collapsed lung requiring chest tube on the left side and hospitalization. Didn't have any other problems w/ breathing growing up. He took up playing [employer] to help lung function.  Pt had abd/p scan for UTI recently which showed some emphysematous changes.    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 One brother  at 65 from leukemia.    Grew up in [place]    The chief compliant  problem varies with instablilty at time      PFSH:  Past Medical History:   Diagnosis Date    A-fib     Anticoagulant long-term use     Asthma     Benign enlargement of prostate     Had a biopsy in around     Bladder cancer     Cancer     COPD (chronic obstructive pulmonary disease)     DDD (degenerative disc disease), lumbar     Elevated PSA     GERD (gastroesophageal reflux disease)     Hypertension     Started with meds in .    Kidney stone     Sleep apnea     Urinary tract infection      Past surg hist  Past Surgical History:   Procedure Laterality Date    FISTULA REPAIR        LEFT HEART CATHETERIZATION N/A 10/23/2018     Procedure: Left heart cath;  Surgeon: Niharika Squires MD;  Location: [facility] CATH;  Service: Cardiology;  Laterality: N/A;          Social History     Tobacco Use    Smoking status: Every Day     Current packs/day: 0.50     Average packs/day: 1 pack/day for 56.2 years (55.6 ttl pk-yrs)     Types: Cigarettes     Start date: 1968     Last attempt to quit: 2023    Smokeless tobacco: Never   Substance Use Topics    Alcohol use: Yes     Comment: occ-beer    Drug use: No     Family History   Problem Relation Name Age of Onset    Heart failure Mother      Cancer Father      Heart disease Brother      Heart attack Brother      Cancer Brother      Heart disease Brother      Drug abuse Brother       Review of patient's allergies indicates:   Allergen Reactions    Msg [glutamic acid] Nausea Only, Palpitations, Shortness Of Breath and Swelling    Oyster extract Swelling     PT swells in his throat after eating oysters on occasion       Performance Status:The patient's activity level is functions out of house. QUACH improved and does not limit him. Back pain limits activity.    Review of Systems:  a review of eleven systems covering constitutional, Eye, HEENT, Psych,  "Respiratory, Cardiac, GI, , Musculoskeletal, Endocrine, Dermatologic was negative except for pertinent findings as listed ABOVE and below:   wheeze, shortness of breath, fatigue      Exam:Comprehensive exam done. BP (!) 158/90 (BP Location: Left arm, Patient Position: Sitting)   Pulse 76   Ht 5' 6" (1.676 m)   Wt 78.4 kg (172 lb 15.2 oz)   SpO2 96% Comment: on room air at rest  BMI 27.91 kg/m²   Exam included Vitals as listed, and patient's appearance and affect and alertness and mood, oral exam for yeast and hygiene and pharynx lesions and Mallapatti (M) score, neck with inspection for jvd and masses and thyroid abnormalities and lymph nodes (supraclavicular and infraclavicular nodes and axillary also examined and noted if abn), chest exam included symmetry and effort and fremitus and percussion and auscultation, cardiac exam included rhythm and gallops and murmur and rubs and jvd and edema, abdominal exam for mass and hepatosplenomegaly and tenderness and hernias and bowel sounds, Musculoskeletal exam with muscle tone and posture and mobility/gait and  strength, and skin for rashes and cyanosis and pallor and turgor, extremity for clubbing.  Findings were normal except for pertinent findings listed below:  M1  Bilateral clear lungs w/ faint rhonchi bilateral lower lung zones  No clubbing or edema    Radiographs (ct chest and cxr) reviewed: view by direct vision   CT scan of the chest September 7, 2022 viewed by direct vision.  Fairly small mediastinal cystic structure looks to be a little smaller than March.  There was very very difficult to see in 2021 however.    CT Chest Without Contrast 03/14/22 Emphysema and unchanged lung nodules  Indeterminate smoothly marginated structure along the anterior superior mediastinum, measuring just above simple fluid attenuation suggestive of a minimally complex/complicated cyst, possibly a lymphovascular malformation, synovial cyst (extending from the " sternoclavicular joint), foregut duplication cyst, thymic epithelial lesion/cyst, and much less likely malignancy, however this has slightly increased in size from the previous exam and may warrant interval attention on follow-up imaging.       CT Chest Without Contrast  05/20/2021   In the left upper lobe is a confluence of vessels and a possible 4 mm nodule decreased in size and conspicuousness since the prior study of July 16, 2020.  No new or enlarging pulmonary nodules are identified.  Emphysema.     CT chest/abd/p 11/20/20- mild emphysema, lungs clear aside from small 6mm nodule DANA which is unchanged from prior exam  1. No metastatic disease.  2. Nodular thickening of the left posterior aspect of the bladder.  3. Enlarged prostate.  3. Mild pulmonary emphysema.   CXR 8/14/20- possible vague infiltrate/opacifications bilateral bases  CT chest 7/16/20- DANA 6mm nodule  CT abd/p 6/26/20- bases of lungs w/ scant emphysematous changes, some strands of atelectasis    Labs reviewed    Lab Results   Component Value Date    WBC 13.85 (H) 12/23/2024    RBC 5.20 12/23/2024    HGB 16.7 12/23/2024    HCT 49.9 12/23/2024    MCV 96 12/23/2024    MCH 32.1 (H) 12/23/2024    MCHC 33.5 12/23/2024    RDW 13.5 12/23/2024     12/23/2024    MPV 8.4 (L) 12/23/2024    GRAN 11.9 (H) 12/23/2024    GRAN 85.7 (H) 12/23/2024    LYMPH 0.9 (L) 12/23/2024    LYMPH 6.6 (L) 12/23/2024    MONO 1.0 12/23/2024    MONO 7.0 12/23/2024    EOS 0.0 12/23/2024    BASO 0.01 12/23/2024    EOSINOPHIL 0.0 12/23/2024    BASOPHIL 0.1 12/23/2024     Results for ANDREW ALBRECHT (MRN [id number]) as of 5/24/2021 15:33   Ref. Range 8/13/2020 02:30 8/13/2020 06:01 8/14/2020 05:03 12/16/2020 09:20 3/22/2021 15:14   Eos # Latest Ref Range: 15 - 500 cells/uL 1.4 (H) 0.3 0.0 0.5 122       PFT reviewed  7/16/20- moderately severe obstruction, reduced DLCO, air trapping    EPWORTH SLEEPINESS SCALE SCORE 13 on 10/4/2021  Sleep study 10/7/2021 AHI Significant  obstructive sleep apnea with a TRUPTI of 8.1/hr      Plan:  Clinical impression is apparently straight forward and impression with management as below.    Martell was seen today for follow-up.    Diagnoses and all orders for this visit:    Gastroesophageal reflux disease without esophagitis  -     esomeprazole (NEXIUM) 40 MG capsule; Take 1 capsule (40 mg total) by mouth once daily.    Severe persistent asthma with acute exacerbation  -     predniSONE (DELTASONE) 20 MG tablet; 3 for 3 days then 2 for 3 days then one for 3 days and repeat for breathing problems    Nicotine dependence, cigarettes, uncomplicated  -     CT Chest Lung Screening Low Dose; Standing    COPD with acute exacerbation  -     albuterol-ipratropium (DUO-NEB) 2.5 mg-0.5 mg/3 mL nebulizer solution; Take 3 mLs by nebulization every 6 (six) hours while awake. Rescue  -     fluticasone-umeclidin-vilanter (TRELEGY ELLIPTA) 200-62.5-25 mcg inhaler; Inhale 1 puff into the lungs once daily.  -     azithromycin (ZITHROMAX) 500 MG tablet; One daily for yellow mucous, repeat if needed  -     ensifentrine (OHTUVAYRE) 3 mg/2.5 mL NbSp; Inhale 3 mg into the lungs 2 (two) times daily.    Recurrent major depressive disorder, in partial remission  -     EScitalopram oxalate (LEXAPRO) 20 MG tablet; Take 0.5 tablets (10 mg total) by mouth once daily.    Pulmonary emphysema, unspecified emphysema type  -     albuterol (PROVENTIL/VENTOLIN HFA) 90 mcg/actuation inhaler; Inhale 1-2 puffs into the lungs every 4 (four) hours as needed for Shortness of Breath (coughing). Rescue    Chronic obstructive pulmonary disease, unspecified COPD type  -     albuterol (PROVENTIL/VENTOLIN HFA) 90 mcg/actuation inhaler; Inhale 1-2 puffs into the lungs every 4 (four) hours as needed for Shortness of Breath (coughing). Rescue    Atherosclerotic heart disease of native coronary artery with other forms of angina pectoris  -     isosorbide mononitrate (IMDUR) 60 MG 24 hr tablet; Take 1 tablet (60  mg total) by mouth once daily.                           - continue COPD medication regiment   - lung nodules unchanged.        Follow up in about 4 months (around 7/24/2025), or if symptoms worsen or fail to improve.    Discussed with patient above for education the following:         Discussed with patient above for education the following:      Patient Instructions   Ct chest viewed from 1/2025 -- nodule seen last yr cleared.    Tezspire to start - -stopping dupixent    Use p[rednisone if needed -- not clearly helpful last dosed.    Ct chest due 1/2026    Continue lali Neumann may help -- ordered -- only avail twice daily

## 2025-03-27 ENCOUNTER — PATIENT MESSAGE (OUTPATIENT)
Dept: PULMONOLOGY | Facility: CLINIC | Age: 70
End: 2025-03-27
Payer: MEDICARE

## 2025-03-27 DIAGNOSIS — J44.1 COPD WITH ACUTE EXACERBATION: ICD-10-CM

## 2025-03-28 RX ORDER — IPRATROPIUM BROMIDE AND ALBUTEROL SULFATE 2.5; .5 MG/3ML; MG/3ML
3 SOLUTION RESPIRATORY (INHALATION)
Qty: 120 EACH | Refills: 11 | Status: SHIPPED | OUTPATIENT
Start: 2025-03-28

## 2025-04-01 ENCOUNTER — TELEPHONE (OUTPATIENT)
Dept: PULMONOLOGY | Facility: CLINIC | Age: 70
End: 2025-04-01
Payer: MEDICARE

## 2025-04-01 NOTE — TELEPHONE ENCOUNTER
Faxed printed rx for duo neb to [facility] [phone].  [facility] [phone]

## 2025-04-10 ENCOUNTER — PATIENT MESSAGE (OUTPATIENT)
Dept: PULMONOLOGY | Facility: CLINIC | Age: 70
End: 2025-04-10
Payer: MEDICARE

## 2025-04-16 ENCOUNTER — PATIENT MESSAGE (OUTPATIENT)
Dept: ADMINISTRATIVE | Facility: OTHER | Age: 70
End: 2025-04-16
Payer: MEDICARE

## 2025-04-17 DIAGNOSIS — E78.5 HYPERLIPIDEMIA, UNSPECIFIED HYPERLIPIDEMIA TYPE: ICD-10-CM

## 2025-04-17 DIAGNOSIS — I25.118 ATHEROSCLEROSIS OF NATIVE CORONARY ARTERY OF NATIVE HEART WITH STABLE ANGINA PECTORIS: ICD-10-CM

## 2025-04-17 RX ORDER — PITAVASTATIN CALCIUM 4.18 MG/1
4 TABLET, FILM COATED ORAL DAILY
Qty: 90 TABLET | Refills: 3 | Status: SHIPPED | OUTPATIENT
Start: 2025-04-17 | End: 2026-04-17

## 2025-05-12 ENCOUNTER — PATIENT MESSAGE (OUTPATIENT)
Dept: FAMILY MEDICINE | Facility: CLINIC | Age: 70
End: 2025-05-12
Payer: MEDICARE

## 2025-05-12 ENCOUNTER — PATIENT MESSAGE (OUTPATIENT)
Dept: PULMONOLOGY | Facility: CLINIC | Age: 70
End: 2025-05-12
Payer: MEDICARE

## 2025-05-12 ENCOUNTER — PATIENT MESSAGE (OUTPATIENT)
Dept: ADMINISTRATIVE | Facility: OTHER | Age: 70
End: 2025-05-12
Payer: MEDICARE

## 2025-05-12 DIAGNOSIS — J44.1 COPD WITH ACUTE EXACERBATION: ICD-10-CM

## 2025-05-12 DIAGNOSIS — R05.9 COUGH, UNSPECIFIED TYPE: ICD-10-CM

## 2025-05-12 DIAGNOSIS — J01.00 ACUTE NON-RECURRENT MAXILLARY SINUSITIS: Primary | ICD-10-CM

## 2025-05-12 RX ORDER — DOXYCYCLINE 100 MG/1
100 CAPSULE ORAL EVERY 12 HOURS
Qty: 14 CAPSULE | Refills: 0 | Status: SHIPPED | OUTPATIENT
Start: 2025-05-12

## 2025-05-12 RX ORDER — ENSIFENTRINE 3 MG/2.5ML
3 SUSPENSION RESPIRATORY (INHALATION) 2 TIMES DAILY
Qty: 150 ML | Refills: 11 | Status: SHIPPED | OUTPATIENT
Start: 2025-05-12

## 2025-05-12 RX ORDER — CODEINE PHOSPHATE AND GUAIFENESIN 10; 100 MG/5ML; MG/5ML
10 SOLUTION ORAL EVERY 6 HOURS PRN
Qty: 240 ML | Refills: 0 | Status: SHIPPED | OUTPATIENT
Start: 2025-05-12

## 2025-05-12 NOTE — PROGRESS NOTES
Pt contacted through wife's portal c/o nasal congestion and discharge that is green and cough, with some headache. Requesting some treatment. Will send in Anbx for doxy. To continue prednisone/nebs if wheezing.

## 2025-05-12 NOTE — TELEPHONE ENCOUNTER
Pt is requesting this be sent electronically if possible, looks like it was printed at his last visit.

## 2025-05-28 ENCOUNTER — PATIENT MESSAGE (OUTPATIENT)
Dept: CARDIOLOGY | Facility: CLINIC | Age: 70
End: 2025-05-28
Payer: MEDICARE

## 2025-06-04 ENCOUNTER — PATIENT MESSAGE (OUTPATIENT)
Dept: CARDIOLOGY | Facility: CLINIC | Age: 70
End: 2025-06-04
Payer: MEDICARE

## 2025-06-04 ENCOUNTER — TELEPHONE (OUTPATIENT)
Dept: CARDIOLOGY | Facility: CLINIC | Age: 70
End: 2025-06-04
Payer: MEDICARE

## 2025-06-13 ENCOUNTER — PATIENT MESSAGE (OUTPATIENT)
Dept: ADMINISTRATIVE | Facility: OTHER | Age: 70
End: 2025-06-13
Payer: MEDICARE

## 2025-06-18 DIAGNOSIS — C67.9 BLADDER CANCER: Primary | ICD-10-CM

## 2025-06-18 DIAGNOSIS — R31.0 GROSS HEMATURIA: ICD-10-CM

## 2025-06-18 DIAGNOSIS — R97.20 ELEVATED PROSTATE SPECIFIC ANTIGEN (PSA): ICD-10-CM

## 2025-07-10 ENCOUNTER — PATIENT MESSAGE (OUTPATIENT)
Dept: ADMINISTRATIVE | Facility: OTHER | Age: 70
End: 2025-07-10
Payer: MEDICARE

## 2025-07-17 ENCOUNTER — OFFICE VISIT (OUTPATIENT)
Dept: FAMILY MEDICINE | Facility: CLINIC | Age: 70
End: 2025-07-17
Payer: MEDICARE

## 2025-07-17 VITALS
HEIGHT: 66 IN | SYSTOLIC BLOOD PRESSURE: 151 MMHG | OXYGEN SATURATION: 96 % | WEIGHT: 169 LBS | BODY MASS INDEX: 27.16 KG/M2 | HEART RATE: 74 BPM | DIASTOLIC BLOOD PRESSURE: 83 MMHG

## 2025-07-17 DIAGNOSIS — K21.9 GASTROESOPHAGEAL REFLUX DISEASE WITHOUT ESOPHAGITIS: ICD-10-CM

## 2025-07-17 DIAGNOSIS — M75.22 BICEPS TENDONITIS ON LEFT: ICD-10-CM

## 2025-07-17 DIAGNOSIS — R73.01 IFG (IMPAIRED FASTING GLUCOSE): ICD-10-CM

## 2025-07-17 DIAGNOSIS — Z79.899 LONG-TERM USE OF HIGH-RISK MEDICATION: ICD-10-CM

## 2025-07-17 DIAGNOSIS — I10 ESSENTIAL HYPERTENSION: Primary | ICD-10-CM

## 2025-07-17 DIAGNOSIS — Z76.0 MEDICATION REFILL: ICD-10-CM

## 2025-07-17 DIAGNOSIS — R73.9 ELEVATED BLOOD SUGAR: ICD-10-CM

## 2025-07-17 PROCEDURE — 99214 OFFICE O/P EST MOD 30 MIN: CPT | Mod: S$GLB,,, | Performed by: FAMILY MEDICINE

## 2025-07-17 RX ORDER — AZELASTINE 1 MG/ML
1 SPRAY, METERED NASAL 2 TIMES DAILY
Qty: 90 ML | Refills: 3 | Status: SHIPPED | OUTPATIENT
Start: 2025-07-17
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
## 2025-07-23 ENCOUNTER — OFFICE VISIT (OUTPATIENT)
Dept: ORTHOPEDICS | Facility: CLINIC | Age: 70
End: 2025-07-23
Payer: MEDICARE

## 2025-07-23 ENCOUNTER — HOSPITAL ENCOUNTER (OUTPATIENT)
Dept: RADIOLOGY | Facility: HOSPITAL | Age: 70
Discharge: HOME | End: 2025-07-23
Payer: MEDICARE

## 2025-07-23 VITALS — BODY MASS INDEX: 27.16 KG/M2 | WEIGHT: 169 LBS | HEIGHT: 66 IN

## 2025-07-23 DIAGNOSIS — M77.12 LEFT LATERAL EPICONDYLITIS: Primary | ICD-10-CM

## 2025-07-23 DIAGNOSIS — M54.12 CERVICAL RADICULOPATHY: ICD-10-CM

## 2025-07-23 PROCEDURE — 99999PBSHW PR PBB SHADOW TECHNICAL ONLY FILED TO HB: Mod: PBBFAC,,,

## 2025-07-23 PROCEDURE — 99999 PR PBB SHADOW E&M-EST. PATIENT-LVL IV: CPT | Mod: PBBFAC,,,

## 2025-07-23 PROCEDURE — 73080 X-RAY EXAM OF ELBOW: CPT | Mod: TC,PN,LT

## 2025-07-23 PROCEDURE — 99214 OFFICE O/P EST MOD 30 MIN: CPT | Mod: PBBFAC,25,PN

## 2025-07-23 PROCEDURE — 73080 X-RAY EXAM OF ELBOW: CPT | Mod: 26,LT,, | Performed by: RADIOLOGY

## 2025-07-23 RX ORDER — MELOXICAM 7.5 MG/1
7.5 TABLET ORAL DAILY
Qty: 30 TABLET | Refills: 1 | Status: SHIPPED | OUTPATIENT
Start: 2025-07-23

## 2025-07-23 RX ORDER — TRIAMCINOLONE ACETONIDE 40 MG/ML
40 INJECTION, SUSPENSION INTRA-ARTICULAR; INTRAMUSCULAR
Status: DISCONTINUED | OUTPATIENT
Start: 2025-07-23 | End: 2025-07-23 | Stop reason: HOSPADM

## 2025-07-23 RX ADMIN — TRIAMCINOLONE ACETONIDE 40 MG: 40 INJECTION, SUSPENSION INTRA-ARTICULAR; INTRAMUSCULAR at 11:07

## 2025-07-23 NOTE — PROCEDURES
Tendon Sheath    Date/Time: 7/23/2025 11:45 AM    Performed by: Olaf Roldan PA-C  Authorized by: Olaf Roldan PA-C    Consent Done?:  Yes (Verbal)  Indications:  Pain  Prep: patient was prepped and draped in usual sterile fashion      Local anesthesia used?: Yes    Local anesthetic:  Lidocaine 1% without epinephrine  Location: left lateral epicondyle.  Needle size:  25 G  Medications:  40 mg triamcinolone acetonide 40 mg/mL  Patient tolerance:  Patient tolerated the procedure well with no immediate complications

## 2025-07-23 NOTE — PROGRESS NOTES
[facility] ORTHOPEDICS  [address]  [place] [address]  Phone: [phone]   [phone]    Patient's PCP: Zo Burrell MD  Referring Provider: Aaareferral Self    Subjective:     Chief Complaint:   Chief Complaint   Patient presents with    Left Elbow - Pain     Pain starts at elbow and radiates up to shoulder, pain at bicep and into the trapezius muscles, has neck pain at left side of neck and posterior neck, numbness and tingling to the left hand, limited and painful ROM at the shoulder joint, able to reach back    Left Shoulder - Pain       Past Medical History:   Diagnosis Date    A-fib     Anticoagulant long-term use     Asthma     Benign enlargement of prostate     Had a biopsy in around 2015    Bladder cancer     Cancer     COPD (chronic obstructive pulmonary disease)     DDD (degenerative disc disease), lumbar     Elevated PSA     GERD (gastroesophageal reflux disease)     Hypertension     Started with meds in 2012.    Kidney stone     Sleep apnea     Urinary tract infection        Current Outpatient Medications   Medication Sig    albuterol (PROVENTIL/VENTOLIN HFA) 90 mcg/actuation inhaler Inhale 1-2 puffs into the lungs every 4 (four) hours as needed for Shortness of Breath (coughing). Rescue    albuterol-ipratropium (DUO-NEB) 2.5 mg-0.5 mg/3 mL nebulizer solution Take 3 mLs by nebulization every 6 (six) hours while awake. Rescue    alfuzosin (UROXATRAL) 10 mg Tb24     ALPRAZolam (XANAX) 0.5 MG tablet Take 1 tablet (0.5 mg total) by mouth 3 (three) times daily as needed for Anxiety.    apixaban (ELIQUIS) 5 mg Tab Take 1 tablet (5 mg total) by mouth 2 (two) times daily.    azelastine (ASTELIN) 137 mcg (0.1 %) nasal spray 1 spray (137 mcg total) by Nasal route 2 (two) times daily.    azithromycin (ZITHROMAX) 500 MG tablet One daily for yellow mucous, repeat if needed    ensifentrine (OHTUVAYRE) 3 mg/2.5 mL NbSp Inhale 2.5 mLs (3 mg total) into the lungs 2 (two) times daily.     EScitalopram oxalate (LEXAPRO) 20 MG tablet Take 0.5 tablets (10 mg total) by mouth once daily.    esomeprazole (NEXIUM) 40 MG capsule Take 1 capsule (40 mg total) by mouth once daily.    ezetimibe (ZETIA) 10 mg tablet Take 1 tablet (10 mg total) by mouth every evening.    finasteride (PROSCAR) 5 mg tablet Take 5 mg by mouth once daily.    fluticasone-umeclidin-vilanter (TRELEGY ELLIPTA) 200-62.5-25 mcg inhaler Inhale 1 puff into the lungs once daily.    isosorbide mononitrate (IMDUR) 60 MG 24 hr tablet Take 1 tablet (60 mg total) by mouth once daily.    pitavastatin calcium (LIVALO) 4 mg Tab Take 1 tablet (4 mg) by mouth Daily.    predniSONE (DELTASONE) 20 MG tablet 3 for 3 days then 2 for 3 days then one for 3 days and repeat for breathing problems    tezepelumab-ekko (TEZSPIRE) 210 mg/1.91 mL (110 mg/mL) PnIj Inject 1.91 mLs (210.1 mg total) into the skin every 28 days.    meloxicam (MOBIC) 7.5 MG tablet Take 1 tablet (7.5 mg total) by mouth once daily.     No current facility-administered medications for this visit.       Review of patient's allergies indicates:   Allergen Reactions    Msg [glutamic acid] Nausea Only, Palpitations, Shortness Of Breath and Swelling    Oyster extract Swelling     PT swells in his throat after eating oysters on occasion       Family History   Problem Relation Name Age of Onset    Heart failure Mother      Cancer Father      Heart disease Brother      Heart attack Brother      Cancer Brother      Heart disease Brother      Drug abuse Brother         Social History[1]    Prior to meeting with the patient I reviewed the medical chart in [facility]. This included reviewing the previous progress notes from our office, review of the patient's last appointment with their primary care provider, review of any visits to the emergency room, and review of any pain management appointments or procedures.    History of present illness: 70 y.o. male  presenting with left elbow pain that has been present  for a couple of months.  He localizes the majority of his left elbow pain to the lateral aspect of his left elbow.  He denies any specific incident or trauma causing the pain to arise.  He states that his left elbow pain increases in severity with increased use of the left upper extremity.  He states that he has been diagnosed with tennis elbow in the past and has received a corticosteroid injection with complete relief of his left elbow pain.  He does admit to numbness and tingling throughout his left upper extremity from his neck into his hands.       Review of Systems:    Constitutional: Negative for chills, fever and weight loss.  HENT: Negative for congestion.    Eyes: Negative for discharge and redness.  Respiratory: Negative for cough and shortness of breath.    Cardiovascular: Negative for chest pain.  Gastrointestinal: Negative for nausea and vomiting.  Musculoskeletal: See HPI.  Skin: Negative for rash.  Neurological: Negative for headaches.  Endo/Heme/Allergies: Does not bruise/bleed easily.  Psychiatric/Behavioral: The patient is not nervous/anxious.    All other systems reviewed and are negative.       Objective:     Physical Examination:    Vital Signs: VITALS@    Body mass index is 27.28 kg/m².    This a well-developed, well nourished patient in no acute distress.  They are alert and oriented and cooperative to examination.    Left elbow exam: Skin overlying the left elbow is clean dry and intact.  No erythema or ecchymosis.  No signs or symptoms of infection.  Full range of motion of the left elbow with flexion-extension, supination, pronation.  Tenderness to lateral epicondyle of the left elbow.  Pain with resisted wrist extension.  Intact light touch sensation throughout the left upper extremity.  Negative Tinel's of the left elbow.  Distally neurovascularly intact.      Pertinent New Results:      XRAY Report / Interpretation:   Three views of the left elbow taken in clinic today reveal no acute  fractures or dislocations.  Visualized soft tissues unremarkable.    Procedure/s:  Tendon Sheath    Date/Time: 7/23/2025 11:45 AM    Performed by: Olaf Roldan PA-C  Authorized by: Olaf Roldan PA-C    Consent Done?:  Yes (Verbal)  Indications:  Pain  Prep: patient was prepped and draped in usual sterile fashion      Local anesthesia used?: Yes    Local anesthetic:  Lidocaine 1% without epinephrine  Location: left lateral epicondyle.  Needle size:  25 G  Medications:  40 mg triamcinolone acetonide 40 mg/mL  Patient tolerance:  Patient tolerated the procedure well with no immediate complications         Assessment:     1. Left lateral epicondylitis    2. Cervical radiculopathy      Plan:    Left lateral epicondylitis  Comments:  Acute. Will refer to Ortho for eval and tx.  Orders:  -     Ambulatory referral/consult to Orthopedics  -     X-Ray Elbow Complete Left  -     Tendon Sheath  -     meloxicam (MOBIC) 7.5 MG tablet; Take 1 tablet (7.5 mg total) by mouth once daily.  Dispense: 30 tablet; Refill: 1    Cervical radiculopathy    Other orders  -     Cancel: X-Ray Shoulder 2 or More Views Left        Follow up in about 6 weeks (around 9/3/2025) for XR bilat hip// bilat hip pain, L tennis elbow inj 7/23/25.    Lateral epicondylitis of the left elbow, cervical radiculopathy.  I believe that his left upper extremity pain is multifactorial.  A majority of his left elbow pain is likely due to lateral epicondylitis of the left elbow.  I also believe that the numbness and tingling throughout his left upper extremity is due to a radicular component stemming from his cervical spine.  I injected the common extensor tendon of the left elbow today with corticosteroid 40 mg Kenalog as both a diagnostic and therapeutic treatment.  He tolerated this well.  I also encouraged him to use a tennis elbow strap just distal to his left elbow to wear at all times.  I also prescribed oral meloxicam 7.5 mg taken once a day for his  inflammatory symptoms.  I would like him to follow up in 6 weeks to reassess his left elbow pain.    Receiving a steroid injection is a simple, in-office procedure.     After your injection, keep the following in mind:     In the first 48 hours after a steroid injection...     -You should be able to resume your normal day-to-day activities     -If you have any mild pain or swelling at the injection site, place an ice pack on the injection site for 10 minutes or as recommended by your doctor     In the months after an injection...     -Everyone responds differently so when your pain returns, schedule a follow up appointment     Corticosteroid injections are generally safe but do carry potential risks.  Local side effects may include, but is not limited to: postinjection flare, skin hypopigmentation and atrophy, infection, tendon rupture, accelerated progression of osteoarthritis, and osseous injury. Systemic side effects may include, but is not limited to: facial flushing, hypertension, and hyperglycemia.    The patient and I had a thorough discussion today.  We discussed the working diagnosis as well as several other potential alternative diagnoses.  We discussed treatment options, both conservative and surgical.  Conservative treatment options would include things such as activity modifications, workplace modifications, a period of rest, oral versus topical over the counter and oral versus topical prescription anti-inflammatory medications, physical therapy / occupational therapy, splinting / bracing, immobilization, corticosteroid injections, and others.      Olaf Roldan PA-C      EMR Statement:  Please note that portions of this patient encounter record were imported from the patients electronic medical record and that others were dictated using voice recognition software. For these reasons grammatical errors, nonsensical language, and apparently contradictory statements may be present.  These should be  disregarded or interpreted with respect to the context of the document.         [1]   Social History  Socioeconomic History    Marital status: Significant Other   Tobacco Use    Smoking status: Every Day     Current packs/day: 0.50     Average packs/day: 1 pack/day for 56.6 years (55.8 ttl pk-yrs)     Types: Cigarettes     Start date: 1/7/1968     Last attempt to quit: 1/7/2023    Smokeless tobacco: Never   Substance and Sexual Activity    Alcohol use: Yes     Comment: occ-beer    Drug use: No     Social Drivers of Health     Financial Resource Strain: High Risk (1/16/2025)    Overall Financial Resource Strain (CARDIA)     Difficulty of Paying Living Expenses: Hard   Food Insecurity: Food Insecurity Present (1/16/2025)    Hunger Vital Sign     Worried About Running Out of Food in the Last Year: Sometimes true     Ran Out of Food in the Last Year: Sometimes true   Transportation Needs: No Transportation Needs (1/16/2025)    PRAPARE - Transportation     Lack of Transportation (Medical): No     Lack of Transportation (Non-Medical): No   Physical Activity: Sufficiently Active (1/16/2025)    Exercise Vital Sign     Days of Exercise per Week: 7 days     Minutes of Exercise per Session: 30 min   Stress: Stress Concern Present (1/16/2025)    [employer] - Occupational Stress Questionnaire     Feeling of Stress : To some extent   Housing Stability: Unknown (1/16/2025)    Housing Stability Vital Sign     Unable to Pay for Housing in the Last Year: No     Homeless in the Last Year: No

## 2025-07-28 ENCOUNTER — OFFICE VISIT (OUTPATIENT)
Dept: PULMONOLOGY | Facility: CLINIC | Age: 70
End: 2025-07-28
Payer: MEDICARE

## 2025-07-28 VITALS
OXYGEN SATURATION: 95 % | WEIGHT: 170.06 LBS | BODY MASS INDEX: 27.33 KG/M2 | HEART RATE: 77 BPM | DIASTOLIC BLOOD PRESSURE: 92 MMHG | SYSTOLIC BLOOD PRESSURE: 172 MMHG | HEIGHT: 66 IN

## 2025-07-28 DIAGNOSIS — I10 PRIMARY HYPERTENSION: Primary | ICD-10-CM

## 2025-07-28 DIAGNOSIS — J44.89 ASTHMA-COPD OVERLAP SYNDROME: ICD-10-CM

## 2025-07-28 DIAGNOSIS — F17.210 NICOTINE DEPENDENCE, CIGARETTES, UNCOMPLICATED: ICD-10-CM

## 2025-07-28 DIAGNOSIS — J44.9 CHRONIC OBSTRUCTIVE PULMONARY DISEASE, UNSPECIFIED COPD TYPE: ICD-10-CM

## 2025-07-28 PROCEDURE — 99213 OFFICE O/P EST LOW 20 MIN: CPT | Mod: S$PBB,,, | Performed by: INTERNAL MEDICINE

## 2025-07-28 PROCEDURE — 99999 PR PBB SHADOW E&M-EST. PATIENT-LVL IV: CPT | Mod: PBBFAC,,, | Performed by: INTERNAL MEDICINE

## 2025-07-28 PROCEDURE — 99214 OFFICE O/P EST MOD 30 MIN: CPT | Mod: PBBFAC,PO | Performed by: INTERNAL MEDICINE

## 2025-07-28 RX ORDER — DOXYCYCLINE 100 MG/1
100 CAPSULE ORAL EVERY 12 HOURS
Qty: 20 CAPSULE | Refills: 2 | Status: SHIPPED | OUTPATIENT
Start: 2025-07-28

## 2025-07-28 RX ORDER — DILTIAZEM HYDROCHLORIDE 180 MG/1
180 CAPSULE, COATED, EXTENDED RELEASE ORAL DAILY
Qty: 90 CAPSULE | Refills: 3 | Status: SHIPPED | OUTPATIENT
Start: 2025-07-28 | End: 2026-07-28

## 2025-07-28 NOTE — PATIENT INSTRUCTIONS
You doing at least as well on tezspire compared to dupixent.  No prednisone used    Continue trelegy  Use albuterol as needed-- you currently use once to twice weekly    Ok to use prednisone    Ohtuvaryre not effective and may have ppt exacerbation and had unusual yellow discoloration -- will discontinue..    Bp up -- dose diltiazem 180 once daily-- may need increaase dose-- goal for bp is less than 135/85..    Need to stop smokes

## 2025-07-28 NOTE — PROGRESS NOTES
7/28/2025    Martell Corcoran  Follow up    Chief Complaint   Patient presents with    Follow-up    COPD    Asthma       7/28/2025 pt relates ohtuvarye ppt worse breathing,  pt has signficant discolored medicine  Pt got tezspire last 4months-- not worse than dupixent .  Not having to use prednisone.    Pt still smokes -- valves consider for emphysema but needs off 4 months.      3/24/2025 pt  to start tezspire later in week -- pt has still been getting dupixent.  Pt had good response after last dose steroids--felt good then didn't feel good.    Patient Instructions   Ct chest viewed from 1/2025 -- nodule seen last yr cleared.    Tezspire to start - -stopping dupixent    Use p[rednisone if needed -- not clearly helpful last dosed.    Ct chest due 1/2026    Continue trelegy      Ohtuvayre may help -- ordered -- only avail twice daily               December 10, 2024- the patient is a smoker-worked at [employer]-had become steroid dependent asthma-does would Dupixent  did better -- was effective in June,but seems less effective now -- was on since April.     Had a mediastinal cyst that was evaluated and felt to be benign.  Pt usually better this time of year.   Pt now much worse-he thinks Dupixent is not effective anymore.  He was steroid dependent before starting Dupixent.  He has not taken steroids since.  He has been on Dupixent now for about 6 months plus.  Patient still smokes.  He has been using preventive therapy with Trelegy  Patient Instructions   Ct chest with left mid lung (series 3 image 105) <5 mm mainly ground glass density,and also 4.7 mm right lower lobe superior segment (series 3 image 125) nodule--    Need to stop smokes  Will repeat ct in 6 months June 2nd.          Dupixent seemed very effective -- actually you are doing better as you are not on constant prednisone.  Will order tezspire.  Would recommend follow up eosiniophils    Take prednisone now.        6/10/204 pt off prednisone as weaned  off-- had couple spells sob. Has been dupixent with very good result.  Pt missed couple doses.   Pt off prednisone   Newly  to girl friend....    Still h;aving smoking problems... off smokes intermittently...  Patient Instructions   Dupixent response very good -- expect better in 3-9 months    Check ct in octobers-- next 5 yrs ordered    Call if problems        3/7/2024 pt has history of smoking and prednisone down to 5/d.  Still uses trelegy once daily..   no longer has a fib-- may stop eliuis sooon... no yellow mucous  Pt has been on steroids since last June and needed continious  Not using cpap as not felt helpful..  November 2, 2023-absolute eosinophil count was only 100 in late October and also in middle September.  October 31st CT of the chest did not show any remarkable findings.  Resumed smokes-- frustrated with [employer].   treatment plant failed.  Pt resumed prednisone as lungs worsened-- now on 7.5 mg.     Patient Instructions   Ct chest viewed  from 10/31/2023 -- no lung nodules of concern    Mucous seen in airway -- you appreciate mucous.      You need chronic steroids and you had hyperglycemia -- metformin 500 mg daily (could go to 2 daily if needed) may prevent    Pt had been intolerant of crestor, lipitor, mevacor, zocor, and provachol  -- you have been able to tolerate Livalo and need to continue as you have had MI and stable angina......  Patient Instructions   You have severe steroid dependant asthma.  Would recommend  dupixent 300 mg every 2 wks    May consider going to prednisone 2.5 mg daily if dupixent works well    Dupixent sample given in office for total of 600 mg sq today        Ct viewed going back to  2017  There is vague mild tracheal stenosis seen on  ct and chest xray going back to 2017 -- may impair mucous clearing.  No intervention for now...  9/6/2023 off smokes 7/26/2023  and still steroid dependant.  Pt got off prednisone and not thriving.   Breathing very short  acitivity.  Marked garcia last month.  Sinuses ok and no yg mucous.  A fib remitted after ablation.  Patient Instructions   You are off smokes and still needing steroids as breathing poorly.  Pattern is severe asthmatic and eosinophils have been very high.    Would recheck eosinophils    Ct from 2022 viewed and showed minimal emphysema.    Trelegy is dosed daily.    Use albuterol prior to activity.      Resume prednisone 10 mg daily to 20 mg daily after blood test.  Stop in 2 wks and resume as needed    If remains unstable by November may consider shots?     -     7/27/2023 just got off steroids last 2 days -- uses trelegy..  had a fib ablation on 13th July and was feeling well.    Pt off smokes since 1/7/2023     Pt was seen by Dr Thornton -- no f/u for mediastinal cyst --we discuss today  Patient Instructions   Non fasting sugar of 146 on July 11th suggest steroid induced diabetes.  Should be better off steroids.    If you relapse soon would recommend biologic shots for asthma -- Fasenra would be good?    Mediastinal abnormality was eval by Dr Thornton in past-- could do follow up ct chest to screen for lung cancer... we discuss    Continue trelegy    Lung capacity was 55% in 2020 6/29/2023 pt presented nsr er June 13th -- had bad coughing spell with sob and chest pains -- pt went home on 18th on prednisone 60/d with few days left today.  Pt still has wheezes in early am.   Codeine did not work -- may have worsened.    Still on trelegy.      Eos were elevated to 800 at presentation --   Patient Instructions   Cxr June 13th viewed  - no pneumonia  You had high blood sugars in hospital -- 160's.  You may have problems with using high dose steroids.   Stop prednisone when out -- drop to 2 daily for 2 days then one daily (if able)- 14 days.    Have prednisone on hand-- start 3 20 mg daily and call if needs again  You have been on trelegy -- continue.   You had unusually high eosinophils when flared lungs with recent heart  attack  You have asthma and copd.  You may be steroid dependant if need to repeat-- consider special shots.       No bad germs in lung mucous on June 15th  May need diabetes medication-- would check sugars in future if on steroids......        5/23/2023 pt was doing cleaning for elderly cat lady- 5/12/2023- had to spray acontainer for roaches- loaded with roaches- he was given fogger,  and was trying to fog container, sprayed and then closed container,  exposed to [place] fogger off and on for roughly 1/2 hr to 40 min. No burning. No sob nor cough til roughly 4-6 hrs later.  Similar exposures to cleaning was no problem in past. Pt was advised to go to er after 4 -5 days -- pt had to use neb q 4-6 hr post exposure whereas was using neb rx every 6 months prior.  Pt seen in er-- prednisone 20/d x 4 done.  Sat 96 and wheezes noted.      Pt did use zpak but mucous still yellow.   Patient Instructions   Chest xray 5/17/2023 viewed with no problems.    Breathing test 2020 with copd 56% lung capacity      Use trelegy or stiolto-- trelegy has steroids which are more effective to prevent wheezes    Increase steroids-- may need into future?? For now use prednisone 20mg 3 x 3 and taper,,,,,  may repeat.  Would be best to inhale steroids to prevent relapse -- not sure long term need.    Codiene not covered but not $$      Use azithromycin, may need special antibiotic??  Need to have mucous clear.      Call if not back to usual in 2-3 wks???    3/7/2023 quit smokes, feels fatigued chronically. Had naila and cardioversion November.  Back to rhythm but could not use meds to continue.   Uses anoro and qvar--- not using reg as before.    Pt missed lexapro somehow-- had increased anxiety and stomach issues.  Uses occ xanax.   Uses astelin      Has occ blurred vision.  Patient Instructions   Pet scan viewed.  Deviated septum noted.     Would use stiolto 2 daily -- could skip -- anoro not good for regular use.    Novmeber screening ct  chest     Nerves may cause fatigue    Sleep apnea may cause excess sleepiness-- would re try cpap once nerves stable    Would re try cholesterol  medications.     Try cpap -- may use ambein to sleep--dedicate 8 hrs sleep to use ambein.      Sept 13, 2022  in past, had own company.  Lung dz developed 4 + yrs ago.  Bladder cancer removed.  Had bcg infusions 2020 . Some garcia. Problem nasal stuffy tolerating cpap.    Patient Instructions   Nasal stuffy -- afrin or generic afrin --- use one to 2 sprays up to once daily -- alternate nares ?  If regular use may get tolerant and afrin ineffective.  Could see ent to improve passages.      Flonase should work better (or astelin) if passages open.     Sleep study was only 8 episodes an hour-- less than 5 is normal.     Cpap titration needed 8-9 cm pressure    Mediastinal abnormality varies-- would send to cardiothoracic surgery.    Copd is moderate at 55% or so.  Anoro and qvar effective -- as needed albuterol.  Would stop smokes.     CT films are reviewed directly with the patient.  Extensive discussion is made.  Patient's evaluation took 56 minutes today.          Below from RJ Salgado  6/1/2022- SOB persistent complaint, pain with deep inspiration on left chest. Followed by cardiology for A fib and vascular blockages pacemaker procedure postponed.    Wearing CPAP with nose mask, moves across face when sleeping. Does not feel better after wearing.   Cough- improved, less often and severe, most days, worse when weather changes, productive clear mucous  Currently smoking 3-4 cigarettes daily, trying to cut down.   Patient Instructions   CT of chest shows clear lungs, no change to previously seen lung nodules.     A cyst is seen in muscle could be cause of chest pain    Recommend smoking cessation    Continue COPD medication regiment    Recommend trying CPAP therapy again.     2/4/2022-  States breathing is labored. States usually worse after infusion for  bladder cancer. States does have benefit with qvar and Anoro, using albuterol as needed 2x daily.     Daytime fatigue persistent.  purchased CPAP from third party. Has not started to use.     Scheduled for pacemaker to treat A-fib. Followed by Dr. Miranda.     Cough- decreased, 2x daily, productive clear mucous, cut back on smoking to 6 cigarettes daily    1/14/2022- not able to get CPAP due to high co-pay.   States breathing improved after decreasing smoking to 3-6 cigarettes daily.   Shortness of breath- daily complaint, slightly improved, worse with exertion, improves with rest. Daytime fatigue unchanged.   Complaint of cough- varies in severity, currently mild. Productive small amount clear mucous, did notice worsening after first starting to cut back on smoking. Has returned to normal   Followed by Urologist Dr. Pham for bladder cancer. Undergoing infusion therapy.     10/4/2021- concerned he had COVID 19 late July early August, lost sense of taste and smell. Complaint of fatigue, body aches, shortness of breath when walking,   No fever. Gradually improved over 4 weeks. Taste and smell has not returned. Experiencing short term memory loss and metal grogginess. Has daytime drowsiness onset years worsened in past two months  Persistent cough- productive clear mucous, associated with wheeze. Improves with albuterol inhaler.   Currently on Anoro, QVAR, using albuterol when needed 1-3 x weekly.     5/24/2021- Admitted To hospital in [place] while on vacation April 27, 2021 for COPD exacerbation. Seen at [facility] December 16, 2020 for COPD exacerbation.   Complaint of SOB- daily, worse with exertion, improves with rest. Treated with antibiotics and steroid therapy April.   Cough- recurrent complaint,  improved since hospital discharge, worsened in last 2 weeks after spending time with la who had bronchitis that has improved with nebulizer treatments every 4 hours. Associated with chest tightness and wheeze.        2020- he had bladder biopsy at [facility] with Dr. Pham on 11/3- per outside records path with High grade papillary urothelial cell carcinoma. He has cough w/ postnasal drip and allergies but breathing is much better. He has been dealing with a lot for the family- 2 brothers just . Hematuria is much better. Not getting bladder infections any more. He continues to smoke 1/2 ppd. Wants to quit but too much stress recently- not ready.    2020-   Start taking prednisone again- long taper over 3 weeks then try to stop. If lungs flaring while decreasing prednisone go up to the last dose that helped you and call our office.  Continue trelegy  Refilled duo nebs to use as needed  Use albuterol as needed  Quit smoking- go to program  Follow up with urologist  Follow up with PCP for kidney testing and possible urinary infection  pt was recently hospitalized for COPD exacerbation, seen by me while admitted 20 and also having worsening hematuria at that time. He was sent home with a course of levaquin and steroid taper. He did not qualify for home O2. He is being worked up for a bladder mass per urology and pending transurethral resection.  Pt c/o pain around L kidney after doing yard work and urine turned darker. He was coughing last night and woke up with face feeling puffy. Switched urologists to a Dr. Pham at [facility] and has appt at end of September.  He finished prednisone taper. Still taking levaquin. Still feels some congestion in chest but it is getting better. Also has sinus problems w/ nose blocked. He denies frequent exacerbations but this one has been very bad. Feels like worsening since stopped prednisone. Still smokes but trying to cut back. Has smoking cessation appt later this month.  Inhalers: nebs 4-5x/day, trelegy daily, albuterol rescue 1-2x/day    7/15/20- Pt is a 66 yo male with HTN, GERD and BPH presents for new evaluation of breathing issues. He complains of SOB  especially if he carries anything like taking out trash to the dumpster. This has been progressive over about 3.5 yrs. He wheezes and coughs up clear mucous, throughout the day.  Pt smokes 1 PPD x 55 yrs.  At age 5 pt had pna and a collapsed lung requiring chest tube on the left side and hospitalization. Didn't have any other problems w/ breathing growing up. He took up playing [employer] to help lung function.  Pt had abd/p scan for UTI recently which showed some emphysematous changes.    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 One brother  at 65 from leukemia.    Grew up in [place]    The chief compliant  problem varies with instablilty at time      PFSH:  Past Medical History:   Diagnosis Date    A-fib     Anticoagulant long-term use     Asthma     Benign enlargement of prostate     Had a biopsy in around     Bladder cancer     Cancer     COPD (chronic obstructive pulmonary disease)     DDD (degenerative disc disease), lumbar     Elevated PSA     GERD (gastroesophageal reflux disease)     Hypertension     Started with meds in .    Kidney stone     Sleep apnea     Urinary tract infection      Past surg hist  Past Surgical History:   Procedure Laterality Date    FISTULA REPAIR        LEFT HEART CATHETERIZATION N/A 10/23/2018     Procedure: Left heart cath;  Surgeon: Niharika Squires MD;  [facility];  Service: Cardiology;  Laterality: N/A;          Social History     Tobacco Use    Smoking status: Every Day     Current packs/day: 0.50     Average packs/day: 1 pack/day for 56.6 years (55.8 ttl pk-yrs)     Types: Cigarettes     Start date: 1968     Last attempt to quit: 2023    Smokeless tobacco: Never   Substance Use Topics    Alcohol use: Yes     Comment: occ-beer    Drug use: No     Family History   Problem Relation Name Age of Onset    Heart failure Mother      Cancer Father      Heart  "disease Brother      Heart attack Brother      Cancer Brother      Heart disease Brother      Drug abuse Brother       Review of patient's allergies indicates:   Allergen Reactions    Msg [glutamic acid] Nausea Only, Palpitations, Shortness Of Breath and Swelling    Oyster extract Swelling     PT swells in his throat after eating oysters on occasion       Performance Status:The patient's activity level is functions out of house. QUACH improved and does not limit him. Back pain limits activity.    Review of Systems:  a review of eleven systems covering constitutional, Eye, HEENT, Psych, Respiratory, Cardiac, GI, , Musculoskeletal, Endocrine, Dermatologic was negative except for pertinent findings as listed ABOVE and below:   wheeze, shortness of breath, fatigue      Exam:Comprehensive exam done. BP (!) 172/92 (BP Location: Right arm, Patient Position: Sitting)   Pulse 77   Ht 5' 6" (1.676 m)   Wt 77.2 kg (170 lb 1.4 oz)   SpO2 95% Comment: on room air at rest  BMI 27.45 kg/m²   Exam included Vitals as listed, and patient's appearance and affect and alertness and mood, oral exam for yeast and hygiene and pharynx lesions and Mallapatti (M) score, neck with inspection for jvd and masses and thyroid abnormalities and lymph nodes (supraclavicular and infraclavicular nodes and axillary also examined and noted if abn), chest exam included symmetry and effort and fremitus and percussion and auscultation, cardiac exam included rhythm and gallops and murmur and rubs and jvd and edema, abdominal exam for mass and hepatosplenomegaly and tenderness and hernias and bowel sounds, Musculoskeletal exam with muscle tone and posture and mobility/gait and  strength, and skin for rashes and cyanosis and pallor and turgor, extremity for clubbing.  Findings were normal except for pertinent findings listed below:  M1  Bilateral clear lungs w/ faint rhonchi bilateral lower lung zones  No clubbing or edema    Radiographs (ct chest " and cxr) reviewed: view by direct vision   CT scan of the chest September 7, 2022 viewed by direct vision.  Fairly small mediastinal cystic structure looks to be a little smaller than March.  There was very very difficult to see in 2021 however.    CT Chest Without Contrast 03/14/22 Emphysema and unchanged lung nodules  Indeterminate smoothly marginated structure along the anterior superior mediastinum, measuring just above simple fluid attenuation suggestive of a minimally complex/complicated cyst, possibly a lymphovascular malformation, synovial cyst (extending from the sternoclavicular joint), foregut duplication cyst, thymic epithelial lesion/cyst, and much less likely malignancy, however this has slightly increased in size from the previous exam and may warrant interval attention on follow-up imaging.       CT Chest Without Contrast  05/20/2021   In the left upper lobe is a confluence of vessels and a possible 4 mm nodule decreased in size and conspicuousness since the prior study of July 16, 2020.  No new or enlarging pulmonary nodules are identified.  Emphysema.     CT chest/abd/p 11/20/20- mild emphysema, lungs clear aside from small 6mm nodule DANA which is unchanged from prior exam  1. No metastatic disease.  2. Nodular thickening of the left posterior aspect of the bladder.  3. Enlarged prostate.  3. Mild pulmonary emphysema.   CXR 8/14/20- possible vague infiltrate/opacifications bilateral bases  CT chest 7/16/20- DANA 6mm nodule  CT abd/p 6/26/20- bases of lungs w/ scant emphysematous changes, some strands of atelectasis    Labs reviewed    Lab Results   Component Value Date    WBC 13.85 (H) 12/23/2024    RBC 5.20 12/23/2024    HGB 16.7 12/23/2024    HCT 49.9 12/23/2024    MCV 96 12/23/2024    MCH 32.1 (H) 12/23/2024    MCHC 33.5 12/23/2024    RDW 13.5 12/23/2024     12/23/2024    MPV 8.4 (L) 12/23/2024    GRAN 11.9 (H) 12/23/2024    GRAN 85.7 (H) 12/23/2024    LYMPH 0.9 (L) 12/23/2024    LYMPH 6.6  (L) 12/23/2024    MONO 1.0 12/23/2024    MONO 7.0 12/23/2024    EOS 0.0 12/23/2024    BASO 0.01 12/23/2024    EOSINOPHIL 0.0 12/23/2024    BASOPHIL 0.1 12/23/2024     Results for MARTELL ALBRECHT (MRN [id number]) as of 5/24/2021 15:33   Ref. Range 8/13/2020 02:30 8/13/2020 06:01 8/14/2020 05:03 12/16/2020 09:20 3/22/2021 15:14   Eos # Latest Ref Range: 15 - 500 cells/uL 1.4 (H) 0.3 0.0 0.5 122       PFT reviewed  7/16/20- moderately severe obstruction, reduced DLCO, air trapping    EPWORTH SLEEPINESS SCALE SCORE 13 on 10/4/2021  Sleep study 10/7/2021 A[facility] Significant obstructive sleep apnea with a TRUPTI of 8.1/hr      Plan:  Clinical impression is apparently straight forward and impression with management as below.    Martell was seen today for follow-up, copd and asthma.    Diagnoses and all orders for this visit:    Primary hypertension  -     diltiaZEM (CARDIZEM CD) 180 MG 24 hr capsule; Take 1 capsule (180 mg total) by mouth once daily.    Asthma-COPD overlap syndrome    Chronic obstructive pulmonary disease, unspecified COPD type  -     doxycycline (VIBRAMYCIN) 100 MG Cap; Take 1 capsule (100 mg total) by mouth every 12 (twelve) hours.    Nicotine dependence, cigarettes, uncomplicated                             - continue COPD medication regiment   - lung nodules unchanged.        Follow up in about 4 months (around 11/28/2025), or if symptoms worsen or fail to improve.    Discussed with patient above for education the following:         Discussed with patient above for education the following:      Patient Instructions   You doing at least as well on tezspire compared to dupixent.  No prednisone used    Continue trelegy  Use albuterol as needed-- you currently use once to twice weekly    Ok to use prednisone    Ohtuvaryre not effective and may have ppt exacerbation and had unusual yellow discoloration -- will discontinue..    Bp up -- dose diltiazem 180 once daily-- may need increaase dose-- goal for bp is less  than 135/85..    Need to stop smokes           Erin took26 min

## 2025-08-05 ENCOUNTER — PATIENT MESSAGE (OUTPATIENT)
Dept: ADMINISTRATIVE | Facility: OTHER | Age: 70
End: 2025-08-05
Payer: MEDICARE

## 2025-08-14 ENCOUNTER — TELEPHONE (OUTPATIENT)
Dept: FAMILY MEDICINE | Facility: CLINIC | Age: 70
End: 2025-08-14
Payer: MEDICARE

## 2025-08-14 VITALS — DIASTOLIC BLOOD PRESSURE: 82 MMHG | SYSTOLIC BLOOD PRESSURE: 135 MMHG

## 2025-09-03 ENCOUNTER — HOSPITAL ENCOUNTER (OUTPATIENT)
Dept: RADIOLOGY | Facility: HOSPITAL | Age: 70
Discharge: HOME | End: 2025-09-03
Payer: MEDICARE

## 2025-09-03 ENCOUNTER — OFFICE VISIT (OUTPATIENT)
Dept: ORTHOPEDICS | Facility: CLINIC | Age: 70
End: 2025-09-03
Payer: MEDICARE

## 2025-09-03 VITALS — BODY MASS INDEX: 27.35 KG/M2 | HEIGHT: 66 IN | WEIGHT: 170.19 LBS

## 2025-09-03 DIAGNOSIS — M75.52 SUBACROMIAL BURSITIS OF LEFT SHOULDER JOINT: ICD-10-CM

## 2025-09-03 DIAGNOSIS — M70.61 GREATER TROCHANTERIC BURSITIS, RIGHT: ICD-10-CM

## 2025-09-03 DIAGNOSIS — M77.12 LEFT LATERAL EPICONDYLITIS: Primary | ICD-10-CM

## 2025-09-03 DIAGNOSIS — M70.62 GREATER TROCHANTERIC BURSITIS, LEFT: ICD-10-CM

## 2025-09-03 PROCEDURE — 72170 X-RAY EXAM OF PELVIS: CPT | Mod: TC,PN

## 2025-09-03 PROCEDURE — 73030 X-RAY EXAM OF SHOULDER: CPT | Mod: TC,PN,LT

## 2025-09-03 PROCEDURE — 99214 OFFICE O/P EST MOD 30 MIN: CPT | Mod: PBBFAC,25,PN

## 2025-09-03 PROCEDURE — 72100 X-RAY EXAM L-S SPINE 2/3 VWS: CPT | Mod: 26,,, | Performed by: RADIOLOGY

## 2025-09-03 PROCEDURE — 99999 PR PBB SHADOW E&M-EST. PATIENT-LVL IV: CPT | Mod: PBBFAC,,,

## 2025-09-03 PROCEDURE — 20610 DRAIN/INJ JOINT/BURSA W/O US: CPT | Mod: PBBFAC,PN,LT

## 2025-09-03 PROCEDURE — 73030 X-RAY EXAM OF SHOULDER: CPT | Mod: 26,LT,, | Performed by: RADIOLOGY

## 2025-09-03 PROCEDURE — 72170 X-RAY EXAM OF PELVIS: CPT | Mod: 26,,, | Performed by: RADIOLOGY

## 2025-09-03 PROCEDURE — 99999PBSHW PR PBB SHADOW TECHNICAL ONLY FILED TO HB: Mod: JZ,PBBFAC,,

## 2025-09-03 PROCEDURE — 72100 X-RAY EXAM L-S SPINE 2/3 VWS: CPT | Mod: TC,PN

## 2025-09-03 RX ORDER — METHYLPREDNISOLONE ACETATE 40 MG/ML
40 INJECTION, SUSPENSION INTRA-ARTICULAR; INTRALESIONAL; INTRAMUSCULAR; SOFT TISSUE
Status: DISCONTINUED | OUTPATIENT
Start: 2025-09-03 | End: 2025-09-03 | Stop reason: HOSPADM

## 2025-09-03 RX ADMIN — METHYLPREDNISOLONE ACETATE 40 MG: 40 INJECTION, SUSPENSION INTRA-ARTICULAR; INTRALESIONAL; INTRAMUSCULAR; SOFT TISSUE at 11:09

## 2025-09-04 DIAGNOSIS — R10.32 ABDOMINAL PAIN, LEFT LOWER QUADRANT: Primary | ICD-10-CM

## 2025-09-04 DIAGNOSIS — Z79.01 LONG TERM (CURRENT) USE OF ANTICOAGULANTS: ICD-10-CM

## 2025-09-04 DIAGNOSIS — Z86.0101 HISTORY OF ADENOMATOUS POLYP OF COLON: ICD-10-CM

## (undated) DEVICE — CATHETER DIAGNOSTIC DXTERITY 6FR JL 4

## (undated) DEVICE — LINE PRESSURE MONITORING 96IN

## (undated) DEVICE — SUT ETHILON 3/0 18IN PS-1

## (undated) DEVICE — SHEATH PINNACLE 6FRX10CM W/GUIDEWIRE

## (undated) DEVICE — SET HBE EXT CARESITE FILTER

## (undated) DEVICE — CATH THERMOCOOL SMTCH SF D F

## (undated) DEVICE — R CATH ACUSON ACUNAV 8FR

## (undated) DEVICE — CATH PENTARY F 2-6-2MM 115CM

## (undated) DEVICE — PAD DEFIB CADENCE ADULT R2

## (undated) DEVICE — SUT VICRYL PLUS 3-0 SH 18IN

## (undated) DEVICE — PATCH CARTO REFERENCE

## (undated) DEVICE — ELECTRODE REM PLYHSV RETURN 9

## (undated) DEVICE — GLOVE BIOGEL PI MICRO INDIC 8

## (undated) DEVICE — R CATH BIDIRECTIONL DF CRV 7FR

## (undated) DEVICE — SET SMARTABLATE IRR TUBE

## (undated) DEVICE — SPONGE COTTON WOVEN 4X4IN

## (undated) DEVICE — SHEATH INTRODUCER 9FR 11CM

## (undated) DEVICE — INTRO FAST-CATH SL1 8.5FR 63CM

## (undated) DEVICE — CATHETER DIAGNOSTIC DXTERITY 6FR JR 4.0

## (undated) DEVICE — COVER PRB TRNSDUC 7.6X183CM

## (undated) DEVICE — PACK EP DRAPE OMC

## (undated) DEVICE — SOL NACL IRR 1000ML BTL

## (undated) DEVICE — TRAY GENERAL SURGERY SMH

## (undated) DEVICE — SUT MCRYL PLUS 4-0 PS2 27IN

## (undated) DEVICE — DRAPE T TRNSVRS LAP 102X78X121

## (undated) DEVICE — NDL ECLIPSE SAFETY 23G 1.5IN

## (undated) DEVICE — PAD RADI FEMORAL

## (undated) DEVICE — GUIDEWIRE DOUBLE ENDED .035 DIA. 150CML

## (undated) DEVICE — INTRO AGILIS MED CRL 8.5F 71CM

## (undated) DEVICE — SHEATH HEMOSTASIS 8.5FR

## (undated) DEVICE — DRESSING MEPILEX 4X8IN

## (undated) DEVICE — NDL TRNSSPTL BRK-1 18GA 71CM

## (undated) DEVICE — CATHETER DIAGNOSTIC DXTERITY 6F PIGST

## (undated) DEVICE — INTRODUCER HEMOSTASIS 7.5F

## (undated) DEVICE — NDL TRNSSPTL BRK-1 18GA 98CM

## (undated) DEVICE — GLOVE BIOGEL PI MICRO SZ 7.5